# Patient Record
Sex: FEMALE | Race: BLACK OR AFRICAN AMERICAN | Employment: FULL TIME | URBAN - METROPOLITAN AREA
[De-identification: names, ages, dates, MRNs, and addresses within clinical notes are randomized per-mention and may not be internally consistent; named-entity substitution may affect disease eponyms.]

---

## 2017-01-28 DIAGNOSIS — I10 ESSENTIAL HYPERTENSION, BENIGN: Chronic | ICD-10-CM

## 2017-01-30 ENCOUNTER — OFFICE VISIT (OUTPATIENT)
Dept: CARDIOLOGY CLINIC | Age: 57
End: 2017-01-30

## 2017-01-30 ENCOUNTER — CLINICAL SUPPORT (OUTPATIENT)
Dept: CARDIOLOGY CLINIC | Age: 57
End: 2017-01-30

## 2017-01-30 VITALS
BODY MASS INDEX: 23.3 KG/M2 | OXYGEN SATURATION: 98 % | SYSTOLIC BLOOD PRESSURE: 150 MMHG | WEIGHT: 140 LBS | HEART RATE: 79 BPM | RESPIRATION RATE: 18 BRPM | DIASTOLIC BLOOD PRESSURE: 100 MMHG

## 2017-01-30 DIAGNOSIS — I47.1 PAROXYSMAL ATRIAL TACHYCARDIA (HCC): ICD-10-CM

## 2017-01-30 DIAGNOSIS — I50.20 SYSTOLIC HEART FAILURE, UNSPECIFIED HEART FAILURE CHRONICITY: ICD-10-CM

## 2017-01-30 DIAGNOSIS — I42.8 CARDIOMYOPATHY, NONISCHEMIC (HCC): ICD-10-CM

## 2017-01-30 DIAGNOSIS — I10 ESSENTIAL HYPERTENSION, BENIGN: Chronic | ICD-10-CM

## 2017-01-30 DIAGNOSIS — Z99.2 ESRD ON PERITONEAL DIALYSIS (HCC): ICD-10-CM

## 2017-01-30 DIAGNOSIS — N18.6 ESRD ON PERITONEAL DIALYSIS (HCC): ICD-10-CM

## 2017-01-30 DIAGNOSIS — I10 ESSENTIAL HYPERTENSION, BENIGN: Primary | Chronic | ICD-10-CM

## 2017-01-30 RX ORDER — VALSARTAN 80 MG/1
TABLET ORAL
Qty: 60 TAB | Refills: 5 | Status: CANCELLED | OUTPATIENT
Start: 2017-01-30

## 2017-01-30 RX ORDER — VALSARTAN 160 MG/1
160 TABLET ORAL 2 TIMES DAILY
Qty: 180 TAB | Refills: 3 | Status: SHIPPED | OUTPATIENT
Start: 2017-01-30 | End: 2018-03-03 | Stop reason: SDUPTHER

## 2017-01-30 RX ORDER — CARVEDILOL 25 MG/1
25 TABLET ORAL 2 TIMES DAILY WITH MEALS
Qty: 60 TAB | Refills: 3
Start: 2017-01-30 | End: 2017-02-08 | Stop reason: SDUPTHER

## 2017-01-30 NOTE — PROGRESS NOTES
[id number] Freddie Hdez [facility], [address] [facility]    Office [phone]  Fax [phone]    History of Present Illness    Oneil Ivy is a 61 y.o. female. Last seen 4 months ago. Problem List  Date Reviewed: 11/30/2016          Codes Class Noted    Paroxysmal atrial tachycardia ([facility] ICD-10-CM: I47.1  ICD-9-CM: 427.0  4/17/2016        ESRD on peritoneal dialysis [facility]) ICD-10-CM: N18.6, Z99.2  ICD-9-CM: 585.6, V45.11  98/84/3598        Systolic HF (heart failure) ([facility] ICD-10-CM: I50.20  ICD-9-CM: 428.20  10/31/2013        Cardiomyopathy, nonischemic ([facility] ICD-10-CM: I42.9  ICD-9-CM: 425.4  10/19/2013    Overview Signed 10/19/2013  1:36 PM by Lottie Jackson MD     LVEF 25-30% by echo 10/19/2013 (previously 45%)             Polycystic kidney disease (Chronic) ICD-10-CM: Q61.3  ICD-9-CM: 753.12  10/18/2012        Essential hypertension, benign (Chronic) ICD-10-CM: I10  ICD-9-CM: 401.1  10/18/2012                Cardiac Testing  ECHO: 10/14/12: EF 45% w/ posterior HK Grade 1 DD, LAE, mild MR, mild-mod TR RVSP 60 mmHG   Cath: 10/16/12: Normal cors. No AVG. Mild pulm HTN (43/16/26). Low-normal filling pressures. Echo 10/19/13 - EF 25- 30%. Severe diffuse hypokinesis. Mildly increased wall thickness. Mild concentric hypertrophy. Doppler parameters were consistent with a reversible restrictive pattern, indicative of decreased left ventricular  diastolic compliance and/or increased left atrial pressure (grade 3 diastolic dysfunction). LA mildly dilated, mild MR, mild TR, mod PA HTN, small pericardial effusion circumferential to the heart, no evidence of hemodynamic compromise. Echo 11/2/15 - EF 25-30%, global HK, mild MR  Lexiscan cardiolite 11/24/15 - focal anteroapical ischemia    Cath 12/15/2015 - EDP 10, LV dilated, EF 20% global HK, normal cors with right dominance. Unable to cannulate femoral vein.      12/29/15-implantation of a single-chamber [address] per  Angelo MAC    Ms. Mathew Seek notes of 3-4 pillow orthopnea for the past several weeks. Patient has unchanged occassional HENDERSON. Her BPs at home normally is in the 120/90s range, but notes it has been elevated recently. She remains active with work. She is still producing urine. Patient denies any exertional chest pain, palpitations, syncope,  edema or paroxysmal nocturnal dyspnea. No ICD discharges. Current Outpatient Prescriptions on File Prior to Visit   Medication Sig Dispense Refill    calcitRIOL (ROCALTROL) 0.25 mcg capsule Take 0.25 mcg by mouth every other day.  valsartan (DIOVAN) 80 mg tablet TAKE ONE TABLET BY MOUTH TWICE DAILY 60 Tab 5    potassium chloride SR (KLOR-CON 10) 10 mEq tablet Take 40 mEq by mouth daily.  spironolactone (ALDACTONE) 50 mg tablet Take 50 mg by mouth daily.  bumetanide (BUMEX) 2 mg tablet Take 4 mg by mouth daily.  lactulose (CHRONULAC) 10 gram/15 mL solution Take 20 g by mouth daily.  aspirin delayed-release 81 mg tablet Take 81 mg by mouth daily.  gentamicin (GARAMYCIN) 0.1 % topical cream Apply  to affected area two (2) times a day.  sevelamer carbonate (RENVELA) 800 mg tab tab Take 800 mg by mouth three (3) times daily (with meals).  MULTIVITAMIN PO Take  by mouth.  fluticasone (FLONASE) 50 mcg/actuation nasal spray 1 [place] by Both Nostrils route daily. 1 Bottle 0     No current facility-administered medications on file prior to visit. Social History     Social History    Marital status: SINGLE     Spouse name: N/A    Number of children: N/A    Years of education: N/A     Occupational History    Not on file.      Social History Main Topics    Smoking status: Never Smoker    Smokeless tobacco: Never Used    Alcohol use No    Drug use: No    Sexual activity: Not Currently     Partners: Male     Other Topics Concern    Not on file     Social History Narrative     Not currently employed as of last week    Review of Systems  Constitutional:  Negative for fever, chills and diaphoresis. Respiratory:  Positive for dyspnea. Negative for cough, hemoptysis, sputum production. Cardiovascular:  Negative for palpitations, claudication, leg swelling and PND. Positive for orthopnea. Gastrointestinal:  Negative for nausea, vomiting, blood in stool and melena. Genitourinary:  Negative for dysuria, urgency and flank pain. Musculoskeletal:  Negative for back pain and joint pain. Skin:  Negative for rash. Neurological:  Negative for dizziness, weakness, seizures, loss of consciousness and headaches. Endo/Heme/Allergies:  Does not bruise/bleed easily. Psychiatric/Behavioral:  Negative for memory loss. The patient does not have insomnia. Visit Vitals    BP (!) 150/100 (BP 1 Location: Left arm, BP Patient Position: At rest)    Pulse 79    Resp 18    Wt 140 lb (63.5 kg)    SpO2 98%    BMI 23.3 kg/m2     Wt Readings from Last 3 Encounters:   01/30/17 140 lb (63.5 kg)   11/09/16 140 lb (63.5 kg)   04/15/16 144 lb 12.8 oz (65.7 kg)     Physical Exam  Vitals reviewed. Constitutional:  She is oriented to person, place and time. She appears well-nourished. HENT:  Head:  Normocephalic. Neck:  Neck supple. No JVD present. Cardiovascular:  Normal rate, regular rhythm, normal heart sounds and intact distal pulses. Exam reveals no gallop. No murmur heard. Pulmonary/Chest:  Effort normal and breath sounds normal.  Abdominal:  Soft, nontender. Bowel sounds normal.  Musculoskeletal:  No edema. Neurological:  Alert and oriented to person, place and time. Skin:  Skin is warm and dry. Psychiatric:  She has a normal mood and affect. Cardiographics  Echo 11/2/15 - EF 25-30%, global HK, mild MR  Lexiscan cardiolite 11/24/15 - focal anteroapical ischemia  EKG 4/15/2016 - atrial tachycardia 120   Echo 1/30/16 - dilated LV, EF 20%    ASSESSMENT and PLAN  Encounter Diagnoses   Name Primary?     Essential hypertension, benign Yes    Paroxysmal atrial tachycardia (HCC)     Systolic heart failure, unspecified heart failure chronicity (HCC)     Cardiomyopathy, nonischemic ([facility]        Ms. Brennan Richardson has a NICM with severe LV dysfunction s/p ICD in the setting of HTN, ESRD on PD. She has had class 2 HF until the past 2 weeks when she describes orthopnea. her BPs have been borderline elevated. She continues to make urine with diuretic therapy. Echo today is largely unchanged with EF in the 20% range. Will increase Valsartan to 160mg BID. Continue Coreg 25mg BID. She continues to increase dylysate to manage her fluid status. We had a long discussion of the role of heart/kidney transplantation. She would like to hold off the discussion for now. Follow-up Disposition:  Return in about 3 months (around 4/30/2017).      Written by Chantel Maradiaga, as dictated by Jose A Hutton MD.   Jose A Hutton MD

## 2017-01-30 NOTE — MR AVS SNAPSHOT
Visit Information Date & Time Provider Department Dept. Phone Encounter #  
 1/30/2017  3:20 PM Adalgisa Bruce MD [facility] [place] [phone] [id number] Follow-up Instructions Return in about 3 months (around 4/30/2017). Your Appointments 12/8/2017  8:30 AM  
PACEMAKER with PACEMAKER, STFRANCES  
[facility] ([place] SCHEDULING) Appt Note: vernon sci icd/stf/new lat schedule [address] [place] [address] [address] [facility]  
[phone]  
  
   
 [address] [facility] [address]  
  
    
 12/8/2017  9:00 AM  
ESTABLISHED PATIENT with Brii Cintron MD  
[facility] ([facility]) Appt Note: annual w/ pacer ck [address] [address]  
[phone]  
  
   
 [address] [place] [address] [address]  
  
    
  
 2/16/2017 10:45 AM  
REMOTE OFFICE VISIT with Bharath Navarrete [facility] ([place] SCHEDULING) Appt Note: lat icd/stf/b 11-9-16  
 [address] [address]  
[phone]  
  
   
 [address] [place] [phone]  
  
    
 5/23/2017 11:15 AM  
REMOTE OFFICE VISIT with Bharath Navarrete [facility] ([facility] SCHEDULING) Appt Note: lat icd/stf  
 [address] [address] [facility]  
[phone]  
  
    
 8/29/2017 10:30 AM  
REMOTE OFFICE VISIT with Bharath Navarrete [facility] ([place] SCHEDULING) Appt Note: lat icd/stf  
 [address] [place] [address] [address] [facility]  
[phone] Upcoming Health Maintenance Date Due Hepatitis C Screening 1960 DTaP/Tdap/Td series (1 - Tdap) 12/18/1980 FOBT Q 1 YEAR AGE 50-75 12/18/2009 Pneumococcal 19-64 Highest Risk (2 of 3 - PCV13) 10/19/2014 BREAST CANCER SCRN MAMMOGRAM 7/6/2016 INFLUENZA AGE 9 TO ADULT 8/1/2016 PAP AKA CERVICAL CYTOLOGY 7/6/2020 Allergies as of 1/30/2017  Review Complete On: 11/30/2016 By: Rashid Vincent MD  
 No Known Allergies Current Immunizations  Never Reviewed Name Date Influenza Vaccine PF 10/19/2013  6:32 AM  
 Pneumococcal Polysaccharide (PPSV-23) 10/19/2013  6:07 AM  
  
 Not reviewed this visit You Were Diagnosed With   
  
 Codes Comments Essential hypertension, benign    -  Primary ICD-10-CM: I10 
ICD-9-CM: 401.1 Paroxysmal atrial tachycardia (HCC)     ICD-10-CM: I47.1 ICD-9-CM: 427.0 Systolic heart failure, unspecified heart failure chronicity (HCC)     ICD-10-CM: I50.20 ICD-9-CM: 428.20 Cardiomyopathy, nonischemic ([facility]     ICD-10-CM: I42.9 ICD-9-CM: 425.4 Vitals BP Pulse Resp Weight(growth percentile) SpO2 BMI  
 (!) 150/100 (BP 1 Location: Left arm, BP Patient Position: At rest) 79 18 140 lb (63.5 kg) 98% 23.3 kg/m2 OB Status Smoking Status Postmenopausal Never Smoker Vitals History BMI and BSA Data Body Mass Index Body Surface Area  
 23.3 kg/m 2 1.71 m 2 Preferred Pharmacy Pharmacy Name Phone [facility] [address] [place] [address] [phone] Your Updated Medication List  
  
   
This list is accurate as of: 1/30/17  3:55 PM.  Always use your most recent med list.  
  
  
  
  
 aspirin delayed-release 81 mg tablet Take 81 mg by mouth daily. bumetanide 2 mg tablet Commonly known as:  Imani Clark Take 4 mg by mouth daily. calcitRIOL 0.25 mcg capsule Commonly known as:  ROCALTROL Take 0.25 mcg by mouth every other day. fluticasone 50 mcg/actuation nasal spray Commonly known as:  FLONASE  
1 [place] by Both Nostrils route daily. gentamicin 0.1 % topical cream  
Commonly known as:  GARAMYCIN Apply  to affected area two (2) times a day. lactulose 10 gram/15 mL solution Commonly known as:  Maciel Sensing Take 20 g by mouth daily. MULTIVITAMIN PO Take  by mouth.  
  
 potassium chloride SR 10 mEq tablet Commonly known as:  KLOR-CON 10 Take 40 mEq by mouth daily. RENVELA 800 mg Tab tab Generic drug:  sevelamer carbonate Take 800 mg by mouth three (3) times daily (with meals). spironolactone 50 mg tablet Commonly known as:  ALDACTONE Take 50 mg by mouth daily. Follow-up Instructions Return in about 3 months (around 4/30/2017). Patient Instructions 1. Increase valsartan 160mg twice daily. Introducing [facility] SERVICES! Dear Chelsey Ask: 
Thank you for requesting a [employer] account. Our records indicate that you already have an active [employer] account. You can access your account anytime at [email or web address] Did you know that you can access your hospital and ER discharge instructions at any time in [employer]? You can also review all of your test results from your hospital stay or ER visit. Additional Information If you have questions, please visit the Frequently Asked Questions section of the [employer] website at [email or web address] Remember, [employer] is NOT to be used for urgent needs. For medical emergencies, dial 911. Now available from your iPhone and Android! Please provide this summary of care documentation to your next provider. Your primary care clinician is listed as Cruzito Driscoll. If you have any questions after today's visit, please call [phone].

## 2017-02-08 RX ORDER — CARVEDILOL 25 MG/1
TABLET ORAL
Qty: 60 TAB | Refills: 11 | Status: SHIPPED | OUTPATIENT
Start: 2017-02-08 | End: 2018-03-03 | Stop reason: SDUPTHER

## 2017-02-16 ENCOUNTER — OFFICE VISIT (OUTPATIENT)
Dept: CARDIOLOGY CLINIC | Age: 57
End: 2017-02-16

## 2017-02-16 DIAGNOSIS — Z95.810 CARDIAC DEFIBRILLATOR IN SITU: Primary | ICD-10-CM

## 2017-05-23 ENCOUNTER — OFFICE VISIT (OUTPATIENT)
Dept: CARDIOLOGY CLINIC | Age: 57
End: 2017-05-23

## 2017-05-23 DIAGNOSIS — Z95.810 PRESENCE OF AUTOMATIC CARDIOVERTER/DEFIBRILLATOR (AICD): Primary | ICD-10-CM

## 2017-07-02 ENCOUNTER — DOCUMENTATION ONLY (OUTPATIENT)
Dept: CARDIOLOGY CLINIC | Age: 57
End: 2017-07-02

## 2017-07-21 RX ORDER — DILTIAZEM HYDROCHLORIDE 120 MG/1
120 CAPSULE, COATED, EXTENDED RELEASE ORAL DAILY
Qty: 30 CAP | Refills: 6 | Status: SHIPPED | OUTPATIENT
Start: 2017-07-21 | End: 2018-04-03 | Stop reason: SDUPTHER

## 2017-09-14 ENCOUNTER — OFFICE VISIT (OUTPATIENT)
Dept: CARDIOLOGY CLINIC | Age: 57
End: 2017-09-14

## 2017-09-14 VITALS
RESPIRATION RATE: 18 BRPM | DIASTOLIC BLOOD PRESSURE: 76 MMHG | SYSTOLIC BLOOD PRESSURE: 112 MMHG | WEIGHT: 140.8 LBS | BODY MASS INDEX: 23.46 KG/M2 | HEIGHT: 65 IN | OXYGEN SATURATION: 97 % | HEART RATE: 64 BPM

## 2017-09-14 DIAGNOSIS — I47.1 PAROXYSMAL ATRIAL TACHYCARDIA (HCC): ICD-10-CM

## 2017-09-14 DIAGNOSIS — Q61.3 POLYCYSTIC KIDNEY DISEASE: Chronic | ICD-10-CM

## 2017-09-14 DIAGNOSIS — I42.8 CARDIOMYOPATHY, NONISCHEMIC (HCC): Primary | ICD-10-CM

## 2017-09-14 DIAGNOSIS — N18.6 ESRD ON PERITONEAL DIALYSIS (HCC): ICD-10-CM

## 2017-09-14 DIAGNOSIS — Z99.2 ESRD ON PERITONEAL DIALYSIS (HCC): ICD-10-CM

## 2017-09-14 DIAGNOSIS — I10 ESSENTIAL HYPERTENSION, BENIGN: Chronic | ICD-10-CM

## 2017-09-14 DIAGNOSIS — I50.22 CHRONIC SYSTOLIC HEART FAILURE (HCC): ICD-10-CM

## 2017-09-14 RX ORDER — OMEPRAZOLE 20 MG/1
20 CAPSULE, DELAYED RELEASE ORAL
COMMUNITY
End: 2020-01-01

## 2017-09-14 RX ORDER — CINACALCET 30 MG/1
30 TABLET, FILM COATED ORAL DAILY
COMMUNITY

## 2017-09-14 NOTE — PROGRESS NOTES
[id number] Freddie Hdez [facility], [address] [facility]    Office [phone]  Fax [phone]    History of Present Illness  Esther Pollock is a 61 y.o. female. Last seen 8 months ago by Dr. Jennifer Moody. Problem List  Date Reviewed: 9/14/2017          Codes Class Noted    Paroxysmal atrial tachycardia ([facility] ICD-10-CM: I47.1  ICD-9-CM: 427.0  4/17/2016        ESRD on peritoneal dialysis [facility]) ICD-10-CM: N18.6, Z99.2  ICD-9-CM: 585.6, V45.11  58/04/7136        Systolic HF (heart failure) ([facility] ICD-10-CM: I50.20  ICD-9-CM: 428.20  10/31/2013        Cardiomyopathy, nonischemic ([facility] ICD-10-CM: I42.8  ICD-9-CM: 425.4  10/19/2013    Overview Signed 10/19/2013  1:36 PM by Abida Pérez MD     LVEF 25-30% by echo 10/19/2013 (previously 45%)             Polycystic kidney disease (Chronic) ICD-10-CM: Q61.3  ICD-9-CM: 753.12  10/18/2012        Essential hypertension, benign (Chronic) ICD-10-CM: I10  ICD-9-CM: 401.1  10/18/2012            Cardiac Testing  ECHO: 10/14/12: EF 45% w/ posterior HK Grade 1 DD, LAE, mild MR, mild-mod TR RVSP 60 mmHG   Cath: 10/16/12: Normal cors. No AVG. Mild pulm HTN (43/16/26). Low-normal filling pressures. Echo 10/19/13 - EF 25- 30%. Severe diffuse hypokinesis. Mildly increased wall thickness. Mild concentric hypertrophy. Doppler parameters were consistent with a reversible restrictive pattern, indicative of decreased left ventricular  diastolic compliance and/or increased left atrial pressure (grade 3 diastolic dysfunction). LA mildly dilated, mild MR, mild TR, mod PA HTN, small pericardial effusion circumferential to the heart, no evidence of hemodynamic compromise. Echo 11/2/15 - EF 25-30%, global HK, mild MR  Lexiscan cardiolite 11/24/15 - focal anteroapical ischemia    Cath 12/15/2015 - EDP 10, LV dilated, EF 20% global HK, normal cors with right dominance. Unable to cannulate femoral vein.      12/29/15-implantation of a single-chamber [address] per Dr. Terese Mesa    Echo 1/30/17 - EF 20 %. Severe diffuse hypokinesis. Mild LAE. Mild MR. Mild Pulm HTN. Small pericardial effusion. No significant change when compared to study 02-Nov-2015. HPI  Ms. Feng Nuñez continues to lead a very active lifestyle. She continues to work full time and performs all of her routine ADL's. She has ESRD and continues to perform nightly PD. She is still producing urine - continues to take Bumex 4 mg BID, Aldactone and potassium as directed by Nephrologist Dr. Karla Garcia. She reports that dialysis is going well. Weight is stable. She has stable 3 pillow orthopnea. Unchanged occassional HENDERSON. She denies any exertional chest pain. No edema or paroxysmal nocturnal dyspnea. She presents today with concerns about 2 episodes (over the past several weeks) of palpitations/elevated HR which are associated with fatigue. She notes that she has been under a lot of stress with work as attributed these episodes to that. Her Nephrologist encouraged her to follow up with Cardiology for further evaluation. She denies any lightheadedness, dizziness, syncope or near syncope with these episodes. No ICD discharges. BP's per home monitoring have remained within normal limits following recent medication changes - Valsartan increased by Dr. Author Osullivan 1/2017 to address elevated SBP. Diltiazem added by Dr. Terese Mesa 7/2017 to address high vent rate episodes per device interrogation. Routine lab work is performed by her Nephrology group every 2 weeks - monthly. She reports labs ~ 2 weeks ago were within normal limits. Current Outpatient Prescriptions on File Prior to Visit   Medication Sig Dispense Refill    dilTIAZem CD (CARDIZEM CD) 120 mg ER capsule Take 1 Cap by mouth daily. 30 Cap 6    carvedilol (COREG) 25 mg tablet TAKE ONE TABLET BY MOUTH TWICE DAILY WITH MEALS 60 Tab 11    valsartan (DIOVAN) 160 mg tablet Take 1 Tab by mouth two (2) times a day.  Indications: Hypertension 180 Tab 3    potassium chloride SR (KLOR-CON 10) 10 mEq tablet Take 40 mEq by mouth daily.  spironolactone (ALDACTONE) 50 mg tablet Take 50 mg by mouth daily.  bumetanide (BUMEX) 2 mg tablet Take 4 mg by mouth two (2) times a day.  aspirin delayed-release 81 mg tablet Take 81 mg by mouth daily.  gentamicin (GARAMYCIN) 0.1 % topical cream Apply  to affected area two (2) times a day. No current facility-administered medications on file prior to visit. Social History     Social History    Marital status: SINGLE     Spouse name: N/A    Number of children: N/A    Years of education: N/A     Occupational History    Not on file. Social History Main Topics    Smoking status: Never Smoker    Smokeless tobacco: Never Used    Alcohol use No    Drug use: No    Sexual activity: Not Currently     Partners: Male     Other Topics Concern    Not on file     Social History Narrative     Review of Systems  Constitutional:  Negative for fever, chills and diaphoresis. Respiratory: Negative for cough, hemoptysis, sputum production. Positive for dyspnea. Cardiovascular:  Negative for claudication, leg swelling and PND. Positive for palpitations/tachycardia and orthopnea. Gastrointestinal:  Negative for nausea, vomiting, blood in stool and melena. Genitourinary:  Negative for dysuria, urgency and flank pain. Musculoskeletal:  Negative for back pain and joint pain. Skin:  Negative for rash. Neurological:  Negative for dizziness, weakness, seizures, loss of consciousness and headaches. Endo/Heme/Allergies:  Does not bruise/bleed easily. Psychiatric/Behavioral:  Negative for memory loss. The patient does not have insomnia.     Visit Vitals    /76 (BP 1 Location: Left arm, BP Patient Position: Sitting)    Pulse 64    Resp 18    Ht 5' 5\" (1.651 m)    Wt 140 lb 12.8 oz (63.9 kg)    SpO2 97%    BMI 23.43 kg/m2     Wt Readings from Last 3 Encounters:   01/30/17 140 lb (63.5 kg)   11/09/16 140 lb (63.5 kg)   04/15/16 144 lb 12.8 oz (65.7 kg)     Physical Exam  Vitals reviewed. Constitutional:  She is oriented to person, place and time. She appears well-nourished. HENT:  Head:  Normocephalic. Neck:  Neck supple. No JVD present. Cardiovascular:  Normal rate, regular rhythm, normal heart sounds and intact distal pulses. Exam reveals no gallop. No murmur heard. Pulmonary/Chest:  Effort normal and breath sounds normal.  Abdominal:  Soft, nontender. Bowel sounds normal.  Musculoskeletal:  No edema. Neurological:  Alert and oriented to person, place and time. Skin:  Skin is warm and dry. Psychiatric:  She has a normal mood and affect. Cardiographics  EKG 4/15/2016 - atrial tachycardia 120   Device interrogation 9/15/17 - No device therapies. No VT (since 2/2017). EKG 9/15/17 - SB, first degree AV block     ASSESSMENT and PLAN  Encounter Diagnoses   Name Primary?  Cardiomyopathy, nonischemic (HCC) Yes    Essential hypertension, benign     Chronic systolic heart failure (HCC)     ESRD on peritoneal dialysis ([facility]     Polycystic kidney disease     Paroxysmal atrial tachycardia ([facility]      Ms. Chucho Argueta has a NICM with severe LV dysfunction s/p ICD in the setting of HTN, ESRD on PD. She has stable class 2 HF symptoms on her current diuretic and PD regimen. Continue ARB, BB and Aldactone. Normotensive in the office today and per home monitoring. She has a history of atrial and ventricular tachycardia. Device interrogation 2 months ago revealed high ventricular rate episodes which prompted the addition of CCB. She denied that these episodes caused her any significant symptoms. Device interrogation today does not reveal any significant ventricular arrhythmias or atrial tachycardia, to my eye that would correlate with her reports of palpitations and tachycardia. EKG today is a NSR with frequent unifocal PVC's.  She reports stable electrolytes per recent check, however results are not available for my personal review at this time. The etiology of her events are unclear to me at this time. Stress could have possibly played a role. I plan to discuss her case further with Dr. Susannah Fairchild and follow up with her should any additional testing is needed at this time. She is scheduled for EP follow up 12/8/17.       Judy Arias NP

## 2017-09-14 NOTE — MR AVS SNAPSHOT
Visit Information Date & Time Provider Department Dept. Phone Encounter #  
 9/14/2017  9:30 AM Dimitrios Cox NP [facility] Javon Jessica [phone] [id number] Your Appointments 12/8/2017  8:30 AM  
PACEMAKER with PACEMAKER, ROLOANCES  
[facility] (VARGAS SCHEDULING) Appt Note: vernon sci icd/stf/new lat schedule [address] [facility] [place] [address] [facility]  
[phone]  
  
   
 [address] [facility] [address] [place] [address] [place] [address]  
  
    
 12/8/2017  9:00 AM  
ESTABLISHED PATIENT with Campos MD Po  
[facility] ([address]) Appt Note: annual w/ pacer ck [address] [facility] [address] [place] [address]  
[address] [place] [address] [address] Upcoming Health Maintenance Date Due Hepatitis C Screening 1960 DTaP/Tdap/Td series (1 - Tdap) 12/18/1980 FOBT Q 1 YEAR AGE 50-75 12/18/2009 Pneumococcal 19-64 Highest Risk (2 of 3 - PCV13) 10/19/2014 BREAST CANCER SCRN MAMMOGRAM 7/6/2016 INFLUENZA AGE 9 TO ADULT 8/1/2017 PAP AKA CERVICAL CYTOLOGY 7/6/2020 Allergies as of 9/14/2017  Review Complete On: 9/14/2017 By: Dimitrios Cox NP No Known Allergies Current Immunizations  Never Reviewed Name Date Influenza Vaccine PF 10/19/2013  6:32 AM  
 Pneumococcal Polysaccharide (PPSV-23) 10/19/2013  6:07 AM  
  
 Not reviewed this visit You Were Diagnosed With   
  
 Codes Comments Cardiomyopathy, nonischemic ([facility]    -  Primary ICD-10-CM: I42.8 ICD-9-CM: 425.4 Essential hypertension, benign     ICD-10-CM: I10 
ICD-9-CM: 218. 1 Chronic systolic heart failure (HCC)     ICD-10-CM: I50.22 ICD-9-CM: 428.22   
 ESRD on peritoneal dialysis ([facility]     ICD-10-CM: N18.6, Z99.2 ICD-9-CM: 585.6, V45.11 Polycystic kidney disease     ICD-10-CM: Q61.3 ICD-9-CM: 753.12   
 Paroxysmal atrial tachycardia (HCC)     ICD-10-CM: I47.1 ICD-9-CM: 427.0 Vitals BP Pulse Resp Height(growth percentile) Weight(growth percentile) SpO2  
 112/76 (BP 1 Location: Left arm, BP Patient Position: Sitting) 64 18 5' 5\" (1.651 m) 140 lb 12.8 oz (63.9 kg) 97% BMI OB Status Smoking Status 23.43 kg/m2 Postmenopausal Never Smoker BMI and BSA Data Body Mass Index Body Surface Area  
 23.43 kg/m 2 1.71 m 2 Preferred Pharmacy Pharmacy Name Phone [facility] [address] [phone] Your Updated Medication List  
  
   
This list is accurate as of: 9/14/17 10:37 AM.  Always use your most recent med list.  
  
  
  
  
 aspirin delayed-release 81 mg tablet Take 81 mg by mouth daily. bumetanide 2 mg tablet Commonly known as:  Rumalda Fritter Take 4 mg by mouth two (2) times a day. carvedilol 25 mg tablet Commonly known as:  COREG  
TAKE ONE TABLET BY MOUTH TWICE DAILY WITH MEALS  
  
 dilTIAZem  mg ER capsule Commonly known as:  CARDIZEM CD Take 1 Cap by mouth daily. ferric citrate 210 mg iron tablet Commonly known as:  Verlon Duet Take  by mouth three (3) times daily (with meals). gentamicin 0.1 % topical cream  
Commonly known as:  GARAMYCIN Apply  to affected area two (2) times a day. omeprazole 20 mg capsule Commonly known as:  PRILOSEC Take 20 mg by mouth daily. potassium chloride SR 10 mEq tablet Commonly known as:  KLOR-CON 10 Take 40 mEq by mouth daily. SENNA-DOCUSATE SODIUM PO Take  by mouth as needed. SENSIPAR 30 mg tablet Generic drug:  cinacalcet Take 30 mg by mouth daily. spironolactone 50 mg tablet Commonly known as:  ALDACTONE Take 50 mg by mouth daily. valsartan 160 mg tablet Commonly known as:  DIOVAN Take 1 Tab by mouth two (2) times a day. Indications: Hypertension We Performed the Following AMB POC EKG ROUTINE W/ 12 LEADS, INTER & REP [45388 CPT(R)] To-Do List   
 Around 01/14/2018 ECHO:  2D ECHO COMPLETE ADULT (TTE) W OR WO CONTR Introducing [facility]! Dear Riya Og: 
Thank you for requesting a [employer] account. Our records indicate that you already have an active [employer] account. You can access your account anytime at [email or web address] Did you know that you can access your hospital and ER discharge instructions at any time in [employer]? You can also review all of your test results from your hospital stay or ER visit. Additional Information If you have questions, please visit the Frequently Asked Questions section of the [employer] website at [email or web address] Remember, [employer] is NOT to be used for urgent needs. For medical emergencies, dial 911. Now available from your iPhone and Android! Please provide this summary of care documentation to your next provider. Your primary care clinician is listed as Tod Blow. If you have any questions after today's visit, please call [phone].

## 2017-10-05 ENCOUNTER — TELEPHONE (OUTPATIENT)
Dept: CARDIOLOGY CLINIC | Age: 57
End: 2017-10-05

## 2017-10-05 NOTE — TELEPHONE ENCOUNTER
Ant Fenton, MICHAEL Iverson, ERYN                   Would you please notify Ms. Pimentel that I had Dr. May Grande review her recent device interrogation and he did not see any concerning high HR events.       Suspect that symptoms she and I discussed a few weeks ago may have been related to stress, as she suspected.       Can you check in to see how she is doing now? If fine, continue follow up with Device checks and MD follow up as previously scheduled.         Patient notified. She voices understanding. She states she feels good and has not had any recent events.

## 2017-12-07 NOTE — PROGRESS NOTES
HISTORY OF PRESENTING ILLNESS      Rebecca Kimble is a 61 y.o. female with HF, HTN, non ischemic CM, LVEF 25-30%, ESRD on peritoneal dialysis, NYHA class II-III, SVT who presents for follow up of her ICD and SVT. Previous ICD interrogation in 7/17 demonstrated tachycardia suspicious for SVT. Patient is doing well without recurrence of tachycardia on her device interrogation since 5/17. Device interrogation otherwise reveals normal device functioning. Patient denies chest pain, shortness of breath, palpitations, syncope. Overall she is quite pleased with her current quality of life. ACTIVE PROBLEM LIST     Patient Active Problem List    Diagnosis Date Noted    Paroxysmal atrial tachycardia ([address] 04/17/2016    ESRD on peritoneal dialysis ([address] 94/58/1320    Systolic HF (heart failure) ([address] 10/31/2013    Cardiomyopathy, nonischemic ([address] 10/19/2013    Polycystic kidney disease 10/18/2012    Essential hypertension, benign 10/18/2012           PAST MEDICAL HISTORY     Past Medical History:   Diagnosis Date    Chronic diastolic heart failure ([address] 10/31/2012    Chronic systolic heart failure ([address] 10/31/2012    Heart failure ([address] 00/23/6953    systolic and diastolic    Hx of non-ST elevation myocardial infarction (NSTEMI)     Hypertension     Peritoneal dialysis catheter in place [facility])     Polycystic kidney disease            PAST SURGICAL HISTORY     No past surgical history on file.        ALLERGIES     No Known Allergies       FAMILY HISTORY     Family History   Problem Relation Age of Onset    Diabetes Brother     Hypertension Brother     Hypertension Mother     Kidney Disease Mother     Hypertension Sister     negative for cardiac disease       SOCIAL HISTORY     Social History     Social History    Marital status: SINGLE     Spouse name: N/A    Number of children: N/A    Years of education: N/A     Social History Main Topics    Smoking status: Never Smoker    Smokeless tobacco: Never Used    Alcohol use No    Drug use: No    Sexual activity: Not Currently     Partners: Male     Other Topics Concern    Not on file     Social History Narrative         MEDICATIONS     Current Outpatient Prescriptions   Medication Sig    omeprazole (PRILOSEC) 20 mg capsule Take 20 mg by mouth daily.  SENNA-DOCUSATE SODIUM PO Take  by mouth as needed.  cinacalcet (SENSIPAR) 30 mg tablet Take 30 mg by mouth daily.  ferric citrate (AURYXIA) 210 mg iron tablet Take  by mouth three (3) times daily (with meals).  dilTIAZem CD (CARDIZEM CD) 120 mg ER capsule Take 1 Cap by mouth daily.  carvedilol (COREG) 25 mg tablet TAKE ONE TABLET BY MOUTH TWICE DAILY WITH MEALS    valsartan (DIOVAN) 160 mg tablet Take 1 Tab by mouth two (2) times a day. Indications: Hypertension    potassium chloride SR (KLOR-CON 10) 10 mEq tablet Take 40 mEq by mouth daily.  spironolactone (ALDACTONE) 50 mg tablet Take 50 mg by mouth daily.  bumetanide (BUMEX) 2 mg tablet Take 4 mg by mouth two (2) times a day.  aspirin delayed-release 81 mg tablet Take 81 mg by mouth daily.  gentamicin (GARAMYCIN) 0.1 % topical cream Apply  to affected area two (2) times a day. No current facility-administered medications for this visit. I have reviewed the nurses notes, vitals, problem list, allergy list, medical history, family, social history and medications. REVIEW OF SYMPTOMS      General: Pt denies excessive weight gain or loss. Pt is able to conduct ADL's  HEENT: Denies blurred vision, headaches, hearing loss, epistaxis and difficulty swallowing. Respiratory: Denies cough, congestion, shortness of breath, HENDERSON, wheezing or stridor.   Cardiovascular: Denies precordial pain, palpitations, edema or PND  Gastrointestinal: Denies poor appetite, indigestion, abdominal pain or blood in stool  Genitourinary: Denies hematuria, dysuria, increased urinary frequency  Musculoskeletal: Denies joint pain or swelling from muscles or joints  Neurologic: Denies tremor, paresthesias, headache, or sensory motor disturbance  Psychiatric: Denies confusion, insomnia, depression  Integumentray: Denies rash, itching or ulcers. Hematologic: Denies easy bruising, bleeding     PHYSICAL EXAMINATION      There were no vitals filed for this visit. General: Well developed, in no acute distress. HEENT: No jaundice, oral mucosa moist, no oral ulcers  Neck: Supple, no stiffness, no lymphadenopathy, supple  Heart:  Normal S1/S2 negative S3 or S4. Regular, no murmur, gallop or rub, no jugular venous distention  Respiratory: Clear bilaterally x 4, no wheezing or rales  Abdomen:   Soft, non-tender, bowel sounds are active.   Extremities:  No edema, normal cap refill, no cyanosis. Musculoskeletal: No clubbing, no deformities  Neuro: A&Ox3, speech clear, gait stable, cooperative, no focal neurologic deficits  Skin: Skin color is normal. No rashes or lesions. Non diaphoretic, moist.  Vascular: 2+ pulses symmetric in all extremities       DIAGNOSTIC DATA      EKG:        LABORATORY DATA      Lab Results   Component Value Date/Time    WBC 7.5 12/10/2015 02:09 PM    Hemoglobin (POC) 9.5 10/18/2013 03:35 PM    HGB 10.4 12/10/2015 02:09 PM    Hematocrit (POC) 28 10/18/2013 03:35 PM    HCT 31.1 12/10/2015 02:09 PM    PLATELET 862 51/34/5162 02:09 PM    MCV 92 12/10/2015 02:09 PM      Lab Results   Component Value Date/Time    Sodium 145 12/10/2015 02:09 PM    Potassium 4.2 12/10/2015 02:09 PM    Chloride 102 12/10/2015 02:09 PM    CO2 22 12/10/2015 02:09 PM    Anion gap 9 01/21/2015 06:05 AM    Glucose 88 12/10/2015 02:09 PM    BUN 36 12/10/2015 02:09 PM    Creatinine 8.91 12/10/2015 02:09 PM    BUN/Creatinine ratio 4 12/10/2015 02:09 PM    GFR est AA 5 12/10/2015 02:09 PM    GFR est non-AA 5 12/10/2015 02:09 PM    Calcium 9.8 12/10/2015 02:09 PM    Bilirubin, total 0.5 01/19/2015 06:35 PM    AST (SGOT) 20 01/19/2015 06:35 PM    Alk.  phosphatase 65 01/19/2015 06:35 PM    Protein, total 7.3 01/19/2015 06:35 PM    Albumin 2.8 01/19/2015 06:35 PM    Globulin 4.5 01/19/2015 06:35 PM    A-G Ratio 0.6 01/19/2015 06:35 PM    ALT (SGPT) 20 01/19/2015 06:35 PM           ASSESSMENT      1. Cardiomyopathy              A. Non ischemic                B. LV systolic              C. NYHA class III              D. Narrow QRS  2. Hypertension  3. End stage renal disease  4. Supraventricular tachycardia  5. ICD           PLAN     Continue current drug regimen and monitoring in device clinic     FOLLOW-UP     1 year      Thank you,  Keisha Hook MD and Dr. Francie Stanton for involving me in the care of this extraordinarily pleasant female. Please do not hesitate to contact me for further questions/concerns.          Fortino Bolaños MD  Cardiac Electrophysiology / Cardiology    [address]  [address], Suite [facility], [address] [place] [address], [address] [place] [address], [facility]  [phone] / [phone] Fax   [phone] / [phone] Fax

## 2017-12-08 ENCOUNTER — OFFICE VISIT (OUTPATIENT)
Dept: CARDIOLOGY CLINIC | Age: 57
End: 2017-12-08

## 2017-12-08 ENCOUNTER — CLINICAL SUPPORT (OUTPATIENT)
Dept: CARDIOLOGY CLINIC | Age: 57
End: 2017-12-08

## 2017-12-08 VITALS
BODY MASS INDEX: 23.53 KG/M2 | HEART RATE: 68 BPM | RESPIRATION RATE: 16 BRPM | OXYGEN SATURATION: 98 % | SYSTOLIC BLOOD PRESSURE: 128 MMHG | DIASTOLIC BLOOD PRESSURE: 82 MMHG | WEIGHT: 141.2 LBS | HEIGHT: 65 IN

## 2017-12-08 DIAGNOSIS — I47.1 SVT (SUPRAVENTRICULAR TACHYCARDIA) (HCC): Primary | ICD-10-CM

## 2017-12-08 DIAGNOSIS — Z95.810 PRESENCE OF AUTOMATIC CARDIOVERTER/DEFIBRILLATOR (AICD): Primary | ICD-10-CM

## 2017-12-08 NOTE — PROGRESS NOTES
Visit Vitals    /82 (BP 1 Location: Right arm, BP Patient Position: Sitting)    Pulse 68    Resp 16    Ht 5' 5\" (1.651 m)    Wt 141 lb 3.2 oz (64 kg)    SpO2 98%    BMI 23.5 kg/m2

## 2018-01-26 ENCOUNTER — CLINICAL SUPPORT (OUTPATIENT)
Dept: CARDIOLOGY CLINIC | Age: 58
End: 2018-01-26

## 2018-01-26 ENCOUNTER — OFFICE VISIT (OUTPATIENT)
Dept: CARDIOLOGY CLINIC | Age: 58
End: 2018-01-26

## 2018-01-26 VITALS
HEART RATE: 68 BPM | BODY MASS INDEX: 23.49 KG/M2 | OXYGEN SATURATION: 98 % | WEIGHT: 141 LBS | DIASTOLIC BLOOD PRESSURE: 84 MMHG | RESPIRATION RATE: 16 BRPM | HEIGHT: 65 IN | SYSTOLIC BLOOD PRESSURE: 132 MMHG

## 2018-01-26 DIAGNOSIS — I10 ESSENTIAL HYPERTENSION, BENIGN: Chronic | ICD-10-CM

## 2018-01-26 DIAGNOSIS — I47.1 PAROXYSMAL ATRIAL TACHYCARDIA (HCC): ICD-10-CM

## 2018-01-26 DIAGNOSIS — N18.6 ESRD ON PERITONEAL DIALYSIS (HCC): ICD-10-CM

## 2018-01-26 DIAGNOSIS — Z99.2 ESRD ON PERITONEAL DIALYSIS (HCC): ICD-10-CM

## 2018-01-26 DIAGNOSIS — I42.8 CARDIOMYOPATHY, NONISCHEMIC (HCC): ICD-10-CM

## 2018-01-26 DIAGNOSIS — Z95.810 ICD (IMPLANTABLE CARDIOVERTER-DEFIBRILLATOR) IN PLACE: ICD-10-CM

## 2018-01-26 DIAGNOSIS — I50.22 CHRONIC SYSTOLIC HEART FAILURE (HCC): Primary | ICD-10-CM

## 2018-01-26 DIAGNOSIS — I50.22 CHRONIC SYSTOLIC HEART FAILURE (HCC): ICD-10-CM

## 2018-01-26 DIAGNOSIS — Q61.3 POLYCYSTIC KIDNEY DISEASE: Chronic | ICD-10-CM

## 2018-01-26 NOTE — PROGRESS NOTES
[id number] [facility], [address] [facility]    Office [phone]  Fax [phone]    History of Present Illness  Licha Chin is a 61 y.o. female. Last seen 4 months ago by Boogie Camarena NP. Last seen by me 1 year ago. Problem List  Date Reviewed: 9/14/2017          Codes Class Noted    ICD (implantable cardioverter-defibrillator) in place ICD-10-CM: Z95.810  ICD-9-CM: V45.02  1/26/2018        Paroxysmal atrial tachycardia ([facility] ICD-10-CM: I47.1  ICD-9-CM: 427.0  4/17/2016        ESRD on peritoneal dialysis [facility]) ICD-10-CM: N18.6, Z99.2  ICD-9-CM: 585.6, V45.11  36/00/1144        Systolic HF (heart failure) ([facility] ICD-10-CM: I50.20  ICD-9-CM: 428.20  10/31/2013        Cardiomyopathy, nonischemic ([facility] ICD-10-CM: I42.8  ICD-9-CM: 425.4  10/19/2013    Overview Signed 10/19/2013  1:36 PM by Doroteo Pollock MD     LVEF 25-30% by echo 10/19/2013 (previously 45%)             Polycystic kidney disease (Chronic) ICD-10-CM: Q61.3  ICD-9-CM: 753.12  10/18/2012        Essential hypertension, benign (Chronic) ICD-10-CM: I10  ICD-9-CM: 401.1  10/18/2012            Cardiac Testing  ECHO: 10/14/12: EF 45% w/ posterior HK Grade 1 DD, LAE, mild MR, mild-mod TR RVSP 60 mmHG   Cath: 10/16/12: Normal cors. No AVG. Mild pulm HTN (43/16/26). Low-normal filling pressures. Echo 10/19/13 - EF 25- 30%. Severe diffuse hypokinesis. Mildly increased wall thickness. Mild concentric hypertrophy. Doppler parameters were consistent with a reversible restrictive pattern, indicative of decreased left ventricular  diastolic compliance and/or increased left atrial pressure (grade 3 diastolic dysfunction). LA mildly dilated, mild MR, mild TR, mod PA HTN, small pericardial effusion circumferential to the heart, no evidence of hemodynamic compromise.     Echo 11/2/15 - EF 25-30%, global HK, mild MR  Lexiscan cardiolite 11/24/15 - focal anteroapical ischemia    Cath 12/15/2015 - EDP 10, LV dilated, EF 20% global HK, normal cors with right dominance. Unable to cannulate femoral vein. 12/29/15-implantation of a single-chamber [address] per Dr. Nav Solis    Echo 1/30/17 - EF 20 %. Severe diffuse hypokinesis. Mild LAE. Mild MR. Mild Pulm HTN. Small pericardial effusion. No significant change when compared to study 02-Nov-2015. Echo 1/26/18- LVEF 28%, severe LAE, PA systolic 50 mmHg      HPI  Ms. Herminia Crowder feels well overall. She goes for walks about twice a week for 1-2 miles. She reports HENDERSON walking fast or up stairs. She also notes SOB sometimes when getting out of bed in the morning. She denies any exertional sxs with her ADLs. Patient denies any exertional chest pain, palpitations, syncope, orthopnea, edema or paroxysmal nocturnal dyspnea. She gets regular ICD checks. She states PD has been going well. Current Outpatient Prescriptions on File Prior to Visit   Medication Sig Dispense Refill    omeprazole (PRILOSEC) 20 mg capsule Take 20 mg by mouth daily.  SENNA-DOCUSATE SODIUM PO Take  by mouth as needed.  cinacalcet (SENSIPAR) 30 mg tablet Take 30 mg by mouth daily.  ferric citrate (AURYXIA) 210 mg iron tablet Take  by mouth three (3) times daily (with meals).  dilTIAZem CD (CARDIZEM CD) 120 mg ER capsule Take 1 Cap by mouth daily. 30 Cap 6    carvedilol (COREG) 25 mg tablet TAKE ONE TABLET BY MOUTH TWICE DAILY WITH MEALS 60 Tab 11    valsartan (DIOVAN) 160 mg tablet Take 1 Tab by mouth two (2) times a day. Indications: Hypertension 180 Tab 3    potassium chloride SR (KLOR-CON 10) 10 mEq tablet Take 40 mEq by mouth daily.  spironolactone (ALDACTONE) 50 mg tablet Take 50 mg by mouth daily.  bumetanide (BUMEX) 2 mg tablet Take 4 mg by mouth two (2) times a day.  aspirin delayed-release 81 mg tablet Take 81 mg by mouth daily.  gentamicin (GARAMYCIN) 0.1 % topical cream Apply  to affected area two (2) times a day.        No current facility-administered medications on file prior to visit. Social History     Social History    Marital status: SINGLE     Spouse name: N/A    Number of children: N/A    Years of education: N/A     Occupational History    Not on file. Social History Main Topics    Smoking status: Never Smoker    Smokeless tobacco: Never Used    Alcohol use No    Drug use: No    Sexual activity: Not Currently     Partners: Male     Other Topics Concern    Not on file     Social History Narrative     Review of Systems  Constitutional:  Negative for fever, chills and diaphoresis. Respiratory: Negative for cough, hemoptysis, sputum production. Positive for dyspnea. Cardiovascular:  Negative for claudication, leg swelling and PND. Gastrointestinal:  Negative for nausea, vomiting, blood in stool and melena. Genitourinary:  Negative for dysuria, urgency and flank pain. Musculoskeletal:  Negative for back pain and joint pain. Skin:  Negative for rash. Neurological:  Negative for dizziness, weakness, seizures, loss of consciousness and headaches. Endo/Heme/Allergies:  Does not bruise/bleed easily. Psychiatric/Behavioral:  Negative for memory loss. The patient does not have insomnia. Visit Vitals    /84 (BP 1 Location: Left arm, BP Patient Position: Sitting)    Pulse 68    Resp 16    Ht 5' 5\" (1.651 m)    Wt 141 lb (64 kg)    SpO2 98%    BMI 23.46 kg/m2     Wt Readings from Last 3 Encounters:   01/26/18 141 lb (64 kg)   12/08/17 141 lb 3.2 oz (64 kg)   09/14/17 140 lb 12.8 oz (63.9 kg)     Physical Exam  Vitals reviewed. Constitutional:  She is oriented to person, place and time. She appears well-nourished. HENT:  Head:  Normocephalic. Neck:  Neck supple. No JVD present. Cardiovascular:  Normal rate, regular rhythm, normal heart sounds and intact distal pulses. Exam reveals no gallop. No murmur heard. Pulmonary/Chest:  Effort normal and breath sounds normal.  Abdominal:  Soft, nontender.   Bowel sounds normal.  Musculoskeletal:  No edema. Neurological:  Alert and oriented to person, place and time. Skin:  Skin is warm and dry. Psychiatric:  She has a normal mood and affect. Cardiographics  EKG 4/15/2016 - atrial tachycardia 120   Device interrogation 9/15/17 - No device therapies. No VT (since 2/2017). EKG 9/15/17 - SB, first degree AV block   Echo 1/26/18- LVEF 28%, severe LAE, PA systolic 50 mmHg    ASSESSMENT and PLAN  Encounter Diagnoses   Name Primary?  Essential hypertension, benign Yes    Chronic systolic heart failure (HCC)     Cardiomyopathy, nonischemic (HCC)     Paroxysmal atrial tachycardia (HCC)     ESRD on [facility] dialysis ([facility]     Polycystic kidney disease     ICD (implantable cardioverter-defibrillator) in place      Ms. Dora Bangura has a NICM with severe LV dysfunction s/p ICD in the setting of HTN, ESRD on PD. Echo today demonstrates stable but severe LF dysfunction (28%). She has stable class 2 HF symptoms on good medical therapy coupled with her PD regimen. She has significant LAE, but no documented AF. Continue regular ICD checks. We discussed renal transplantation. She may require a heart-kidney transplant. I have asked her to investigate this further. Follow-up Disposition:  Return in about 6 months (around 7/26/2018).      Written by Kathy Wisdom, dictated by Octavio Doss MD.  Octavio Doss MD

## 2018-01-26 NOTE — MR AVS SNAPSHOT
[address] 
[phone] Patient: Jayson Ivey MRN: [id number] Visit Information Date & Time Provider Department Dept. Phone Encounter #  
 1/26/2018  4:00 PM Buster Boss MD [facility] [place] [phone] [id number] Follow-up Instructions Return in about 6 months (around 7/26/2018). Your Appointments 12/19/2018  9:00 AM  
ESTABLISHED PATIENT with Yuridia Ellington MD  
[facility] ([facility]) Appt Note: annual  
 [address] [facility] [address]  
[phone]  
  
   
 [address] [facility] [address]  
  
    
  
 3/15/2018 11:30 AM  
REMOTE OFFICE VISIT with Jack Valencia [facility] ([facility]) Appt Note: lat icd/stf  
 [address] [facility] [address]  
[phone]  
  
   
 [address] [facility] [address]  
  
    
 6/21/2018  9:30 AM  
REMOTE OFFICE VISIT with Jack Valencia [facility] ([facility]) Appt Note: lat icd/stf  
 [address]  
[phone]  
  
    
 9/25/2018 10:00 AM  
REMOTE OFFICE VISIT with Jack Valencia [facility] ([facility]) Appt Note: lat icd/stf  
 [address]  
[phone] Upcoming Health Maintenance Date Due Hepatitis C Screening 1960 DTaP/Tdap/Td series (1 - Tdap) 12/18/1980 FOBT Q 1 YEAR AGE 50-75 12/18/2009 Pneumococcal 19-64 Highest Risk (2 of 3 - PCV13) 10/19/2014 BREAST CANCER SCRN MAMMOGRAM 7/6/2016 Influenza Age 5 to Adult 8/1/2017 PAP AKA CERVICAL CYTOLOGY 7/6/2020 Allergies as of 1/26/2018  Review Complete On: 1/26/2018 By: Jovani Wilcox No Known Allergies Current Immunizations  Never Reviewed Name Date Influenza Vaccine PF 10/19/2013  6:32 AM  
 Pneumococcal Polysaccharide (PPSV-23) 10/19/2013  6:07 AM  
  
 Not reviewed this visit You Were Diagnosed With   
  
 Codes Comments Essential hypertension, benign    -  Primary ICD-10-CM: I10 
ICD-9-CM: 244. 1 Chronic systolic heart failure (HCC)     ICD-10-CM: I50.22 ICD-9-CM: 428.22 Cardiomyopathy, nonischemic ([facility]     ICD-10-CM: I42.8 ICD-9-CM: 425.4 Paroxysmal atrial tachycardia (HCC)     ICD-10-CM: I47.1 ICD-9-CM: 427.0 ESRD on peritoneal dialysis ([facility]     ICD-10-CM: N18.6, Z99.2 ICD-9-CM: 585.6, V45.11 Polycystic kidney disease     ICD-10-CM: Q61.3 ICD-9-CM: 753.12 Vitals BP Pulse Resp Height(growth percentile) Weight(growth percentile) SpO2  
 132/84 (BP 1 Location: Left arm, BP Patient Position: Sitting) 68 16 5' 5\" (1.651 m) 141 lb (64 kg) 98% BMI OB Status Smoking Status 23.46 kg/m2 Postmenopausal Never Smoker Vitals History BMI and BSA Data Body Mass Index Body Surface Area  
 23.46 kg/m 2 1.71 m 2 Preferred Pharmacy Pharmacy Name Phone [facility] 90, [address] [phone] Your Updated Medication List  
  
   
This list is accurate as of: 1/26/18  4:29 PM.  Always use your most recent med list.  
  
  
  
  
 aspirin delayed-release 81 mg tablet Take 81 mg by mouth daily. bumetanide 2 mg tablet Commonly known as:  Alleen Herson Take 4 mg by mouth two (2) times a day. carvedilol 25 mg tablet Commonly known as:  COREG  
TAKE ONE TABLET BY MOUTH TWICE DAILY WITH MEALS  
  
 dilTIAZem  mg ER capsule Commonly known as:  CARDIZEM CD Take 1 Cap by mouth daily. ferric citrate 210 mg iron tablet Commonly known as:  Natividad Lout Take  by mouth three (3) times daily (with meals). gentamicin 0.1 % topical cream  
Commonly known as:  GARAMYCIN Apply  to affected area two (2) times a day. omeprazole 20 mg capsule Commonly known as:  PRILOSEC Take 20 mg by mouth daily. potassium chloride SR 10 mEq tablet Commonly known as:  KLOR-CON 10 Take 40 mEq by mouth daily. SENNA-DOCUSATE SODIUM PO Take  by mouth as needed. SENSIPAR 30 mg tablet Generic drug:  cinacalcet Take 30 mg by mouth daily. spironolactone 50 mg tablet Commonly known as:  ALDACTONE Take 50 mg by mouth daily. valsartan 160 mg tablet Commonly known as:  DIOVAN Take 1 Tab by mouth two (2) times a day. Indications: Hypertension Follow-up Instructions Return in about 6 months (around 7/26/2018). Introducing [facility]! Dear Erin Cueva: 
Thank you for requesting a [employer] account. Our records indicate that you already have an active [employer] account. You can access your account anytime at [email or web address] Did you know that you can access your hospital and ER discharge instructions at any time in [employer]? You can also review all of your test results from your hospital stay or ER visit. Additional Information If you have questions, please visit the Frequently Asked Questions section of the [employer] website at [email or web address] Remember, [employer] is NOT to be used for urgent needs. For medical emergencies, dial 911. Now available from your iPhone and Android! Please provide this summary of care documentation to your next provider. Your primary care clinician is listed as Abigail Conway. If you have any questions after today's visit, please call [phone].

## 2018-03-03 DIAGNOSIS — I10 ESSENTIAL HYPERTENSION, BENIGN: Chronic | ICD-10-CM

## 2018-03-05 DIAGNOSIS — I10 ESSENTIAL HYPERTENSION, BENIGN: Chronic | ICD-10-CM

## 2018-03-05 RX ORDER — VALSARTAN 160 MG/1
TABLET ORAL
Qty: 180 TAB | Refills: 3 | Status: SHIPPED | OUTPATIENT
Start: 2018-03-05 | End: 2018-03-07 | Stop reason: SDUPTHER

## 2018-03-05 RX ORDER — CARVEDILOL 25 MG/1
TABLET ORAL
Qty: 60 TAB | Refills: 11 | Status: SHIPPED | OUTPATIENT
Start: 2018-03-05 | End: 2018-03-05 | Stop reason: SDUPTHER

## 2018-03-05 RX ORDER — VALSARTAN 160 MG/1
TABLET ORAL
Qty: 60 TAB | Refills: 11 | Status: SHIPPED | OUTPATIENT
Start: 2018-03-05 | End: 2018-03-05 | Stop reason: SDUPTHER

## 2018-03-05 RX ORDER — CARVEDILOL 25 MG/1
TABLET ORAL
Qty: 180 TAB | Refills: 3 | Status: SHIPPED | OUTPATIENT
Start: 2018-03-05 | End: 2018-03-07 | Stop reason: SDUPTHER

## 2018-03-07 DIAGNOSIS — I10 ESSENTIAL HYPERTENSION, BENIGN: Chronic | ICD-10-CM

## 2018-03-07 RX ORDER — VALSARTAN 160 MG/1
TABLET ORAL
Qty: 180 TAB | Refills: 3 | Status: SHIPPED | OUTPATIENT
Start: 2018-03-07 | End: 2018-08-08

## 2018-03-07 RX ORDER — CARVEDILOL 25 MG/1
TABLET ORAL
Qty: 180 TAB | Refills: 3 | Status: SHIPPED | OUTPATIENT
Start: 2018-03-07 | End: 2018-08-27

## 2018-03-07 NOTE — TELEPHONE ENCOUNTER
Pharmacy verified. Requested Prescriptions     Pending Prescriptions Disp Refills    carvedilol (COREG) 25 mg tablet 180 Tab 3     Sig: TAKE ONE TABLET BY MOUTH TWICE DAILY WITH MEALS    valsartan (DIOVAN) 160 mg tablet 180 Tab 3     Sig: TAKE ONE TABLET BY MOUTH TWICE DAILY FOR HYPERTENSION     Thanks!   Braulio Dave

## 2018-03-12 ENCOUNTER — TELEPHONE (OUTPATIENT)
Dept: CARDIOLOGY CLINIC | Age: 58
End: 2018-03-12

## 2018-03-15 ENCOUNTER — OFFICE VISIT (OUTPATIENT)
Dept: CARDIOLOGY CLINIC | Age: 58
End: 2018-03-15

## 2018-03-15 DIAGNOSIS — Z95.810 CARDIAC DEFIBRILLATOR IN SITU: Primary | ICD-10-CM

## 2018-03-21 ENCOUNTER — HOSPITAL ENCOUNTER (OUTPATIENT)
Dept: GENERAL RADIOLOGY | Age: 58
Discharge: HOME | End: 2018-03-21
Payer: MEDICARE

## 2018-03-21 DIAGNOSIS — R05.9 COUGH: ICD-10-CM

## 2018-03-21 DIAGNOSIS — R06.02 SOB (SHORTNESS OF BREATH): ICD-10-CM

## 2018-03-21 PROCEDURE — 71046 X-RAY EXAM CHEST 2 VIEWS: CPT

## 2018-04-03 RX ORDER — DILTIAZEM HYDROCHLORIDE 120 MG/1
120 CAPSULE, COATED, EXTENDED RELEASE ORAL DAILY
Qty: 30 CAP | Refills: 6 | Status: SHIPPED | OUTPATIENT
Start: 2018-04-03 | End: 2018-04-20 | Stop reason: SDUPTHER

## 2018-04-03 NOTE — TELEPHONE ENCOUNTER
Requested Prescriptions     Signed Prescriptions Disp Refills    dilTIAZem CD (CARDIZEM CD) 120 mg ER capsule 30 Cap 6     Sig: Take 1 Cap by mouth daily. Authorizing Provider: Ether Loyal     Ordering User: Leydi Cleary     Per verbal order Dr. Dashawn Villatoro.

## 2018-04-20 RX ORDER — DILTIAZEM HYDROCHLORIDE 120 MG/1
120 CAPSULE, COATED, EXTENDED RELEASE ORAL DAILY
Qty: 30 CAP | Refills: 6 | Status: SHIPPED | OUTPATIENT
Start: 2018-04-20 | End: 2018-08-24

## 2018-06-21 ENCOUNTER — OFFICE VISIT (OUTPATIENT)
Dept: CARDIOLOGY CLINIC | Age: 58
End: 2018-06-21

## 2018-06-21 DIAGNOSIS — Z95.810 CARDIAC DEFIBRILLATOR IN SITU: Primary | ICD-10-CM

## 2018-07-27 ENCOUNTER — OFFICE VISIT (OUTPATIENT)
Dept: CARDIOLOGY CLINIC | Age: 58
End: 2018-07-27

## 2018-07-27 VITALS
DIASTOLIC BLOOD PRESSURE: 76 MMHG | WEIGHT: 123 LBS | SYSTOLIC BLOOD PRESSURE: 132 MMHG | RESPIRATION RATE: 14 BRPM | OXYGEN SATURATION: 99 % | BODY MASS INDEX: 20.49 KG/M2 | HEART RATE: 103 BPM | HEIGHT: 65 IN

## 2018-07-27 DIAGNOSIS — I42.8 CARDIOMYOPATHY, NONISCHEMIC (HCC): ICD-10-CM

## 2018-07-27 DIAGNOSIS — Z95.810 ICD (IMPLANTABLE CARDIOVERTER-DEFIBRILLATOR) IN PLACE: ICD-10-CM

## 2018-07-27 DIAGNOSIS — I47.1 PAROXYSMAL ATRIAL TACHYCARDIA (HCC): ICD-10-CM

## 2018-07-27 DIAGNOSIS — Z99.2 ESRD ON PERITONEAL DIALYSIS (HCC): ICD-10-CM

## 2018-07-27 DIAGNOSIS — I48.0 PAROXYSMAL ATRIAL FIBRILLATION (HCC): ICD-10-CM

## 2018-07-27 DIAGNOSIS — I50.22 CHRONIC SYSTOLIC HEART FAILURE (HCC): Primary | ICD-10-CM

## 2018-07-27 DIAGNOSIS — Q61.3 POLYCYSTIC KIDNEY DISEASE: Chronic | ICD-10-CM

## 2018-07-27 DIAGNOSIS — I10 ESSENTIAL HYPERTENSION, BENIGN: Chronic | ICD-10-CM

## 2018-07-27 DIAGNOSIS — N18.6 ESRD ON PERITONEAL DIALYSIS (HCC): ICD-10-CM

## 2018-07-27 NOTE — MR AVS SNAPSHOT
[address] 
[phone] Patient: Christiana Vidales MRN: [id number] Visit Information Date & Time Provider Department Dept. Phone Encounter #  
 7/27/2018 11:00 AM Alberto Hurd, NP [facility] Shruthi Muro [phone] [id number] Your Appointments 8/2/2018  1:40 PM  
MAMMOGRAPHY with MAMMMAYA, ADOLFO Jones [facility] Appt Note: mammo + Dr. Carin Cole [address] [facility] [address]  
[facility] 31 [address]  
  
    
 8/2/2018  2:00 PM  
ESTABLISHED PATIENT with MD Surinder Luna ([facility]) Appt Note: mammo + Dr. Carin Cole [address]  
[phone]  
  
   
 [address]  
  
    
 12/19/2018  9:00 AM  
ESTABLISHED PATIENT with Cesia Capellan MD  
[facility] ([facility]) Appt Note: annual  
 [address]  
[phone]  
  
   
 [address] [address] [address]  
  
    
  
 9/25/2018 10:00 AM  
REMOTE OFFICE VISIT with Felicia Laurent [facility] ([facility]) Appt Note: lat icd/stf  
 [address]  
[address] [address] [address] Upcoming Health Maintenance Date Due Hepatitis C Screening 1960 DTaP/Tdap/Td series (1 - Tdap) 12/18/1980 FOBT Q 1 YEAR AGE 50-75 12/18/2009 Pneumococcal 19-64 Highest Risk (2 of 3 - PCV13) 10/19/2014 BREAST CANCER SCRN MAMMOGRAM 7/6/2016 MEDICARE YEARLY EXAM 3/14/2018 Influenza Age 5 to Adult 8/1/2018 PAP AKA CERVICAL CYTOLOGY 7/6/2020 Allergies as of 7/27/2018  Review Complete On: 7/27/2018 By: Danny Gauthier NP No Known Allergies Current Immunizations  Reviewed on 7/27/2018 Name Date Influenza Vaccine PF 10/19/2013  6:32 AM  
 Pneumococcal Polysaccharide (PPSV-23) 10/19/2013  6:07 AM  
  
 Reviewed by Richard Berman LPN on 0/70/4659 at 47:63 AM  
You Were Diagnosed With   
  
 Codes Comments Chronic systolic heart failure (HCC)    -  Primary ICD-10-CM: P42.70 ICD-9-CM: 428.22 Essential hypertension, benign     ICD-10-CM: I10 
ICD-9-CM: 401.1 Cardiomyopathy, nonischemic ([facility]     ICD-10-CM: I42.8 ICD-9-CM: 425.4 Polycystic kidney disease     ICD-10-CM: Q61.3 ICD-9-CM: 753.12   
 ESRD on peritoneal dialysis ([facility]     ICD-10-CM: N18.6, Z99.2 ICD-9-CM: 585.6, V45.11 Paroxysmal atrial tachycardia (HCC)     ICD-10-CM: I47.1 ICD-9-CM: 427.0 ICD (implantable cardioverter-defibrillator) in place     ICD-10-CM: Z95.810 ICD-9-CM: V45.02 Vitals BP Pulse Resp Height(growth percentile) Weight(growth percentile) SpO2  
 132/76 (BP 1 Location: Right arm, BP Patient Position: Sitting) (!) 103 14 5' 5\" (1.651 m) 123 lb (55.8 kg) 99% BMI OB Status Smoking Status 20.47 kg/m2 Postmenopausal Never Smoker Vitals History BMI and BSA Data Body Mass Index Body Surface Area  
 20.47 kg/m 2 1.6 m 2 Preferred Pharmacy Pharmacy Name Phone [facility] [address] [place] [address] [address] [facility] [phone] Your Updated Medication List  
  
   
This list is accurate as of 7/27/18 12:29 PM.  Always use your most recent med list.  
  
  
  
  
 aspirin delayed-release 81 mg tablet Take 81 mg by mouth daily. bumetanide 2 mg tablet Commonly known as:  Nubia Damon Take 4 mg by mouth two (2) times a day. carvedilol 25 mg tablet Commonly known as:  COREG  
TAKE ONE TABLET BY MOUTH TWICE DAILY WITH MEALS  
  
 dilTIAZem  mg ER capsule Commonly known as:  CARDIZEM CD Take 1 Cap by mouth daily. ferric citrate 210 mg iron tablet Commonly known as:  Earma Johny Take  by mouth three (3) times daily (with meals). gentamicin 0.1 % topical cream  
Commonly known as:  GARAMYCIN Apply  to affected area two (2) times a day. omeprazole 20 mg capsule Commonly known as:  PRILOSEC Take 20 mg by mouth daily. potassium chloride SR 10 mEq tablet Commonly known as:  KLOR-CON 10 Take 40 mEq by mouth daily. SENNA-DOCUSATE SODIUM PO Take  by mouth as needed. SENSIPAR 30 mg tablet Generic drug:  cinacalcet Take 30 mg by mouth daily. spironolactone 50 mg tablet Commonly known as:  ALDACTONE Take 50 mg by mouth daily. valsartan 160 mg tablet Commonly known as:  DIOVAN  
TAKE ONE TABLET BY MOUTH TWICE DAILY FOR HYPERTENSION We Performed the Following AMB POC EKG ROUTINE W/ 12 LEADS, INTER & REP [09098 CPT(R)] Introducing [facility]! Dear Ata Door: 
Thank you for requesting a [employer] account. Our records indicate that you already have an active [employer] account. You can access your account anytime at [email or web address] Did you know that you can access your hospital and ER discharge instructions at any time in [employer]? You can also review all of your test results from your hospital stay or ER visit. Additional Information If you have questions, please visit the Frequently Asked Questions section of the [employer] website at [email or web address] Remember, [employer] is NOT to be used for urgent needs. For medical emergencies, dial 911. Now available from your iPhone and Android! Please provide this summary of care documentation to your next provider. Your primary care clinician is listed as Casi Krause. If you have any questions after today's visit, please call [phone].

## 2018-07-27 NOTE — PATIENT INSTRUCTIONS
We uncovered a different heart rhythm on you today. Its called atrial fibrillation. I am going to obtain your records from [facility] and from your Nephrologist and then will call so we can continue our discussion about Coumadin (blood thinner).

## 2018-07-27 NOTE — PROGRESS NOTES
Chief Complaint   Patient presents with    Follow-up     6 mths,Systolic HF,PAF,ICD    Hypertension    Cardiomyopathy     1. Have you been to the ER, urgent care clinic since your last visit? Hospitalized since your last visit? Yes,MCV,allergic reaction,6/4/18 approx. Per pt    2. Have you seen or consulted any other health care providers outside of the [address] [place] [address] since your last visit? Include any pap smears or colon screening.  No    Visit Vitals    /76 (BP 1 Location: Right arm, BP Patient Position: Sitting)    Pulse (!) 103    Resp 14    Ht 5' 5\" (1.651 m)    Wt 123 lb (55.8 kg)    SpO2 99%    BMI 20.47 kg/m2

## 2018-07-31 PROBLEM — I48.0 PAROXYSMAL ATRIAL FIBRILLATION (HCC): Status: ACTIVE | Noted: 2018-07-31

## 2018-08-01 ENCOUNTER — DOCUMENTATION ONLY (OUTPATIENT)
Dept: CARDIOLOGY CLINIC | Age: 58
End: 2018-08-01

## 2018-08-01 NOTE — PROGRESS NOTES
Records Release form faxed to [facility] for hospital stay 6/2018 and to Mary Kate Haney 1841.

## 2018-08-02 ENCOUNTER — OFFICE VISIT (OUTPATIENT)
Dept: OBGYN CLINIC | Age: 58
End: 2018-08-02

## 2018-08-02 VITALS
HEIGHT: 65 IN | DIASTOLIC BLOOD PRESSURE: 74 MMHG | BODY MASS INDEX: 21.02 KG/M2 | SYSTOLIC BLOOD PRESSURE: 116 MMHG | WEIGHT: 126.2 LBS

## 2018-08-02 DIAGNOSIS — Z11.51 SPECIAL SCREENING EXAMINATION FOR HUMAN PAPILLOMAVIRUS (HPV): ICD-10-CM

## 2018-08-02 DIAGNOSIS — Z01.419 ENCOUNTER FOR GYNECOLOGICAL EXAMINATION (GENERAL) (ROUTINE) WITHOUT ABNORMAL FINDINGS: Primary | ICD-10-CM

## 2018-08-02 NOTE — PROGRESS NOTES
Fernando Stephens is a ,  61 y.o. female [address] whose LMP was on  who presents for her annual checkup. She is having no significant problems. She now needs a heart and kidney transplant - is working with docs at [facility]. Menstrual status:    Her periods are absent in flow. She denies dysmenorrhea. She reports no premenstrual symptoms. The patient is not using HRT. Contraception:    The current method of family planning is post menopausal status. Sexual history:    She  reports that she does not currently engage in sexual activity but has had male partners.;    Medical conditions:    Since her last annual GYN exam about 2015 ago, she has had the following changes in her health history: none. Pap and Mammogram History:    Her most recent Pap smear was normal/-HPV obtained 2015 year(s) ago. The patient had her mammogram today in our office. Breast Cancer History/Substance Abuse:    She has no family history of breast cancer. Osteoporosis History:    Family history does not include a first or second degree relative with osteopenia or osteoporosis. A bone density scan was not obtained. She is currently not taking calcium and vit D. Past Medical History:   Diagnosis Date    Chronic diastolic heart failure ([address] 10/31/2012    Chronic systolic heart failure ([facility] 10/31/2012    Heart failure ([facility]     systolic and diastolic    Hx of non-ST elevation myocardial infarction (NSTEMI)     Hypertension     Peritoneal dialysis catheter in place [facility])     Polycystic kidney disease      History reviewed. No pertinent surgical history. Current Outpatient Prescriptions   Medication Sig Dispense Refill    dilTIAZem CD (CARDIZEM CD) 120 mg ER capsule Take 1 Cap by mouth daily.  30 Cap 6    carvedilol (COREG) 25 mg tablet TAKE ONE TABLET BY MOUTH TWICE DAILY WITH MEALS 180 Tab 3    valsartan (DIOVAN) 160 mg tablet TAKE ONE TABLET BY MOUTH TWICE DAILY FOR HYPERTENSION 180 Tab 3    omeprazole (PRILOSEC) 20 mg capsule Take 20 mg by mouth daily.  SENNA-DOCUSATE SODIUM PO Take  by mouth as needed.  cinacalcet (SENSIPAR) 30 mg tablet Take 30 mg by mouth daily.  ferric citrate (AURYXIA) 210 mg iron tablet Take  by mouth three (3) times daily (with meals).  potassium chloride SR (KLOR-CON 10) 10 mEq tablet Take 40 mEq by mouth daily.  spironolactone (ALDACTONE) 50 mg tablet Take 50 mg by mouth daily.  bumetanide (BUMEX) 2 mg tablet Take 4 mg by mouth two (2) times a day.  aspirin delayed-release 81 mg tablet Take 81 mg by mouth daily.  gentamicin (GARAMYCIN) 0.1 % topical cream Apply  to affected area two (2) times a day. Allergies: Review of patient's allergies indicates no known allergies. Social History     Social History    Marital status: SINGLE     Spouse name: N/A    Number of children: N/A    Years of education: N/A     Occupational History    Not on file. Social History Main Topics    Smoking status: Never Smoker    Smokeless tobacco: Never Used    Alcohol use No    Drug use: No    Sexual activity: Not Currently     Partners: Male     Other Topics Concern    Not on file     Social History Narrative     Tobacco History:  reports that she has never smoked. She has never used smokeless tobacco.  Alcohol Abuse:  reports that she does not drink alcohol. Drug Abuse:  reports that she does not use illicit drugs.   Patient Active Problem List   Diagnosis Code    Polycystic kidney disease Q61.3    Essential hypertension, benign I10    Cardiomyopathy, nonischemic ([address] S39.9    Systolic HF (heart failure) ([address] I50.20    ESRD on peritoneal dialysis (HCC) N18.6, Z99.2    Paroxysmal atrial tachycardia (HCC) I47.1    ICD (implantable cardioverter-defibrillator) in place Z95.810    Paroxysmal atrial fibrillation ([address] I48.0         Review of Systems - History obtained from the patient  Constitutional: negative for weight loss, fever, night sweats  HEENT: negative for hearing loss, earache, congestion, snoring, sorethroat  CV: negative for chest pain, palpitations, edema  Resp: negative for cough, shortness of breath, wheezing  GI: negative for change in bowel habits, abdominal pain, black or bloody stools  : negative for frequency, dysuria, hematuria, vaginal discharge  MSK: negative for back pain, joint pain, muscle pain  Breast: negative for breast lumps, nipple discharge, galactorrhea  Skin :negative for itching, rash, hives  Neuro: negative for dizziness, headache, confusion, weakness  Psych: negative for anxiety, depression, change in mood  Heme/lymph: negative for bleeding, bruising, pallor    Physical Exam    Visit Vitals    /74    Ht 5' 5\" (1.651 m)    Wt 126 lb 3.2 oz (57.2 kg)    BMI 21 kg/m2     Constitutional  · Appearance: well-nourished, well developed, alert, in no acute distress    HENT  · Head and Face: appears normal    Neck  · Inspection/Palpation: normal appearance, no masses or tenderness  · Lymph Nodes: no lymphadenopathy present  · Thyroid: gland size normal, nontender, no nodules or masses present on palpation    Chest  · Respiratory Effort: breathing normal  · Auscultation: normal breath sounds    Cardiovascular  · Heart:  · Auscultation: regular rate and rhythm without murmur    Breasts  · Inspection of Breasts: breasts symmetrical, no skin changes, no discharge present, nipple appearance normal, no skin retraction present  · Palpation of Breasts and Axillae: no masses present on palpation, no breast tenderness  · Axillary Lymph Nodes: no lymphadenopathy present    Gastrointestinal  · Abdominal Examination: abdomen non-tender to palpation, normal bowel sounds, no masses present  · Liver and spleen: no hepatomegaly present, spleen not palpable  · Hernias: no hernias identified    Skin  · General Inspection: no rash, no lesions identified    Neurologic/Psychiatric  · Mental Status:  · Orientation: grossly oriented to person, place and time  · Mood and Affect: mood normal, affect appropriate    Genitourinary  · External Genitalia: normal appearance for age, no discharge present, no tenderness present, no inflammatory lesions present, no masses present, no atrophy present  · Vagina: normal vaginal vault without central or paravaginal defects, no discharge present, no inflammatory lesions present, no masses present  · Bladder: non-tender to palpation  · Urethra: appears normal  · Cervix: normal   · Uterus: normal size, shape and consistency  · Adnexa: no adnexal tenderness present, no adnexal masses present  · Perineum: perineum within normal limits, no evidence of trauma, no rashes or skin lesions present  · Anus: anus within normal limits, no hemorrhoids present  · Inguinal Lymph Nodes: no lymphadenopathy present    Assessment:  Routine gynecologic examination  Her current medical status is satisfactory with no evidence of significant gynecologic issues.     Plan:  Counseled re: diet, exercise, healthy lifestyle  Return for yearly wellness visits  Rec annual mammogram  Pap/HPV

## 2018-08-02 NOTE — PATIENT INSTRUCTIONS
Well Visit, Women 48 to 72: Care Instructions  Your Care Instructions    Physical exams can help you stay healthy. Your doctor has checked your overall health and may have suggested ways to take good care of yourself. He or she also may have recommended tests. At home, you can help prevent illness with healthy eating, regular exercise, and other steps. Follow-up care is a key part of your treatment and safety. Be sure to make and go to all appointments, and call your doctor if you are having problems. It's also a good idea to know your test results and keep a list of the medicines you take. How can you care for yourself at home? · Reach and stay at a healthy weight. This will lower your risk for many problems, such as obesity, diabetes, heart disease, and high blood pressure. · Get at least 30 minutes of exercise on most days of the week. Walking is a good choice. You also may want to do other activities, such as running, swimming, cycling, or playing tennis or team sports. · Do not smoke. Smoking can make health problems worse. If you need help quitting, talk to your doctor about stop-smoking programs and medicines. These can increase your chances of quitting for good. · Protect your skin from too much sun. When you're outdoors from 10 a.m. to 4 p.m., stay in the shade or cover up with clothing and a hat with a wide brim. Wear sunglasses that block UV rays. Even when it's cloudy, put broad-spectrum sunscreen (SPF 30 or higher) on any exposed skin. · See a dentist one or two times a year for checkups and to have your teeth cleaned. · Wear a seat belt in the car. · Limit alcohol to 1 drink a day. Too much alcohol can cause health problems. Follow your doctor's advice about when to have certain tests. These tests can spot problems early. · Cholesterol.  Your doctor will tell you how often to have this done based on your age, family history, or other things that can increase your risk for heart attack and stroke. · Blood pressure. Have your blood pressure checked during a routine doctor visit. Your doctor will tell you how often to check your blood pressure based on your age, your blood pressure results, and other factors. · Mammogram. Ask your doctor how often you should have a mammogram, which is an X-ray of your breasts. A mammogram can spot breast cancer before it can be felt and when it is easiest to treat. · Pap test and pelvic exam. Ask your doctor how often you should have a Pap test. You may not need to have a Pap test as often as you used to. · Vision. Have your eyes checked every year or two or as often as your doctor suggests. Some experts recommend that you have yearly exams for glaucoma and other age-related eye problems starting at age 48. · Hearing. Tell your doctor if you notice any change in your hearing. You can have tests to find out how well you hear. · Diabetes. Ask your doctor whether you should have tests for diabetes. · Colon cancer. You should begin tests for colon cancer at age 48. You may have one of several tests. Your doctor will tell you how often to have tests based on your age and risk. Risks include whether you already had a precancerous polyp removed from your colon or whether your parents, sisters and brothers, or children have had colon cancer. · Thyroid disease. Talk to your doctor about whether to have your thyroid checked as part of a regular physical exam. Women have an increased chance of a thyroid problem. · Osteoporosis. You should begin tests for bone density at age 72. If you are younger than 72, ask your doctor whether you have factors that may increase your risk for this disease. You may want to have this test before age 72. · Heart attack and stroke risk. At least every 4 to 6 years, you should have your risk for heart attack and stroke assessed.  Your doctor uses factors such as your age, blood pressure, cholesterol, and whether you smoke or have diabetes to show what your risk for a heart attack or stroke is over the next 10 years. When should you call for help? Watch closely for changes in your health, and be sure to contact your doctor if you have any problems or symptoms that concern you. Where can you learn more? Go to http://khoi-terry.info/. Enter B881 in the search box to learn more about \"Well Visit, Women 50 to 72: Care Instructions. \"  Current as of: May 16, 2017  Content Version: 11.7  © 8835-0056 [employer]. Care instructions adapted under license by [employer] (which disclaims liability or warranty for this information). If you have questions about a medical condition or this instruction, always ask your healthcare professional. [address] any warranty or liability for your use of this information.

## 2018-08-05 LAB
CYTOLOGIST CVX/VAG CYTO: NORMAL
CYTOLOGY CVX/VAG DOC THIN PREP: NORMAL
CYTOLOGY HISTORY:: NORMAL
DX ICD CODE: NORMAL
HPV I/H RISK 1 DNA CVX QL PROBE+SIG AMP: NEGATIVE
Lab: NORMAL
OTHER STN SPEC: NORMAL
PATH REPORT.FINAL DX SPEC: NORMAL
STAT OF ADQ CVX/VAG CYTO-IMP: NORMAL

## 2018-08-07 ENCOUNTER — DOCUMENTATION ONLY (OUTPATIENT)
Dept: CARDIOLOGY CLINIC | Age: 58
End: 2018-08-07

## 2018-08-07 DIAGNOSIS — I48.0 PAROXYSMAL ATRIAL FIBRILLATION (HCC): ICD-10-CM

## 2018-08-07 DIAGNOSIS — I47.1 PAROXYSMAL ATRIAL TACHYCARDIA (HCC): Primary | ICD-10-CM

## 2018-08-07 RX ORDER — WARFARIN SODIUM 5 MG/1
5 TABLET ORAL DAILY
Qty: 15 TAB | Refills: 0 | Status: SHIPPED | OUTPATIENT
Start: 2018-08-07 | End: 2018-08-27

## 2018-08-07 NOTE — PROGRESS NOTES
Records from [facility] received and reviewed. 1. Cardiology Admission H&P and procedure note for right and left heart cath 6/4/18:   Access via right IJ vein and right radial artery   No significant CAD  Normal right sided filling pressure  Elevated left sided filling pressure  Group 2 Pulmonary HTN  Decreased cardiac output    Lipid panel 6/4/18:    TG 89  LDL 90  HDL 32    2. ER visit 6/6/18 - c/o right facial edema  - CT neck w/o contrast and Otolaryngology consult - IV Clindamyacin started and admitted to the hospital.     Lab work - 6/6/18:  Na 140  K 3.5  BUN/Creat 40/13.09  WBC 14.3  H/H 12.2/37  Plat 156  Negative Blood cultures    H/H trend 6/8/18 - 6/12/18:  9.0/26.9  9.5/28.3  9.2/28  9.3/27.7  9.8/30    +C-diff on 6/10/18    Discharge summary reviewed - facial cellulitis resolved - discharged on PO Augmentin. C-diff - discharged on PO Vanc. Advised to hold Valsartan and Diltiazem at discharge due to hypotension.       3. Lab work from Dialysis center   6/13/18:  H/H 11.8/36.4  K 3.7  WBC 8  Plat 220    7/25/18:  K 3.0

## 2018-08-08 RX ORDER — LOSARTAN POTASSIUM 50 MG/1
50 TABLET ORAL 2 TIMES DAILY
Qty: 180 TAB | Refills: 3 | Status: SHIPPED | OUTPATIENT
Start: 2018-08-08 | End: 2018-08-27

## 2018-08-08 NOTE — TELEPHONE ENCOUNTER
Verified pt using two identifiers (name and )    Pt notified of med change and to monitor Bp to ensure dose is working well

## 2018-08-24 ENCOUNTER — HOSPITAL ENCOUNTER (INPATIENT)
Age: 58
LOS: 1 days | Discharge: HOME | DRG: 698 | End: 2018-08-27
Attending: STUDENT IN AN ORGANIZED HEALTH CARE EDUCATION/TRAINING PROGRAM | Admitting: INTERNAL MEDICINE
Payer: MEDICARE

## 2018-08-24 ENCOUNTER — APPOINTMENT (OUTPATIENT)
Dept: CT IMAGING | Age: 58
DRG: 698 | End: 2018-08-24
Attending: EMERGENCY MEDICINE
Payer: MEDICARE

## 2018-08-24 ENCOUNTER — TELEPHONE (OUTPATIENT)
Dept: CARDIOLOGY CLINIC | Age: 58
End: 2018-08-24

## 2018-08-24 ENCOUNTER — CLINICAL SUPPORT (OUTPATIENT)
Dept: CARDIOLOGY CLINIC | Age: 58
End: 2018-08-24

## 2018-08-24 DIAGNOSIS — R10.9 ACUTE ABDOMINAL PAIN: Primary | ICD-10-CM

## 2018-08-24 DIAGNOSIS — Z79.01 LONG TERM CURRENT USE OF ANTICOAGULANT THERAPY: Primary | ICD-10-CM

## 2018-08-24 DIAGNOSIS — R11.2 NON-INTRACTABLE VOMITING WITH NAUSEA, UNSPECIFIED VOMITING TYPE: ICD-10-CM

## 2018-08-24 DIAGNOSIS — I48.0 PAROXYSMAL ATRIAL FIBRILLATION (HCC): ICD-10-CM

## 2018-08-24 DIAGNOSIS — R79.1 SUPRATHERAPEUTIC INR: ICD-10-CM

## 2018-08-24 PROBLEM — N32.89 BLADDER MASS: Status: ACTIVE | Noted: 2018-08-24

## 2018-08-24 PROBLEM — D68.9 COAGULOPATHY (HCC): Status: ACTIVE | Noted: 2018-08-24

## 2018-08-24 PROBLEM — R31.0 GROSS HEMATURIA: Status: ACTIVE | Noted: 2018-08-24

## 2018-08-24 LAB
ALBUMIN SERPL-MCNC: 3 G/DL (ref 3.5–5)
ALBUMIN/GLOB SERPL: 0.7 {RATIO} (ref 1.1–2.2)
ALP SERPL-CCNC: 57 U/L (ref 45–117)
ALT SERPL-CCNC: 33 U/L (ref 12–78)
ANION GAP SERPL CALC-SCNC: 13 MMOL/L (ref 5–15)
APPEARANCE FLD: CLEAR
AST SERPL-CCNC: 37 U/L (ref 15–37)
BASOPHILS # BLD: 0.1 K/UL (ref 0–0.1)
BASOPHILS NFR BLD: 1 % (ref 0–1)
BILIRUB SERPL-MCNC: 1.1 MG/DL (ref 0.2–1)
BUN SERPL-MCNC: 41 MG/DL (ref 6–20)
BUN/CREAT SERPL: 4 (ref 12–20)
CALCIUM SERPL-MCNC: 8.3 MG/DL (ref 8.5–10.1)
CHLORIDE SERPL-SCNC: 101 MMOL/L (ref 97–108)
CO2 SERPL-SCNC: 23 MMOL/L (ref 21–32)
COLOR FLD: COLORLESS
CREAT SERPL-MCNC: 11.3 MG/DL (ref 0.55–1.02)
DIFFERENTIAL METHOD BLD: ABNORMAL
EOSINOPHIL # BLD: 0.2 K/UL (ref 0–0.4)
EOSINOPHIL NFR BLD: 3 % (ref 0–7)
ERYTHROCYTE [DISTWIDTH] IN BLOOD BY AUTOMATED COUNT: 13.3 % (ref 11.5–14.5)
GLOBULIN SER CALC-MCNC: 4.1 G/DL (ref 2–4)
GLUCOSE SERPL-MCNC: 104 MG/DL (ref 65–100)
HCT VFR BLD AUTO: 33.1 % (ref 35–47)
HGB BLD-MCNC: 11 G/DL (ref 11.5–16)
IMM GRANULOCYTES # BLD: 0.1 K/UL (ref 0–0.04)
IMM GRANULOCYTES NFR BLD AUTO: 1 % (ref 0–0.5)
INR BLD: NORMAL
INR PPP: 9.8 (ref 0.9–1.1)
LACTATE SERPL-SCNC: 1.8 MMOL/L (ref 0.4–2)
LIPASE SERPL-CCNC: 310 U/L (ref 73–393)
LYMPHOCYTES # BLD: 0.7 K/UL (ref 0.8–3.5)
LYMPHOCYTES NFR BLD: 9 % (ref 12–49)
LYMPHOCYTES NFR FLD: 96 %
MCH RBC QN AUTO: 32.4 PG (ref 26–34)
MCHC RBC AUTO-ENTMCNC: 33.2 G/DL (ref 30–36.5)
MCV RBC AUTO: 97.6 FL (ref 80–99)
MONOCYTES # BLD: 0.2 K/UL (ref 0–1)
MONOCYTES NFR BLD: 3 % (ref 5–13)
MONOS+MACROS NFR FLD: 4 %
NEUTS SEG # BLD: 6.9 K/UL (ref 1.8–8)
NEUTS SEG NFR BLD: 83 % (ref 32–75)
NRBC # BLD: 0 K/UL (ref 0–0.01)
NRBC BLD-RTO: 0 PER 100 WBC
NUC CELL # FLD: 8 /CU MM
PLATELET # BLD AUTO: 181 K/UL (ref 150–400)
PMV BLD AUTO: 10.1 FL (ref 8.9–12.9)
POTASSIUM SERPL-SCNC: 2.9 MMOL/L (ref 3.5–5.1)
PROT SERPL-MCNC: 7.1 G/DL (ref 6.4–8.2)
PROTHROMBIN TIME: 88.8 SEC (ref 9–11.1)
PT POC: NORMAL SECONDS
RBC # BLD AUTO: 3.39 M/UL (ref 3.8–5.2)
RBC # FLD: >100 /CU MM
RBC MORPH BLD: ABNORMAL
SODIUM SERPL-SCNC: 137 MMOL/L (ref 136–145)
SPECIMEN SOURCE FLD: ABNORMAL
TOTAL CELLS COUNTED SPEC: 25
VALID INTERNAL CONTROL?: YES
WBC # BLD AUTO: 8.2 K/UL (ref 3.6–11)

## 2018-08-24 PROCEDURE — 85610 PROTHROMBIN TIME: CPT | Performed by: STUDENT IN AN ORGANIZED HEALTH CARE EDUCATION/TRAINING PROGRAM

## 2018-08-24 PROCEDURE — 89050 BODY FLUID CELL COUNT: CPT | Performed by: STUDENT IN AN ORGANIZED HEALTH CARE EDUCATION/TRAINING PROGRAM

## 2018-08-24 PROCEDURE — 74011250636 HC RX REV CODE- 250/636: Performed by: INTERNAL MEDICINE

## 2018-08-24 PROCEDURE — 99285 EMERGENCY DEPT VISIT HI MDM: CPT

## 2018-08-24 PROCEDURE — 74011250636 HC RX REV CODE- 250/636: Performed by: EMERGENCY MEDICINE

## 2018-08-24 PROCEDURE — 96361 HYDRATE IV INFUSION ADD-ON: CPT

## 2018-08-24 PROCEDURE — 74011250636 HC RX REV CODE- 250/636

## 2018-08-24 PROCEDURE — 96375 TX/PRO/DX INJ NEW DRUG ADDON: CPT

## 2018-08-24 PROCEDURE — 74011000258 HC RX REV CODE- 258: Performed by: INTERNAL MEDICINE

## 2018-08-24 PROCEDURE — 83690 ASSAY OF LIPASE: CPT | Performed by: STUDENT IN AN ORGANIZED HEALTH CARE EDUCATION/TRAINING PROGRAM

## 2018-08-24 PROCEDURE — 87205 SMEAR GRAM STAIN: CPT | Performed by: EMERGENCY MEDICINE

## 2018-08-24 PROCEDURE — 83605 ASSAY OF LACTIC ACID: CPT | Performed by: EMERGENCY MEDICINE

## 2018-08-24 PROCEDURE — 74011250637 HC RX REV CODE- 250/637: Performed by: INTERNAL MEDICINE

## 2018-08-24 PROCEDURE — 85025 COMPLETE CBC W/AUTO DIFF WBC: CPT | Performed by: STUDENT IN AN ORGANIZED HEALTH CARE EDUCATION/TRAINING PROGRAM

## 2018-08-24 PROCEDURE — 99218 HC RM OBSERVATION: CPT

## 2018-08-24 PROCEDURE — 96365 THER/PROPH/DIAG IV INF INIT: CPT

## 2018-08-24 PROCEDURE — 36415 COLL VENOUS BLD VENIPUNCTURE: CPT | Performed by: EMERGENCY MEDICINE

## 2018-08-24 PROCEDURE — 96376 TX/PRO/DX INJ SAME DRUG ADON: CPT

## 2018-08-24 PROCEDURE — 74011250636 HC RX REV CODE- 250/636: Performed by: STUDENT IN AN ORGANIZED HEALTH CARE EDUCATION/TRAINING PROGRAM

## 2018-08-24 PROCEDURE — 74176 CT ABD & PELVIS W/O CONTRAST: CPT

## 2018-08-24 PROCEDURE — 87040 BLOOD CULTURE FOR BACTERIA: CPT | Performed by: EMERGENCY MEDICINE

## 2018-08-24 PROCEDURE — 80053 COMPREHEN METABOLIC PANEL: CPT | Performed by: STUDENT IN AN ORGANIZED HEALTH CARE EDUCATION/TRAINING PROGRAM

## 2018-08-24 RX ORDER — HYDROCODONE BITARTRATE AND ACETAMINOPHEN 5; 325 MG/1; MG/1
1 TABLET ORAL
Status: DISCONTINUED | OUTPATIENT
Start: 2018-08-24 | End: 2018-08-27 | Stop reason: HOSPADM

## 2018-08-24 RX ORDER — NALOXONE HYDROCHLORIDE 0.4 MG/ML
0.4 INJECTION, SOLUTION INTRAMUSCULAR; INTRAVENOUS; SUBCUTANEOUS AS NEEDED
Status: DISCONTINUED | OUTPATIENT
Start: 2018-08-24 | End: 2018-08-27 | Stop reason: HOSPADM

## 2018-08-24 RX ORDER — SEVELAMER CARBONATE 800 MG/1
800 TABLET, FILM COATED ORAL
Status: DISCONTINUED | OUTPATIENT
Start: 2018-08-25 | End: 2018-08-27 | Stop reason: HOSPADM

## 2018-08-24 RX ORDER — CINACALCET 30 MG/1
30 TABLET, FILM COATED ORAL DAILY
Status: DISCONTINUED | OUTPATIENT
Start: 2018-08-25 | End: 2018-08-27 | Stop reason: HOSPADM

## 2018-08-24 RX ORDER — LOSARTAN POTASSIUM 50 MG/1
50 TABLET ORAL 2 TIMES DAILY
Status: DISCONTINUED | OUTPATIENT
Start: 2018-08-24 | End: 2018-08-27

## 2018-08-24 RX ORDER — SPIRONOLACTONE 25 MG/1
50 TABLET ORAL DAILY
Status: DISCONTINUED | OUTPATIENT
Start: 2018-08-25 | End: 2018-08-27 | Stop reason: HOSPADM

## 2018-08-24 RX ORDER — CARVEDILOL 12.5 MG/1
25 TABLET ORAL 2 TIMES DAILY WITH MEALS
Status: DISCONTINUED | OUTPATIENT
Start: 2018-08-25 | End: 2018-08-27

## 2018-08-24 RX ORDER — SODIUM CHLORIDE 0.9 % (FLUSH) 0.9 %
5-10 SYRINGE (ML) INJECTION EVERY 8 HOURS
Status: DISCONTINUED | OUTPATIENT
Start: 2018-08-24 | End: 2018-08-27 | Stop reason: HOSPADM

## 2018-08-24 RX ORDER — BUMETANIDE 1 MG/1
4 TABLET ORAL 2 TIMES DAILY
Status: DISCONTINUED | OUTPATIENT
Start: 2018-08-24 | End: 2018-08-27

## 2018-08-24 RX ORDER — FENTANYL CITRATE 50 UG/ML
50 INJECTION, SOLUTION INTRAMUSCULAR; INTRAVENOUS
Status: DISCONTINUED | OUTPATIENT
Start: 2018-08-24 | End: 2018-08-24

## 2018-08-24 RX ORDER — MORPHINE SULFATE 4 MG/ML
INJECTION, SOLUTION INTRAMUSCULAR; INTRAVENOUS
Status: COMPLETED
Start: 2018-08-24 | End: 2018-08-24

## 2018-08-24 RX ORDER — ACETAMINOPHEN 325 MG/1
650 TABLET ORAL
Status: DISCONTINUED | OUTPATIENT
Start: 2018-08-24 | End: 2018-08-27 | Stop reason: HOSPADM

## 2018-08-24 RX ORDER — ONDANSETRON 2 MG/ML
4 INJECTION INTRAMUSCULAR; INTRAVENOUS
Status: DISCONTINUED | OUTPATIENT
Start: 2018-08-24 | End: 2018-08-27 | Stop reason: HOSPADM

## 2018-08-24 RX ORDER — GENTAMICIN SULFATE 1 MG/G
CREAM TOPICAL
Status: DISCONTINUED | OUTPATIENT
Start: 2018-08-24 | End: 2018-08-27 | Stop reason: HOSPADM

## 2018-08-24 RX ORDER — SODIUM CHLORIDE 0.9 % (FLUSH) 0.9 %
5-10 SYRINGE (ML) INJECTION AS NEEDED
Status: DISCONTINUED | OUTPATIENT
Start: 2018-08-24 | End: 2018-08-27 | Stop reason: HOSPADM

## 2018-08-24 RX ORDER — OMEPRAZOLE 20 MG/1
20 CAPSULE, DELAYED RELEASE ORAL DAILY
Status: DISCONTINUED | OUTPATIENT
Start: 2018-08-25 | End: 2018-08-27 | Stop reason: HOSPADM

## 2018-08-24 RX ORDER — POTASSIUM CHLORIDE 20 MEQ/1
20 TABLET, EXTENDED RELEASE ORAL DAILY
COMMUNITY
End: 2018-08-27

## 2018-08-24 RX ORDER — HYDROMORPHONE HYDROCHLORIDE 2 MG/ML
1 INJECTION, SOLUTION INTRAMUSCULAR; INTRAVENOUS; SUBCUTANEOUS
Status: DISCONTINUED | OUTPATIENT
Start: 2018-08-24 | End: 2018-08-27 | Stop reason: HOSPADM

## 2018-08-24 RX ORDER — MORPHINE SULFATE 4 MG/ML
8 INJECTION, SOLUTION INTRAMUSCULAR; INTRAVENOUS ONCE
Status: COMPLETED | OUTPATIENT
Start: 2018-08-24 | End: 2018-08-24

## 2018-08-24 RX ORDER — ZOLPIDEM TARTRATE 5 MG/1
5 TABLET ORAL
Status: DISCONTINUED | OUTPATIENT
Start: 2018-08-24 | End: 2018-08-27 | Stop reason: HOSPADM

## 2018-08-24 RX ORDER — POTASSIUM CHLORIDE 750 MG/1
40 TABLET, FILM COATED, EXTENDED RELEASE ORAL
Status: COMPLETED | OUTPATIENT
Start: 2018-08-24 | End: 2018-08-24

## 2018-08-24 RX ORDER — MORPHINE SULFATE 4 MG/ML
4 INJECTION, SOLUTION INTRAMUSCULAR; INTRAVENOUS ONCE
Status: COMPLETED | OUTPATIENT
Start: 2018-08-24 | End: 2018-08-24

## 2018-08-24 RX ORDER — ONDANSETRON 2 MG/ML
4 INJECTION INTRAMUSCULAR; INTRAVENOUS
Status: COMPLETED | OUTPATIENT
Start: 2018-08-24 | End: 2018-08-24

## 2018-08-24 RX ORDER — MORPHINE SULFATE 4 MG/ML
4 INJECTION, SOLUTION INTRAMUSCULAR; INTRAVENOUS
Status: COMPLETED | OUTPATIENT
Start: 2018-08-24 | End: 2018-08-24

## 2018-08-24 RX ADMIN — MORPHINE SULFATE 4 MG: 4 INJECTION, SOLUTION INTRAMUSCULAR; INTRAVENOUS at 18:11

## 2018-08-24 RX ADMIN — ONDANSETRON 4 MG: 2 INJECTION, SOLUTION INTRAMUSCULAR; INTRAVENOUS at 21:00

## 2018-08-24 RX ADMIN — Medication 10 ML: at 21:00

## 2018-08-24 RX ADMIN — SODIUM CHLORIDE 250 ML: 900 INJECTION, SOLUTION INTRAVENOUS at 18:00

## 2018-08-24 RX ADMIN — PHYTONADIONE 5 MG: 10 INJECTION, EMULSION INTRAMUSCULAR; INTRAVENOUS; SUBCUTANEOUS at 18:59

## 2018-08-24 RX ADMIN — BUMETANIDE 4 MG: 1 TABLET ORAL at 20:50

## 2018-08-24 RX ADMIN — POTASSIUM CHLORIDE 40 MEQ: 750 TABLET, EXTENDED RELEASE ORAL at 19:00

## 2018-08-24 RX ADMIN — ONDANSETRON 4 MG: 2 INJECTION, SOLUTION INTRAMUSCULAR; INTRAVENOUS at 17:09

## 2018-08-24 RX ADMIN — LOSARTAN POTASSIUM 50 MG: 50 TABLET ORAL at 20:50

## 2018-08-24 RX ADMIN — MORPHINE SULFATE 4 MG: 4 INJECTION, SOLUTION INTRAMUSCULAR; INTRAVENOUS at 17:10

## 2018-08-24 RX ADMIN — MORPHINE SULFATE 4 MG: 4 INJECTION, SOLUTION INTRAMUSCULAR; INTRAVENOUS at 18:01

## 2018-08-24 RX ADMIN — GENTAMICIN SULFATE: 1 CREAM TOPICAL at 22:00

## 2018-08-24 RX ADMIN — HYDROMORPHONE HYDROCHLORIDE 1 MG: 2 INJECTION, SOLUTION INTRAMUSCULAR; INTRAVENOUS; SUBCUTANEOUS at 21:07

## 2018-08-24 NOTE — PROGRESS NOTES
See documentation from tele message today. Pt started warfarin 5 mg daily 8/7/18. Did not have any follow up. Per verbal order Dr Analilia Salmon, pt given 5 mg vitamin K. She then had moderate vomiting in bathroom (here in office) and had been c/o moderate abdominal pain on left side. HX of PD. States she has felt this way in the past when she had an infection from the PD. Verbal order Dr Analilia Salmon: have pt transported to [facility] ER for further workup and evaluation. Pt taken down to ER via wheelchair by this nurse.

## 2018-08-24 NOTE — H&P
[facility]    Hospitalist Admission Note      NAME:  Crystal Gutiérrez   :   1960   MRN:  [id number]     PCP:  Nayan Cuevas MD     Date/Time:  2018 6:41 PM          Subjective:     CHIEF COMPLAINT: Lower abd pain     HISTORY OF PRESENT ILLNESS:     Ms. Ximena Arredondo is a 61 y.o.  female with a hx of ESRD on PD, HFrEF, HTN who presented to the Emergency Department complaining of LLQ abdominal pain x 1 day. Pain is sharp, stabbing pain in LLQ. Started this AM upon waking. Similar to prior episode of peritonitis. Dumped PD fluid without looking at it this AM so unsure if murky/cloudy. Pain is constant, 7/10, worse with movement. Seen in cardiology office today for INR check as she was started on coumadin 3 weeks ago and this was her first INR check/visit. INR was high and given vitamin K PO with immediate vomiting. Abd pain appeared to get worse and sent to ED. In ED, INR elevated and CT scan showed possible bladder hematoma. We will admit for observation. Past Medical History:   Diagnosis Date    Chronic diastolic heart failure ([facility] [address] 10/31/2012    Chronic systolic heart failure ([facility] [address] 10/31/2012    Heart failure ([facility] [address]     systolic and diastolic    Hx of non-ST elevation myocardial infarction (NSTEMI)     Hypertension     Peritoneal dialysis catheter in place [facility])     Polycystic kidney disease         History reviewed. No pertinent surgical history. Social History   Substance Use Topics    Smoking status: Never Smoker    Smokeless tobacco: Never Used    Alcohol use No        Family History   Problem Relation Age of Onset    Diabetes Brother     Hypertension Brother     Hypertension Mother     Kidney Disease Mother     Hypertension Sister       Family hx cannot be fully assessed, due to the admitting conditions    No Known Allergies     Prior to Admission medications    Medication Sig Start Date End Date Taking?  Authorizing Provider   losartan (COZAAR) 50 mg tablet Take 1 Tab by mouth two (2) times a day. Indications: hypertension 8/8/18   Thea Whiting NP   warfarin (COUMADIN) 5 mg tablet Take 1 Tab by mouth daily. 8/7/18   Nat Mcmahon MD   dilTIAZem CD (CARDIZEM CD) 120 mg ER capsule Take 1 Cap by mouth daily. 4/20/18   Martha Gonsales MD   carvedilol (COREG) 25 mg tablet TAKE ONE TABLET BY MOUTH TWICE DAILY WITH MEALS 3/7/18   Nat Mcmahon MD   omeprazole (PRILOSEC) 20 mg capsule Take 20 mg by mouth daily. Historical Provider   SENNA-DOCUSATE SODIUM PO Take  by mouth as needed. Historical Provider   cinacalcet (SENSIPAR) 30 mg tablet Take 30 mg by mouth daily. Historical Provider   ferric citrate (AURYXIA) 210 mg iron tablet Take  by mouth three (3) times daily (with meals). Historical Provider   potassium chloride SR (KLOR-CON 10) 10 mEq tablet Take 40 mEq by mouth daily. Historical Provider   spironolactone (ALDACTONE) 50 mg tablet Take 50 mg by mouth daily. Historical Provider   bumetanide (BUMEX) 2 mg tablet Take 4 mg by mouth two (2) times a day. Historical Provider   aspirin delayed-release 81 mg tablet Take 81 mg by mouth daily. Historical Provider   gentamicin (GARAMYCIN) 0.1 % topical cream Apply  to affected area two (2) times a day.  7/12/14   Historical Provider       Review of Systems:  (bold if positive, if negative)    Gen:  Eyes:  ENT:  CVS:  Pulm:  GI:  Abdominal pain, nausea, emesis  :    MS:  Skin:  Psych:  Endo:    Hem:  Renal:    Neuro:        Objective:      VITALS:    Vital signs reviewed; most recent are:    Visit Vitals    /81    Pulse 85    Temp 97.8 °F (36.6 °C)    Resp 18    Ht 5' 5\" (1.651 m)    Wt 57.2 kg (126 lb)    SpO2 96%    BMI 20.97 kg/m2     SpO2 Readings from Last 6 Encounters:   08/24/18 96%   07/27/18 99%   01/26/18 98%   12/08/17 98%   09/14/17 97%   01/30/17 98%    O2 Flow Rate (L/min): 1 l/min   No intake or output data in the 24 hours ending 08/24/18 0051 Exam:     Physical Exam:    Gen:  Well-developed, well-nourished, in no acute distress  HEENT:  Pink conjunctivae, PERRL, hearing intact to voice, moist mucous membranes  Neck:  Supple, without masses, thyroid non-tender  Resp:  No accessory muscle use, clear breath sounds without wheezes rales or rhonchi  Card:  No murmurs, normal S1, S2 without thrills, bruits or peripheral edema  Abd:  Soft, ttp in LLQ, non-distended, normoactive bowel sounds are present, no palpable organomegaly and no detectable hernias, PD cath in place  Lymph:  No cervical or inguinal adenopathy  Musc:  No cyanosis or clubbing  Skin:  No rashes or ulcers, skin turgor is good  Neuro:  Cranial nerves are grossly intact, no focal motor weakness, follows commands appropriately  Psych:  Good insight, oriented to person, place and time, alert     Labs:    Recent Labs      08/24/18   1539   WBC  8.2   HGB  11.0*   HCT  33.1*   PLT  181     Recent Labs      08/24/18   1539   NA  137   K  2.9*   CL  101   CO2  23   GLU  104*   BUN  41*   CREA  11.30*   CA  8.3*   ALB  3.0*   TBILI  1.1*   SGOT  37   ALT  33     Lab Results   Component Value Date/Time    Glucose (POC) 94 10/18/2013 03:35 PM    Glucose (POC) 102 10/14/2012 05:58 AM     No results for input(s): PH, PCO2, PO2, HCO3, FIO2 in the last 72 hours. Recent Labs      08/24/18   1645   INR  9.8*     All Micro Results     Procedure Component Value Units Date/Time    CULTURE, BODY FLUID Joyce Deng STAIN [[id number]] Collected:  08/24/18 1719    Order Status:  Completed Specimen:  Peritoneal [address] [place] Updated:  08/24/18 2828    CULTURE, BLOOD, PAIRED [[id number]]     Order Status:  Sent Specimen:  Blood         CT A/P:    IMPRESSION:     1. Filling defect in the bladder which is concerning for mass. Findings may also  represent hemorrhage/hematoma. Recommend clinical correlation. 2. Multiple cysts throughout the liver and in both kidneys suggesting autosomal  dominant polycystic kidney disease.   3. Incidental findings as above. I have reviewed previous records       Assessment and Plan: Active Problems:    Abdominal pain / Hx of peritonitis. I doubt this is peritonitis at this time but abd pain significant. Admit to medicine. Serial abd exams. BCx, fluid cx obtained. Hold on Abx unless abd pain worsens, WBC elevation, fever, or AMS. Bladder mass / Gross hematuria. This is the likely culprit for above issue. Presumably this is a hemorrhage or hematoma in setting of elevated INR. Oliguric to begin with so likely a decent amount of blood to trigger voiding. Reverse INR, monitor abd exam. Will ask urology to comment (cysto v. Repeating imaging v. Reassurance). Coagulopathy. 2/2 warfarin. Patient is 9.8 with significant pain. Attempted 5 mg PO in cardiology clinic but immediately threw up. Will give 5 mg IV vitamin K now. Hold coumadin. Monitor INR    ESRD on peritoneal dialysis. Did exchange this AM. Nephrology consult to assist with PD orders. Essential hypertension, benign (10/18/2012). BP okay. Continue home anti-hypertensives once verified    Chronic Systolic HF (heart failure), class 2  / ICD (implantable cardioverter-defibrillator) in place. Stable/compensated. Seen by Dr. Leesa Mosley today. Continue coreg, bumex, aldactone, cozaar. Paroxysmal atrial fibrillation. Rate controlled. Hold coumadin 2/2 above. Continue CCB and BB. Hypokalemia (1/20/2015). Replete PO and monitor    Anemia of chronic disease. Doubt acute blood loss playing a role. At baseline.  Monitor      Telemetry reviewed:   normal sinus rhythm    Risk of deterioration: high      Total time spent with patient: [address] discussed with: Patient, Family and Nursing Staff    Discussed:  Care Plan and D/C Planning       ___________________________________________________    Attending Physician: Sruthi James DO

## 2018-08-24 NOTE — PROGRESS NOTES
[facility]: MED RECONCILIATION    Comments/Recommendations:   Patient is awake and alert. She does not have a medication list or pill bottles with her today. She reports the list in the EMR from Dr. Alix Thomas office is up to date. Pharmacist reviewed prescription refill history with [facility]. The patient reports she uses [facility] as listed but obtains some medications from [facility] mail order. She obtains some samples from [facility]. The pharmacist was unable to verify a recent prescription refill history for carvedilol, cinacalcet, ferrice citrate, gentamicin topical cream, losartan, and potassium chloride. The patient reports she is taking these medications as listed below. The patient was asked about the lack of available recent refill history. She reports some medications come from [facility] (mail order or samples) and having a backlog of other medications at home. Aspirin was stopped when the patient was started on warfarin this month. Warfarin is a new medication for atrial fibrillation. The patient reports she has been taking warfarin for three weeks and today was her first INR check. The patient reports she took one pill of ibuprofen this morning by mistake for pain but she does not usually take this medication and she is aware she should only take acetaminophen or Tylenol as an OTC pain medication. The patient reports the dose of ibuprofen today was the only dose she has taken in the last three weeks. Pharmacy does not have ferric citrate in stock in the past the patient has used sevelamer 800 mg with meals. The patient reports diltiazem was stopped awhile ago    Medications added:     · None    Medications removed:    · Aspirin  · Diltiazem    Medications adjusted:    · Gentamicin cream changed to HS  · Potassium changed to 20 meq daily  · Senna docusate changed to one pill daily prn    Allergies: Review of patient's allergies indicates no known allergies.     Prior to Admission Medications:   Prior to Admission Medications   Prescriptions Last Dose Informant Patient Reported? Taking? SENNA-DOCUSATE SODIUM PO  Self Yes Yes   Sig: Take 1 Tab by mouth daily as needed (constipation). bumetanide (BUMEX) 2 mg tablet 8/23/2018 at afternoon Self Yes Yes   Sig: Take 4 mg by mouth two (2) times a day. carvedilol (COREG) 25 mg tablet 8/23/2018 at afternoon Self No Yes   Sig: TAKE ONE TABLET BY MOUTH TWICE DAILY WITH MEALS   cinacalcet (SENSIPAR) 30 mg tablet 8/23/2018 at am Self Yes Yes   Sig: Take 30 mg by mouth daily. ferric citrate (AURYXIA) 210 mg iron tablet 8/23/2018 at Unknown time Self Yes Yes   Sig: Take 210 mg by mouth three (3) times daily (with meals). gentamicin (GARAMYCIN) 0.1 % topical cream 8/23/2018 at Unknown time Self Yes Yes   Sig: Apply  to affected area nightly. losartan (COZAAR) 50 mg tablet 8/23/2018 at afternoon Self No Yes   Sig: Take 1 Tab by mouth two (2) times a day. Indications: hypertension   omeprazole (PRILOSEC) 20 mg capsule 8/23/2018 at am Self Yes Yes   Sig: Take 20 mg by mouth daily. potassium chloride (KLOR-CON M20) 20 mEq tablet 8/23/2018 at Unknown time Self Yes Yes   Sig: Take 20 mEq by mouth daily. spironolactone (ALDACTONE) 50 mg tablet 8/23/2018 at am Self Yes Yes   Sig: Take 50 mg by mouth daily. warfarin (COUMADIN) 5 mg tablet 8/23/2018 at am Self No Yes   Sig: Take 1 Tab by mouth daily.       Facility-Administered Medications: None      Thank you,    Naheed Marvin, PharmD, BCPS

## 2018-08-24 NOTE — ED NOTES

## 2018-08-24 NOTE — IP AVS SNAPSHOT
[address] [place] [address] 
[phone] Patient: Rachael Halsted MRN: [id number] [facility]:[birth year] About your hospitalization You were admitted on:  August 24, 2018 You last received care in the:  [facility] 5M1 MED SURG 1 You were discharged on:  August 27, 2018 Why you were hospitalized Your primary diagnosis was:  Bladder Mass Your diagnoses also included:  Esrd On Peritoneal Dialysis (Hcc), Essential Hypertension, Benign, Paroxysmal Atrial Fibrillation (Hcc), Hypokalemia, Anemia, Abdominal Pain, Coagulopathy (Hcc), Abdominal Pain, Gross Hematuria, Leukocytosis, Systolic Chf, Chronic (Hcc) Follow-up Information Follow up With Details Comments Contact Info Amador Crenshaw MD   [address] [place] [address] 
[phone] Your Scheduled Appointments Monday September 17, 2018  8:40 AM EDT  
ESTABLISHED PATIENT with Samantha Castillo MD  
[facility] ([address]) [address] [place] [address]  
[phone] Tuesday September 25, 2018 10:00 AM EDT  
REMOTE OFFICE VISIT with Hilario Simental [facility] ([facility]AS SCHEDULING) [address] [place] [address]  
[phone] Discharge Orders None A check matthew indicates which time of day the medication should be taken. My Medications CONTINUE taking these medications Instructions Each Dose to Equal  
 Morning Noon Evening Bedtime  
 ferric citrate 210 mg iron tablet Commonly known as:  [place] Take 210 mg by mouth three (3) times daily (with meals). 210 mg  
    
  
   
  
   
  
   
  
 gentamicin 0.1 % topical cream  
Commonly known as:  GARAMYCIN Apply  to affected area nightly. omeprazole 20 mg capsule Commonly known as:  PRILOSEC  
 Take 20 mg by mouth daily. 20 mg SENNA-DOCUSATE SODIUM PO Take 1 Tab by mouth daily as needed (constipation). 1 Tab SENSIPAR 30 mg tablet Generic drug:  cinacalcet Take 30 mg by mouth daily. 30 mg  
    
  
   
   
   
  
  
STOP taking these medications   
 bumetanide 2 mg tablet Commonly known as:  BUMEX  
   
  
 carvedilol 25 mg tablet Commonly known as:  COREG  
   
  
 KLOR-CON M20 20 mEq tablet Generic drug:  potassium chloride  
   
  
 losartan 50 mg tablet Commonly known as:  COZAAR  
   
  
 spironolactone 50 mg tablet Commonly known as:  ALDACTONE  
   
  
 warfarin 5 mg tablet Commonly known as:  COUMADIN Discharge Instructions ACUTE DIAGNOSES: 
Abdominal pain Abdominal pain CHRONIC MEDICAL DIAGNOSES: 
Problem List as of 8/27/2018  Date Reviewed: 8/2/2018 Codes Class Noted - Resolved Systolic CHF, chronic (HCC) (Chronic) ICD-10-CM: E51.96 ICD-9-CM: 428.22, 428.0  8/25/2018 - Present * (Principal)Bladder mass ICD-10-CM: N32.89 ICD-9-CM: 596.89  8/24/2018 - Present Gross hematuria ICD-10-CM: R31.0 ICD-9-CM: 599.71  8/24/2018 - Present Paroxysmal atrial fibrillation (HCC) (Chronic) ICD-10-CM: I48.0 ICD-9-CM: 427.31  7/31/2018 - Present  
   
 ICD (implantable cardioverter-defibrillator) in place (Chronic) ICD-10-CM: F17.022 ICD-9-CM: V45.02  1/26/2018 - Present Paroxysmal atrial tachycardia (HCC) ICD-10-CM: I47.1 ICD-9-CM: 427.0  4/17/2016 - Present ESRD on peritoneal dialysis (HCC) (Chronic) ICD-10-CM: N18.6, Z99.2 ICD-9-CM: 585.6, V45.11  12/11/2015 - Present Hypokalemia ICD-10-CM: E87.6 ICD-9-CM: 276.8  1/20/2015 - Present Anemia (Chronic) ICD-10-CM: D64.9 ICD-9-CM: 285.9  1/20/2015 - Present Cardiomyopathy, nonischemic ([facility] ICD-10-CM: I42.8 ICD-9-CM: 425.4  10/19/2013 - Present Overview Signed 10/19/2013  1:36 PM by Jose A Hutton MD  
  LVEF 25-30% by echo 10/19/2013 (previously 45%) Polycystic kidney disease (Chronic) ICD-10-CM: Q61.3 ICD-9-CM: 753.12  10/18/2012 - Present Essential hypertension, benign (Chronic) ICD-10-CM: I10 
ICD-9-CM: 401.1  10/18/2012 - Present RESOLVED: Leukocytosis ICD-10-CM: T89.824 ICD-9-CM: 288.60  8/25/2018 - 8/27/2018 RESOLVED: Coagulopathy ([facility] ICD-10-CM: B52.7 ICD-9-CM: 286.9  8/24/2018 - 8/27/2018 RESOLVED: Abdominal pain ICD-10-CM: R10.9 ICD-9-CM: 789.00  8/24/2018 - 8/27/2018 RESOLVED: Abnormal nuclear stress test ICD-10-CM: R94.39 
ICD-9-CM: 794.39  12/11/2015 - 1/5/2016 RESOLVED: CAD (coronary artery disease) ICD-10-CM: I25.10 ICD-9-CM: 414.00  1/20/2015 - 12/11/2015 RESOLVED: Sepsis ([facility] ICD-10-CM: A41.9 ICD-9-CM: 038.9, 995.91  1/20/2015 - 10/13/2015 RESOLVED: Fever and chills ICD-10-CM: R50.9 ICD-9-CM: 780.60  1/20/2015 - 10/13/2015 RESOLVED: Abdominal pain ICD-10-CM: R10.9 ICD-9-CM: 789.00  1/20/2015 - 10/13/2015 RESOLVED: Leukocytosis ICD-10-CM: P42.408 ICD-9-CM: 288.60  1/20/2015 - 10/13/2015 RESOLVED: Peritonitis ([facility] ICD-10-CM: K65.9 ICD-9-CM: 567.9  1/19/2015 - 10/13/2015 RESOLVED: Acute systolic HF (heart failure) (HCC) ICD-10-CM: I50.21 ICD-9-CM: 428.21  10/19/2013 - 10/31/2013 RESOLVED: Excessive sleepiness ICD-10-CM: G47.10 ICD-9-CM: 780.54  10/19/2013 - 10/20/2013 Overview Signed 10/19/2013  1:37 PM by Jose A Hutton MD  
  Suspect severe KAI RESOLVED: CHF, acute ([facility] ICD-10-CM: I50.9 ICD-9-CM: 428.0  10/18/2013 - 10/31/2013 RESOLVED: Fluid overload ICD-10-CM: E87.70 ICD-9-CM: 276.69  10/18/2013 - 10/31/2013 RESOLVED: Chronic diastolic heart failure ([facility] ICD-10-CM: I50.32 
ICD-9-CM: 428.32  10/31/2012 - 12/13/2013 RESOLVED: CKD (chronic kidney disease) stage 5, GFR less than 15 ml/min (HCC) (Chronic) ICD-10-CM: N18.5 ICD-9-CM: 585.5  10/18/2012 - 12/11/2015 RESOLVED: Heart failure (HCC) ICD-10-CM: I50.9 ICD-9-CM: 428.9  10/15/2012 - 10/18/2012 RESOLVED: Elevated troponin ICD-10-CM: R74.8 ICD-9-CM: 790.6  10/14/2012 - 10/18/2012 RESOLVED: Other dyspnea and respiratory abnormality ICD-10-CM: R06.09, R09.89 ICD-9-CM: 786.09  10/14/2012 - 10/18/2012 RESOLVED: Acute renal failure (HCC) ICD-10-CM: N17.9 ICD-9-CM: 584.9  10/14/2012 - 10/18/2012 DISCHARGE MEDICATIONS:  
  
 
 
· It is important that you take the medication exactly as they are prescribed. · Keep your medication in the bottles provided by the pharmacist and keep a list of the medication names, dosages, and times to be taken in your wallet. · Do not take other medications without consulting your doctor. DIET:  Cardiac Diet ACTIVITY: Activity as tolerated ADDITIONAL INFORMATION: If you experience any of the following symptoms then please call your primary care physician or return to the emergency room if you cannot get hold of your doctor: Fever, chills, nausea, vomiting, diarrhea, change in mentation, falling, bleeding, shortness of breath. FOLLOW UP CARE: 
Dr. Laith Dimas MD  you are to call and set up an appointment to see them in 2 weeks. Follow-up with urology in 1 week Follow up with cardiology in 1-2 week Information obtained by : 
I understand that if any problems occur once I am at home I am to contact my physician. I understand and acknowledge receipt of the instructions indicated above. Physician's or R.N.'s Signature                                                                  Date/Time Patient or Representative Signature                                                          Date/Time 
 
 
 
ACO Transitions of Care Introducing [employer] [address] offers a voluntary care coordination program to provide high quality service and care to [facility] fee-for-service beneficiaries. Simin Miller was designed to help you enhance your health and well-being through the following services: ? Transitions of Care  support for individuals who are transitioning from one care setting to another (example: Hospital to home). ? Chronic and Complex Care Coordination  support for individuals and caregivers of those with serious or chronic illnesses or with more than one chronic (ongoing) condition and those who take a number of different medications. If you meet specific medical criteria, a [address] [place] [address] Rd may call you directly to coordinate your care with your primary care physician and your other care providers. For questions about the [facility] programs, please, contact your physicians office. For general questions or additional information about Accountable Care Organizations: 
Please visit www.medicare.gov/acos. html or call 1-800-MEDICARE (7-520.630.4982) TTY users should call 4-257.785.4869. MamaBear App Announcement We are excited to announce that we are making your provider's discharge notes available to you in [employer]. You will see these notes when they are completed and signed by the physician that discharged you from your recent hospital stay.   If you have any questions or concerns about any information you see in [employer], please call the Health Information Department where you were seen or reach out to your Primary Care Provider for more information about your plan of care. Introducing [facility]! Dear Kassie Teixeira: 
Thank you for requesting a [employer] account. Our records indicate that you already have an active [employer] account. You can access your account anytime at [email or web address] Did you know that you can access your hospital and ER discharge instructions at any time in [employer]? You can also review all of your test results from your hospital stay or ER visit. Additional Information If you have questions, please visit the Frequently Asked Questions section of the [employer] website at [email or web address] Remember, [employer] is NOT to be used for urgent needs. For medical emergencies, dial 911. Now available from your iPhone and Android! Introducing Ephraim Dyson As a Joey Santiago patient, I wanted to make you aware of our electronic visit tool called Ephraim Kiel. Joey Single 24/Securly allows you to connect within minutes with a medical provider 24 hours a day, seven days a week via a mobile device or tablet or logging into a secure website from your computer. You can access Ephraim Dyson from anywhere in the [place]. A virtual visit might be right for you when you have a simple condition and feel like you just dont want to get out of bed, or cant get away from work for an appointment, when your regular Joey Santiago provider is not available (evenings, weekends or holidays), or when youre out of town and need minor care. Electronic visits cost only $49 and if the Joey Tricycle/Securly provider determines a prescription is needed to treat your condition, one can be electronically transmitted to a nearby pharmacy*. Please take a moment to enroll today if you have not already done so. The enrollment process is free and takes just a few minutes.   To enroll, please download the Peter Single 24/Securly amos to your tablet or phone, or visit [email or web address] to enroll on your computer. And, as an [address] [place] [address] patient with a [employer] account, the results of your visits will be scanned into your electronic medical record and your primary care provider will be able to view the scanned results. We urge you to continue to see your regular Piter Griffin provider for your ongoing medical care. And while your primary care provider may not be the one available when you seek a TargetingMantradesirefin virtual visit, the peace of mind you get from getting a real diagnosis real time can be priceless. For more information on Offerpop, view our Frequently Asked Questions (FAQs) at [email or web address]. Sincerely, 
 
Kel Briseno MD 
Chief Medical Officer [address] *:  certain medications cannot be prescribed via [employer]pop Unresulted Labs-Please follow up with your PCP about these lab tests Order Current Status CULTURE, BLOOD, PAIRED Preliminary result CULTURE, BODY FLUID W GRAM STAIN Preliminary result CULTURE, BODY FLUID W GRAM STAIN Preliminary result Providers Seen During Your Hospitalization Provider Specialty Primary office phone Yanick Marks MD Emergency Medicine [phone] Jhon Draper, DO Emergency Medicine [phone] [facility],  Internal Medicine [phone] Argelia Fregoso MD Hospitalist [phone] Your Primary Care Physician (PCP) Primary Care Physician Office Phone Office Fax Payal Mani [phone] You are allergic to the following No active allergies Recent Documentation Height Weight BMI OB Status Smoking Status 1.651 m 58.7 kg 21.53 kg/m2 Postmenopausal Never Smoker Emergency Contacts Name Discharge Info Relation Home Work Mobile Kelly Gonzalez DISCHARGE CAREGIVER [3] Other Relative [6] [phone] Jihan Espinosa DISCHARGE CAREGIVER [3] Sister [23] [phone] Patient Belongings The following personal items are in your possession at time of discharge: 
  Dental Appliances: None  Visual Aid: Glasses, With patient      Home Medications: None   Jewelry: Ring  Clothing: At bedside    Other Valuables: Romana Gilbert Please provide this summary of care documentation to your next provider. Signatures-by signing, you are acknowledging that this After Visit Summary has been reviewed with you and you have received a copy. Patient Signature:  ____________________________________________________________ Date:  ____________________________________________________________  
  
[facility] Provider Signature:  ____________________________________________________________ Date:  ____________________________________________________________

## 2018-08-24 NOTE — TELEPHONE ENCOUNTER
Pt tells me that she is new on coumadin and forgot she has to be careful about what meds she takes and she took ibuprofen this AM and has now noticed blood in her urine. She can see it in the toilet. Looks like she started 5 mg warfarin 8/7/18. Said that she has not gotten a call about coming in for a pro-time. I asked her to come this afternoon. I gave this info to DIDIER, our NP, and she also wants Ms Analisa Henriquez to have an [facility] 80 drawn.

## 2018-08-24 NOTE — TELEPHONE ENCOUNTER
MICHAEL Tracy RN        Caller: Unspecified (Today,  1:37 PM)                     Thank you.  Please fwd me lab results when they return.  Educate pt on s/s of abnormal bleeding which she needs to watch for while on Warfarin.  Can you get her set up to be followed in the Coumadin clinic?

## 2018-08-24 NOTE — ED PROVIDER NOTES
HPI Comments: 61 y.o. female with past medical history significant for polycystic kidney disease, hypertension, heart failure, STEMI, and peritoneal dialysis catheter who presents ambulatory from cardiology with chief complaint of severe abdominal pain. Patient reports onset of LLQ pain this morning and notes history of similar pain with peritonitis 3-4 years ago. She was hospitalized for her abdominal pain then. She notes that she did not check her PD bag this morning. Patient notes her last bowel movement this morning and was normal color. She also complains of chills and multiple episodes of vomiting. Of note, patient takes Coumadin. There are no other acute medical concerns at this time. Old Chart Review: Patient was admitted in 2015 for an ICD implantation. Social hx: Denies tobacco use; denies alcohol use; denies illicit drug use  PCP: Feliz Verma MD    Note written by Amor Billings, as dictated by Cristie Litten, DO 3:40 PM      The history is provided by the patient. No  was used. Past Medical History:   Diagnosis Date    Chronic diastolic heart failure ([address] 10/31/2012    Chronic systolic heart failure ([address] 10/31/2012    Heart failure ([facility] 69/31/2667    systolic and diastolic    Hx of non-ST elevation myocardial infarction (NSTEMI)     Hypertension     Peritoneal dialysis [facility] in [facility])     Polycystic kidney disease        No past surgical history on file. Family History:   Problem Relation Age of Onset    Diabetes Brother     Hypertension Brother     Hypertension Mother     Kidney Disease Mother     Hypertension Sister        Social History     Social History    Marital status: SINGLE     Spouse name: N/A    Number of children: N/A    Years of education: N/A     Occupational History    Not on file.      Social History Main Topics    Smoking status: Never Smoker    Smokeless tobacco: Never Used    Alcohol use No    Drug use: No    Sexual activity: Not Currently     Partners: Male     Other Topics Concern    Not on file     Social History Narrative         ALLERGIES: Review of patient's allergies indicates no known allergies. Review of Systems   Constitutional: Positive for chills. Gastrointestinal: Positive for abdominal pain, nausea and vomiting. All other systems reviewed and are negative. Vitals:    08/24/18 1532   BP: (!) 159/106   Pulse: 85   Resp: 18   Temp: 97.8 °F (36.6 °C)   SpO2: 96%   Weight: 57.2 kg (126 lb)   Height: 5' 5\" (1.651 m)            Physical Exam      Constitutional: Pt is awake and alert. Pt appears well-developed and well-nourished. In a lot of pain. HENT:   Head: Normocephalic and atraumatic. Nose: Nose normal.   Mouth/Throat: Oropharynx is clear and moist. No oropharyngeal exudate. Eyes: Conjunctivae and extraocular motions are normal. Pupils are equal, round, and reactive to light. Right eye exhibits no discharge. Left eye exhibits no discharge. No scleral icterus. Neck: No tracheal deviation present. Supple neck. Cardiovascular: Normal rate, regular rhythm, normal heart sounds and intact distal pulses. Exam reveals no gallop and no friction rub. No murmur heard. Pulmonary/Chest: Effort normal and breath sounds normal.  Pt  has no wheezes. Pt  has no rales. Abdominal: Soft. Pt  exhibits no distension and no mass. Left abdominal tenderness. Pt  has no rebound and no guarding. PD catheter right side. Musculoskeletal:  Pt  exhibits no edema and no tenderness. Ext: Normal ROM in all four extremities; not tender to palpation; distal pulses are normal, no edema. Neurological:  Pt is alert. nonfocal neuro exam.  Skin: Skin is warm and dry. Pt  is not diaphoretic. Psychiatric:  Pt  has a normal mood and affect.  Behavior is normal.   Note written by Amor Boyle, as dictated by Jared Carbajal DO 3:40 PM            [facility]      ED Course Procedures           Reviewed CT images          5:10 PM - after numerous failed peripheral IV attempts by nursing, I placed a 20g peripheral iv in her R hand. Nestor Cerrato, DO    PROGRESS NOTE:  6:20 PM  Patient states that she had gross hematuria yesterday and this morning.        INR high  Discussed with Dr Reyes Clause - will admit  Pain control with morphine  CT reviewed  Has bladder and/or mass in bladder  No obstruction  8 WBC in her fluid

## 2018-08-24 NOTE — PROGRESS NOTES
8/24/2018  4:12 PM  Case management note    Met with patient and sister to discuss discharge planning. Confirmed demographics. Patient lives alone in a 2 story apartment with about 20 steps to enter. Patient drives and performs ADL's independently. She uses [facility] @ [address] [place] [address]. Patient follows Dr. Carlee Mcnair for medical management. No NN    OBS educated, letter signed and placed in chart. Reason for Admission:   Stomach pain                  RRAT Score:     13             Do you (patient/family) have any concerns for transition/discharge? Lives alone               Plan for utilizing home health:     Unable to determine at this time    Likelihood of readmission?    Moderate/yellow            Transition of Care Plan:      Unable to determine at this time    Care Management Interventions  Mode of Transport at Discharge: Self  Transition of Care Consult (CM Consult): Discharge Planning  Current Support Network: Lives Alone  Confirm Follow Up Transport: Family  Plan discussed with Pt/Family/Caregiver: Yes  Discharge Location  Discharge Placement: Unable to determine at this time  Greri Busch

## 2018-08-24 NOTE — TELEPHONE ENCOUNTER
INR 8.0 given 5 mg vitamin K per Dr Dago Hooks verbal order. (see anticoagulation documentation) states only had blood in her urine. No other bleeding issues, no recents cuts nor falls. Did take one time dose of ibuprofen this AM for \"kidney pain\". Kimmie Gins it is not unusual for her to do that. Pt also c/o moderate left sided abdominal pain and bloating and nausea. Mentioned she is a PD patient and was in a hurry this AM when doing her exchange and wonders if she has developed an infection because she has felt similar with an infection in the past. She went into the restroom in the office and vomited. Per Dr Corey Adam, this nurse took pt via wheelchair to the ER for further evaluation and tx.

## 2018-08-24 NOTE — IP AVS SNAPSHOT
[address] 
[phone] Patient: Milton Siegel MRN: [id number] SAZ:[birth year] A check matthew indicates which time of day the medication should be taken. My Medications CONTINUE taking these medications Instructions Each Dose to Equal  
 Morning Noon Evening Bedtime  
 ferric citrate 210 mg iron tablet Commonly known as:  Ivan Cake Take 210 mg by mouth three (3) times daily (with meals). 210 mg  
    
  
   
  
   
  
   
  
 gentamicin 0.1 % topical cream  
Commonly known as:  GARAMYCIN Apply  to affected area nightly. omeprazole 20 mg capsule Commonly known as:  PRILOSEC Take 20 mg by mouth daily. 20 mg SENNA-DOCUSATE SODIUM PO Take 1 Tab by mouth daily as needed (constipation). 1 Tab SENSIPAR 30 mg tablet Generic drug:  cinacalcet Take 30 mg by mouth daily. 30 mg  
    
  
   
   
   
  
  
STOP taking these medications   
 bumetanide 2 mg tablet Commonly known as:  BUMEX  
   
  
 carvedilol 25 mg tablet Commonly known as:  COREG  
   
  
 KLOR-CON M20 20 mEq tablet Generic drug:  potassium chloride  
   
  
 losartan 50 mg tablet Commonly known as:  COZAAR  
   
  
 spironolactone 50 mg tablet Commonly known as:  ALDACTONE  
   
  
 warfarin 5 mg tablet Commonly known as:  COUMADIN

## 2018-08-25 PROBLEM — I50.22 SYSTOLIC CHF, CHRONIC (HCC): Chronic | Status: ACTIVE | Noted: 2018-08-25

## 2018-08-25 PROBLEM — I48.0 PAROXYSMAL ATRIAL FIBRILLATION (HCC): Chronic | Status: ACTIVE | Noted: 2018-07-31

## 2018-08-25 PROBLEM — D72.829 LEUKOCYTOSIS: Status: ACTIVE | Noted: 2018-08-25

## 2018-08-25 PROBLEM — Z95.810 ICD (IMPLANTABLE CARDIOVERTER-DEFIBRILLATOR) IN PLACE: Chronic | Status: ACTIVE | Noted: 2018-01-26

## 2018-08-25 LAB
ALBUMIN SERPL-MCNC: 2.9 G/DL (ref 3.5–5)
ALBUMIN/GLOB SERPL: 0.9 {RATIO} (ref 1.1–2.2)
ALP SERPL-CCNC: 56 U/L (ref 45–117)
ALT SERPL-CCNC: 28 U/L (ref 12–78)
ANION GAP SERPL CALC-SCNC: 17 MMOL/L (ref 5–15)
AST SERPL-CCNC: 33 U/L (ref 15–37)
BASOPHILS # BLD: 0.1 K/UL (ref 0–0.1)
BASOPHILS NFR BLD: 0 % (ref 0–1)
BILIRUB DIRECT SERPL-MCNC: 0.3 MG/DL (ref 0–0.2)
BILIRUB SERPL-MCNC: 1.6 MG/DL (ref 0.2–1)
BUN SERPL-MCNC: 44 MG/DL (ref 6–20)
BUN/CREAT SERPL: 4 (ref 12–20)
CALCIUM SERPL-MCNC: 7.8 MG/DL (ref 8.5–10.1)
CHLORIDE SERPL-SCNC: 101 MMOL/L (ref 97–108)
CO2 SERPL-SCNC: 24 MMOL/L (ref 21–32)
CREAT SERPL-MCNC: 12.43 MG/DL (ref 0.55–1.02)
DIFFERENTIAL METHOD BLD: ABNORMAL
EOSINOPHIL # BLD: 0 K/UL (ref 0–0.4)
EOSINOPHIL NFR BLD: 0 % (ref 0–7)
ERYTHROCYTE [DISTWIDTH] IN BLOOD BY AUTOMATED COUNT: 13.4 % (ref 11.5–14.5)
GLOBULIN SER CALC-MCNC: 3.2 G/DL (ref 2–4)
GLUCOSE SERPL-MCNC: 100 MG/DL (ref 65–100)
HCT VFR BLD AUTO: 34.5 % (ref 35–47)
HGB BLD-MCNC: 11.1 G/DL (ref 11.5–16)
IMM GRANULOCYTES # BLD: 0.1 K/UL (ref 0–0.04)
IMM GRANULOCYTES NFR BLD AUTO: 1 % (ref 0–0.5)
INR PPP: 2 (ref 0.9–1.1)
LYMPHOCYTES # BLD: 0.7 K/UL (ref 0.8–3.5)
LYMPHOCYTES NFR BLD: 5 % (ref 12–49)
MAGNESIUM SERPL-MCNC: 2 MG/DL (ref 1.6–2.4)
MCH RBC QN AUTO: 32.1 PG (ref 26–34)
MCHC RBC AUTO-ENTMCNC: 32.2 G/DL (ref 30–36.5)
MCV RBC AUTO: 99.7 FL (ref 80–99)
MONOCYTES # BLD: 0.6 K/UL (ref 0–1)
MONOCYTES NFR BLD: 5 % (ref 5–13)
NEUTS SEG # BLD: 11.2 K/UL (ref 1.8–8)
NEUTS SEG NFR BLD: 88 % (ref 32–75)
NRBC # BLD: 0 K/UL (ref 0–0.01)
NRBC BLD-RTO: 0 PER 100 WBC
PHOSPHATE SERPL-MCNC: 6 MG/DL (ref 2.6–4.7)
PLATELET # BLD AUTO: 170 K/UL (ref 150–400)
PMV BLD AUTO: 9.7 FL (ref 8.9–12.9)
POTASSIUM SERPL-SCNC: 3.4 MMOL/L (ref 3.5–5.1)
PROT SERPL-MCNC: 6.1 G/DL (ref 6.4–8.2)
PROTHROMBIN TIME: 19.8 SEC (ref 9–11.1)
RBC # BLD AUTO: 3.46 M/UL (ref 3.8–5.2)
SODIUM SERPL-SCNC: 142 MMOL/L (ref 136–145)
WBC # BLD AUTO: 12.7 K/UL (ref 3.6–11)

## 2018-08-25 PROCEDURE — 74011250637 HC RX REV CODE- 250/637: Performed by: INTERNAL MEDICINE

## 2018-08-25 PROCEDURE — 77030005518 HC CATH URETH FOL 2W BARD -B

## 2018-08-25 PROCEDURE — 77030018836 HC SOL IRR NACL ICUM -A

## 2018-08-25 PROCEDURE — 83735 ASSAY OF MAGNESIUM: CPT | Performed by: INTERNAL MEDICINE

## 2018-08-25 PROCEDURE — 74011250636 HC RX REV CODE- 250/636: Performed by: INTERNAL MEDICINE

## 2018-08-25 PROCEDURE — 84100 ASSAY OF PHOSPHORUS: CPT | Performed by: INTERNAL MEDICINE

## 2018-08-25 PROCEDURE — 80048 BASIC METABOLIC PNL TOTAL CA: CPT | Performed by: INTERNAL MEDICINE

## 2018-08-25 PROCEDURE — 77010033678 HC OXYGEN DAILY

## 2018-08-25 PROCEDURE — 99218 HC RM OBSERVATION: CPT

## 2018-08-25 PROCEDURE — 85025 COMPLETE CBC W/AUTO DIFF WBC: CPT | Performed by: INTERNAL MEDICINE

## 2018-08-25 PROCEDURE — 80076 HEPATIC FUNCTION PANEL: CPT | Performed by: INTERNAL MEDICINE

## 2018-08-25 PROCEDURE — 36415 COLL VENOUS BLD VENIPUNCTURE: CPT | Performed by: INTERNAL MEDICINE

## 2018-08-25 PROCEDURE — 85610 PROTHROMBIN TIME: CPT | Performed by: INTERNAL MEDICINE

## 2018-08-25 PROCEDURE — 94760 N-INVAS EAR/PLS OXIMETRY 1: CPT

## 2018-08-25 PROCEDURE — A4722 DIALYS SOL FLD VOL > 1999CC: HCPCS | Performed by: INTERNAL MEDICINE

## 2018-08-25 RX ORDER — POTASSIUM CHLORIDE 750 MG/1
40 TABLET, FILM COATED, EXTENDED RELEASE ORAL
Status: COMPLETED | OUTPATIENT
Start: 2018-08-25 | End: 2018-08-25

## 2018-08-25 RX ADMIN — BUMETANIDE 4 MG: 1 TABLET ORAL at 08:09

## 2018-08-25 RX ADMIN — HYDROCODONE BITARTRATE AND ACETAMINOPHEN 1 TABLET: 5; 325 TABLET ORAL at 16:41

## 2018-08-25 RX ADMIN — ONDANSETRON 4 MG: 2 INJECTION, SOLUTION INTRAMUSCULAR; INTRAVENOUS at 11:55

## 2018-08-25 RX ADMIN — SEVELAMER CARBONATE 800 MG: 800 TABLET, FILM COATED ORAL at 08:10

## 2018-08-25 RX ADMIN — HYDROMORPHONE HYDROCHLORIDE 1 MG: 2 INJECTION, SOLUTION INTRAMUSCULAR; INTRAVENOUS; SUBCUTANEOUS at 03:54

## 2018-08-25 RX ADMIN — CARVEDILOL 25 MG: 12.5 TABLET, FILM COATED ORAL at 08:08

## 2018-08-25 RX ADMIN — LOSARTAN POTASSIUM 50 MG: 50 TABLET ORAL at 08:08

## 2018-08-25 RX ADMIN — GENTAMICIN SULFATE: 1 CREAM TOPICAL at 22:00

## 2018-08-25 RX ADMIN — SODIUM CHLORIDE, SODIUM LACTATE, CALCIUM CHLORIDE, MAGNESIUM CHLORIDE AND DEXTROSE 1500 ML: 1.5; 538; 448; 18.3; 5.08 INJECTION, SOLUTION INTRAPERITONEAL at 22:00

## 2018-08-25 RX ADMIN — Medication 10 ML: at 22:00

## 2018-08-25 RX ADMIN — SODIUM CHLORIDE, SODIUM LACTATE, CALCIUM CHLORIDE, MAGNESIUM CHLORIDE AND DEXTROSE 1500 ML: 1.5; 538; 448; 18.3; 5.08 INJECTION, SOLUTION INTRAPERITONEAL at 15:11

## 2018-08-25 RX ADMIN — SEVELAMER CARBONATE 800 MG: 800 TABLET, FILM COATED ORAL at 17:40

## 2018-08-25 RX ADMIN — SEVELAMER CARBONATE 800 MG: 800 TABLET, FILM COATED ORAL at 12:56

## 2018-08-25 RX ADMIN — SPIRONOLACTONE 50 MG: 25 TABLET, FILM COATED ORAL at 08:07

## 2018-08-25 RX ADMIN — OMEPRAZOLE 20 MG: 20 CAPSULE, DELAYED RELEASE ORAL at 08:10

## 2018-08-25 RX ADMIN — SODIUM CHLORIDE, SODIUM LACTATE, CALCIUM CHLORIDE, MAGNESIUM CHLORIDE AND DEXTROSE 1500 ML: 1.5; 538; 448; 18.3; 5.08 INJECTION, SOLUTION INTRAPERITONEAL at 18:14

## 2018-08-25 RX ADMIN — CINACALCET HYDROCHLORIDE 30 MG: 30 TABLET, COATED ORAL at 08:09

## 2018-08-25 RX ADMIN — Medication 10 ML: at 03:54

## 2018-08-25 RX ADMIN — POTASSIUM CHLORIDE 40 MEQ: 750 TABLET, EXTENDED RELEASE ORAL at 10:20

## 2018-08-25 NOTE — ROUTINE PROCESS
TRANSFER - OUT REPORT:    Verbal report given to Timi on Marline Angry  being transferred to 506 for routine progression of care       Report consisted of patients Situation, Background, Assessment and   Recommendations(SBAR). Information from the following report(s) SBAR, ED Summary, [facility] and Recent Results was reviewed with the receiving nurse. Opportunity for questions and clarification was provided.

## 2018-08-25 NOTE — PROGRESS NOTES
TRANSFER - IN REPORT:    Verbal report received from [address] (name) on Jonathan Mehta  being received from ED (unit) for routine progression of care      Report consisted of patients Situation, Background, Assessment and   Recommendations(SBAR). Information from the following report(s) SBAR, Kardex, ED Summary, Accordion and Recent Results was reviewed with the receiving nurse. Opportunity for questions and clarification was provided. Assessment completed upon patients arrival to unit and care assumed.

## 2018-08-25 NOTE — PROGRESS NOTES
Marcial Alatorre zaire [place] 78  [address] [facility], [place], [address] [place] [address]  [phone]      Medical Progress Note      NAME: Rachael Halsted   :  1960  MRM:  [id number]    Date/Time: 2018  10:26 AM         Subjective:     Chief Complaint:  Pain: abdominal/bladder, mild this AM, improved from yesterday and controlled with pain meds, still with hematuria    ROS:  (bold if positive, if negative)                        Tolerating Diet          Objective:       Vitals:          Last 24hrs VS reviewed since prior progress note.  Most recent are:    Visit Vitals    /77 (BP 1 Location: Right arm)    Pulse (!) 115    Temp 98 °F (36.7 °C)    Resp 20    Ht 5' 5\" (1.651 m)    Wt 57.2 kg (126 lb)    SpO2 95%    BMI 20.97 kg/m2     SpO2 Readings from Last 6 Encounters:   18 95%   18 99%   18 98%   17 98%   17 97%   17 98%    O2 Flow Rate (L/min): 2 l/min     Intake/Output Summary (Last 24 hours) at 18 1026  Last data filed at 18 0412   Gross per 24 hour   Intake                0 ml   Output              150 ml   Net             -150 ml          Exam:     Physical Exam:    Gen:  Well-developed, well-nourished, in no acute distress  HEENT:  Pink conjunctivae, PERRL, hearing intact to voice, moist mucous membranes  Neck:  Supple, without masses, thyroid non-tender  Resp:  No accessory muscle use, clear breath sounds without wheezes rales or rhonchi  Card:  No murmurs, normal S1, S2 without thrills, bruits or peripheral edema  Abd:  Soft, non-tender, non-distended, normoactive bowel sounds are present, no palpable organomegaly and no detectable hernias  Lymph:  No cervical or inguinal adenopathy  Musc:  No cyanosis or clubbing  Skin:  No rashes or ulcers, skin turgor is good  Neuro:  Cranial nerves are grossly intact, no focal motor weakness, follows commands appropriately  Psych:  Good insight, oriented to person, place and time, alert    Medications Reviewed: (see below)    Lab Data Reviewed: (see below)    ______________________________________________________________________    Medications:     Current Facility-Administered Medications   Medication Dose Route Frequency    sodium chloride (NS) flush 5-10 mL  5-10 mL IntraVENous Q8H    sodium chloride (NS) flush 5-10 mL  5-10 mL IntraVENous PRN    naloxone (NARCAN) injection 0.4 mg  0.4 mg IntraVENous PRN    zolpidem (AMBIEN) tablet 5 mg  5 mg Oral QHS PRN    ondansetron (ZOFRAN) injection 4 mg  4 mg IntraVENous Q4H PRN    acetaminophen (TYLENOL) tablet 650 mg  650 mg Oral Q4H PRN    HYDROcodone-acetaminophen (NORCO) 5-325 mg per tablet 1 Tab  1 Tab Oral Q4H PRN    HYDROmorphone (PF) (DILAUDID) injection 1 mg  1 mg IntraVENous Q4H PRN    losartan (COZAAR) tablet 50 mg  50 mg Oral BID    carvedilol (COREG) tablet 25 mg  25 mg Oral BID WITH MEALS    cinacalcet (SENSIPAR) tablet 30 mg  30 mg Oral DAILY    omeprazole (PRILOSEC) capsule 20 mg  20 mg Oral DAILY    bumetanide (BUMEX) tablet 4 mg  4 mg Oral BID    spironolactone (ALDACTONE) tablet 50 mg  50 mg Oral DAILY    gentamicin (GARAMYCIN) 0.1 % cream   Topical QHS    sevelamer carbonate (RENVELA) tab 800 mg  800 mg Oral TID WITH MEALS            Lab Review:     Recent Labs      08/25/18   0406  08/24/18   1539   WBC  12.7*  8.2   HGB  11.1*  11.0*   HCT  34.5*  33.1*   PLT  170  181     Recent Labs      08/25/18   0450  08/25/18   0406  08/24/18   1645  08/24/18   1539   NA   --   142   --   137   K   --   3.4*   --   2.9*   CL   --   101   --   101   CO2   --   24   --   23   GLU   --   100   --   104*   BUN   --   44*   --   41*   CREA   --   12.43*   --   11.30*   CA   --   7.8*   --   8.3*   MG   --   2.0   --    --    PHOS   --   6.0*   --    --    ALB   --   2.9*   --   3.0*   TBILI   --   1.6*   --   1.1*   SGOT   --   33   --   37   ALT   --   28   --   33   INR  2.0*   --   9.8*   --      Lab Results   Component Value Date/Time    Glucose (POC) 94 10/18/2013 03:35 PM    Glucose (POC) 102 10/14/2012 05:58 AM     No results for input(s): PH, PCO2, PO2, HCO3, FIO2 in the last 72 hours.   Recent Labs      08/25/18   0450  08/24/18   1645   INR  2.0*  9.8*     Lab Results   Component Value Date/Time    Specimen Description: URINE 10/18/2013 02:41 PM    Specimen Description: NARES 10/15/2012 02:45 PM     Lab Results   Component Value Date/Time    Culture result: PENDING 08/24/2018 05:19 PM    Culture result: NO GROWTH AFTER 11 HOURS 08/24/2018 04:21 PM    Culture result: HEAVY 01/19/2015 07:50 PM    Culture result: STAPHYLOCOCCUS AUREUS 01/19/2015 07:50 PM            Assessment:     Principal Problem:    Bladder mass (8/24/2018)    Active Problems:    Essential hypertension, benign (10/18/2012)      Hypokalemia (1/20/2015)      Anemia (1/20/2015)      ESRD on peritoneal dialysis ([address] (12/11/2015)      Paroxysmal atrial fibrillation ([address] (7/31/2018)      Coagulopathy ([address] (8/24/2018)      Abdominal pain (8/24/2018)      Gross hematuria (8/24/2018)      Leukocytosis (2/39/7510)      Systolic CHF, chronic (HCC) (8/25/2018)           Plan:     Principal Problem:    Bladder mass (8/24/2018)/Gross hematuria (8/24/2018)/Abdominal pain (8/24/2018)   - abdominal CT with bladder mass vs clot/hematoma, visualized by me    - given coagulopathy suspect all due to acute bleeding   - seems to have improved with correction of coagulopathy    -  to see, doubt acute cystoscopy would be of any benefit but will need outpatient cystoscopy prior to resuming warfarin   - UA was never sent, have ordered and would send for culture and start ceftriaxone pending result given WBC up this AM   - continue pain meds    Active Problems:    Essential hypertension, benign (10/18/2012)   - BP okay on meds as above      Hypokalemia (1/20/2015)   - replete and follow      Anemia (1/20/2015)   - Hgb stable      ESRD on peritoneal dialysis ([address] (12/11/2015)   - Renal consulted for PD      Paroxysmal atrial fibrillation (HCC) (7/31/2018)/Coagulopathy ([facility] (8/24/2018)   - hold warfarin until bladder issues addressed      Leukocytosis (8/25/2018)   - WBC up acutely this AM, may have UTI, will treat as above   - no evidence of PD peritonitis      Systolic CHF, chronic ([facility] (8/25/2018)   - stable, watch volume status      Total time spent in patient care: 35 minutes                  Care Plan discussed with: Patient and Nursing Staff    Discussed:  Code Status, Care Plan and D/C Planning    Prophylaxis:  SCD's    Disposition:  Home w/Family           ___________________________________________________    Attending Physician: Mariella Adame MD

## 2018-08-25 NOTE — PROGRESS NOTES
0715- Bedside and Verbal shift change report given to SLAVA Camarena and SLAVA Serrano  (oncoming nurse) by Tommy Ramos RN  (offgoing nurse). Report included the following information SBAR, Kardex, Intake/Output, MAR and Cardiac Rhythm AFIB. Patient observed in bed, resting on 2L O2 NC, patient states no discomfort at this time. Will assess. 1700- Dr. Laura Crawford called and updated regarding patient's upcoming blood pressure medications and recent BP reading of 97/69 at 1553 and 107/77 at 1651. Patient states that she does not feel light headed or dizzy. Dr. Laura Crawford stated to hold upcoming blood pressure medications at 1700 and 1800. Will continue to monitor. [facility]- Bedside and Verbal shift change report given to Tommy Ramos RN  (oncoming nurse) by Miroslava Ureña and SLAVA Serrano  (offgoing nurse). Report included the following information SBAR, Kardex, Intake/Output, MAR, Recent Results and Cardiac Rhythm AFIB.  Amanda London

## 2018-08-25 NOTE — PROGRESS NOTES
Primary Nurse Jude Rivers RN and [facility] CELY VANCE RN performed a dual skin assessment on this patient No impairment noted  Maurisio score is 22

## 2018-08-25 NOTE — CONSULTS
[address] [facility]  MR#: [id number]  : 1960  ACCOUNT #: [de-identified]   DATE OF SERVICE: 2018    REFERRING PHYSICIAN:  Dr. Derrick Mills:  Provision of peritoneal dialysis during this hospitalization. HISTORY OF PRESENT ILLNESS:  The patient is a very well-known to me 41-year-old black woman who was followed up by me for the last 10 years for polycystic kidney disease with chronic kidney disease, which progressed to end-stage renal disease. The patient is currently on peritoneal dialysis. He does a nighttime cycler. Patient was doing relatively well until recently when she experienced sudden pain in her left flank. The pain was sharp and stabbing. Patient developed subsequently hematuria. Of importance is that the patient was started on Coumadin for AFib. The patient was diagnosed with a mass in her bladder, which is believed to be a big clot. Patient had already PD fluid checked for peritonitis and the fluid is clear with no fibrin or cells. PAST MEDICAL HISTORY:  Polycystic kidney disease, end-stage renal disease on PD, hypertension, non-ST elevation MI, severe cardiomyopathy with atrial fibrillation (patient is being evaluated for combined kidney heart transplant) chronic systolic heart failure. PAST SURGICAL HISTORY:  Placement of a PD catheter. SOCIAL HISTORY:  The patient still works full time as a . The patient is single, childless. No history of alcohol, tobacco or illicit drug abuse. FAMILY HISTORY:  Mother with hypertension and kidney disease. Brother with hypertension and diabetes. Sister with hypertension. ALLERGIES:  NO KNOWN MEDICAL ALLERGIES.     MEDICATIONS:  Prior to admission include Cozaar 50 mg once a day, warfarin 5 mg once a day, diltiazem 120 extended release, carvedilol 25 twice a day, Prilosec 20, senna, Sensipar 30, Auryxia 210 one pill 3 times daily, potassium chloride, Aldactone, Bumex. REVIEW OF SYSTEMS:  Currently, patient reports constipation, abdominal soreness, anorexia. She denies any other systems from the exam and 12 body system review. PHYSICAL EXAMINATION:  GENERAL:  Middle-aged woman. She is pleasant, awake, alert, oriented, not in acute distress. She is able to provide history. VITAL SIGNS:  Blood pressure is 93/69, heart rate is 110, temperature is 98.5. HEENT:  Normocephalic. Eyes with anicteric sclerae. Mouth with moist oral mucosa. NECK:  Supple, with no increased JVP. No carotid bruit or thyromegaly. CHEST:  Lungs are clear to auscultation with no wheezes, rales or rhonchi. Breathing is nonlabored. CARDIOVASCULAR:  With S1 and S2 with systolic murmur. ABDOMEN:  Soft and not tender. Bowel sounds are present. EXTREMITIES:  With no edema, cyanosis or clubbing. NEUROLOGIC:  Nonfocal.    LABORATORY DATA:  Hemoglobin is 11.1. INR is 2, down from 9.8 yesterday. Sodium is 142, potassium 3.4, CO2 is 24, BUN is 44, creatinine is 12.4, phosphorus is 6, calcium is 7.8, albumin is 2.9. IMPRESSION:  End-stage renal disease, on peritoneal dialysis. Patient is known uremic. She has normal fluid status, electrolytes are with mild hypokalemia. Urinary bladder clot most probably from bleeding cysts which is not uncommon in patients with longstanding polycystic kidney disease. Patient had coagulopathy induced by Coumadin with very high INR. Currently, the patient is hypotensive. She is on multiple blood pressure medications. RECOMMENDATIONS:  Resume PD with low volume exchanges of 1.5 kilos. Resume potassium supplementation and phosphorus binders. Hold blood pressure medications, especially diltiazem if blood pressure is below 110/70. Urology will follow. We will monitor their recommendations. Thank you very much for the opportunity to be part of this patient's care.       Prince Wynne MD       LTD / [place]  D: 08/25/2018 13:11     T: 08/25/2018 16:47  JOB #: [id number]

## 2018-08-25 NOTE — CONSULTS
Urology Consult    Patient: Viri Buck MRN: [id number]  SSN: [id number]    YOB: 1960  Age: 61 y.o. Sex: female          Date of Encounter:  August 25, 2018  Pre-existing [facility] Patient:          History of Present Illness:  Patient is a 61 y.o. female. She presents with LLQ x 1 day. Has hx of ADPCKD on PD. Limited uop. Reports starting coumadin earlier this month. INR 10 on presentation. Now with gross hematuria. Never smoker. Has never had hematuria previously. CT with blood clot vs bladder tumor. Denies difficulty with urination. Got Vit K.  INR now 2. Past Medical History:  No Known Allergies   Prior to Admission medications    Medication Sig Start Date End Date Taking? Authorizing Provider   potassium chloride (KLOR-CON M20) 20 mEq tablet Take 20 mEq by mouth daily. Yes Historical Provider   losartan (COZAAR) 50 mg tablet Take 1 Tab by mouth two (2) times a day. Indications: hypertension 8/8/18  Yes Kyle Interiano NP   warfarin (COUMADIN) 5 mg tablet Take 1 Tab by mouth daily. 8/7/18  Yes Barak Yanes MD   carvedilol (COREG) 25 mg tablet TAKE ONE TABLET BY MOUTH TWICE DAILY WITH MEALS 3/7/18  Yes Barak Yanes MD   omeprazole (PRILOSEC) 20 mg capsule Take 20 mg by mouth daily. Yes Historical Provider   SENNA-DOCUSATE SODIUM PO Take 1 Tab by mouth daily as needed (constipation). Yes Historical Provider   cinacalcet (SENSIPAR) 30 mg tablet Take 30 mg by mouth daily. Yes Historical Provider   ferric citrate (AURYXIA) 210 mg iron tablet Take 210 mg by mouth three (3) times daily (with meals). Yes Historical Provider   spironolactone (ALDACTONE) 50 mg tablet Take 50 mg by mouth daily. Yes Historical Provider   bumetanide (BUMEX) 2 mg tablet Take 4 mg by mouth two (2) times a day. Yes Historical Provider   gentamicin (GARAMYCIN) 0.1 % topical cream Apply  to affected area nightly.  7/12/14  Yes Historical Provider     PMHx:  has a past medical history of Chronic diastolic heart failure ([facility] (10/31/2012); Chronic systolic heart failure ([facility] (10/31/2012); [facility] failure ([facility] (10/15/2012); non-ST elevation myocardial infarction (NSTEMI); Hypertension; Peritoneal dialysis catheter in place [facility]); and Polycystic kidney disease. PSurgHx:  has no past surgical history on file. PSocHx:  reports that she has never smoked. She has never used smokeless tobacco. She reports that she does not drink alcohol or use illicit drugs. ROS:  negative other than above. Physical Exam:            General:    appears nontoxic                     Skin:  no clubbing, cyanosis, edema                HEENT:  NCAT        Throat/Neck:  supple, no LAD                 Chest[de-identified]  nonlabored      Heart[de-identified]  normal cap refill             Abdomen/Flank[de-identified]  No CVAT, non-tender soft abdomen, no masses             Lab Results   Component Value Date/Time    WBC 12.7 (H) 08/25/2018 04:06 AM    HCT 34.5 (L) 08/25/2018 04:06 AM    PLATELET 365 59/04/7771 04:06 AM    Sodium 142 08/25/2018 04:06 AM    Potassium 3.4 (L) 08/25/2018 04:06 AM    Chloride 101 08/25/2018 04:06 AM    CO2 24 08/25/2018 04:06 AM    BUN 44 (H) 08/25/2018 04:06 AM    Creatinine 12.43 (H) 08/25/2018 04:06 AM    Glucose 100 08/25/2018 04:06 AM    Calcium 7.8 (L) 08/25/2018 04:06 AM    Magnesium 2.0 08/25/2018 04:06 AM    Phosphorus 6.0 (H) 08/25/2018 04:06 AM    INR 2.0 (H) 08/25/2018 04:50 AM       UA: pendnig  Cultures: pending  Xrays: CT reviewed    Assessment/Plan:     1. Bladder mass: hematoma vs bladder tumor  -PVR will be unreliable given free fluid for PD  -urokinase in urine may reabsorb clot. Will keep some for me to see. May need manual clot irrigation.  -fu as outpt for cystoscopy.     Signed By: Gi Ramos MD  - August 25, 2018

## 2018-08-26 ENCOUNTER — APPOINTMENT (OUTPATIENT)
Dept: GENERAL RADIOLOGY | Age: 58
DRG: 698 | End: 2018-08-26
Attending: INTERNAL MEDICINE
Payer: MEDICARE

## 2018-08-26 LAB
ANION GAP SERPL CALC-SCNC: 10 MMOL/L (ref 5–15)
APPEARANCE FLD: CLEAR
APPEARANCE FLD: CLEAR
APPEARANCE UR: ABNORMAL
BACTERIA URNS QL MICRO: NEGATIVE /HPF
BILIRUB UR QL CFM: NEGATIVE
BUN SERPL-MCNC: 48 MG/DL (ref 6–20)
BUN/CREAT SERPL: 4 (ref 12–20)
CALCIUM SERPL-MCNC: 7.6 MG/DL (ref 8.5–10.1)
CHLORIDE SERPL-SCNC: 101 MMOL/L (ref 97–108)
CO2 SERPL-SCNC: 25 MMOL/L (ref 21–32)
COLOR FLD: YELLOW
COLOR FLD: YELLOW
COLOR UR: ABNORMAL
CREAT SERPL-MCNC: 12.77 MG/DL (ref 0.55–1.02)
EPITH CASTS URNS QL MICRO: ABNORMAL /LPF
ERYTHROCYTE [DISTWIDTH] IN BLOOD BY AUTOMATED COUNT: 13.5 % (ref 11.5–14.5)
GLUCOSE SERPL-MCNC: 80 MG/DL (ref 65–100)
GLUCOSE UR STRIP.AUTO-MCNC: NEGATIVE MG/DL
HCT VFR BLD AUTO: 30.6 % (ref 35–47)
HGB BLD-MCNC: 9.9 G/DL (ref 11.5–16)
HGB UR QL STRIP: ABNORMAL
KETONES UR QL STRIP.AUTO: ABNORMAL MG/DL
LEUKOCYTE ESTERASE UR QL STRIP.AUTO: ABNORMAL
LYMPHOCYTES NFR FLD: 76 %
LYMPHOCYTES NFR FLD: 79 %
MAGNESIUM SERPL-MCNC: 1.8 MG/DL (ref 1.6–2.4)
MCH RBC QN AUTO: 31.9 PG (ref 26–34)
MCHC RBC AUTO-ENTMCNC: 32.4 G/DL (ref 30–36.5)
MCV RBC AUTO: 98.7 FL (ref 80–99)
MESOTHL CELL NFR FLD: 5 %
MONOS+MACROS NFR FLD: 12 %
MONOS+MACROS NFR FLD: 18 %
NEUTROPHILS NFR FLD: 4 %
NEUTROPHILS NFR FLD: 6 %
NITRITE UR QL STRIP.AUTO: NEGATIVE
NRBC # BLD: 0 K/UL (ref 0–0.01)
NRBC BLD-RTO: 0 PER 100 WBC
NUC CELL # FLD: 22 /CU MM
NUC CELL # FLD: 6 /CU MM
PH UR STRIP: 7 [PH] (ref 5–8)
PHOSPHATE SERPL-MCNC: 4.9 MG/DL (ref 2.6–4.7)
PLATELET # BLD AUTO: 109 K/UL (ref 150–400)
PMV BLD AUTO: 9.8 FL (ref 8.9–12.9)
POTASSIUM SERPL-SCNC: 3.8 MMOL/L (ref 3.5–5.1)
PROT UR STRIP-MCNC: 300 MG/DL
RBC # BLD AUTO: 3.1 M/UL (ref 3.8–5.2)
RBC # FLD: >100 /CU MM
RBC # FLD: >100 /CU MM
RBC #/AREA URNS HPF: >100 /HPF (ref 0–5)
SODIUM SERPL-SCNC: 136 MMOL/L (ref 136–145)
SP GR UR REFRACTOMETRY: 1.01 (ref 1–1.03)
SPECIMEN SOURCE FLD: ABNORMAL
SPECIMEN SOURCE FLD: ABNORMAL
UA: UC IF INDICATED,UAUC: ABNORMAL
UROBILINOGEN UR QL STRIP.AUTO: 0.2 EU/DL (ref 0.2–1)
WBC # BLD AUTO: 11.6 K/UL (ref 3.6–11)
WBC URNS QL MICRO: ABNORMAL /HPF (ref 0–4)

## 2018-08-26 PROCEDURE — 99218 HC RM OBSERVATION: CPT

## 2018-08-26 PROCEDURE — 89050 BODY FLUID CELL COUNT: CPT | Performed by: INTERNAL MEDICINE

## 2018-08-26 PROCEDURE — 94760 N-INVAS EAR/PLS OXIMETRY 1: CPT

## 2018-08-26 PROCEDURE — A4722 DIALYS SOL FLD VOL > 1999CC: HCPCS | Performed by: INTERNAL MEDICINE

## 2018-08-26 PROCEDURE — 85027 COMPLETE CBC AUTOMATED: CPT | Performed by: INTERNAL MEDICINE

## 2018-08-26 PROCEDURE — 74011250636 HC RX REV CODE- 250/636: Performed by: INTERNAL MEDICINE

## 2018-08-26 PROCEDURE — 84100 ASSAY OF PHOSPHORUS: CPT | Performed by: INTERNAL MEDICINE

## 2018-08-26 PROCEDURE — 74011250637 HC RX REV CODE- 250/637: Performed by: INTERNAL MEDICINE

## 2018-08-26 PROCEDURE — 87070 CULTURE OTHR SPECIMN AEROBIC: CPT | Performed by: INTERNAL MEDICINE

## 2018-08-26 PROCEDURE — 36415 COLL VENOUS BLD VENIPUNCTURE: CPT | Performed by: INTERNAL MEDICINE

## 2018-08-26 PROCEDURE — 71046 X-RAY EXAM CHEST 2 VIEWS: CPT

## 2018-08-26 PROCEDURE — 3E1K78Z IRRIGATION OF GENITOURINARY TRACT USING IRRIGATING SUBSTANCE, VIA NATURAL OR ARTIFICIAL OPENING: ICD-10-PCS | Performed by: UROLOGY

## 2018-08-26 PROCEDURE — 87086 URINE CULTURE/COLONY COUNT: CPT | Performed by: INTERNAL MEDICINE

## 2018-08-26 PROCEDURE — 83735 ASSAY OF MAGNESIUM: CPT | Performed by: INTERNAL MEDICINE

## 2018-08-26 PROCEDURE — 80048 BASIC METABOLIC PNL TOTAL CA: CPT | Performed by: INTERNAL MEDICINE

## 2018-08-26 PROCEDURE — 81001 URINALYSIS AUTO W/SCOPE: CPT | Performed by: INTERNAL MEDICINE

## 2018-08-26 RX ADMIN — BUMETANIDE 4 MG: 1 TABLET ORAL at 09:16

## 2018-08-26 RX ADMIN — CARVEDILOL 25 MG: 12.5 TABLET, FILM COATED ORAL at 17:56

## 2018-08-26 RX ADMIN — GENTAMICIN SULFATE: 1 CREAM TOPICAL at 21:46

## 2018-08-26 RX ADMIN — SEVELAMER CARBONATE 800 MG: 800 TABLET, FILM COATED ORAL at 12:08

## 2018-08-26 RX ADMIN — Medication 10 ML: at 13:26

## 2018-08-26 RX ADMIN — Medication 10 ML: at 21:46

## 2018-08-26 RX ADMIN — HYDROMORPHONE HYDROCHLORIDE 1 MG: 2 INJECTION, SOLUTION INTRAMUSCULAR; INTRAVENOUS; SUBCUTANEOUS at 22:19

## 2018-08-26 RX ADMIN — SODIUM CHLORIDE, SODIUM LACTATE, CALCIUM CHLORIDE, MAGNESIUM CHLORIDE AND DEXTROSE 1500 ML: 1.5; 538; 448; 18.3; 5.08 INJECTION, SOLUTION INTRAPERITONEAL at 21:47

## 2018-08-26 RX ADMIN — SPIRONOLACTONE 50 MG: 25 TABLET, FILM COATED ORAL at 09:15

## 2018-08-26 RX ADMIN — HYDROMORPHONE HYDROCHLORIDE 1 MG: 2 INJECTION, SOLUTION INTRAMUSCULAR; INTRAVENOUS; SUBCUTANEOUS at 07:33

## 2018-08-26 RX ADMIN — OMEPRAZOLE 20 MG: 20 CAPSULE, DELAYED RELEASE ORAL at 09:16

## 2018-08-26 RX ADMIN — BUMETANIDE 4 MG: 1 TABLET ORAL at 17:55

## 2018-08-26 RX ADMIN — SEVELAMER CARBONATE 800 MG: 800 TABLET, FILM COATED ORAL at 07:33

## 2018-08-26 RX ADMIN — LOSARTAN POTASSIUM 50 MG: 50 TABLET ORAL at 09:16

## 2018-08-26 RX ADMIN — SODIUM CHLORIDE, SODIUM LACTATE, CALCIUM CHLORIDE, MAGNESIUM CHLORIDE AND DEXTROSE 1500 ML: 1.5; 538; 448; 18.3; 5.08 INJECTION, SOLUTION INTRAPERITONEAL at 18:00

## 2018-08-26 RX ADMIN — CARVEDILOL 25 MG: 12.5 TABLET, FILM COATED ORAL at 07:33

## 2018-08-26 RX ADMIN — LOSARTAN POTASSIUM 50 MG: 50 TABLET ORAL at 17:56

## 2018-08-26 RX ADMIN — ERYTHROPOIETIN 20000 UNITS: 20000 INJECTION, SOLUTION INTRAVENOUS; SUBCUTANEOUS at 14:56

## 2018-08-26 RX ADMIN — CINACALCET HYDROCHLORIDE 30 MG: 30 TABLET, COATED ORAL at 09:16

## 2018-08-26 RX ADMIN — SEVELAMER CARBONATE 800 MG: 800 TABLET, FILM COATED ORAL at 17:56

## 2018-08-26 RX ADMIN — SODIUM CHLORIDE, SODIUM LACTATE, CALCIUM CHLORIDE, MAGNESIUM CHLORIDE AND DEXTROSE 1500 ML: 1.5; 538; 448; 18.3; 5.08 INJECTION, SOLUTION INTRAPERITONEAL at 09:22

## 2018-08-26 RX ADMIN — SODIUM CHLORIDE, SODIUM LACTATE, CALCIUM CHLORIDE, MAGNESIUM CHLORIDE AND DEXTROSE 1500 ML: 1.5; 538; 448; 18.3; 5.08 INJECTION, SOLUTION INTRAPERITONEAL at 13:27

## 2018-08-26 NOTE — PROGRESS NOTES
Passed catheter and irrigated manually. No clot returned. Urine initially drained deep burgundy, but thin. Rapidly cleared after 120cc. Catheter removed after irrigating to crystal clear with 500cc of saline. She will keep her next urine in a hat for review.

## 2018-08-26 NOTE — PROGRESS NOTES
Name: Carlos Benson MRN: [id number]   : 1960 Hospital: [facility]   Date: 2018        IMPRESSION:   · ESRD on PD. Patient reports pain during in and dwell time. Her exchange fluid is \"whitney\" as per patient. · May be a presentation of PD related peritonitis  · Gross hematuria- awaiting urology evaluation on Monday  · Anemia of ESRD, Hb is down bellow the target range. PLAN:   · Check PD fluid for cells and culture  · Resume Procrit  · Will follow recommendations from Urology  · If SOB persists will increase the Dextrose concentration to 2.5%     Subjective/Interval History:   I have reviewed the flowsheet and previous days notes. ROS:Pertinent items are noted in HPI. Reports SOB, cloudy PD fluid and abdominal pain. urine is still bloody    Objective:   Vital Signs:    Visit Vitals    BP 94/63 (BP 1 Location: Right arm, BP Patient Position: At rest)    Pulse 79    Temp 98.8 °F (37.1 °C)    Resp 18    Ht 5' 5\" (1.651 m)    Wt 59.1 kg (130 lb 4.7 oz)    SpO2 93%    BMI 21.68 kg/m2       O2 Device: Room air   O2 Flow Rate (L/min): 2 l/min   Temp (24hrs), Av °F (36.7 °C), Min:97.3 °F (36.3 °C), Max:98.8 °F (37.1 °C)       Intake/Output:   Last shift:      701 - 1900  In: 1440 [P.O.:240]  Out: 200 [Urine:100]  Last 3 shifts: 1901 -  0700  In: 2800   Out: 6450 [Urine:150]    Intake/Output Summary (Last 24 hours) at 18 1327  Last data filed at 18 1050   Gross per 24 hour   Intake             4240 ml   Output             6500 ml   Net            -2260 ml        Physical Exam:  General:    Alert, cooperative, no distress, appears stated age. Head:   Normocephalic, without obvious abnormality, atraumatic. Eyes:   Conjunctivae/corneas clear. Nose:  Nares normal. No drainage or sinus tenderness.   Throat:    Lips, mucosa, and tongue normal.  No Thrush  Neck:  Supple, symmetrical,  no adenopathy, thyroid: non tender    no carotid bruit and no JVD. Lungs:   Clear to auscultation bilaterally. No Wheezing or Rhonchi. No rales. Chest wall:  No tenderness or deformity. No Accessory muscle use. AICD on left upper chest  Heart:   Regular rate and rhythm,  no murmur, rub or gallop. Abdomen:   Soft, non-tender. Distended. Bowel sounds normal. No masses  Extremities: Extremities normal, atraumatic, No cyanosis. No edema. No clubbing  Skin:     Texture, turgor normal. No rashes or lesions. Not Jaundiced  Psych:  Good insight. Not depressed. Not anxious or agitated. Neurologic: Normal strength, Alert and oriented X 3. DATA:  Labs:  Recent Labs      08/26/18 0253  08/25/18   0406  08/24/18   1539   NA  136  142  137   K  3.8  3.4*  2.9*   CL  101  101  101   CO2  25  24  23   BUN  48*  44*  41*   CREA  12.77*  12.43*  11.30*   CA  7.6*  7.8*  8.3*   ALB   --   2.9*  3.0*   PHOS  4.9*  6.0*   --    MG  1.8  2.0   --      Recent Labs      08/26/18 0253 08/25/18   0406  08/24/18   1539   WBC  11.6*  12.7*  8.2   HGB  9.9*  11.1*  11.0*   HCT  30.6*  34.5*  33.1*   PLT  109*  170  181     No results for input(s): ROMAN, KU, CLU, CREAU in the last 72 hours.     No lab exists for component: PROU    Total time spent with patient:  35 minutes    [] Critical Care Provided    Care Plan discussed with:   Staff, Medical Team    Roz Rubinstein, MD

## 2018-08-26 NOTE — PROGRESS NOTES
Bedside and Verbal shift change report given to Maggie (oncoming nurse) by Gudelia Aguilar (offgoing nurse). Report included the following information SBAR and Kardex.

## 2018-08-26 NOTE — PROGRESS NOTES
Progress Note    Patient: Jonathan Mehta MRN: [id number]  SSN: [id number]    YOB: 1960 Age: 61 y.o. Sex: female   Height: Height: 5' 5\" (165.1 cm) Weight: Weight: 59.1 kg (130 lb 4.7 oz) BMI: Body mass index is 21.68 kg/(m^2). Emergency  Contact:  Primary Emergency Contact: Rafaela, Home Phone: [phone]   PCP:   Avi Jenkins  [phone]     Hospital Day: 3 - Admitted 8/24/2018  3:36 PM by [address] Problems    Diagnosis Date Noted    Leukocytosis 83/70/4402    Systolic CHF, chronic ([facility] 08/25/2018    Coagulopathy ([facility] [address] 08/24/2018    Abdominal pain 08/24/2018    Bladder mass 08/24/2018    Gross hematuria 08/24/2018    Paroxysmal atrial fibrillation ([facility] [facility] 07/31/2018    ESRD on peritoneal dialysis ([facility] [address] [facility] 12/11/2015    Hypokalemia 01/20/2015    Anemia 01/20/2015    Essential hypertension, benign 10/18/2012    * No surgery found *            Assessment/Plan:     1. Bladder mass: clot vs bladder tumor  -PVR will be unreliable given free fluid for PD  -Will pass red rubber for manual clot irrigation. Will try to avoid indwelling catheter as she does not make much urine and do not want to introduce bacterial colonizatino  -fu as outpt for cystoscopy.      Subjective: No acute events, still with hematuria   Imaging: N/A   Exam: nad  nonlabored breathing   ROS:  Denies Chest Pain, SOB         Labs: Recent Labs      08/26/18   0253  08/25/18   0406  08/24/18   1539   WBC  11.6*  12.7*  8.2   HGB  9.9*  11.1*  11.0*   HCT  30.6*  34.5*  33.1*   PLT  109*  170  181     Recent Labs      08/26/18   0253  08/25/18   0450  08/25/18   0406  08/24/18   1645  08/24/18   1539   NA  136   --   142   --   137   K  3.8   --   3.4*   --   2.9*   CL  101   --   101   --   101   CO2  25   --   24   --   23   GLU  80   --   100   --   104*   BUN  48*   --   44*   --   41*   CREA  12.77*   --   12.43*   --   11.30*   CA 7.6*   --   7.8*   --   8.3*   MG  1.8   --   2.0   --    --    PHOS  4.9*   --   6.0*   --    --    INR   --   2.0*   --   9.8*   --       ID: Temp (24hrs), Av °F (36.7 °C), Min:97.3 °F (36.3 °C), Max:98.5 °F (36.9 °C)    2018: WBC 8.2 K/uL (Ref range: 3.6 - 11.0 K/uL)  2018: WBC 12.7 K/uL* (Ref range: 3.6 - 11.0 K/uL)  2018: WBC 11.6 K/uL* (Ref range: 3.6 - 11.0 K/uL)  Current Antimicrobial Therapy     None        Cultures: All Micro Results     Procedure Component Value Units Date/Time    CULTURE, BODY FLUID Trinidad Bracket STAIN [[id number]] Collected:  18 1046    Order Status:  Completed Specimen:  Peritoneal [place]ial[place]ld Updated:  18 1053    CULTURE, URINE [[id number]] Collected:  18 0752    Order Status:  Completed Updated:  18 1023    CULTURE, BODY FLUID PATRICIA Yee [[id number]] Collected:  18 1719    Order Status:  Completed Specimen:  Peritoneal [place] Updated:  18 1009     Special Requests: NO SPECIAL REQUESTS        GRAM STAIN RARE WBCS SEEN         NO ORGANISMS SEEN        Culture result: NO GROWTH 2 DAYS       CULTURE, BLOOD, PAIRED [[id number]] Collected:  18 1621    Order Status:  Completed Specimen:  Blood Updated:  18 0719     Special Requests: NO SPECIAL REQUESTS        Culture result: NO GROWTH 2 DAYS            GI: Intake: DIET CARDIAC Regular   Appetite: Good % Diet Eaten: 80 %   P.O.: 240 mL    Abdominal Assessment: Tender, Soft  Bowel Sounds:  Active    Patient Vitals for the past 168 hrs:   Stool Occurrence(s)   18 2341 0         Pain: 7/10 Dull - Abdomen - Anterior, Left      Current Analgesic Therapy (168h ago through future)    Ordered     Start Stop    18 1840  HYDROmorphone (PF) (DILAUDID) injection 1 mg  1 mg,   IntraVENous,   EVERY 4 HOURS AS NEEDED      18 --    18  HYDROcodone-acetaminophen (NORCO) 5-325 mg per tablet 1 Tab  1 Tab,   Oral,   EVERY 4 HOURS AS NEEDED      18 -- 08/24/18 1840  acetaminophen (TYLENOL) tablet 650 mg  650 mg,   Oral,   EVERY 4 HOURS AS NEEDED      08/24/18 1840 --         :    Anuria -      8/24/2018: Creatinine 11.30 MG/DL* (Ref range: 0.55 - 1.02 MG/DL)  8/25/2018: Creatinine 12.43 MG/DL* (Ref range: 0.55 - 1.02 MG/DL)  8/26/2018: Creatinine 12.77 MG/DL* (Ref range: 0.55 - 1.02 MG/DL)       Vitals: O2 Device: Room air @ O2 Flow Rate (L/min): 2 l/min  Patient Vitals for the past 24 hrs:   BP Temp Pulse Resp SpO2 Weight   08/26/18 0828 107/77 97.8 °F (36.6 °C) 85 18 96 % -   08/26/18 0727 - - (!) 124 - - -   08/26/18 0354 110/75 97.3 °F (36.3 °C) (!) 121 16 94 % -   08/25/18 2340 - - - - 90 % -   08/25/18 2338 115/85 98.3 °F (36.8 °C) (!) 117 16 (!) 85 % -   08/25/18 2113 - - (!) 116 - - -   08/25/18 2010 95/67 97.8 °F (36.6 °C) 81 15 93 % -   08/25/18 1805 - - - - - 59.1 kg (130 lb 4.7 oz)   08/25/18 1651 107/77 - (!) 114 18 94 % -   08/25/18 1553 97/69 98.1 °F (36.7 °C) (!) 112 18 92 % -   08/25/18 1402 - - (!) 112 - - -   08/25/18 1232 93/69 98.5 °F (36.9 °C) (!) 110 20 96 % -      I&O's:    Date 08/25/18 0700 - 08/26/18 0659 08/26/18 0700 - 08/27/18 0659   Shift [id number] 1900-0659 24 Hour Total 7360-7201 3354-4834 24 Hour Total   I  N  T  A  K  E   P.O.    240  240      P. O.    240  240    Dialysis 1300 1500 2800 1200  1200      Total Fluid Volume In (mL) 1300 1500 2800 1200  1200    Shift Total  (mL/kg) 1300  (22) 1500  (25.4) 2800  (47.4) 1440  (24.4)  1440  (24.4)   O  U  T  P  U  T   Urine  (mL/kg/hr)    100  100      Urine Voided    100  100      Urine Occurrence(s)  0 x 0 x       Stool            Stool Occurrence(s)  0 x 0 x       Dialysis 200 6100 6300 100  100      Total Fluid Volume Out (mL) 200 6100 6300 100  100    Shift Total  (mL/kg) 200  (3.4) 6100  (103.2) 6300  (106.6) 200  (3.4)  200  (3.4)   NET 1100 -4600 -3500 1240  1240   Weight (kg) 59.1 59.1 59.1 59.1 59.1 59.1       Meds:    Current Facility-Administered Medications:    peritoneal dialysis DEXTROSE 1.5% (2.5 mEq/L low calcium) solution 1,500 mL, 1,500 mL, IntraPERitoneal, QID, Massimo Davidson MD, 1,500 mL at 08/26/18 2727    sodium chloride (NS) flush 5-10 mL, 5-10 mL, IntraVENous, Q8H, Lovena Weesatche Jr V, DO, 10 mL at 08/25/18 2200    sodium chloride (NS) flush 5-10 mL, 5-10 mL, IntraVENous, PRN, Lovena Weesatche Jr V, DO, 10 mL at 08/25/18 0354    naloxone [facility]) injection 0.4 mg, 0.4 mg, IntraVENous, PRN, Lovena Yolanda Jr V, DO    zolpidem [facility] [place], [facility]) tablet 5 mg, 5 mg, Oral, QHS PRN, Lovena Yolanda Jr V, DO    ondansetron [facility]) injection 4 mg, 4 mg, IntraVENous, Q4H PRN, Lovena Yolanda Jr V, DO, 4 mg at 08/25/18 1155    acetaminophen (TYLENOL) tablet 650 mg, 650 mg, Oral, Q4H PRN, Felicia Whitehead, DO    HYDROcodone-acetaminophen [facility]) 5-325 mg per tablet 1 Tab, 1 Tab, Oral, Q4H PRN, Lovena Yolanda Jr V, DO, 1 Tab at 08/25/18 1641    HYDROmorphone (PF) (DILAUDID) injection 1 mg, 1 mg, IntraVENous, Q4H PRN, Lovena Weesatche Jr V, DO, 1 mg at 08/26/18 8261    losartan (COZAAR) tablet 50 mg, 50 mg, Oral, BID, Lovena Yolanda Jr V, DO, 50 mg at 08/26/18 6531    carvedilol (COREG) tablet 25 mg, 25 mg, Oral, BID WITH MEALS, Lovena Weesatche Jr V, DO, 25 mg at 08/26/18 0996    cinacalcet (SENSIPAR) tablet 30 mg, 30 mg, Oral, DAILY, Lovena Weesatche Jr V, DO, 30 mg at 08/26/18 9932    omeprazole (PRILOSEC) capsule 20 mg, 20 mg, Oral, DAILY, Lovena Weesatche Jr V, DO, 20 mg at 08/26/18 9681    bumetanide (BUMEX) tablet 4 mg, 4 mg, Oral, BID, Lovena Yolanda Jr V, DO, 4 mg at 08/26/18 7094    spironolactone (ALDACTONE) tablet 50 mg, 50 mg, Oral, DAILY, Lovena Yolanda Jr V, DO, 50 mg at 08/26/18 0915    gentamicin (GARAMYCIN) 0.1 % cream, , Topical, QHS, Lovena Yolanda Jr V, DO    sevelamer carbonate (RENVELA) tab 800 mg, 800 mg, Oral, TID WITH MEALS, Jing Fitch MD, 800 mg at 08/26/18 3347          Signed By: Hanna Antoine MD - August 26, 2018

## 2018-08-26 NOTE — PROGRESS NOTES
Bedside and Verbal shift change report given to Hitesh Fritz RN (oncoming nurse) by Raymond Betancourt RN (offgoing nurse). Report included the following information SBAR, Kardex, Procedure Summary, Intake/Output, MAR, Accordion, Recent Results and Med Rec Status.

## 2018-08-26 NOTE — PROGRESS NOTES
Problem: Falls - Risk of  Goal: *Absence of Falls  Document Jimmy Fall Risk and appropriate interventions in the flowsheet.    Outcome: Progressing Towards Goal  Fall Risk Interventions:            Medication Interventions: Patient to call before getting OOB, Teach patient to arise slowly

## 2018-08-26 NOTE — PROGRESS NOTES
[facility] [address]  [address]  [phone]      Medical Progress Note      NAME: Jolly Macdonald   :  1960  MRM:  [id number]    Date/Time: 2018  10:32 AM          Subjective:     Chief Complaint:  Pain: abdominal/bladder, mild this AM, improved from yesterday and controlled with pain meds, still with hematuria. States she doesn't feel as well today and her dialysate is cloudy this AM    ROS:  (bold if positive, if negative)                        Tolerating Diet          Objective:       Vitals:          Last 24hrs VS reviewed since prior progress note.  Most recent are:    Visit Vitals    /77 (BP 1 Location: Right arm, BP Patient Position: At rest)    Pulse 85    Temp 97.8 °F (36.6 °C)    Resp 18    Ht 5' 5\" (1.651 m)    Wt 59.1 kg (130 lb 4.7 oz)    SpO2 96%    BMI 21.68 kg/m2     SpO2 Readings from Last 6 Encounters:   18 96%   18 99%   18 98%   17 98%   17 97%   17 98%    O2 Flow Rate (L/min): 2 l/min       Intake/Output Summary (Last 24 hours) at 18 1032  Last data filed at 18 0951   Gross per 24 hour   Intake             3040 ml   Output             6500 ml   Net            -3460 ml          Exam:     Physical Exam:    Gen:  Well-developed, well-nourished, in no acute distress  HEENT:  Pink conjunctivae, PERRL, hearing intact to voice, moist mucous membranes  Neck:  Supple, without masses, thyroid non-tender  Resp:  No accessory muscle use, clear breath sounds without wheezes rales or rhonchi  Card:  No murmurs, normal S1, S2 without thrills, bruits or peripheral edema  Abd:  Soft, non-tender, non-distended, normoactive bowel sounds are present, no palpable organomegaly and no detectable hernias  Lymph:  No cervical or inguinal adenopathy  Musc:  No cyanosis or clubbing  Skin:  No rashes or ulcers, skin turgor is good  Neuro:  Cranial nerves are grossly intact, no focal motor weakness, follows commands appropriately  Psych:  Good insight, oriented to person, place and time, alert    Medications Reviewed: (see below)    Lab Data Reviewed: (see below)    ______________________________________________________________________    Medications:     Current Facility-Administered Medications   Medication Dose Route Frequency    peritoneal dialysis DEXTROSE 1.5% (2.5 mEq/L low calcium) solution 1,500 mL  1,500 mL IntraPERitoneal QID    sodium chloride (NS) flush 5-10 mL  5-10 mL IntraVENous Q8H    sodium chloride (NS) flush 5-10 mL  5-10 mL IntraVENous PRN    naloxone (NARCAN) injection 0.4 mg  0.4 mg IntraVENous PRN    zolpidem (AMBIEN) tablet 5 mg  5 mg Oral QHS PRN    ondansetron (ZOFRAN) injection 4 mg  4 mg IntraVENous Q4H PRN    acetaminophen (TYLENOL) tablet 650 mg  650 mg Oral Q4H PRN    HYDROcodone-acetaminophen (NORCO) 5-325 mg per tablet 1 Tab  1 Tab Oral Q4H PRN    HYDROmorphone (PF) (DILAUDID) injection 1 mg  1 mg IntraVENous Q4H PRN    losartan (COZAAR) tablet 50 mg  50 mg Oral BID    carvedilol (COREG) tablet 25 mg  25 mg Oral BID WITH MEALS    cinacalcet (SENSIPAR) tablet 30 mg  30 mg Oral DAILY    omeprazole (PRILOSEC) capsule 20 mg  20 mg Oral DAILY    bumetanide (BUMEX) tablet 4 mg  4 mg Oral BID    spironolactone (ALDACTONE) tablet 50 mg  50 mg Oral DAILY    gentamicin (GARAMYCIN) 0.1 % cream   Topical QHS    sevelamer carbonate (RENVELA) tab 800 mg  800 mg Oral TID WITH MEALS            Lab Review:     Recent Labs      08/26/18   0253  08/25/18   0406  08/24/18   1539   WBC  11.6*  12.7*  8.2   HGB  9.9*  11.1*  11.0*   HCT  30.6*  34.5*  33.1*   PLT  109*  170  181     Recent Labs      08/26/18   0253  08/25/18   0450  08/25/18   0406  08/24/18   1645  08/24/18   1539   NA  136   --   142   --   137   K  3.8   --   3.4*   --   2.9*   CL  101   --   101   --   101   CO2  25   --   24   --   23   GLU  80   --   100   --   104*   BUN  48*   --   44*   -- 41*   CREA  12.77*   --   12.43*   --   11.30*   CA  7.6*   --   7.8*   --   8.3*   MG  1.8   --   2.0   --    --    PHOS  4.9*   --   6.0*   --    --    ALB   --    --   2.9*   --   3.0*   TBILI   --    --   1.6*   --   1.1*   SGOT   --    --   33   --   37   ALT   --    --   28   --   33   INR   --   2.0*   --   9.8*   --      Lab Results   Component Value Date/Time    Glucose (POC) 94 10/18/2013 03:35 PM    Glucose (POC) 102 10/14/2012 05:58 AM     No results for input(s): PH, PCO2, PO2, HCO3, FIO2 in the last 72 hours.   Recent Labs      08/25/18   0450  08/24/18   1645   INR  2.0*  9.8*     Lab Results   Component Value Date/Time    Specimen Description: URINE 10/18/2013 02:41 PM    Specimen Description: NARES 10/15/2012 02:45 PM     Lab Results   Component Value Date/Time    Culture result: NO GROWTH 2 DAYS 08/24/2018 05:19 PM    Culture result: NO GROWTH 2 DAYS 08/24/2018 04:21 PM    Culture result: HEAVY 01/19/2015 07:50 PM    Culture result: STAPHYLOCOCCUS AUREUS 01/19/2015 07:50 PM            Assessment:     Principal Problem:    Bladder mass (8/24/2018)    Active Problems:    Essential hypertension, benign (10/18/2012)      Hypokalemia (1/20/2015)      Anemia (1/20/2015)      ESRD on peritoneal dialysis ([facility] (12/11/2015)      Paroxysmal atrial fibrillation (HCC) (7/31/2018)      Coagulopathy ([address] (8/24/2018)      Abdominal pain (8/24/2018)      Gross hematuria (8/24/2018)      Leukocytosis (3/21/8073)      Systolic CHF, chronic (HCC) (8/25/2018)           Plan:     Principal Problem:    Bladder mass (8/24/2018)/Gross hematuria (8/24/2018)/Abdominal pain (8/24/2018)   - abdominal CT with bladder mass vs clot/hematoma, visualized by me    - given coagulopathy suspect all due to acute bleeding   - seems to have improved with correction of coagulopathy    -  input noted, patient states no one came and talked with her about it   - unclear from the note if  felt she could go as they mentioned manual clot removal   - UA does not appear infected    Active Problems:    Essential hypertension, benign (10/18/2012)   - BP okay on meds as above      Hypokalemia (2015)   - replete and follow      Anemia (2015)   - Hgb stable      ESRD on peritoneal dialysis ([facility] [address] (2015)   - Renal consulted for PD   - ordered cell count given constitutional symptoms and report of cloudy fluid      Paroxysmal atrial fibrillation (HCC) (2018)/Coagulopathy ([facility] [address] (2018)   - hold warfarin until bladder issues addressed, daily INR   - will not be able to resume until has outpatient cystoscopy      Leukocytosis (2018)   - WBC about the same today   - Temp (24hrs), Av °F (36.7 °C), Min:97.3 °F (36.3 °C), Max:98.5 °F (36.9 °C)    - follow off antibiotics for now, checking dialysate cell count as above      Systolic CHF, chronic ([facility] [address] (2018)   - stable, watch volume status      Total time spent in patient care: 25 minutes                  Care Plan discussed with: Patient and Nursing Staff    Discussed:  Code Status, Care Plan and D/C Planning    Prophylaxis:  SCD's    Disposition:  Home w/Family           ___________________________________________________    Attending Physician: Hitesh Kessler MD

## 2018-08-27 VITALS
OXYGEN SATURATION: 94 % | TEMPERATURE: 97.6 F | DIASTOLIC BLOOD PRESSURE: 72 MMHG | BODY MASS INDEX: 21.56 KG/M2 | WEIGHT: 129.41 LBS | HEART RATE: 70 BPM | RESPIRATION RATE: 18 BRPM | HEIGHT: 65 IN | SYSTOLIC BLOOD PRESSURE: 118 MMHG

## 2018-08-27 PROBLEM — R10.9 ABDOMINAL PAIN: Status: RESOLVED | Noted: 2018-08-24 | Resolved: 2018-08-27

## 2018-08-27 PROBLEM — D72.829 LEUKOCYTOSIS: Status: RESOLVED | Noted: 2018-08-25 | Resolved: 2018-08-27

## 2018-08-27 PROBLEM — D68.9 COAGULOPATHY (HCC): Status: RESOLVED | Noted: 2018-08-24 | Resolved: 2018-08-27

## 2018-08-27 LAB
ANION GAP SERPL CALC-SCNC: 9 MMOL/L (ref 5–15)
BACTERIA SPEC CULT: NORMAL
BUN SERPL-MCNC: 50 MG/DL (ref 6–20)
BUN/CREAT SERPL: 4 (ref 12–20)
CALCIUM SERPL-MCNC: 7.8 MG/DL (ref 8.5–10.1)
CC UR VC: NORMAL
CHLORIDE SERPL-SCNC: 99 MMOL/L (ref 97–108)
CO2 SERPL-SCNC: 28 MMOL/L (ref 21–32)
CREAT SERPL-MCNC: 13.18 MG/DL (ref 0.55–1.02)
ERYTHROCYTE [DISTWIDTH] IN BLOOD BY AUTOMATED COUNT: 13.5 % (ref 11.5–14.5)
GLUCOSE SERPL-MCNC: 95 MG/DL (ref 65–100)
HCT VFR BLD AUTO: 28.7 % (ref 35–47)
HGB BLD-MCNC: 9.4 G/DL (ref 11.5–16)
INR PPP: 1.6 (ref 0.9–1.1)
IRON SATN MFR SERPL: 14 % (ref 20–50)
IRON SERPL-MCNC: 30 UG/DL (ref 35–150)
MCH RBC QN AUTO: 32.3 PG (ref 26–34)
MCHC RBC AUTO-ENTMCNC: 32.8 G/DL (ref 30–36.5)
MCV RBC AUTO: 98.6 FL (ref 80–99)
NRBC # BLD: 0 K/UL (ref 0–0.01)
NRBC BLD-RTO: 0 PER 100 WBC
PLATELET # BLD AUTO: 118 K/UL (ref 150–400)
PMV BLD AUTO: 10.3 FL (ref 8.9–12.9)
POTASSIUM SERPL-SCNC: 3.3 MMOL/L (ref 3.5–5.1)
PROTHROMBIN TIME: 15.6 SEC (ref 9–11.1)
RBC # BLD AUTO: 2.91 M/UL (ref 3.8–5.2)
SERVICE CMNT-IMP: NORMAL
SODIUM SERPL-SCNC: 136 MMOL/L (ref 136–145)
TIBC SERPL-MCNC: 208 UG/DL (ref 250–450)
WBC # BLD AUTO: 10.5 K/UL (ref 3.6–11)

## 2018-08-27 PROCEDURE — 36415 COLL VENOUS BLD VENIPUNCTURE: CPT | Performed by: INTERNAL MEDICINE

## 2018-08-27 PROCEDURE — A4722 DIALYS SOL FLD VOL > 1999CC: HCPCS | Performed by: INTERNAL MEDICINE

## 2018-08-27 PROCEDURE — 85027 COMPLETE CBC AUTOMATED: CPT | Performed by: INTERNAL MEDICINE

## 2018-08-27 PROCEDURE — 74011250637 HC RX REV CODE- 250/637: Performed by: INTERNAL MEDICINE

## 2018-08-27 PROCEDURE — 74011250636 HC RX REV CODE- 250/636: Performed by: INTERNAL MEDICINE

## 2018-08-27 PROCEDURE — 85610 PROTHROMBIN TIME: CPT | Performed by: INTERNAL MEDICINE

## 2018-08-27 PROCEDURE — 99218 HC RM OBSERVATION: CPT

## 2018-08-27 PROCEDURE — 83540 ASSAY OF IRON: CPT | Performed by: INTERNAL MEDICINE

## 2018-08-27 PROCEDURE — 65270000029 HC RM PRIVATE

## 2018-08-27 PROCEDURE — 94760 N-INVAS EAR/PLS OXIMETRY 1: CPT

## 2018-08-27 PROCEDURE — 80048 BASIC METABOLIC PNL TOTAL CA: CPT | Performed by: INTERNAL MEDICINE

## 2018-08-27 RX ORDER — POTASSIUM CHLORIDE 750 MG/1
40 TABLET, FILM COATED, EXTENDED RELEASE ORAL
Status: COMPLETED | OUTPATIENT
Start: 2018-08-27 | End: 2018-08-27

## 2018-08-27 RX ADMIN — Medication 10 ML: at 14:47

## 2018-08-27 RX ADMIN — SODIUM CHLORIDE, SODIUM LACTATE, CALCIUM CHLORIDE, MAGNESIUM CHLORIDE AND DEXTROSE 1500 ML: 1.5; 538; 448; 18.3; 5.08 INJECTION, SOLUTION INTRAPERITONEAL at 09:27

## 2018-08-27 RX ADMIN — POTASSIUM CHLORIDE 40 MEQ: 750 TABLET, EXTENDED RELEASE ORAL at 09:10

## 2018-08-27 RX ADMIN — SEVELAMER CARBONATE 800 MG: 800 TABLET, FILM COATED ORAL at 09:10

## 2018-08-27 RX ADMIN — SODIUM CHLORIDE, SODIUM LACTATE, CALCIUM CHLORIDE, MAGNESIUM CHLORIDE AND DEXTROSE 1500 ML: 1.5; 538; 448; 18.3; 5.08 INJECTION, SOLUTION INTRAPERITONEAL at 13:43

## 2018-08-27 RX ADMIN — CINACALCET HYDROCHLORIDE 30 MG: 30 TABLET, COATED ORAL at 09:10

## 2018-08-27 RX ADMIN — SEVELAMER CARBONATE 800 MG: 800 TABLET, FILM COATED ORAL at 12:21

## 2018-08-27 RX ADMIN — OMEPRAZOLE 20 MG: 20 CAPSULE, DELAYED RELEASE ORAL at 09:09

## 2018-08-27 NOTE — PROGRESS NOTES
[facility] Alatorre [address] [place] [address]  [address] [facility], [address] [place] [address]  [phone]      Medical Progress Note      NAME: Oneil Ivy   :  1960  MRM:  [id number]    Date/Time: 2018  8:31 AM       Assessment and Plan:   1. Bladder mass (2018) vs clot. abdominal CT with bladder mass vs clot/hematoma. Evaluated by urology and irrigation was done. Pt still has some hematuria. Plan for outpatient cystoscopy                            2.  Essential hypertension, benign (10/18/2012). Now hypotensive. Hold all BP meds                           3.  Hypokalemia (2015). replete                            4.  Anemia (2015) - Hgb stable                         5.  ESRD on peritoneal dialysis ([address] (2015). Renal evaluation appreciated. Continue PD    6. Abdominal pain. Report of cloudy peritoneal fluid. Cell count is unremarkable for possible infection. 7.  Paroxysmal atrial fibrillation ([address] (2018)/Coagulopathy ([address] (2018). hold warfarin until bladder issues addressed. Will not be able to resume until has outpatient cystoscopy    8. Systolic CHF, chronic ([address] (2018). Recent EF 25%. Holding bumex, ARB and coreg due to hypotension                             Subjective:     Chief Complaint:  Follow up of pt who was admitted with bladder mass. Still pt has hematuria     ROS:  (bold if positive, if negative)      Tolerating PT  Tolerating Diet        Objective:     Last 24hrs VS reviewed since prior progress note.  Most recent are:    Visit Vitals    BP (!) 85/62 (BP 1 Location: Right arm, BP Patient Position: At rest)    Pulse 99    Temp 97.8 °F (36.6 °C)    Resp 18    Ht 5' 5\" (1.651 m)    Wt 58.7 kg (129 lb 6.6 oz)    SpO2 96%    BMI 21.53 kg/m2     SpO2 Readings from Last 6 Encounters:   18 96%   18 99%   18 98%   17 98%   17 97%   17 98%    O2 Flow Rate (L/min): 2 l/min Intake/Output Summary (Last 24 hours) at 08/27/18 0831  Last data filed at 08/27/18 0037   Gross per 24 hour   Intake             3800 ml   Output             7300 ml   Net            -3500 ml        Physical Exam:    Gen:  Well-developed, well-nourished, in no acute distress  HEENT:  Pink conjunctivae, PERRL, hearing intact to voice, moist mucous membranes  Neck:  Supple, without masses, thyroid non-tender  Resp:  No accessory muscle use, clear breath sounds without wheezes rales or rhonchi  Card:  No murmurs, normal S1, S2 without thrills, bruits or peripheral edema  Abd:  Soft, non-tender, non-distended, normoactive bowel sounds are present, no palpable organomegaly and no detectable hernias  Lymph:  No cervical or inguinal adenopathy  Musc:  No cyanosis or clubbing  Skin:  No rashes or ulcers, skin turgor is good  Neuro:  Cranial nerves are grossly intact, no focal motor weakness, follows commands appropriately  Psych:  Good insight, oriented to person, place and time, alert  __________________________________________________________________  Medications Reviewed: (see below)  Medications:     Current Facility-Administered Medications   Medication Dose Route Frequency    peritoneal dialysis DEXTROSE 1.5% (2.5 mEq/L low calcium) solution 1,500 mL  1,500 mL IntraPERitoneal QID    sodium chloride (NS) flush 5-10 mL  5-10 mL IntraVENous Q8H    sodium chloride (NS) flush 5-10 mL  5-10 mL IntraVENous PRN    naloxone (NARCAN) injection 0.4 mg  0.4 mg IntraVENous PRN    zolpidem (AMBIEN) tablet 5 mg  5 mg Oral QHS PRN    ondansetron (ZOFRAN) injection 4 mg  4 mg IntraVENous Q4H PRN    acetaminophen (TYLENOL) tablet 650 mg  650 mg Oral Q4H PRN    HYDROcodone-acetaminophen (NORCO) 5-325 mg per tablet 1 Tab  1 Tab Oral Q4H PRN    HYDROmorphone (PF) (DILAUDID) injection 1 mg  1 mg IntraVENous Q4H PRN    cinacalcet (SENSIPAR) tablet 30 mg  30 mg Oral DAILY    omeprazole (PRILOSEC) capsule 20 mg  20 mg Oral DAILY    spironolactone (ALDACTONE) tablet 50 mg  50 mg Oral DAILY    gentamicin (GARAMYCIN) 0.1 % cream   Topical QHS    sevelamer carbonate (RENVELA) tab 800 mg  800 mg Oral TID WITH MEALS        Lab Data Reviewed: (see below)  Lab Review:     Recent Labs      08/27/18   0124 08/26/18   0253  08/25/18   0406   WBC  10.5  11.6*  12.7*   HGB  9.4*  9.9*  11.1*   HCT  28.7*  30.6*  34.5*   PLT  118*  109*  170     Recent Labs      08/27/18   0124 08/26/18   0253  08/25/18   0450  08/25/18   0406  08/24/18   1645  08/24/18   1539   NA  136  136   --   142   --   137   K  3.3*  3.8   --   3.4*   --   2.9*   CL  99  101   --   101   --   101   CO2  28  25   --   24   --   23   GLU  95  80   --   100   --   104*   BUN  50*  48*   --   44*   --   41*   CREA  13.18*  12.77*   --   12.43*   --   11.30*   CA  7.8*  7.6*   --   7.8*   --   8.3*   MG   --   1.8   --   2.0   --    --    PHOS   --   4.9*   --   6.0*   --    --    ALB   --    --    --   2.9*   --   3.0*   TBILI   --    --    --   1.6*   --   1.1*   SGOT   --    --    --   33   --   37   ALT   --    --    --   28   --   33   INR  1.6*   --   2.0*   --   9.8*   --      Lab Results   Component Value Date/Time    Glucose (POC) 94 10/18/2013 03:35 PM    Glucose (POC) 102 10/14/2012 05:58 AM     No results for input(s): PH, PCO2, PO2, HCO3, FIO2 in the last 72 hours.   Recent Labs      08/27/18   0124 08/25/18   0450  08/24/18   1645   INR  1.6*  2.0*  9.8*     All Micro Results     Procedure Component Value Units Date/Time    CULTURE, BLOOD, PAIRED [[id number]] Collected:  08/24/18 1621    Order Status:  Completed Specimen:  Blood Updated:  08/27/18 0729     Special Requests: NO SPECIAL REQUESTS        Culture result: NO GROWTH 3 DAYS       CULTURE, BODY FLUID Francie Garcia [[id number]] Collected:  08/26/18 1046    Order Status:  Completed Specimen:  Peritoneal Dial[place]ld Updated:  08/26/18 1505     Special Requests: NO SPECIAL REQUESTS        GRAM STAIN OCCASIONAL WBCS SEEN         NO ORGANISMS SEEN        Culture result: PENDING    CULTURE, URINE [[id number]] Collected:  08/26/18 0752    Order Status:  Completed Updated:  08/26/18 1023    CULTURE, BODY FLUID W Analilia Wrightett [[id number]] Collected:  08/24/18 1719    Order Status:  Completed Specimen:  Peritoneal [address] [place] Updated:  08/26/18 1009     Special Requests: NO SPECIAL REQUESTS        GRAM STAIN RARE WBCS SEEN         NO ORGANISMS SEEN        Culture result: NO GROWTH 2 DAYS             I have reviewed notes of prior 24hr. Other pertinent lab:       Total time spent with patient: [address] discussed with: Patient, Nursing Staff and >50% of time spent in counseling and coordination of care    Discussed:  Care Plan    Prophylaxis:  SCD's    Disposition:  Home w/Family           ___________________________________________________    Attending Physician: Virginia Bains MD

## 2018-08-27 NOTE — PHYSICIAN ADVISORY
Letter of Status Determination:   Recommend hospitalization status upgraded from   OBSERVATION  to INPATIENT  Status     Pt Name:  Karrie Chin   MR#   [address] [facility] # [id number] /  [id number]  Payor: Reed Lopez / Plan: [address] / Product Type: Medicare /    RAFY#  [id number]   [address] [place] [address]  [phone]  @ [facility]   Hospitalization date  8/24/2018  3:36 PM   Current Attending Physician  Viji Jduge MD   Principal diagnosis  Bladder mass      Clinicals  61 y.o. y.o  female hospitalized with above diagnosis   This pt suffers from complex multiple medical illnesses including but not limited to ESRD / CAPD, Anemia, Chronic systolic dysfunction. She presented with abdominal pain and now we learn that she has had hematuria with possible clot/mass in her urinary bladder. Urologist had been following this patient guiding treatment. Due to appropriate and necessary medical care, this pt's hospitalization has now exceeded two midnights . [place]   Does  NOT apply    STATUS DETERMINATION  This patient is at above high risk of deterioration based on documented presenting clinical data, comorbid conditions, high risk of adverse events and current acute care course. Ms. Karrie Chin now meets Inpatient Admission status criteria in accordance with CMS regulation Section 43 .3. Specifically, due to medical necessity the patient's stay now exceeds Two Midnights. It is our recommendation that this patient's hospitalization status should be upgraded from  OBSERVATION to INPATIENT status.      The final decision of the patient's hospitalization status depends on the attending physician's judgment            Additional comments     Payor: Reed Lopez / Plan: [address] / Product Type: Medicare /         Traamine Galeano MD MPH [facility] Documentation Management Program  [facility] [address]   President Medical Staff, [address] [place] [address]    Cell  [phone]        [phone]    .

## 2018-08-27 NOTE — DISCHARGE INSTRUCTIONS
ACUTE DIAGNOSES:  Abdominal pain  Abdominal pain    CHRONIC MEDICAL DIAGNOSES:  Problem List as of 8/27/2018  Date Reviewed: 8/2/2018          Codes Class Noted - Resolved    Systolic CHF, chronic (HCC) (Chronic) ICD-10-CM: I50.22  ICD-9-CM: 428.22, 428.0  8/25/2018 - Present        * (Principal)Bladder mass ICD-10-CM: N32.89  ICD-9-CM: 596.89  8/24/2018 - Present        Gross hematuria ICD-10-CM: R31.0  ICD-9-CM: 599.71  8/24/2018 - Present        Paroxysmal atrial fibrillation (HCC) (Chronic) ICD-10-CM: I48.0  ICD-9-CM: 427.31  7/31/2018 - Present        ICD (implantable cardioverter-defibrillator) in place (Chronic) ICD-10-CM: Z95.810  ICD-9-CM: V45.02  1/26/2018 - Present        Paroxysmal atrial tachycardia ([facility] ICD-10-CM: I47.1  ICD-9-CM: 427.0  4/17/2016 - Present        ESRD on peritoneal dialysis ([facility] (Chronic) ICD-10-CM: N18.6, Z99.2  ICD-9-CM: 585.6, V45.11  12/11/2015 - Present        Hypokalemia ICD-10-CM: E87.6  ICD-9-CM: 276.8  1/20/2015 - Present        Anemia (Chronic) ICD-10-CM: D64.9  ICD-9-CM: 285.9  1/20/2015 - Present        Cardiomyopathy, nonischemic ([facility] ICD-10-CM: I42.8  ICD-9-CM: 425.4  10/19/2013 - Present    Overview Signed 10/19/2013  1:36 PM by Karen Mccabe MD     LVEF 25-30% by echo 10/19/2013 (previously 45%)             Polycystic kidney disease (Chronic) ICD-10-CM: Q61.3  ICD-9-CM: 753.12  10/18/2012 - Present        Essential hypertension, benign (Chronic) ICD-10-CM: I10  ICD-9-CM: 401.1  10/18/2012 - Present        RESOLVED: Leukocytosis ICD-10-CM: X31.720  ICD-9-CM: 288.60  8/25/2018 - 8/27/2018        RESOLVED: Coagulopathy ([address] ICD-10-CM: D68.9  ICD-9-CM: 286.9  8/24/2018 - 8/27/2018        RESOLVED: Abdominal pain ICD-10-CM: R10.9  ICD-9-CM: 789.00  8/24/2018 - 8/27/2018        RESOLVED: Abnormal nuclear stress test ICD-10-CM: R94.39  ICD-9-CM: 794.39  12/11/2015 - 1/5/2016        RESOLVED: CAD (coronary artery disease) ICD-10-CM: I25.10  ICD-9-CM: 414.00  1/20/2015 - 12/11/2015        RESOLVED: Sepsis ([facility] ICD-10-CM: A41.9  ICD-9-CM: 038.9, 995.91  1/20/2015 - 10/13/2015        RESOLVED: Fever and chills ICD-10-CM: R50.9  ICD-9-CM: 780.60  1/20/2015 - 10/13/2015        RESOLVED: Abdominal pain ICD-10-CM: R10.9  ICD-9-CM: 789.00  1/20/2015 - 10/13/2015        RESOLVED: Leukocytosis ICD-10-CM: M13.161  ICD-9-CM: 288.60  1/20/2015 - 10/13/2015        RESOLVED: Peritonitis ([facility] ICD-10-CM: K65.9  ICD-9-CM: 567.9  1/19/2015 - 10/13/2015        RESOLVED: Acute systolic HF (heart failure) ([facility] ICD-10-CM: I50.21  ICD-9-CM: 428.21  10/19/2013 - 10/31/2013        RESOLVED: Excessive sleepiness ICD-10-CM: G47.10  ICD-9-CM: 780.54  10/19/2013 - 10/20/2013    Overview Signed 10/19/2013  1:37 PM by Pernell Thompson MD     Suspect severe KAI             RESOLVED: CHF, acute ([facility] ICD-10-CM: I50.9  ICD-9-CM: 428.0  10/18/2013 - 10/31/2013        RESOLVED: Fluid overload ICD-10-CM: E87.70  ICD-9-CM: 276.69  10/18/2013 - 10/31/2013        RESOLVED: Chronic diastolic heart failure ([facility] ICD-10-CM: I50.32  ICD-9-CM: 428.32  10/31/2012 - 12/13/2013        RESOLVED: CKD (chronic kidney disease) stage 5, GFR less than 15 ml/min (HCC) (Chronic) ICD-10-CM: N18.5  ICD-9-CM: 585.5  10/18/2012 - 12/11/2015        RESOLVED: Heart failure ([facility] ICD-10-CM: I50.9  ICD-9-CM: 428.9  10/15/2012 - 10/18/2012        RESOLVED: Elevated troponin ICD-10-CM: R74.8  ICD-9-CM: 790.6  10/14/2012 - 10/18/2012        RESOLVED: Other dyspnea and respiratory abnormality ICD-10-CM: R06.09, R09.89  ICD-9-CM: 786.09  10/14/2012 - 10/18/2012        RESOLVED: Acute renal failure ([facility] ICD-10-CM: N17.9  ICD-9-CM: 584.9  10/14/2012 - 10/18/2012              DISCHARGE MEDICATIONS:          · It is important that you take the medication exactly as they are prescribed. · Keep your medication in the bottles provided by the pharmacist and keep a list of the medication names, dosages, and times to be taken in your wallet.    · Do not take other medications without consulting your doctor. DIET:  Cardiac Diet    ACTIVITY: Activity as tolerated    ADDITIONAL INFORMATION: If you experience any of the following symptoms then please call your primary care physician or return to the emergency room if you cannot get hold of your doctor: Fever, chills, nausea, vomiting, diarrhea, change in mentation, falling, bleeding, shortness of breath. FOLLOW UP CARE:  Dr. Kamille Sarah MD  you are to call and set up an appointment to see them in 2 weeks. Follow-up with urology in 1 week    Follow up with cardiology in 1-2 week      Information obtained by :  I understand that if any problems occur once I am at home I am to contact my physician. I understand and acknowledge receipt of the instructions indicated above.                                                                                                                                            Physician's or R.N.'s Signature                                                                  Date/Time                                                                                                                                              Patient or Representative Signature                                                          Date/Time

## 2018-08-27 NOTE — DISCHARGE SUMMARY
Hospitalist Discharge Summary     Patient ID:    Steffany Quick  [id number]  61 y.o.  1960    Admit date: 8/24/2018    Discharge date and time: 8/27/2018    Admission Diagnoses: Abdominal pain  Abdominal pain    Chronic Diagnoses:    Problem List as of 8/27/2018  Date Reviewed: 8/2/2018          Codes Class Noted - Resolved    Systolic CHF, chronic (HCC) (Chronic) ICD-10-CM: I50.22  ICD-9-CM: 428.22, 428.0  8/25/2018 - Present        * (Principal)Bladder mass ICD-10-CM: N32.89  ICD-9-CM: 596.89  8/24/2018 - Present        Gross hematuria ICD-10-CM: R31.0  ICD-9-CM: 599.71  8/24/2018 - Present        Paroxysmal atrial fibrillation (HCC) (Chronic) ICD-10-CM: I48.0  ICD-9-CM: 427.31  7/31/2018 - Present        ICD (implantable cardioverter-defibrillator) in place (Chronic) ICD-10-CM: Z95.810  ICD-9-CM: V45.02  1/26/2018 - Present        Paroxysmal atrial tachycardia ([facility] ICD-10-CM: I47.1  ICD-9-CM: 427.0  4/17/2016 - Present        ESRD on peritoneal dialysis ([facility] (Chronic) ICD-10-CM: N18.6, Z99.2  ICD-9-CM: 585.6, V45.11  12/11/2015 - Present        Hypokalemia ICD-10-CM: E87.6  ICD-9-CM: 276.8  1/20/2015 - Present        Anemia (Chronic) ICD-10-CM: D64.9  ICD-9-CM: 285.9  1/20/2015 - Present        Cardiomyopathy, nonischemic ([facility] ICD-10-CM: I42.8  ICD-9-CM: 425.4  10/19/2013 - Present    Overview Signed 10/19/2013  1:36 PM by Jason Rosario MD     LVEF 25-30% by echo 10/19/2013 (previously 45%)             Polycystic kidney disease (Chronic) ICD-10-CM: Q61.3  ICD-9-CM: 753.12  10/18/2012 - Present        Essential hypertension, benign (Chronic) ICD-10-CM: I10  ICD-9-CM: 401.1  10/18/2012 - Present        RESOLVED: Leukocytosis ICD-10-CM: L27.140  ICD-9-CM: 288.60  8/25/2018 - 8/27/2018        RESOLVED: Coagulopathy ([facility] ICD-10-CM: D68.9  ICD-9-CM: 286.9  8/24/2018 - 8/27/2018        RESOLVED: Abdominal pain ICD-10-CM: R10.9  ICD-9-CM: 789.00  8/24/2018 - 8/27/2018        RESOLVED: Abnormal nuclear stress test ICD-10-CM: R94.39  ICD-9-CM: 794.39  12/11/2015 - 1/5/2016        RESOLVED: CAD (coronary artery disease) ICD-10-CM: I25.10  ICD-9-CM: 414.00  1/20/2015 - 12/11/2015        RESOLVED: Sepsis ([email or web address] ICD-10-CM: A41.9  ICD-9-CM: 038.9, 995.91  1/20/2015 - 10/13/2015        RESOLVED: Fever and chills ICD-10-CM: R50.9  ICD-9-CM: 780.60  1/20/2015 - 10/13/2015        RESOLVED: Abdominal pain ICD-10-CM: R10.9  ICD-9-CM: 789.00  1/20/2015 - 10/13/2015        RESOLVED: Leukocytosis ICD-10-CM: K54.225  ICD-9-CM: 288.60  1/20/2015 - 10/13/2015        RESOLVED: Peritonitis ([place] [address].) ICD-10-CM: K65.9  ICD-9-CM: 567.9  1/19/2015 - 10/13/2015        RESOLVED: Acute systolic HF (heart failure) ([email or web address] ICD-10-CM: I50.21  ICD-9-CM: 428.21  10/19/2013 - 10/31/2013        RESOLVED: Excessive sleepiness ICD-10-CM: G47.10  ICD-9-CM: 780.54  10/19/2013 - 10/20/2013    Overview Signed 10/19/2013  1:37 PM by Leandro Kapoor MD     Suspect severe KAI             RESOLVED: CHF, acute ([email or web address] ICD-10-CM: I50.9  ICD-9-CM: 428.0  10/18/2013 - 10/31/2013        RESOLVED: Fluid overload ICD-10-CM: E87.70  ICD-9-CM: 276.69  10/18/2013 - 10/31/2013        RESOLVED: Chronic diastolic heart failure ([email or web address] ICD-10-CM: I50.32  ICD-9-CM: 428.32  10/31/2012 - 12/13/2013        RESOLVED: CKD (chronic kidney disease) stage 5, GFR less than 15 ml/min (HCC) (Chronic) ICD-10-CM: N18.5  ICD-9-CM: 585.5  10/18/2012 - 12/11/2015        RESOLVED: Heart failure ([email or web address] ICD-10-CM: I50.9  ICD-9-CM: 428.9  10/15/2012 - 10/18/2012        RESOLVED: Elevated troponin ICD-10-CM: R74.8  ICD-9-CM: 790.6  10/14/2012 - 10/18/2012        RESOLVED: Other dyspnea and respiratory abnormality ICD-10-CM: R06.09, R09.89  ICD-9-CM: 786.09  10/14/2012 - 10/18/2012        RESOLVED: Acute renal failure ([email or web address] ICD-10-CM: N17.9  ICD-9-CM: 584.9  10/14/2012 - 10/18/2012              Discharge Medications:   Current Discharge Medication List      CONTINUE these medications which have NOT CHANGED    Details   omeprazole (PRILOSEC) 20 mg capsule Take 20 mg by mouth daily. Associated Diagnoses: Cardiomyopathy, nonischemic ([facility] Essential hypertension, benign; Chronic systolic heart failure ([facility] ESRD on peritoneal dialysis ([facility] Polycystic kidney disease; Paroxysmal atrial tachycardia (HCC)      SENNA-DOCUSATE SODIUM PO Take 1 Tab by mouth daily as needed (constipation). Associated Diagnoses: Cardiomyopathy, nonischemic ([facility] Essential hypertension, benign; Chronic systolic heart failure ([facility] ESRD on peritoneal dialysis ([facility] Polycystic kidney disease; Paroxysmal atrial tachycardia (HCC)      cinacalcet (SENSIPAR) 30 mg tablet Take 30 mg by mouth daily. Associated Diagnoses: Cardiomyopathy, nonischemic ([facility] Essential hypertension, benign; Chronic systolic heart failure ([facility] ESRD on peritoneal dialysis ([facility] Polycystic kidney disease; Paroxysmal atrial tachycardia (HCC)      ferric citrate (AURYXIA) 210 mg iron tablet Take 210 mg by mouth three (3) times daily (with meals). Associated Diagnoses: Cardiomyopathy, nonischemic ([facility] Essential hypertension, benign; Chronic systolic heart failure ([facility] ESRD on peritoneal dialysis ([facility] Polycystic kidney disease; Paroxysmal atrial tachycardia (HCC)      gentamicin (GARAMYCIN) 0.1 % topical cream Apply  to affected area nightly. STOP taking these medications       potassium chloride (KLOR-CON M20) 20 mEq tablet Comments:   Reason for Stopping:         losartan (COZAAR) 50 mg tablet Comments:   Reason for Stopping:         warfarin (COUMADIN) 5 mg tablet Comments:   Reason for Stopping:         carvedilol (COREG) 25 mg tablet Comments:   Reason for Stopping:         spironolactone (ALDACTONE) 50 mg tablet Comments:   Reason for Stopping:         bumetanide (BUMEX) 2 mg tablet Comments:   Reason for Stopping: Follow up Care:    1. Art Horvath MD in 1-2 weeks  2.  Urology     Diet:  Cardiac Diet    Disposition:  Home. Advanced Directive:    Discharge Exam:  See today's note. CONSULTATIONS: Urology    Significant Diagnostic Studies:   Recent Labs      08/27/18   0124  08/26/18   0253   WBC  10.5  11.6*   HGB  9.4*  9.9*   HCT  28.7*  30.6*   PLT  118*  109*     Recent Labs      08/27/18   0124  08/26/18   0253  08/25/18   0406   NA  136  136  142   K  3.3*  3.8  3.4*   CL  99  101  101   CO2  28  25  24   BUN  50*  48*  44*   CREA  13.18*  12.77*  12.43*   GLU  95  80  100   CA  7.8*  7.6*  7.8*   MG   --   1.8  2.0   PHOS   --   4.9*  6.0*     Recent Labs      08/25/18   0406  08/24/18   1539   SGOT  33  37   ALT  28  33   AP  56  57   TBILI  1.6*  1.1*   TP  6.1*  7.1   ALB  2.9*  3.0*   GLOB  3.2  4.1*   LPSE   --   310     Recent Labs      08/27/18   0124  08/25/18   0450  08/24/18   1645   INR  1.6*  2.0*  9.8*   PTP  15.6*  19.8*  88.8*      Recent Labs      08/27/18   0124   TIBC  208*   PSAT  14*      No results for input(s): PH, PCO2, PO2 in the last 72 hours. No results for input(s): CPK, CKMB in the last 72 hours. No lab exists for component: TROPONINI  Lab Results   Component Value Date/Time    Glucose (POC) 94 10/18/2013 03:35 PM    Glucose (POC) 102 10/14/2012 05:58 AM             HOSPITAL COURSE & TREATMENT RENDERED:   1.   Bladder mass (8/24/2018) vs clot. abdominal CT with bladder mass vs clot/hematoma. Evaluated by urology and irrigation was done. Pt still has some hematuria. Plan for outpatient cystoscopy       2. Essential hypertension, benign (10/18/2012). Now hypotensive. Hold all BP meds and monitor BP. Pt has BP machine at home and will check daily her BP. Asymptomatic        3.  Hypokalemia (1/20/2015). replete       4. Anemia (1/20/2015) - Hgb stable     5.  ESRD on peritoneal dialysis ([facility] (12/11/2015). Renal evaluation appreciated. Continue PD     6. Abdominal pain. Report of cloudy peritoneal fluid. Cell count is unremarkable for possible infection.       7. Paroxysmal atrial fibrillation (HCC) (7/31/2018)/Coagulopathy ([facility] (8/24/2018). hold warfarin until bladder issues addressed. Will not be able to resume until has outpatient cystoscopy     8. Systolic CHF, chronic ([facility] (8/25/2018). Recent EF 25%.  Holding bumex, ARB and coreg due to hypotension     Discharged in stable condition       Signed:  Randy Pederson MD  8/27/2018  2:30 PM

## 2018-08-27 NOTE — PROGRESS NOTES
0715- Bedside and Verbal shift change report given to Migue RN and Jimy Pierce RN  (oncoming nurse) by Azeem Olsen RN  (offgoing nurse). Report included the following information SBAR, Kardex, Intake/Output, MAR, Recent Results and Cardiac Rhythm AFIB. Patient observed in bed, watching tv, on 2L O2 NC. Patient states no pain or discomfort at this time. Will assess. Vale Monteiro I have reviewed discharge instructions with the patient. The patient verbalized understanding. Patient IV removed, and in own clothes. Currently dwelling 1400g of diasylate. States she is in no pain or discomfort. Will drive self home. All questions answered to patients satisfaction.  Vale Monteiro

## 2018-08-27 NOTE — PROGRESS NOTES
Progress Note    Patient: Ambrose Escalante MRN: [id number]  SSN: [id number]    YOB: 1960  Age: 61 y.o. Sex: female        ADMITTED:  2018 to Penny Whitehead MD  for Abdominal pain  Abdominal pain         Ambrose Escalante was admitted for Abdominal pain  Abdominal pain. Passed scant blood today, feels well    Vitals:  Temp (24hrs), Av.6 °F (37 °C), Min:97.8 °F (36.6 °C), Max:99.1 °F (37.3 °C)     Blood pressure (!) 85/62, pulse 99, temperature 97.8 °F (36.6 °C), resp. rate 18, height 5' 5\" (1.651 m), weight 58.7 kg (129 lb 6.6 oz), SpO2 96 %. I&O's:   1901 -  0700  In: 5540 [P.O.:240]  Out: 06006 [Urine:250]         Exam:   NAD. Labs:   Recent Labs      18   0124  18   0253  18   0406   WBC  10.5  11.6*  12.7*   HGB  9.4*  9.9*  11.1*   HCT  28.7*  30.6*  34.5*   PLT  118*  109*  170     Recent Labs      18   0124  18   0253  18   0406   NA  136  136  142   K  3.3*  3.8  3.4*   CL  99  101  101   CO2  28  25  24   GLU  95  80  100   BUN  50*  48*  44*   CREA  13.18*  12.77*  12.43*   CA  7.8*  7.6*  7.8*        Cultures:      Imaging:       Assessment:     - Principal Problem:    Bladder mass (2018)    Active Problems:    Essential hypertension, benign (10/18/2012)      Hypokalemia (2015)      Anemia (2015)      ESRD on peritoneal dialysis ([facility] (2015)      Paroxysmal atrial fibrillation (HCC) (2018)      Coagulopathy ([facility] (2018)      Abdominal pain (2018)      Gross hematuria (2018)      Leukocytosis ()      Systolic CHF, chronic ([facility] (2018)        Plan:     - follow up urine culture.   - cysto rpg, poss turbt as op  - stay off coumadin for now    Signed By: Lisandro Shell MD - 2018

## 2018-08-27 NOTE — PROGRESS NOTES
[facility] follow-up appointment on Friday August 31,2018 @ 11:20 a.m. with Dr. Sabrina Escalona. Specialist appointment on Monday September 17,2018 @ 8:40 a.m. With Dr. Hannah García. [facility] will be consulted.    Added to AVS.  Machelle Li CM Specialist

## 2018-08-27 NOTE — PROGRESS NOTES
Name: Miley Haas MRN: [id number]   : 1960 Hospital: [address] [place] [address]   Date: 2018        IMPRESSION:   · ESRD on PD. Patient reports less pain during in and dwell time. Her exchange fluid is still neena in apperance per patient. · Unlikely peritonitis given normal PD cell count  · Gross hematuria- urologyto perform cysto as outpatient  · Anemia of ESRD, Hb is down bellow the target range. PLAN:   · Continue PD exchanges, d/w RN  · Resume Procrit  · Will follow recommendations from Urology       Subjective/Interval History:   I have reviewed the flowsheet and previous days notes. Patient seen at 0700 today  ROS:  Not SOB, no further nausea, still with mild abd discomfort, reddish PD fluid and urine    Objective:   Vital Signs:    Visit Vitals    BP 95/68 (BP 1 Location: Right arm, BP Patient Position: At rest)    Pulse 70    Temp 97.6 °F (36.4 °C)    Resp 18    Ht 5' 5\" (1.651 m)    Wt 58.7 kg (129 lb 6.6 oz)    SpO2 94%    BMI 21.53 kg/m2       O2 Device: Room air   O2 Flow Rate (L/min): 2 l/min   Temp (24hrs), Av.4 °F (36.9 °C), Min:97.6 °F (36.4 °C), Max:99.1 °F (37.3 °C)       Intake/Output:   Last shift:       07 -  190  In: -   Out: 1300   Last 3 shifts: 1901 -  07  In: 0141 [P.O.:240]  Out: 16132 [Urine:250]    Intake/Output Summary (Last 24 hours) at 18 1428  Last data filed at 18 1233   Gross per 24 hour   Intake             2600 ml   Output             7250 ml   Net            -4650 ml        Physical Exam:  General:    Alert, cooperative, no distress, appears stated age. Head:   Normocephalic, without obvious abnormality, atraumatic. Eyes:   Conjunctivae/corneas clear. Neck:  Supple, symmetrical,  no adenopathy, thyroid: non tender    no carotid bruit and no JVD. Lungs:   Clear to auscultation bilaterally. No Wheezing or Rhonchi. No rales. Chest wall:  No tenderness or deformity. No Accessory muscle use. AICD on left upper chest  Heart:   Regular rate and rhythm,  no murmur, rub or gallop. Abdomen:   Soft, non-tender. Distended. Bowel sounds normal. No masses  Extremities: Extremities normal, atraumatic, No cyanosis. No edema. No clubbing  Skin:     Texture, turgor normal. No rashes or lesions. Not Jaundiced  Psych:  Good insight. Not depressed. Not anxious or agitated. Neurologic: Normal strength, Alert and oriented X 3. DATA:  Labs:  Recent Labs      08/27/18   0124  08/26/18   0253  08/25/18   0406  08/24/18   1539   NA  136  136  142  137   K  3.3*  3.8  3.4*  2.9*   CL  99  101  101  101   CO2  28  25  24  23   BUN  50*  48*  44*  41*   CREA  13.18*  12.77*  12.43*  11.30*   CA  7.8*  7.6*  7.8*  8.3*   ALB   --    --   2.9*  3.0*   PHOS   --   4.9*  6.0*   --    MG   --   1.8  2.0   --      Recent Labs      08/27/18   0124  08/26/18   0253  08/25/18   0406   WBC  10.5  11.6*  12.7*   HGB  9.4*  9.9*  11.1*   HCT  28.7*  30.6*  34.5*   PLT  118*  109*  170     Results for Kelley Driscoll (MRN [id number]) as of 8/27/2018 14:32    8/26/2018 15:02  BODY FLUID TYPE: ABDOMINAL FLUID  FLUID APPEARANCE: CLEAR  FLUID COLOR: YELLOW  FLUID RBC CT.: >100 (H)  FLUID NUCLEATED CELLS: 6  FLD NEUTROPHILS: 6 (A)  FLD LYMPHS: 76 (A)  FLD MONO/MACROPHAGES: 18 (A)    No results for input(s): ROMAN, KU, CLU, CREAU in the last 72 hours.     No lab exists for component: PROU    Total time spent with patient:     [] Critical Care Provided    Care Plan discussed with:   Staff, Medical Team    Vivi Ortiz MD

## 2018-08-27 NOTE — PROGRESS NOTES
Bedside and Verbal shift change report given to Emmy (oncoming nurse) by Antnoieta Talavera (offgoing nurse). Report included the following information SBAR and Kardex.

## 2018-08-28 LAB
BACTERIA SPEC CULT: NORMAL
GRAM STN SPEC: NORMAL
GRAM STN SPEC: NORMAL
SERVICE CMNT-IMP: NORMAL

## 2018-08-29 LAB
BACTERIA SPEC CULT: NORMAL
SERVICE CMNT-IMP: NORMAL

## 2018-08-30 LAB
BACTERIA SPEC CULT: NORMAL
GRAM STN SPEC: NORMAL
GRAM STN SPEC: NORMAL
SERVICE CMNT-IMP: NORMAL

## 2018-09-17 ENCOUNTER — OFFICE VISIT (OUTPATIENT)
Dept: CARDIOLOGY CLINIC | Age: 58
End: 2018-09-17

## 2018-09-17 VITALS
DIASTOLIC BLOOD PRESSURE: 84 MMHG | OXYGEN SATURATION: 98 % | BODY MASS INDEX: 19.99 KG/M2 | HEART RATE: 124 BPM | RESPIRATION RATE: 24 BRPM | HEIGHT: 65 IN | SYSTOLIC BLOOD PRESSURE: 124 MMHG | WEIGHT: 120 LBS

## 2018-09-17 DIAGNOSIS — I50.22 CHRONIC SYSTOLIC HEART FAILURE (HCC): ICD-10-CM

## 2018-09-17 DIAGNOSIS — N18.6 ESRD ON PERITONEAL DIALYSIS (HCC): Chronic | ICD-10-CM

## 2018-09-17 DIAGNOSIS — I10 ESSENTIAL HYPERTENSION, BENIGN: Chronic | ICD-10-CM

## 2018-09-17 DIAGNOSIS — Z95.810 ICD (IMPLANTABLE CARDIOVERTER-DEFIBRILLATOR) IN PLACE: ICD-10-CM

## 2018-09-17 DIAGNOSIS — I47.1 PAROXYSMAL ATRIAL TACHYCARDIA (HCC): ICD-10-CM

## 2018-09-17 DIAGNOSIS — I42.8 CARDIOMYOPATHY, NONISCHEMIC (HCC): ICD-10-CM

## 2018-09-17 DIAGNOSIS — I48.0 PAROXYSMAL ATRIAL FIBRILLATION (HCC): Primary | ICD-10-CM

## 2018-09-17 DIAGNOSIS — I47.1 SVT (SUPRAVENTRICULAR TACHYCARDIA) (HCC): ICD-10-CM

## 2018-09-17 DIAGNOSIS — Z99.2 ESRD ON PERITONEAL DIALYSIS (HCC): Chronic | ICD-10-CM

## 2018-09-17 DIAGNOSIS — I50.22 SYSTOLIC CHF, CHRONIC (HCC): Chronic | ICD-10-CM

## 2018-09-17 NOTE — MR AVS SNAPSHOT
[address]  [place] [address] [facility] 
[phone] Patient: Miley Haas MRN: [id number] Visit Information Date & Time Provider Department Dept. Phone Encounter #  
 9/17/2018  8:40 AM Keke Musa MD [facility] [phone] [id number] Your Appointments 12/19/2018  9:00 AM  
ESTABLISHED PATIENT with Hugo Macias MD  
[facility] ([address]) Appt Note: annual  
 [address] [place] [address] [facility]  
[phone]  
  
   
 [address] [facility] [place] [address] [address]  
  
    
  
 9/25/2018 10:00 AM  
REMOTE OFFICE VISIT with Alisson Kendrick [facility] ([facility] SCHEDULING) Appt Note: lat icd/stf  
 [address] [facility] [place] [address] [facility]  
[address] [place] [address] [address] Upcoming Health Maintenance Date Due Hepatitis C Screening 1960 DTaP/Tdap/Td series (1 - Tdap) 12/18/1980 FOBT Q 1 YEAR AGE 50-75 12/18/2009 Pneumococcal 19-64 Highest Risk (2 of 3 - PCV13) 10/19/2014 MEDICARE YEARLY EXAM 3/14/2018 Influenza Age 5 to Adult 8/1/2018 BREAST CANCER SCRN MAMMOGRAM 8/2/2019 PAP AKA CERVICAL CYTOLOGY 8/2/2023 Allergies as of 9/17/2018  Review Complete On: 9/17/2018 By: Melanie Núñez NP No Known Allergies Current Immunizations  Reviewed on 7/27/2018 Name Date Influenza Vaccine PF 10/19/2013  6:32 AM  
 Pneumococcal Polysaccharide (PPSV-23) 10/19/2013  6:07 AM  
  
 Not reviewed this visit You Were Diagnosed With   
  
 Codes Comments Paroxysmal atrial fibrillation (HCC)    -  Primary ICD-10-CM: I48.0 ICD-9-CM: 427.31 Chronic systolic heart failure (HCC)     ICD-10-CM: I50.22 ICD-9-CM: 428.22   
 Cardiomyopathy, nonischemic ([facility]     ICD-10-CM: I42.8 ICD-9-CM: 425.4 Paroxysmal atrial tachycardia (HCC)     ICD-10-CM: I47.1 ICD-9-CM: 427.0 ICD (implantable cardioverter-defibrillator) in place     ICD-10-CM: Z95.810 ICD-9-CM: V45.02   
 SVT (supraventricular tachycardia) (HCC)     ICD-10-CM: I47.1 ICD-9-CM: 427.89 Systolic CHF, chronic (HCC)     ICD-10-CM: I50.22 ICD-9-CM: 428.22, 428.0 Essential hypertension, benign     ICD-10-CM: I10 
ICD-9-CM: 401.1 ESRD on peritoneal dialysis ([facility]     ICD-10-CM: N18.6, Z99.2 ICD-9-CM: 585.6, V45.11 Vitals BP Pulse Resp Height(growth percentile) Weight(growth percentile) SpO2  
 124/84 (!) 124 24 5' 5\" (1.651 m) 120 lb (54.4 kg) 98% BMI OB Status Smoking Status 19.97 kg/m2 Postmenopausal Never Smoker BMI and BSA Data Body Mass Index Body Surface Area  
 19.97 kg/m 2 1.58 m 2 Preferred Pharmacy Pharmacy Name Phone [facility] [address] [place] [address] [address] [facility] [phone] Your Updated Medication List  
  
   
This list is accurate as of 9/17/18  9:58 AM.  Always use your most recent med list.  
  
  
  
  
 ferric citrate 210 mg iron tablet Commonly known as:  Francine Mingo Take 210 mg by mouth three (3) times daily (with meals). gentamicin 0.1 % topical cream  
Commonly known as:  GARAMYCIN Apply  to affected area nightly. omeprazole 20 mg capsule Commonly known as:  PRILOSEC Take 20 mg by mouth daily. SENNA-DOCUSATE SODIUM PO Take 1 Tab by mouth daily as needed (constipation). SENSIPAR 30 mg tablet Generic drug:  cinacalcet Take 30 mg by mouth daily. We Performed the Following AMB POC EKG ROUTINE W/ 12 LEADS, INTER & REP [46084 CPT(R)] Patient Instructions Continue Coreg/Carvedilol 25 mg twice daily. We are going to add a new medication called Corlanor to be taken 2.5 mg twice daily. Please use your green bags tonight and tomorrow night prior to seeing Dr. Dede Smith to address our concerns that you have excess fluid on board. If anything were to intensify past how you are feeling now, please seek additional medical attention at the ER or call 911. Introducing [facility] SERVICES! Dear Flaquito Cullen: 
Thank you for requesting a [employer] account. Our records indicate that you already have an active Medef[employer] account. You can access your account anytime at [email or web address] Did you know that you can access your hospital and ER discharge instructions at any time in Med[employer]? You can also review all of your test results from your hospital stay or ER visit. Additional Information If you have questions, please visit the Frequently Asked Questions section of the Medefy website at [email or web address] Remember, Medefy is NOT to be used for urgent needs. For medical emergencies, dial 911. Now available from your iPhone and Android! Please provide this summary of care documentation to your next provider. Your primary care clinician is listed as Hunter Ritter. If you have any questions after today's visit, please call [phone].

## 2018-09-17 NOTE — PROGRESS NOTES
[address] [facility], [address] [facility]    Office [phone]  Fax [phone]    History of Present Illness  Marline Mckinney is a 61 y.o. female admitted to [facility] 8/24/18 - 8/27/18 with hematuria. Problem List  Date Reviewed: 9/17/2018          Codes Class Noted    Systolic CHF, chronic (HCC) (Chronic) ICD-10-CM: I50.22  ICD-9-CM: 428.22, 428.0  8/25/2018        Bladder mass ICD-10-CM: N32.89  ICD-9-CM: 596.89  8/24/2018        Gross hematuria ICD-10-CM: R31.0  ICD-9-CM: 599.71  8/24/2018        Paroxysmal atrial fibrillation (HCC) (Chronic) ICD-10-CM: I48.0  ICD-9-CM: 427.31  7/31/2018        ICD (implantable cardioverter-defibrillator) in place (Chronic) ICD-10-CM: Z95.810  ICD-9-CM: V45.02  1/26/2018        Paroxysmal atrial tachycardia (HCC) ICD-10-CM: I47.1  ICD-9-CM: 427.0  4/17/2016        ESRD on peritoneal dialysis [facility]) (Chronic) ICD-10-CM: N18.6, Z99.2  ICD-9-CM: 585.6, V45.11  12/11/2015        Hypokalemia ICD-10-CM: E87.6  ICD-9-CM: 276.8  1/20/2015        Anemia (Chronic) ICD-10-CM: D64.9  ICD-9-CM: 285.9  1/20/2015        Cardiomyopathy, nonischemic ([facility] ICD-10-CM: I42.8  ICD-9-CM: 425.4  10/19/2013    Overview Signed 10/19/2013  1:36 PM by Henrik Alvarenga MD     LVEF 25-30% by echo 10/19/2013 (previously 45%)             Polycystic kidney disease (Chronic) ICD-10-CM: Q61.3  ICD-9-CM: 753.12  10/18/2012        Essential hypertension, benign (Chronic) ICD-10-CM: I10  ICD-9-CM: 401.1  10/18/2012            Cardiac Testing  ECHO: 10/14/12: EF 45% w/ posterior HK Grade 1 DD, LAE, mild MR, mild-mod TR RVSP 60 mmHG   Cath: 10/16/12: Normal cors. No AVG. Mild pulm HTN (43/16/26). Low-normal filling pressures. Echo 10/19/13 - EF 25- 30%. Severe diffuse hypokinesis. Mildly increased wall thickness. Mild concentric hypertrophy.  Doppler parameters were consistent with a reversible restrictive pattern, indicative of decreased left ventricular  diastolic compliance and/or increased left atrial pressure (grade 3 diastolic dysfunction). LA mildly dilated, mild MR, mild TR, mod PA HTN, small pericardial effusion circumferential to the heart, no evidence of hemodynamic compromise. Echo 11/2/15 - EF 25-30%, global HK, mild MR  Lexiscan cardiolite 11/24/15 - focal anteroapical ischemia    Cath 12/15/2015 - EDP 10, LV dilated, EF 20% global HK, normal cors with right dominance. Unable to cannulate femoral vein. 12/29/15-implantation of a single-chamber [address] per Dr. Juan Jose Hughes    Echo 1/30/17 - EF 20 %. Severe diffuse hypokinesis. Mild LAE. Mild MR. Mild Pulm HTN. Small pericardial effusion. No significant change when compared to study 02-Nov-2015. Echo 1/26/18- LVEF 28%, severe LAE, PA systolic 50 mmHg      HPI  Discharge summary reviewed. Found to have bladder mass vs.clot in the setting of supra therapeutic INR. Pending further Urologic evaluation and testing - denies any recurrent bleeding concerns, now off Coumadin. Note that all HF and HTN medication were discontinued at discharge due to hypotension during admission. She reports seeing Dr. Paras Ortiz ~ 2 weeks ago. She reports HR was elevated at that time and Coreg 25 mg BID was restarted. She continues with nightly PD. Reports that exchanges have been going well. She reports that she has been rotating between her \"routine\" bags and \"high volume\" bags. Used a \"high volume bag\" last night to address worsening shortness of breath, at rest and with exertion and abdominal distention. She reports that symptoms improved, but are still more than her usual baseline. She is scheduled for Nephrology with Dr. Luis Be again this Wednesday. She continues to have a poor appetite with intermittent nausea and vomiting. This was present prior to admission, when volume status was stable. Sleep has been poor due to worsening orthopnea and PND. Intermittent wheezing with laying down.       She reports that her activity level has been significantly reduced due to worsening HENDERSON. She denies any exertional chest pain. No ICD therapies. No syncope or near syncope. Current Outpatient Prescriptions on File Prior to Visit   Medication Sig Dispense Refill    omeprazole (PRILOSEC) 20 mg capsule Take 20 mg by mouth daily.  SENNA-DOCUSATE SODIUM PO Take 1 Tab by mouth daily as needed (constipation).  cinacalcet (SENSIPAR) 30 mg tablet Take 30 mg by mouth daily.  ferric citrate (AURYXIA) 210 mg iron tablet Take 210 mg by mouth three (3) times daily (with meals).  gentamicin (GARAMYCIN) 0.1 % topical cream Apply  to affected area nightly. No current facility-administered medications on file prior to visit. Social History     Social History    Marital status: SINGLE     Spouse name: N/A    Number of children: N/A    Years of education: N/A     Occupational History    Not on file. Social History Main Topics    Smoking status: Never Smoker    Smokeless tobacco: Never Used    Alcohol use No    Drug use: No    Sexual activity: Not Currently     Partners: Male     Other Topics Concern    Not on file     Social History Narrative     Review of Systems  Constitutional:  Negative for fever, chills and diaphoresis. Respiratory: Negative for hemoptysis. Positive for cough with small sputum production. Positive for conversational dyspnea, HENDERSON, orthopnea and PND. Cardiovascular:  Negative for chest pain and claudication. Positive for leg swelling, orthopnea and PND. Gastrointestinal:  Negative for blood in stool and melena. Positive for abdominal distention; improved today. Positive for nausea and intermittent vomiting. Genitourinary:  Negative for dysuria, urgency and flank pain. Musculoskeletal:  Negative for back pain and joint pain. Skin:  Negative for rash. Neurological:  Negative for dizziness, weakness, seizures, loss of consciousness and headaches.   Endo/Heme/Allergies:  Does not bruise/bleed easily. Psychiatric/Behavioral:  Negative for memory loss. The patient does not have insomnia. Visit Vitals    /84    Pulse (!) 124    Resp 24    Ht 5' 5\" (1.651 m)    Wt 120 lb (54.4 kg)    SpO2 98%    BMI 19.97 kg/m2     Wt Readings from Last 3 Encounters:   09/17/18 120 lb (54.4 kg)   08/26/18 129 lb 6.6 oz (58.7 kg)   08/02/18 126 lb 3.2 oz (57.2 kg)     Physical Exam  Vitals reviewed. Constitutional:  She is oriented to person, place and time. She appears well-nourished. HENT:  Head:  Normocephalic. Neck:  Neck supple. +JVD  Cardiovascular:  Normal rate, regular rhythm, normal heart sounds and intact distal pulses. Exam reveals no gallop. No murmur heard. Pulmonary/Chest:  Effort normal and breath sounds normal.  Abdominal:  Distended, mild tenderness RUQ. Bowel sounds normal.  Musculoskeletal:  Trace ankle edema bilaterally. Neurological:  Alert and oriented to person, place and time. Skin:  Skin is warm and dry. Psychiatric:  She has a normal mood and affect. Cardiographics  EKG 4/15/2016 - atrial tachycardia 120   Device interrogation 9/15/17 - No device therapies. No VT (since 2/2017). EKG 9/15/17 - SB, first degree AV block    EKG 9/18/18 - AF vs. Atrial tach/, occ. PVC's    ASSESSMENT and PLAN  Encounter Diagnoses   Name Primary?  Paroxysmal atrial fibrillation (HCC) Yes    Chronic systolic heart failure (HCC)     Cardiomyopathy, nonischemic (HCC)     Paroxysmal atrial tachycardia (HCC)     ICD (implantable cardioverter-defibrillator) in place     SVT (supraventricular tachycardia) (HCC)     Systolic CHF, chronic (HCC)     Essential hypertension, benign     ESRD on peritoneal dialysis [facility])      Ms. Hillary Smyth has a NICM with severe LV dysfunction s/p ICD in the setting of HTN, ESRD on PD. EF 25% with G3DD by Echo 1/26/18. She now has progressive Class 3/4 HF symptoms and is volume overloaded on exam today.  Volume status is controlled via PD and seems to have improved following intensified PD bag last night. Discussed plan moving forward including admission vs.continuing with more aggressive PD until she is seen by Nephrology again on Wednesday. She prefers to avoid hospital admission and will continue with nightly PD. Advised that if symptoms progressed that she will need to seek immediate medical attention or call 911. She voices understanding. Continue Coreg 25 mg BID. Plan to add Corlanor 2.5 mg BID today to address elevated HR. PAF, now off anticoagulation with recent hematuria. Will need to await further Urological evaluation until anticoagulation can be further discussed. Follow-up Disposition:  Return in about 4 weeks (around 10/15/2018).      MICHAEL Quick MD

## 2018-09-17 NOTE — PATIENT INSTRUCTIONS
Continue Coreg/Carvedilol 25 mg twice daily. We are going to add a new medication called Corlanor to be taken 2.5 mg twice daily. Please use your green bags tonight and tomorrow night prior to seeing Dr. Estee Restrepo to address our concerns that you have excess fluid on board. If anything were to intensify past how you are feeling now, please seek additional medical attention at the ER or call 911.

## 2018-09-17 NOTE — PROGRESS NOTES
Visit Vitals    /84    Pulse (!) 124    Resp 24    Ht 5' 5\" (1.651 m)    Wt 120 lb (54.4 kg)    SpO2 98%    BMI 19.97 kg/m2     Hospital visit 8/24 at that time taken off coumadin and has not restarted,also stopped BP meds. Complains of Cough in that month,SOB since yesterday with lung pain.   EKG HOWARD Perez

## 2018-09-18 RX ORDER — CARVEDILOL 25 MG/1
25 TABLET ORAL 2 TIMES DAILY WITH MEALS
Qty: 60 TAB | Refills: 6
Start: 2018-09-18 | End: 2019-03-19

## 2018-09-25 ENCOUNTER — OFFICE VISIT (OUTPATIENT)
Dept: CARDIOLOGY CLINIC | Age: 58
End: 2018-09-25

## 2018-09-25 DIAGNOSIS — Z95.810 CARDIAC DEFIBRILLATOR IN SITU: Primary | ICD-10-CM

## 2018-10-09 ENCOUNTER — TELEPHONE (OUTPATIENT)
Dept: CARDIOLOGY CLINIC | Age: 58
End: 2018-10-09

## 2018-10-09 NOTE — TELEPHONE ENCOUNTER
[facility] requesting Cardiac stratification for Cystoscopy bilateral retrograde possible Bladder biopsy vs Transurethral resection of Bladder tumor under general anesthesia.   Surgeon Tara Lagos or Bibi Burrows advise

## 2018-10-10 ENCOUNTER — TELEPHONE (OUTPATIENT)
Dept: CARDIOLOGY CLINIC | Age: 58
End: 2018-10-10

## 2018-10-10 NOTE — TELEPHONE ENCOUNTER
Pt is returning your call from October 9. Please call pt back on her cell phone [phone].     Thanks

## 2018-10-11 ENCOUNTER — OFFICE VISIT (OUTPATIENT)
Dept: CARDIOLOGY CLINIC | Age: 58
End: 2018-10-11

## 2018-10-11 VITALS
OXYGEN SATURATION: 93 % | SYSTOLIC BLOOD PRESSURE: 120 MMHG | HEART RATE: 63 BPM | WEIGHT: 115.6 LBS | HEIGHT: 65 IN | DIASTOLIC BLOOD PRESSURE: 78 MMHG | BODY MASS INDEX: 19.26 KG/M2

## 2018-10-11 DIAGNOSIS — N18.6 ESRD ON PERITONEAL DIALYSIS (HCC): Chronic | ICD-10-CM

## 2018-10-11 DIAGNOSIS — Z99.2 ESRD ON PERITONEAL DIALYSIS (HCC): Chronic | ICD-10-CM

## 2018-10-11 DIAGNOSIS — I42.8 CARDIOMYOPATHY, NONISCHEMIC (HCC): ICD-10-CM

## 2018-10-11 DIAGNOSIS — Q61.3 POLYCYSTIC KIDNEY DISEASE: Chronic | ICD-10-CM

## 2018-10-11 DIAGNOSIS — N32.89 BLADDER MASS: ICD-10-CM

## 2018-10-11 DIAGNOSIS — R31.0 GROSS HEMATURIA: ICD-10-CM

## 2018-10-11 DIAGNOSIS — I48.0 PAROXYSMAL ATRIAL FIBRILLATION (HCC): Chronic | ICD-10-CM

## 2018-10-11 DIAGNOSIS — I50.22 SYSTOLIC CHF, CHRONIC (HCC): Primary | Chronic | ICD-10-CM

## 2018-10-11 DIAGNOSIS — I10 ESSENTIAL HYPERTENSION, BENIGN: Chronic | ICD-10-CM

## 2018-10-11 NOTE — PROGRESS NOTES
[address] [facility], [address] [facility]    Office [phone]  Fax [phone]    History of Present Illness  Marlon Turner is a 61 y.o. female admitted to [facility] 8/24/18 - 8/27/18 with hematuria. Problem List  Date Reviewed: 9/17/2018          Codes Class Noted    Systolic CHF, chronic (HCC) (Chronic) ICD-10-CM: I50.22  ICD-9-CM: 428.22, 428.0  8/25/2018        Bladder mass ICD-10-CM: N32.89  ICD-9-CM: 596.89  8/24/2018        Gross hematuria ICD-10-CM: R31.0  ICD-9-CM: 599.71  8/24/2018        Paroxysmal atrial fibrillation (HCC) (Chronic) ICD-10-CM: I48.0  ICD-9-CM: 427.31  7/31/2018        ICD (implantable cardioverter-defibrillator) in place (Chronic) ICD-10-CM: Z95.810  ICD-9-CM: V45.02  1/26/2018        Paroxysmal atrial tachycardia (HCC) ICD-10-CM: I47.1  ICD-9-CM: 427.0  4/17/2016        ESRD on peritoneal dialysis [facility]) (Chronic) ICD-10-CM: N18.6, Z99.2  ICD-9-CM: 585.6, V45.11  12/11/2015        Hypokalemia ICD-10-CM: E87.6  ICD-9-CM: 276.8  1/20/2015        Anemia (Chronic) ICD-10-CM: D64.9  ICD-9-CM: 285.9  1/20/2015        Cardiomyopathy, nonischemic ([facility] ICD-10-CM: I42.8  ICD-9-CM: 425.4  10/19/2013    Overview Signed 10/19/2013  1:36 PM by Pasha Preston MD     LVEF 25-30% by echo 10/19/2013 (previously 45%)             Polycystic kidney disease (Chronic) ICD-10-CM: Q61.3  ICD-9-CM: 753.12  10/18/2012        Essential hypertension, benign (Chronic) ICD-10-CM: I10  ICD-9-CM: 401.1  10/18/2012            Cardiac Testing  ECHO: 10/14/12: EF 45% w/ posterior HK Grade 1 DD, LAE, mild MR, mild-mod TR RVSP 60 mmHG   Cath: 10/16/12: Normal cors. No AVG. Mild pulm HTN (43/16/26). Low-normal filling pressures. Echo 10/19/13 - EF 25- 30%. Severe diffuse hypokinesis. Mildly increased wall thickness. Mild concentric hypertrophy.  Doppler parameters were consistent with a reversible restrictive pattern, indicative of decreased left ventricular  diastolic compliance and/or increased left atrial pressure (grade 3 diastolic dysfunction). LA mildly dilated, mild MR, mild TR, mod PA HTN, small pericardial effusion circumferential to the heart, no evidence of hemodynamic compromise. Echo 11/2/15 - EF 25-30%, global HK, mild MR  Lexiscan cardiolite 11/24/15 - focal anteroapical ischemia    Cath 12/15/2015 - EDP 10, LV dilated, EF 20% global HK, normal cors with right dominance. Unable to cannulate femoral vein. 12/29/15-implantation of a single-chamber [address] per Dr. Orly Leiva    Echo 1/30/17 - EF 20 %. Severe diffuse hypokinesis. Mild LAE. Mild MR. Mild Pulm HTN. Small pericardial effusion. No significant change when compared to study 02-Nov-2015. Echo 1/26/18- LVEF 28%, severe LAE, PA systolic 50 mmHg      HPI  Marked improvement in dyspnea and edema with 2.5 exchanges for PD. Feels great now. Patient denies any exertional chest pain, dyspnea, palpitations, syncope, orthopnea, edema or paroxysmal nocturnal dyspnea. Scheduled for cysto under [place] 10/22 with Dr Keyona Esposito to evaluate bladder tumor, recent hematuria    Has been off warfarin since her initial presentation with hematuria. Current Outpatient Prescriptions on File Prior to Visit   Medication Sig Dispense Refill    carvedilol (COREG) 25 mg tablet Take 1 Tab by mouth two (2) times daily (with meals). 60 Tab 6    omeprazole (PRILOSEC) 20 mg capsule Take 20 mg by mouth daily.  SENNA-DOCUSATE SODIUM PO Take 1 Tab by mouth daily as needed (constipation).  cinacalcet (SENSIPAR) 30 mg tablet Take 30 mg by mouth daily.  ferric citrate (AURYXIA) 210 mg iron tablet Take 210 mg by mouth three (3) times daily (with meals).  gentamicin (GARAMYCIN) 0.1 % topical cream Apply  to affected area nightly.  ivabradine (CORLANOR) 5 mg tablet Take 0.5 Tabs by mouth two (2) times daily (with meals). 60 Tab 6     No current facility-administered medications on file prior to visit.         Social History Social History    Marital status: SINGLE     Spouse name: N/A    Number of children: N/A    Years of education: N/A     Occupational History    Not on file. Social History Main Topics    Smoking status: Never Smoker    Smokeless tobacco: Never Used    Alcohol use No    Drug use: No    Sexual activity: Not Currently     Partners: Male     Other Topics Concern    Not on file     Social History Narrative     Review of Systems  Constitutional:  Negative for fever, chills and diaphoresis. Respiratory: Negative for hemoptysis. Positive for cough with small sputum production. Positive for conversational dyspnea, HENDERSON, orthopnea and PND. Cardiovascular:  Negative for chest pain and claudication. Positive for leg swelling, orthopnea and PND. Gastrointestinal:  Negative for blood in stool and melena. Positive for abdominal distention; improved today. Positive for nausea and intermittent vomiting. Genitourinary:  Negative for dysuria, urgency and flank pain. Musculoskeletal:  Negative for back pain and joint pain. Skin:  Negative for rash. Neurological:  Negative for dizziness, weakness, seizures, loss of consciousness and headaches. Endo/Heme/Allergies:  Does not bruise/bleed easily. Psychiatric/Behavioral:  Negative for memory loss. The patient does not have insomnia. Visit Vitals    /78 (BP 1 Location: Left arm, BP Patient Position: Sitting)    Pulse 63    Ht 5' 5\" (1.651 m)    Wt 115 lb 9.6 oz (52.4 kg)    SpO2 93%    BMI 19.24 kg/m2     Wt Readings from Last 3 Encounters:   10/11/18 115 lb 9.6 oz (52.4 kg)   09/17/18 120 lb (54.4 kg)   08/26/18 129 lb 6.6 oz (58.7 kg)     Physical Exam  Vitals reviewed. Constitutional:  She is oriented to person, place and time. She appears well-nourished. HENT:  Head:  Normocephalic. Neck:  Neck supple. Normal JVP  Cardiovascular:  Normal rate, regular rhythm, normal heart sounds and intact distal pulses. Exam reveals no gallop.   No murmur heard. Pulmonary/Chest:  Effort normal and breath sounds normal.  Abdominal:  abd soft  Bowel sounds normal.  Musculoskeletal:  no ankle edema bilaterally. Neurological:  Alert and oriented to person, place and time. Skin:  Skin is warm and dry. Psychiatric:  She has a normal mood and affect. Cardiographics  EKG 4/15/2016 - atrial tachycardia 120   Device interrogation 9/15/17 - No device therapies. No VT (since 2/2017). EKG 9/15/17 - SB, first degree AV block    EKG 9/18/18 - AF vs. Atrial tach/, occ. PVC's    ASSESSMENT and PLAN  Encounter Diagnoses   Name Primary?  Essential hypertension, benign     Paroxysmal atrial fibrillation (HCC)     Systolic CHF, chronic (HCC) Yes    Cardiomyopathy, nonischemic (HCC)     Polycystic kidney disease     ESRD on [facility] dialysis ([facility]     Bladder mass     Gross hematuria      Ms. Florina Norwood has a NICM with severe LV dysfunction s/p ICD in the setting of HTN, ESRD on PD. EF 25% by Echo 1/26/18. She has class 1-2 now after intensification of PD regimen. Continue coreg, resume ACE/ARB at some point, continue spironlactone. Reassess EF with echo in 3 months    PAF, now off anticoagulation with recent hematuria. Resume anticoagulation either with warfarin or possibly Eliquis (she will check pricing with [facility]) following completion of urological evaluation. Continue regular ICD checks    Low cardiac risk for cystoscopy. Proceed as planned. Follow-up Disposition:  Return in about 3 months (around 1/11/2019).      Zehra Wood MD

## 2018-10-11 NOTE — MR AVS SNAPSHOT
[facility] [place] [facility] 
[phone] Patient: Marques Machuca MRN: [id number]:[id number] Visit Information Date & Time Provider Department Dept. Phone Encounter #  
 10/11/2018  9:20 AM Zeinab Hercules MD [facility] [phone] [id number] Follow-up Instructions Return in about 3 months (around 1/11/2019). Your Appointments 12/19/2018  9:00 AM  
ESTABLISHED PATIENT with Fina Buckley MD  
[facility] ([address] [place] [address]) Appt Note: annual  
 320 [facility] 600 [facility] [address]  
[phone]  
  
   
 1717 [facility]  
  
    
 12/19/2018  9:15 AM  
PACEMAKER with TARAS HODGE  
[facility] ([facility]) Appt Note: vernon/icd/stf/r  
 320 [facility] 600 [facility] [address]  
[phone]  
  
   
 [address] [address]  
  
    
 1/28/2019  8:00 AM  
ECHO CARDIOGRAMS 2D with ROMAIN CHAIDEZ  
[facility] ([address] [place] [address]) Appt Note: 1 mo fup needed early morning appt; 3 mo fup echo at 8 at 9 dr Diallo Art 320 [facility] [place] 600 [facility] 99 [address]  
[phone]  
  
   
 [address] [address]  
  
    
 1/28/2019  9:00 AM  
ESTABLISHED PATIENT with Zeinab Hercules MD  
[facility] ([address] [place] [address]) Appt Note: 3 mo fup echo at 8 at 9 dr Imani Shin 320 [facility] [place] [address] 1007 [facility]  
[address] [facility] [address] [address] Upcoming Health Maintenance Date Due Hepatitis C Screening 1960 DTaP/Tdap/Td series (1 - Tdap) 12/18/1980 Shingrix Vaccine Age 50> (1 of 2) 12/18/2009 FOBT Q 1 YEAR AGE 50-75 12/18/2009 Pneumococcal 19-64 Highest Risk (2 of 3 - PCV13) 10/19/2014 MEDICARE YEARLY EXAM 3/14/2018 Influenza Age 5 to Adult 8/1/2018 BREAST CANCER SCRN MAMMOGRAM 8/2/2019 PAP AKA CERVICAL CYTOLOGY 8/2/2023 Allergies as of 10/11/2018  Review Complete On: 10/11/2018 By: Marine Stein  
 No Known Allergies Current Immunizations  Reviewed on 7/27/2018 Name Date Influenza Vaccine PF 10/19/2013  6:32 AM  
 Pneumococcal Polysaccharide (PPSV-23) 10/19/2013  6:07 AM  
  
 Not reviewed this visit You Were Diagnosed With   
  
 Codes Comments Essential hypertension, benign    -  Primary ICD-10-CM: I10 
ICD-9-CM: 401.1 Paroxysmal atrial fibrillation (HCC)     ICD-10-CM: I48.0 ICD-9-CM: 427.31 Systolic CHF, chronic (HCC)     ICD-10-CM: I50.22 ICD-9-CM: 428.22, 428.0 Cardiomyopathy, nonischemic ([email or web address] 75.)     ICD-10-CM: I42.8 ICD-9-CM: 425.4 Polycystic kidney disease     ICD-10-CM: Q61.3 ICD-9-CM: 753.12   
 ESRD on peritoneal dialysis ([facility] [[email or web address] 75.)     ICD-10-CM: N18.6, Z99.2 ICD-9-CM: 585.6, V45.11 Vitals BP Pulse Height(growth percentile) Weight(growth percentile) SpO2 BMI  
 120/78 (BP 1 Location: Left arm, BP Patient Position: Sitting) 63 5' 5\" (1.651 m) 115 lb 9.6 oz (52.4 kg) 93% 19.24 kg/m2 OB Status Smoking Status Postmenopausal Never Smoker Vitals History BMI and BSA Data Body Mass Index Body Surface Area  
 19.24 kg/m 2 1.55 m 2 Preferred Pharmacy Pharmacy Name Phone [facility] [address] [place] [address] 1500 [facility] [phone] Your Updated Medication List  
  
   
This list is accurate as of 10/11/18  9:39 AM.  Always use your most recent med list.  
  
  
  
  
 carvedilol 25 mg tablet Commonly known as:  González Zaragoza Take 1 Tab by mouth two (2) times daily (with meals). ferric citrate 210 mg iron tablet Commonly known as:  Cabot Pickle Take 210 mg by mouth three (3) times daily (with meals). gentamicin 0.1 % topical cream  
Commonly known as:  GARAMYCIN Apply  to affected area nightly. ivabradine 5 mg tablet Commonly known as:  [employer] Take 0.5 Tabs by mouth two (2) times daily (with meals). omeprazole 20 mg capsule Commonly known as:  PRILOSEC Take 20 mg by mouth daily. POTASSIUM CHLORIDE Take  by mouth three (3) times daily. SENNA-DOCUSATE SODIUM PO Take 1 Tab by mouth daily as needed (constipation). SENSIPAR 30 mg tablet Generic drug:  cinacalcet Take 30 mg by mouth daily. SPIRONOLACTONE PO Take  by mouth daily. Follow-up Instructions Return in about 3 months (around 1/11/2019). To-Do List   
 10/16/2018 8:00 AM  
  Appointment with BILLY HAMLIN at [address] [place] [address] ([phone]) Patient Instructions Check with healthkeepers to see if Eliquis is covered as an alternative to coumadin Introducing [facility] HEALTH [facility]! Dear Estefany Caruso: 
Thank you for requesting a [employer] account. Our records indicate that you already have an active [employer] account. You can access your account anytime at [email or web address] Did you know that you can access your hospital and ER discharge instructions at any time in [employer]? You can also review all of your test results from your hospital stay or ER visit. Additional Information If you have questions, please visit the Frequently Asked Questions section of the [employer] website at [email or web address] Remember, [employer] is NOT to be used for urgent needs. For medical emergencies, dial 911. Now available from your iPhone and Android! Please provide this summary of care documentation to your next provider. Your primary care clinician is listed as Jerome Choudhary.  If you have any questions after today's visit, please call [phone].

## 2018-10-11 NOTE — PROGRESS NOTES
Patient has no cardiac complaints today.     Visit Vitals    /78 (BP 1 Location: Left arm, BP Patient Position: Sitting)    Pulse 63    Ht 5' 5\" (1.651 m)    Wt 115 lb 9.6 oz (52.4 kg)    SpO2 93%    BMI 19.24 kg/m2

## 2018-10-12 ENCOUNTER — TELEPHONE (OUTPATIENT)
Dept: CARDIOLOGY CLINIC | Age: 58
End: 2018-10-12

## 2018-10-12 NOTE — TELEPHONE ENCOUNTER
[facility] from [facility] called following up on Cardiac clearance faxed on Oct 9th at 2:49pm for bladder procedure.   Phone # [phone] ext #[phone]  Thanks

## 2018-10-16 ENCOUNTER — HOSPITAL ENCOUNTER (OUTPATIENT)
Dept: PREADMISSION TESTING | Age: 58
Discharge: HOME | End: 2018-10-16
Payer: MEDICARE

## 2018-10-16 VITALS
RESPIRATION RATE: 14 BRPM | OXYGEN SATURATION: 94 % | HEART RATE: 99 BPM | BODY MASS INDEX: 19.68 KG/M2 | TEMPERATURE: 97.9 F | WEIGHT: 115.3 LBS | DIASTOLIC BLOOD PRESSURE: 58 MMHG | HEIGHT: 64 IN | SYSTOLIC BLOOD PRESSURE: 81 MMHG

## 2018-10-16 LAB
ANION GAP SERPL CALC-SCNC: 18 MMOL/L (ref 5–15)
BASOPHILS # BLD: 0.1 K/UL (ref 0–0.1)
BASOPHILS NFR BLD: 1 % (ref 0–1)
BUN SERPL-MCNC: 46 MG/DL (ref 6–20)
BUN/CREAT SERPL: 3 (ref 12–20)
CALCIUM SERPL-MCNC: 10 MG/DL (ref 8.5–10.1)
CHLORIDE SERPL-SCNC: 99 MMOL/L (ref 97–108)
CO2 SERPL-SCNC: 26 MMOL/L (ref 21–32)
CREAT SERPL-MCNC: 13.69 MG/DL (ref 0.55–1.02)
DIFFERENTIAL METHOD BLD: NORMAL
EOSINOPHIL # BLD: 0.3 K/UL (ref 0–0.4)
EOSINOPHIL NFR BLD: 3 % (ref 0–7)
ERYTHROCYTE [DISTWIDTH] IN BLOOD BY AUTOMATED COUNT: 14.4 % (ref 11.5–14.5)
GLUCOSE SERPL-MCNC: 99 MG/DL (ref 65–100)
HCT VFR BLD AUTO: 43.3 % (ref 35–47)
HGB BLD-MCNC: 13.6 G/DL (ref 11.5–16)
IMM GRANULOCYTES # BLD: 0 K/UL (ref 0–0.04)
IMM GRANULOCYTES NFR BLD AUTO: 0 % (ref 0–0.5)
LYMPHOCYTES # BLD: 1.3 K/UL (ref 0.8–3.5)
LYMPHOCYTES NFR BLD: 15 % (ref 12–49)
MCH RBC QN AUTO: 31 PG (ref 26–34)
MCHC RBC AUTO-ENTMCNC: 31.4 G/DL (ref 30–36.5)
MCV RBC AUTO: 98.6 FL (ref 80–99)
MONOCYTES # BLD: 0.5 K/UL (ref 0–1)
MONOCYTES NFR BLD: 6 % (ref 5–13)
NEUTS SEG # BLD: 6.5 K/UL (ref 1.8–8)
NEUTS SEG NFR BLD: 75 % (ref 32–75)
NRBC # BLD: 0 K/UL (ref 0–0.01)
NRBC BLD-RTO: 0 PER 100 WBC
PLATELET # BLD AUTO: 181 K/UL (ref 150–400)
PMV BLD AUTO: 10 FL (ref 8.9–12.9)
POTASSIUM SERPL-SCNC: 3.1 MMOL/L (ref 3.5–5.1)
RBC # BLD AUTO: 4.39 M/UL (ref 3.8–5.2)
SODIUM SERPL-SCNC: 143 MMOL/L (ref 136–145)
WBC # BLD AUTO: 8.6 K/UL (ref 3.6–11)

## 2018-10-16 PROCEDURE — 80048 BASIC METABOLIC PNL TOTAL CA: CPT | Performed by: ANESTHESIOLOGY

## 2018-10-16 PROCEDURE — 85025 COMPLETE CBC W/AUTO DIFF WBC: CPT | Performed by: ANESTHESIOLOGY

## 2018-10-16 PROCEDURE — 36415 COLL VENOUS BLD VENIPUNCTURE: CPT | Performed by: ANESTHESIOLOGY

## 2018-10-16 RX ORDER — SPIRONOLACTONE 50 MG/1
50 TABLET, FILM COATED ORAL
COMMUNITY
End: 2019-03-19

## 2018-10-16 RX ORDER — POTASSIUM CHLORIDE 20 MEQ/1
40 TABLET, EXTENDED RELEASE ORAL
COMMUNITY
End: 2020-01-01

## 2018-10-16 NOTE — PERIOP NOTES
INSTRUCTIONS [address] [address] [address] MAIN OR [phone] MAIN PRE OP [phone] AMBULATORY PRE OP [phone] PRE-ADMISSION TESTING 21  Surgery Date:   10/22/2018 Is surgery arrival time given by surgeon? NO If [facility] staff will call you between 3 and 7pm the day before your surgery with your arrival time. (If your surgery is on a Monday, we will call you the Friday before.) Call [phone] after 7pm Monday-Friday if you did not receive your arrival time. Answers to Common Questions When You 
Arrive Arrive at the 2nd [address] [facility] on the day of your surgery Have your insurance card, photo ID, and any copayment (if needed) Food 
 and  
Drink NO food or drink after midnight the night before surgery This means NO water, gum, mints, coffee, juice, etc. 
No alcohol (beer, wine, liquor) 24 hours before and after surgery Medicine to TAKE Morning of Surgery MEDICATIONS TO TAKE THE MORNING OF SURGERY WITH A SIP OF WATER:  
? Carvedilol, Prilosec if needed. Check with your nephrologist to see if you should take your potassium and Spironolactone the morning of surgery. Medicine To 
STOP  
FOR PAIN 
? You can take Tylenol  follow instructions on the bottle 
? NO Aspirin for pain ? NO Non-Steroidal Anti-Inflammatory Drugs (NSAIDs:  
for example, Ibuprofen (Advil, Motrin), Naproxen (Aleve) ? STOP herbal supplements and vitamins 1 week before surgery Blood Thinners ? If you take Aspirin, Plavix, Coumadin, blood-thinning or anti-clot medicine, talk to your surgeon and/or follow the instructions from the doctor who told you to take that medicine Clothing Jewelry Valuables Bathing CLOTHING 
? Wear loose, comfortable clothes ? Wear glasses instead of contacts ? Leave money, jewelry and valuables at home ? No make-up, particularly mascara, the day of surgery ? REMOVE ALL piercings, rings, and jewelry - leave at home ? Wear hair loose or down; no pony-tails, buns, or metal hair clips BATHING 
? Follow all special bath instructions (for total joint replacement, spine and bowel surgeries.) ? If you shower the morning of surgery, please do not apply any lotions, powders, or deodorants afterwards. Do not shave or trim anywhere 24 hours before surgery. Going Home 
or Spending the Night  
? SAME-DAY SURGERY: You must have a responsible adult drive you home and stay with you 24 hours after surgery ? ADMITS: If your doctor is keeping you into the hospital after surgery, leave personal belongings/luggage in your car until you have a hospital room number. Hospital discharge time is 12 noon Drivers must be here before 12 noon unless you are told differently Follow all instructions so your surgery wont be cancelled. Please, be on time. If a situation occurs and you are delayed the day of surgery, call [phone] or [phone]. If your physical condition changes (like a fever, cold, flu, etc.) call your surgeon as soon as possible. The Preadmission Testing staff can be reached at [phone]. OTHER SPECIAL INSTRUCTIONS:  Free  parking 7am-5pm 
 
The patient was contacted  in person. She  verbalize  understanding of all instructions and does not  need reinforcement.

## 2018-10-16 NOTE — H&P
PAT Pre-Op History & Physical 
 
Patient: Carolina Larios                  MRN: [id number]          SSN: [id number] YOB: 1960          Age: 61 y.o. Sex: female Subjective:  
Patient is a 61 y.o.  female who presents with history of gross hematuria for which she was hospitalized 8/24-8/27/2018. A CT scan showed a bladder mass. Patient denies any hematuria at this time and states that no evidence of bleeding in PD fluid. The patient was evaluated in the surgeon's office and it was determined that the most appropriate plan of care is to proceed with surgical intervention. Patient's PCP Khanh Frederick MD 
 
 
 
 
 
Past Medical History:  
Diagnosis Date  Anemia associated with chronic renal failure  Arrhythmia Paroxysmal a fib, paroxsymal atrial tach, SVT  Chronic kidney disease ESRD- on PD  Chronic systolic heart failure ([facility] [address] 10/31/2012  GERD (gastroesophageal reflux disease)  Hx of non-ST elevation myocardial infarction (NSTEMI)  Hypertension  Peritoneal dialysis catheter in place [facility])  Polycystic kidney disease Past Surgical History:  
Procedure Laterality Date  HX HEART CATHETERIZATION  12/2015  
 no interventions  HX PACEMAKER  12/29/2015 ICD Prior to Admission medications Medication Sig Start Date End Date Taking? Authorizing Provider  
spironolactone (ALDACTONE) 50 mg tablet Take 50 mg by mouth every morning. Yes Historical Provider  
potassium chloride (KLOR-CON M20) 20 mEq tablet Take 40 mEq by mouth every morning. Yes Historical Provider  
carvedilol (COREG) 25 mg tablet Take 1 Tab by mouth two (2) times daily (with meals). 9/18/18  Yes Liliane Erickson NP  
omeprazole (PRILOSEC) 20 mg capsule Take 20 mg by mouth daily as needed. Yes Historical Provider SENNA-DOCUSATE SODIUM PO Take 1 Tab by mouth daily as needed (constipation). Yes Historical Provider cinacalcet (SENSIPAR) 30 mg tablet Take 30 mg by mouth daily (with dinner). Yes Historical Provider  
ferric citrate (AURYXIA) 210 mg iron tablet Take 210 mg by mouth three (3) times daily (with meals). Yes Historical Provider  
gentamicin (GARAMYCIN) 0.1 % topical cream Apply  to affected area nightly. 7/12/14  Yes Historical Provider Current Outpatient Prescriptions Medication Sig  
 spironolactone (ALDACTONE) 50 mg tablet Take 50 mg by mouth every morning.  potassium chloride (KLOR-CON M20) 20 mEq tablet Take 40 mEq by mouth every morning.  carvedilol (COREG) 25 mg tablet Take 1 Tab by mouth two (2) times daily (with meals).  omeprazole (PRILOSEC) 20 mg capsule Take 20 mg by mouth daily as needed.  SENNA-DOCUSATE SODIUM PO Take 1 Tab by mouth daily as needed (constipation).  cinacalcet (SENSIPAR) 30 mg tablet Take 30 mg by mouth daily (with dinner).  ferric citrate (AURYXIA) 210 mg iron tablet Take 210 mg by mouth three (3) times daily (with meals).  gentamicin (GARAMYCIN) 0.1 % topical cream Apply  to affected area nightly. No current facility-administered medications for this encounter. Allergies Allergen Reactions  Iodinated Contrast- Oral And Iv Dye Other (comments) Cellulitis on side of face after test  
  
Social History Substance Use Topics  Smoking status: Never Smoker  Smokeless tobacco: Never Used  Alcohol use No  
  
History Drug Use No  
 
Family History Problem Relation Age of Onset  Diabetes Brother  Hypertension Brother  Hypertension Mother Jerman.Deem Arthritis-osteo Mother  Hypertension Sister  Diabetes Sister  Kidney Disease Father   
  polycystic  Parkinson's Disease Father  Heart Disease Father  Hypertension Father  Hypertension Brother Review of Systems Patient denies difficulty swallowing, mouth sores, or loose teeth.  Patient denies any recent dental procedures or any planned prior to surgery. Patient denies chest pain, tightness, pain radiating down left arm, palpitations. Denies dizziness, visual disturbances, or lightheadedness. Patient denies shortness of breath, wheezing, cough, fever, or chills. Patient denies diarrhea, constipation, or abdominal pain. Patient denies urinary problems including dysuria, hesitancy, urgency, or incontinence- states she voids usually once/day. Denies skin breakdown, rashes, insect bites or open area. Objective:  
Patient Vitals for the past 24 hrs: 
 Temp Pulse Resp BP SpO2  
10/16/18 0817 97.9 °F (36.6 °C) 99 14 (!) 81/58 94 % Temp (24hrs), Av.9 °F (36.6 °C), Min:97.9 °F (36.6 °C), Max:97.9 °F (36.6 °C) Body mass index is 19.79 kg/(m^2). Wt Readings from Last 1 Encounters:  
10/16/18 52.3 kg (115 lb 4.8 oz) Physical Exam: 
 
 General: Pleasant,  cooperative, no apparent distress, appears stated age. Eyes: Conjunctivae/corneas clear. EOMs intact. Nose: Nares normal. 
 Mouth/Throat: Lips, mucosa, and tongue normal. Teeth and gums normal. 
 Neck: Supple, symmetrical, trachea midline. Back: Symmetric Lungs: Clear to auscultation bilaterally. Heart: Regular rate and rhythm, S1, S2 normal. No murmur, click, rub or gallop. Abdomen: Soft, non-tender. Bowel sounds normal. No distention. Musculoskeletal:  Unremarkable. Extremities:  Extremities normal, atraumatic, no cyanosis or edema. Calves 
                               supple, non tender to palpation. Pulses: 2+ and symmetric bilateral upper extremities. Cap. refill <2 seconds Skin: Skin color, texture, turgor normal.  No visible rashes or lesions. Neurologic: CN II-XII grossly intact. Alert and oriented x3. Labs:  
Recent Results (from the past 72 hour(s)) CBC WITH AUTOMATED DIFF Collection Time: 10/16/18  8:57 AM  
Result Value Ref Range WBC 8.6 3.6 - 11.0 K/uL RBC 4.39 3.80 - 5.20 M/uL  
 HGB 13.6 11.5 - 16.0 g/dL HCT 43.3 35.0 - 47.0 % MCV 98.6 80.0 - 99.0 FL  
 MCH 31.0 26.0 - 34.0 PG  
 MCHC 31.4 30.0 - 36.5 g/dL  
 RDW 14.4 11.5 - 14.5 % PLATELET 534 163 - 938 K/uL MPV 10.0 8.9 - 12.9 FL  
 NRBC 0.0 0  WBC ABSOLUTE NRBC 0.00 0.00 - 0.01 K/uL NEUTROPHILS 75 32 - 75 % LYMPHOCYTES 15 12 - 49 % MONOCYTES 6 5 - 13 % EOSINOPHILS 3 0 - 7 % BASOPHILS 1 0 - 1 % IMMATURE GRANULOCYTES 0 0.0 - 0.5 % ABS. NEUTROPHILS 6.5 1.8 - 8.0 K/UL  
 ABS. LYMPHOCYTES 1.3 0.8 - 3.5 K/UL  
 ABS. MONOCYTES 0.5 0.0 - 1.0 K/UL  
 ABS. EOSINOPHILS 0.3 0.0 - 0.4 K/UL  
 ABS. BASOPHILS 0.1 0.0 - 0.1 K/UL  
 ABS. IMM. GRANS. 0.0 0.00 - 0.04 K/UL  
 DF AUTOMATED METABOLIC PANEL, BASIC Collection Time: 10/16/18  8:57 AM  
Result Value Ref Range Sodium 143 136 - 145 mmol/L Potassium 3.1 (L) 3.5 - 5.1 mmol/L Chloride 99 97 - 108 mmol/L  
 CO2 26 21 - 32 mmol/L Anion gap 18 (H) 5 - 15 mmol/L Glucose 99 65 - 100 mg/dL BUN 46 (H) 6 - 20 MG/DL Creatinine 13.69 (H) 0.55 - 1.02 MG/DL  
 BUN/Creatinine ratio 3 (L) 12 - 20 GFR est AA 3 (L) >60 ml/min/1.73m2 GFR est non-AA 3 (L) >60 ml/min/1.73m2 Calcium 10.0 8.5 - 10.1 MG/DL Assessment:  
 
Gross hematuria Plan:  
 
Scheduled for Cystoscopy bilateral retrogrades possible bladder biopsy versus transurethral resection of bladder tumor. Labs to be done DOS per surgeon's orders. BMP and CBC done today per anesthesia protocol- results consistent with history of ESRD. EKG done 9/17/2018- reviewed in [facility]. Patient denies any change in cardiac status in interim. Cardiology note dated 10/11/2018 from Dr. Colleen Smith reviewed in [facility]. States that patient is low cardiac risk for cystoscopy. ICD plan sent to Dr Sergio Chen for completion.  
 
 
 
Carmen Menchaca NP

## 2018-10-19 ENCOUNTER — ANESTHESIA EVENT (OUTPATIENT)
Dept: SURGERY | Age: 58
End: 2018-10-19
Payer: MEDICARE

## 2018-10-19 RX ORDER — SODIUM CHLORIDE, SODIUM LACTATE, POTASSIUM CHLORIDE, CALCIUM CHLORIDE 600; 310; 30; 20 MG/100ML; MG/100ML; MG/100ML; MG/100ML
125 INJECTION, SOLUTION INTRAVENOUS CONTINUOUS
Status: CANCELLED | OUTPATIENT
Start: 2018-10-19

## 2018-10-19 RX ORDER — DIPHENHYDRAMINE HYDROCHLORIDE 50 MG/ML
12.5 INJECTION, SOLUTION INTRAMUSCULAR; INTRAVENOUS AS NEEDED
Status: CANCELLED | OUTPATIENT
Start: 2018-10-19 | End: 2018-10-19

## 2018-10-19 RX ORDER — HYDROMORPHONE HYDROCHLORIDE 1 MG/ML
.25-1 INJECTION, SOLUTION INTRAMUSCULAR; INTRAVENOUS; SUBCUTANEOUS
Status: CANCELLED | OUTPATIENT
Start: 2018-10-19

## 2018-10-19 RX ORDER — MIDAZOLAM HYDROCHLORIDE 1 MG/ML
2 INJECTION, SOLUTION INTRAMUSCULAR; INTRAVENOUS
Status: CANCELLED | OUTPATIENT
Start: 2018-10-19

## 2018-10-22 ENCOUNTER — HOSPITAL ENCOUNTER (OUTPATIENT)
Age: 58
Setting detail: OUTPATIENT SURGERY
Discharge: HOME | End: 2018-10-22
Attending: UROLOGY | Admitting: UROLOGY
Payer: MEDICARE

## 2018-10-22 ENCOUNTER — HOSPITAL ENCOUNTER (EMERGENCY)
Age: 58
Discharge: HOME | End: 2018-10-22
Attending: EMERGENCY MEDICINE
Payer: MEDICARE

## 2018-10-22 ENCOUNTER — ANESTHESIA (OUTPATIENT)
Dept: SURGERY | Age: 58
End: 2018-10-22
Payer: MEDICARE

## 2018-10-22 VITALS
OXYGEN SATURATION: 98 % | HEIGHT: 65 IN | RESPIRATION RATE: 12 BRPM | DIASTOLIC BLOOD PRESSURE: 69 MMHG | WEIGHT: 115.3 LBS | SYSTOLIC BLOOD PRESSURE: 95 MMHG | BODY MASS INDEX: 19.21 KG/M2 | TEMPERATURE: 97.7 F | HEART RATE: 96 BPM

## 2018-10-22 VITALS
HEART RATE: 85 BPM | DIASTOLIC BLOOD PRESSURE: 83 MMHG | RESPIRATION RATE: 11 BRPM | TEMPERATURE: 97.6 F | BODY MASS INDEX: 19.14 KG/M2 | WEIGHT: 115 LBS | SYSTOLIC BLOOD PRESSURE: 108 MMHG | OXYGEN SATURATION: 99 %

## 2018-10-22 DIAGNOSIS — Z99.2 PERITONEAL DIALYSIS STATUS (HCC): ICD-10-CM

## 2018-10-22 DIAGNOSIS — E87.6 HYPOKALEMIA: Primary | ICD-10-CM

## 2018-10-22 LAB
ANION GAP SERPL CALC-SCNC: 11 MMOL/L (ref 5–15)
ANION GAP SERPL CALC-SCNC: 14 MMOL/L (ref 5–15)
ATRIAL RATE: 90 BPM
BASOPHILS # BLD: 0.1 K/UL (ref 0–0.1)
BASOPHILS NFR BLD: 1 % (ref 0–1)
BUN SERPL-MCNC: 44 MG/DL (ref 6–20)
BUN SERPL-MCNC: 45 MG/DL (ref 6–20)
BUN/CREAT SERPL: 3 (ref 12–20)
BUN/CREAT SERPL: 3 (ref 12–20)
CALCIUM SERPL-MCNC: 10 MG/DL (ref 8.5–10.1)
CALCIUM SERPL-MCNC: 9.5 MG/DL (ref 8.5–10.1)
CALCULATED R AXIS, ECG10: -32 DEGREES
CALCULATED T AXIS, ECG11: 108 DEGREES
CHLORIDE SERPL-SCNC: 100 MMOL/L (ref 97–108)
CHLORIDE SERPL-SCNC: 101 MMOL/L (ref 97–108)
CO2 SERPL-SCNC: 26 MMOL/L (ref 21–32)
CO2 SERPL-SCNC: 30 MMOL/L (ref 21–32)
CREAT SERPL-MCNC: 13.68 MG/DL (ref 0.55–1.02)
CREAT SERPL-MCNC: 14.19 MG/DL (ref 0.55–1.02)
DIAGNOSIS, 93000: NORMAL
DIFFERENTIAL METHOD BLD: ABNORMAL
EOSINOPHIL # BLD: 0.2 K/UL (ref 0–0.4)
EOSINOPHIL NFR BLD: 3 % (ref 0–7)
ERYTHROCYTE [DISTWIDTH] IN BLOOD BY AUTOMATED COUNT: 14 % (ref 11.5–14.5)
ERYTHROCYTE [DISTWIDTH] IN BLOOD BY AUTOMATED COUNT: 14.4 % (ref 11.5–14.5)
GLUCOSE SERPL-MCNC: 114 MG/DL (ref 65–100)
GLUCOSE SERPL-MCNC: 121 MG/DL (ref 65–100)
HCT VFR BLD AUTO: 41.6 % (ref 35–47)
HCT VFR BLD AUTO: 44.1 % (ref 35–47)
HGB BLD-MCNC: 13.6 G/DL (ref 11.5–16)
HGB BLD-MCNC: 14 G/DL (ref 11.5–16)
IMM GRANULOCYTES # BLD: 0 K/UL (ref 0–0.04)
IMM GRANULOCYTES NFR BLD AUTO: 1 % (ref 0–0.5)
LYMPHOCYTES # BLD: 1 K/UL (ref 0.8–3.5)
LYMPHOCYTES NFR BLD: 13 % (ref 12–49)
MAGNESIUM SERPL-MCNC: 2.5 MG/DL (ref 1.6–2.4)
MCH RBC QN AUTO: 30.8 PG (ref 26–34)
MCH RBC QN AUTO: 31.9 PG (ref 26–34)
MCHC RBC AUTO-ENTMCNC: 31.7 G/DL (ref 30–36.5)
MCHC RBC AUTO-ENTMCNC: 32.7 G/DL (ref 30–36.5)
MCV RBC AUTO: 96.9 FL (ref 80–99)
MCV RBC AUTO: 97.4 FL (ref 80–99)
MONOCYTES # BLD: 0.4 K/UL (ref 0–1)
MONOCYTES NFR BLD: 6 % (ref 5–13)
NEUTS SEG # BLD: 5.7 K/UL (ref 1.8–8)
NEUTS SEG NFR BLD: 77 % (ref 32–75)
NRBC # BLD: 0 K/UL (ref 0–0.01)
NRBC # BLD: 0 K/UL (ref 0–0.01)
NRBC BLD-RTO: 0 PER 100 WBC
NRBC BLD-RTO: 0 PER 100 WBC
PLATELET # BLD AUTO: 126 K/UL (ref 150–400)
PLATELET # BLD AUTO: 127 K/UL (ref 150–400)
PMV BLD AUTO: 10.5 FL (ref 8.9–12.9)
PMV BLD AUTO: 9.7 FL (ref 8.9–12.9)
POTASSIUM SERPL-SCNC: 2.8 MMOL/L (ref 3.5–5.1)
POTASSIUM SERPL-SCNC: 3 MMOL/L (ref 3.5–5.1)
Q-T INTERVAL, ECG07: 378 MS
QRS DURATION, ECG06: 108 MS
QTC CALCULATION (BEZET), ECG08: 459 MS
RBC # BLD AUTO: 4.27 M/UL (ref 3.8–5.2)
RBC # BLD AUTO: 4.55 M/UL (ref 3.8–5.2)
SODIUM SERPL-SCNC: 140 MMOL/L (ref 136–145)
SODIUM SERPL-SCNC: 142 MMOL/L (ref 136–145)
VENTRICULAR RATE, ECG03: 89 BPM
WBC # BLD AUTO: 7.1 K/UL (ref 3.6–11)
WBC # BLD AUTO: 7.4 K/UL (ref 3.6–11)

## 2018-10-22 PROCEDURE — 74011250636 HC RX REV CODE- 250/636: Performed by: EMERGENCY MEDICINE

## 2018-10-22 PROCEDURE — 74011250637 HC RX REV CODE- 250/637: Performed by: EMERGENCY MEDICINE

## 2018-10-22 PROCEDURE — 36415 COLL VENOUS BLD VENIPUNCTURE: CPT | Performed by: UROLOGY

## 2018-10-22 PROCEDURE — 85025 COMPLETE CBC W/AUTO DIFF WBC: CPT | Performed by: EMERGENCY MEDICINE

## 2018-10-22 PROCEDURE — 93005 ELECTROCARDIOGRAM TRACING: CPT

## 2018-10-22 PROCEDURE — 83735 ASSAY OF MAGNESIUM: CPT | Performed by: PHYSICIAN ASSISTANT

## 2018-10-22 PROCEDURE — 80048 BASIC METABOLIC PNL TOTAL CA: CPT | Performed by: EMERGENCY MEDICINE

## 2018-10-22 PROCEDURE — 74011250636 HC RX REV CODE- 250/636: Performed by: UROLOGY

## 2018-10-22 PROCEDURE — 96365 THER/PROPH/DIAG IV INF INIT: CPT

## 2018-10-22 PROCEDURE — 85027 COMPLETE CBC AUTOMATED: CPT | Performed by: UROLOGY

## 2018-10-22 PROCEDURE — 74011250636 HC RX REV CODE- 250/636

## 2018-10-22 PROCEDURE — 80048 BASIC METABOLIC PNL TOTAL CA: CPT | Performed by: UROLOGY

## 2018-10-22 PROCEDURE — 77030020782 HC GWN BAIR PAWS FLX 3M -B

## 2018-10-22 PROCEDURE — 99285 EMERGENCY DEPT VISIT HI MDM: CPT

## 2018-10-22 PROCEDURE — 96366 THER/PROPH/DIAG IV INF ADDON: CPT

## 2018-10-22 RX ORDER — POTASSIUM CHLORIDE 29.8 MG/ML
20 INJECTION INTRAVENOUS ONCE
Status: DISCONTINUED | OUTPATIENT
Start: 2018-10-22 | End: 2018-10-22

## 2018-10-22 RX ORDER — POTASSIUM CHLORIDE 7.45 MG/ML
10 INJECTION INTRAVENOUS
Status: COMPLETED | OUTPATIENT
Start: 2018-10-22 | End: 2018-10-22

## 2018-10-22 RX ORDER — SODIUM CHLORIDE 9 MG/ML
25 INJECTION, SOLUTION INTRAVENOUS CONTINUOUS
Status: DISCONTINUED | OUTPATIENT
Start: 2018-10-22 | End: 2018-10-22 | Stop reason: HOSPADM

## 2018-10-22 RX ORDER — POTASSIUM CHLORIDE 7.45 MG/ML
10 INJECTION INTRAVENOUS
Status: DISCONTINUED | OUTPATIENT
Start: 2018-10-22 | End: 2018-10-22 | Stop reason: HOSPADM

## 2018-10-22 RX ORDER — SODIUM CHLORIDE, SODIUM LACTATE, POTASSIUM CHLORIDE, CALCIUM CHLORIDE 600; 310; 30; 20 MG/100ML; MG/100ML; MG/100ML; MG/100ML
125 INJECTION, SOLUTION INTRAVENOUS CONTINUOUS
Status: DISCONTINUED | OUTPATIENT
Start: 2018-10-22 | End: 2018-10-22 | Stop reason: HOSPADM

## 2018-10-22 RX ORDER — LIDOCAINE HYDROCHLORIDE 10 MG/ML
0.1 INJECTION, SOLUTION EPIDURAL; INFILTRATION; INTRACAUDAL; PERINEURAL AS NEEDED
Status: DISCONTINUED | OUTPATIENT
Start: 2018-10-22 | End: 2018-10-22 | Stop reason: HOSPADM

## 2018-10-22 RX ORDER — POTASSIUM CHLORIDE 750 MG/1
40 TABLET, FILM COATED, EXTENDED RELEASE ORAL
Status: COMPLETED | OUTPATIENT
Start: 2018-10-22 | End: 2018-10-22

## 2018-10-22 RX ORDER — CEFAZOLIN SODIUM/WATER 2 G/20 ML
2 SYRINGE (ML) INTRAVENOUS ONCE
Status: DISCONTINUED | OUTPATIENT
Start: 2018-10-22 | End: 2018-10-22 | Stop reason: HOSPADM

## 2018-10-22 RX ORDER — NALOXONE HYDROCHLORIDE 0.4 MG/ML
0.2 INJECTION, SOLUTION INTRAMUSCULAR; INTRAVENOUS; SUBCUTANEOUS
Status: DISCONTINUED | OUTPATIENT
Start: 2018-10-22 | End: 2018-10-22 | Stop reason: HOSPADM

## 2018-10-22 RX ORDER — FLUMAZENIL 0.1 MG/ML
0.2 INJECTION INTRAVENOUS
Status: DISCONTINUED | OUTPATIENT
Start: 2018-10-22 | End: 2018-10-22 | Stop reason: HOSPADM

## 2018-10-22 RX ADMIN — SODIUM CHLORIDE 25 ML/HR: 900 INJECTION, SOLUTION INTRAVENOUS at 10:13

## 2018-10-22 RX ADMIN — POTASSIUM CHLORIDE 10 MEQ: 10 INJECTION, SOLUTION INTRAVENOUS at 14:49

## 2018-10-22 RX ADMIN — POTASSIUM CHLORIDE 10 MEQ: 10 INJECTION, SOLUTION INTRAVENOUS at 13:00

## 2018-10-22 RX ADMIN — POTASSIUM CHLORIDE 40 MEQ: 750 TABLET, FILM COATED, EXTENDED RELEASE ORAL at 13:00

## 2018-10-22 NOTE — ANESTHESIA PREPROCEDURE EVALUATION
Anesthetic History No history of anesthetic complications Review of Systems / Medical History Patient summary reviewed, nursing notes reviewed and pertinent labs reviewed Pulmonary Pertinent negatives: No recent URI Neuro/Psych Within defined limits Cardiovascular Hypertension Dysrhythmias : atrial fibrillation Pacemaker, past MI (2012) and hyperlipidemia Exercise tolerance: >4 METS Comments: Nonischemic cardiomyopathy TTE 1/18 - Left ventricle systolic function was severely reduced. EF estimated to be 25 %. No regional wall motion abnormalities. Severe diffuse hypokinesis. Doppler parameters 
were consistent with a reversible restrictive pattern, indicative of decreased left ventricular diastolic compliance and/or increased left 
atrial pressure (grade 3 diastolic dysfunction). Left atrium was severely dilated. Mild MR Tricuspid valve: Pulmonary artery systolic pressure: 51 mmHg. Pericardium: A small pericardial effusion was identified. GI/Hepatic/Renal 
  
GERD: well controlled Renal disease (polycystic kidney disease, on PD): ESRD and dialysis Endo/Other Anemia ( 
bladder seen) Other Findings Comments: Stopped coumadin about 1 month ago since bladder mass seen Physical Exam 
 
Airway Mallampati: I 
TM Distance: 4 - 6 cm Neck ROM: normal range of motion Mouth opening: Normal 
 
 Cardiovascular Rhythm: regular Rate: normal 
 
 
 
 Dental 
No notable dental hx Pulmonary Breath sounds clear to auscultation Abdominal 
GI exam deferred Other Findings Anesthetic Plan ASA: 4 Anesthesia type: general 
 
 
 
 
Induction: Intravenous Anesthetic plan and risks discussed with: Patient

## 2018-10-22 NOTE — ED TRIAGE NOTES
Patient arrives to ED via wheelchair with Pre-op staff. Patient was scheduled for a cystoscopy/bladder biopsy/resection of bladder tumor today, potassium noted to be 2.8. Patient denies symptoms at this time. Patient is on peritoneal dialysis, last dialysis last night.

## 2018-10-22 NOTE — PERIOP NOTES
Dr. Edgar Nelson assessed patient and viewed recent lab work drawn in pre op. Due to Potassium being 2.8 she has checked with Dr. Martin Purdy to see if we will be proceeding with surgery. Waiting for Dr. Martin Purdy to let us know if we will be continuing. 1100: Per Dr. Egdar Nelson and Dr. Neda Cardona surgery is canceled. Dr. Edgar Nelson  Would like her to be treated in the ED for her low potassium. Patient is aware of all of this and Rn will be taking patient to ED to be registered.

## 2018-10-22 NOTE — ED PROVIDER NOTES
11:41 AM 
I have evaluated the patient as the Provider in Triage. I have reviewed Her vital signs and the triage nurse assessment. I have talked with the patient and any available family and advised that I am the provider in triage and have ordered the appropriate study to initiate their work up based on the clinical presentation during my assessment. I have advised that the patient will be accommodated in the Main ED as soon as possible. I have also requested to contact the triage nurse or myself immediately if the patient experiences any changes in their condition during this brief waiting period. 62 yo female with ESRD, polycystic kidney p/w hypokalemia (2.8) from pre-op where she was going to get a cystoscopy. Colleen Hazel. Cally Reynoso MD 
 
61 y.o. female with past medical history significant for polycystic kidney disease, hypertension, NSTEMI, paroxysmal atrial fibrillation, GERD, chronic kidney disease, and anemia who presents from surgery with chief complaint of abnormal lab results. Patient was scheduled to have cystoscopy today for bladder mass; however, the pre-op lab work showed potassium 2.8, so patient did not undergo surgery and was instead referred to [facility] ED for further evaluation. Patient presents to [facility] ED with no current pain or discomfort. Patient notes having nausea this morning during pre-op lab work, but attributes this to usually feeling nauseous when she is around blood. Patient states she had pre-op blood work done on 10/16/2018 for the cystoscopy scheduled today which showed potassium 3.1. Patient's nephrologist is Dr. Ximena Zaragoza MD. Patient is on peritoneal dialysis and notes her last dialysis was last night. She specifically denies any fevers, chills, nausea, vomiting, chest pain, abd pain, urinary sx, shortness of breath, headache, rash, diarrhea, sweating or weight loss. Patient denies the use of tobacco, alcohol, or elicit drugs. Old Chart Review: Patient was seen by her cardiologist Dr. Merlinda Sellar, MD on 10/11/2018 which was normal, cleared for cytoscopy, and sent home with follow up in three months. Patient was seem at [facility] on 08/24/2018 for hematuria, completed CT abd pelv which showed bladder mass and multiple cysts throughout liver and both kidneys, and discharged on 08/27/2018 with plan to schedule outpatient cystoscopy. There are no other acute medical concerns at this time. PCP: Coreen Nguyen MD 
 
Note written by Amor Arnold, as dictated by Jenae Chavez PA-C 12:07 PM 
 
 
 
The history is provided by the patient. Past Medical History:  
Diagnosis Date  Anemia associated with chronic renal failure  Arrhythmia Paroxysmal a fib, paroxsymal atrial tach, SVT  Chronic kidney disease ESRD- on PD  Chronic systolic heart failure ([facility] [address] 10/31/2012  GERD (gastroesophageal reflux disease)  Hx of non-ST elevation myocardial infarction (NSTEMI)  Hypertension  Peritoneal dialysis catheter in place [facility])  Polycystic kidney disease Past Surgical History:  
Procedure Laterality Date  HX HEART CATHETERIZATION  12/2015  
 no interventions  HX PACEMAKER  12/29/2015 ICD Family History:  
Problem Relation Age of Onset  Diabetes Brother  Hypertension Brother  Hypertension Mother David.Reji Arthritis-osteo Mother  Hypertension Sister  Diabetes Sister  Kidney Disease Father   
     polycystic  Parkinson's Disease Father  Heart Disease Father  Hypertension Father  Hypertension Brother Social History Socioeconomic History  Marital status: SINGLE Spouse name: Not on file  Number of children: Not on file  Years of education: Not on file  Highest education level: Not on file Social Needs  Financial resource strain: Not on file  Food insecurity - worry: Not on file  Food insecurity - inability: Not on file  Transportation needs - medical: Not on file  Transportation needs - non-medical: Not on file Occupational History  Not on file Tobacco Use  Smoking status: Never Smoker  Smokeless tobacco: Never Used Substance and Sexual Activity  Alcohol use: No  
 Drug use: No  
 Sexual activity: Not Currently Partners: Male Other Topics Concern  Not on file Social History Narrative  Not on file ALLERGIES: Iodinated contrast- oral and iv dye Review of Systems Constitutional: Negative for appetite change, chills, fatigue and fever. HENT: Negative for congestion, ear pain, postnasal drip, rhinorrhea and sore throat. Eyes: Negative for visual disturbance. Respiratory: Negative for cough, shortness of breath and wheezing. Cardiovascular: Negative for chest pain, palpitations and leg swelling. Gastrointestinal: Positive for nausea. Negative for abdominal pain, anal bleeding, constipation, diarrhea and vomiting. Genitourinary: Negative for difficulty urinating, dysuria and hematuria. Musculoskeletal: Negative for arthralgias and myalgias. Skin: Negative for rash and wound. Allergic/Immunologic: Negative for immunocompromised state. Neurological: Negative for weakness, light-headedness and headaches. Patient Vitals for the past 12 hrs: 
 Temp Pulse Resp BP SpO2  
10/22/18 1600  85 11 108/83 99 % 10/22/18 1430  96 18 98/71 98 % 10/22/18 1400  93 18 95/70   
10/22/18 1330  95 10 96/74   
10/22/18 1139 97.6 °F (36.4 °C) 93 15 111/71 97 % Physical Exam  
Constitutional: She is oriented to person, place, and time. She appears well-developed and well-nourished. No distress. HENT:  
Head: Normocephalic and atraumatic. Right Ear: External ear normal.  
Left Ear: External ear normal.  
Eyes: EOM are normal. Pupils are equal, round, and reactive to light. Neck: Neck supple. Cardiovascular: Normal rate, regular rhythm, normal heart sounds and intact distal pulses. Exam reveals no gallop and no friction rub. No murmur heard. Pulmonary/Chest: Effort normal and breath sounds normal. No stridor. No respiratory distress. She has no wheezes. She has no rales. She exhibits no tenderness. Abdominal: Soft. Bowel sounds are normal. She exhibits no distension and no mass. There is no tenderness. There is no rebound and no guarding. No hernia. PD in place. No TTP Musculoskeletal: Normal range of motion. She exhibits no edema, tenderness or deformity. Neurological: She is alert and oriented to person, place, and time. No cranial nerve deficit. Coordination normal.  
Skin: Skin is warm and dry. No rash noted. No erythema. No pallor. Psychiatric: She has a normal mood and affect. Her behavior is normal.  
Nursing note and vitals reviewed. MDM Number of Diagnoses or Management Options Hypokalemia:  
Peritoneal dialysis status [facility]): Amount and/or Complexity of Data Reviewed Clinical lab tests: ordered and reviewed Tests in the medicine section of CPT®: reviewed and ordered Obtain history from someone other than the patient: yes Review and summarize past medical records: yes Independent visualization of images, tracings, or specimens: yes Critical Care Total time providing critical care: 30-74 minutes Procedures 12:15 PM 
Discussed pt, sx, hx and current findings with Janet Smith MD. He is in agreement with plan and will see pt. Will get labs, ekg and contact nephrology given pt's PD 
Ivone Husain. TONEY Jansen 
 
ED EKG interpretation: 1:08 PM 
Rhythm:accelerated junctional rhythm with retrograde conduction with occ pvc; and irregular. Rate (approx.): 89; Axis: left axis deviation; QRS interval: normal ; ST/T wave: non-specific changes; Other findings: abnormal ekg. This EKG was interpreted by Janet Smith MD,ED Provider.  
 
CONSULT NOTE: 
 12:23 PM Quentin Ponce MD spoke with Dr. Lana Daigle MD Consult for Nephrology. Discussed available diagnostic tests and clinical findings. Dr. Sarah Reina recommends giving two runs IV potassium as well as oral potassium, and follow up for repeat potassium. 2:22 PM  
Pt continuing with infusion of potassium. No new sx. Will continue to replete k and monitor Kaushal Mancia. TONEY Jansen 
 
PROGRESS NOTE: 
4:13 PM Patient's IV loosened and an unknown amount of potassium ended up on the floor and not into the patient. Provider spoke with Dr. Bud Leigh MD about patient and he did not recommend additional potassium, but to follow up with her neurologist. 
 
Critical Care: The reason for providing this level of medical care for this critically ill patient was due to a critical illness that impaired one or more vital organ systems such that there was a high probability of imminent or life threatening deterioration in the patients condition. This care involved high complexity decision making to assess, manipulate, and support vital system functions. Total critical care time spent exclusive of procedures:  35 min LABORATORY TESTS: 
Recent Results (from the past 12 hour(s)) CBC W/O DIFF Collection Time: 10/22/18 10:00 AM  
Result Value Ref Range WBC 7.1 3.6 - 11.0 K/uL  
 RBC 4.27 3.80 - 5.20 M/uL  
 HGB 13.6 11.5 - 16.0 g/dL HCT 41.6 35.0 - 47.0 % MCV 97.4 80.0 - 99.0 FL  
 MCH 31.9 26.0 - 34.0 PG  
 MCHC 32.7 30.0 - 36.5 g/dL  
 RDW 14.4 11.5 - 14.5 % PLATELET 216 (L) 176 - 400 K/uL MPV 10.5 8.9 - 12.9 FL  
 NRBC 0.0 0  WBC ABSOLUTE NRBC 0.00 0.00 - 0.01 K/uL METABOLIC PANEL, BASIC Collection Time: 10/22/18 10:00 AM  
Result Value Ref Range Sodium 142 136 - 145 mmol/L Potassium 2.8 (L) 3.5 - 5.1 mmol/L Chloride 101 97 - 108 mmol/L  
 CO2 30 21 - 32 mmol/L Anion gap 11 5 - 15 mmol/L Glucose 121 (H) 65 - 100 mg/dL  BUN 45 (H) 6 - 20 MG/DL  
 Creatinine 14.19 (H) 0.55 - 1.02 MG/DL  
 BUN/Creatinine ratio 3 (L) 12 - 20 GFR est AA 3 (L) >60 ml/min/1.73m2 GFR est non-AA 3 (L) >60 ml/min/1.73m2 Calcium 9.5 8.5 - 10.1 MG/DL  
CBC WITH AUTOMATED DIFF Collection Time: 10/22/18 11:50 AM  
Result Value Ref Range WBC 7.4 3.6 - 11.0 K/uL  
 RBC 4.55 3.80 - 5.20 M/uL  
 HGB 14.0 11.5 - 16.0 g/dL HCT 44.1 35.0 - 47.0 % MCV 96.9 80.0 - 99.0 FL  
 MCH 30.8 26.0 - 34.0 PG  
 MCHC 31.7 30.0 - 36.5 g/dL  
 RDW 14.0 11.5 - 14.5 % PLATELET 909 (L) 567 - 400 K/uL MPV 9.7 8.9 - 12.9 FL  
 NRBC 0.0 0  WBC ABSOLUTE NRBC 0.00 0.00 - 0.01 K/uL NEUTROPHILS 77 (H) 32 - 75 % LYMPHOCYTES 13 12 - 49 % MONOCYTES 6 5 - 13 % EOSINOPHILS 3 0 - 7 % BASOPHILS 1 0 - 1 % IMMATURE GRANULOCYTES 1 (H) 0.0 - 0.5 % ABS. NEUTROPHILS 5.7 1.8 - 8.0 K/UL  
 ABS. LYMPHOCYTES 1.0 0.8 - 3.5 K/UL  
 ABS. MONOCYTES 0.4 0.0 - 1.0 K/UL  
 ABS. EOSINOPHILS 0.2 0.0 - 0.4 K/UL  
 ABS. BASOPHILS 0.1 0.0 - 0.1 K/UL  
 ABS. IMM. GRANS. 0.0 0.00 - 0.04 K/UL  
 DF AUTOMATED METABOLIC PANEL, BASIC Collection Time: 10/22/18 11:50 AM  
Result Value Ref Range Sodium 140 136 - 145 mmol/L Potassium 3.0 (L) 3.5 - 5.1 mmol/L Chloride 100 97 - 108 mmol/L  
 CO2 26 21 - 32 mmol/L Anion gap 14 5 - 15 mmol/L Glucose 114 (H) 65 - 100 mg/dL BUN 44 (H) 6 - 20 MG/DL Creatinine 13.68 (H) 0.55 - 1.02 MG/DL  
 BUN/Creatinine ratio 3 (L) 12 - 20 GFR est AA 3 (L) >60 ml/min/1.73m2 GFR est non-AA 3 (L) >60 ml/min/1.73m2 Calcium 10.0 8.5 - 10.1 MG/DL MAGNESIUM Collection Time: 10/22/18 11:50 AM  
Result Value Ref Range Magnesium 2.5 (H) 1.6 - 2.4 mg/dL EKG, 12 LEAD, INITIAL Collection Time: 10/22/18  1:08 PM  
Result Value Ref Range Ventricular Rate 89 BPM  
 Atrial Rate 90 BPM  
 QRS Duration 108 ms Q-T Interval 378 ms QTC Calculation (Bezet) 459 ms Calculated R Axis -32 degrees Calculated T Axis 108 degrees Diagnosis Accelerated Junctional rhythm with retrograde conduction with occasional  
premature ventricular complexes Left axis deviation Possible Anterior infarct , age undetermined T wave abnormality, consider lateral ischemia Abnormal ECG When compared with ECG of 15-DEC-2015 08:33, 
premature ventricular complexes are now present IMAGING RESULTS: 
 
No results found. MEDICATIONS GIVEN: 
Medications  
potassium chloride SR (KLOR-CON 10) tablet 40 mEq (40 mEq Oral Given 10/22/18 1300) potassium chloride 10 mEq in 100 ml IVPB (0 mEq IntraVENous IV Completed 10/22/18 1400) potassium chloride 10 mEq in 100 ml IVPB (0 mEq IntraVENous IV Completed 10/22/18 1549) IMPRESSION: 
1. Hypokalemia 2. Peritoneal dialysis status ([facility] [address] PLAN: 
1. Current Discharge Medication List  
  
CONTINUE these medications which have NOT CHANGED Details  
spironolactone (ALDACTONE) 50 mg tablet Take 50 mg by mouth every morning. potassium chloride (KLOR-CON M20) 20 mEq tablet Take 40 mEq by mouth every morning. carvedilol (COREG) 25 mg tablet Take 1 Tab by mouth two (2) times daily (with meals). Qty: 60 Tab, Refills: 6  
  
omeprazole (PRILOSEC) 20 mg capsule Take 20 mg by mouth daily as needed. Associated Diagnoses: Cardiomyopathy, nonischemic ([address] Essential hypertension, benign; Chronic systolic heart failure ([address] ESRD on peritoneal dialysis ([address] Polycystic kidney disease; Paroxysmal atrial tachycardia (HCC)  
  
cinacalcet (SENSIPAR) 30 mg tablet Take 30 mg by mouth daily (with dinner). Associated Diagnoses: Cardiomyopathy, nonischemic ([address] Essential hypertension, benign; Chronic systolic heart failure ([address] ESRD on peritoneal dialysis ([address] Polycystic kidney disease; Paroxysmal atrial tachycardia ([address] ferric citrate (AURYXIA) 210 mg iron tablet Take 210 mg by mouth three (3) times daily (with meals). Associated Diagnoses: Cardiomyopathy, nonischemic ([address] Essential hypertension, benign; Chronic systolic heart failure ([address] ESRD on peritoneal dialysis ([address] Polycystic kidney disease; Paroxysmal atrial tachycardia (HCC)  
  
gentamicin (GARAMYCIN) 0.1 % topical cream Apply  to affected area nightly. SENNA-DOCUSATE SODIUM PO Take 1 Tab by mouth daily as needed (constipation). Associated Diagnoses: Cardiomyopathy, nonischemic ([address] Essential hypertension, benign; Chronic systolic heart failure ([address] ESRD on peritoneal dialysis ([address] Polycystic kidney disease; Paroxysmal atrial tachycardia ([address] 2. Follow-up Information Follow up With Specialties Details Why Contact Info Richi Galindo MD Nephrology Schedule an appointment as soon as possible for a visit 2-4 days for recheck  [address] [facility] [address] [place] [address] 
[phone] Return to ED if worse 4:15 PM 
Pt has been reexamined. Pt has no new complaints, changes or physical findings. Care plan outlined and precautions discussed. All available results were reviewed with pt. All medications were reviewed with pt. All of pt's questions and concerns were addressed. Pt agrees to F/U as instructed and agrees to return to ED upon further deterioration. Pt is ready to go home.  
JONATHON Yepez

## 2018-10-22 NOTE — DISCHARGE INSTRUCTIONS
Hypokalemia: Care Instructions  Your Care Instructions    Hypokalemia (say \"ez-er-uwv-SRINATH-corby-uh\") is a low level of potassium. The heart, muscles, kidneys, and nervous system all need potassium to work well. This problem has many different causes. Kidney problems, diet, and medicines like diuretics and laxatives can cause it. So can vomiting or diarrhea. In some cases, cancer is the cause. Your doctor may do tests to find the cause of your low potassium levels. You may need medicines to bring your potassium levels back to normal. You may also need regular blood tests to check your potassium. If you have very low potassium, you may need intravenous (IV) medicines. You also may need tests to check the electrical activity of your heart. Heart problems caused by low potassium levels can be very serious. Follow-up care is a key part of your treatment and safety. Be sure to make and go to all appointments, and call your doctor if you are having problems. It's also a good idea to know your test results and keep a list of the medicines you take. How can you care for yourself at home? · If your doctor recommends it, eat foods that have a lot of potassium. These include fresh fruits, juices, and vegetables. They also include nuts, beans, and milk. · Be safe with medicines. If your doctor prescribes medicines or potassium supplements, take them exactly as directed. Call your doctor if you have any problems with your medicines. · Get your potassium levels tested as often as your doctor tells you. When should you call for help? Call 911 anytime you think you may need emergency care. For example, call if:    · You feel like your heart is missing beats. Heart problems caused by low potassium can cause death.     · You passed out (lost consciousness).     · You have a seizure.    Call your doctor now or seek immediate medical care if:    · You feel weak or unusually tired.     · You have severe arm or leg cramps.   · You have tingling or numbness.     · You feel sick to your stomach, or you vomit.     · You have belly cramps.     · You feel bloated or constipated.     · You have to urinate a lot.     · You feel very thirsty most of the time.     · You are dizzy or lightheaded, or you feel like you may faint.     · You feel depressed, or you lose touch with reality.    Watch closely for changes in your health, and be sure to contact your doctor if:    · You do not get better as expected. Where can you learn more? Go to http://khoi-terry.info/. Enter G358 in the search box to learn more about \"Hypokalemia: Care Instructions. \"  Current as of: March 15, 2018  Content Version: 11.8  © 9729-4791 [employer]. Care instructions adapted under license by [employer] (which disclaims liability or warranty for this information). If you have questions about a medical condition or this instruction, always ask your healthcare professional. [address] any warranty or liability for your use of this information.

## 2018-11-01 ENCOUNTER — OFFICE VISIT (OUTPATIENT)
Dept: CARDIOLOGY CLINIC | Age: 58
End: 2018-11-01

## 2018-11-01 VITALS
WEIGHT: 117.8 LBS | SYSTOLIC BLOOD PRESSURE: 102 MMHG | OXYGEN SATURATION: 97 % | RESPIRATION RATE: 18 BRPM | BODY MASS INDEX: 19.63 KG/M2 | HEART RATE: 88 BPM | HEIGHT: 65 IN | DIASTOLIC BLOOD PRESSURE: 78 MMHG

## 2018-11-01 DIAGNOSIS — N18.6 ESRD ON PERITONEAL DIALYSIS (HCC): ICD-10-CM

## 2018-11-01 DIAGNOSIS — Z95.810 ICD (IMPLANTABLE CARDIOVERTER-DEFIBRILLATOR) IN PLACE: ICD-10-CM

## 2018-11-01 DIAGNOSIS — R31.0 GROSS HEMATURIA: ICD-10-CM

## 2018-11-01 DIAGNOSIS — Z99.2 ESRD ON PERITONEAL DIALYSIS (HCC): ICD-10-CM

## 2018-11-01 DIAGNOSIS — I42.8 CARDIOMYOPATHY, NONISCHEMIC (HCC): ICD-10-CM

## 2018-11-01 DIAGNOSIS — I50.22 SYSTOLIC CHF, CHRONIC (HCC): ICD-10-CM

## 2018-11-01 DIAGNOSIS — I10 ESSENTIAL HYPERTENSION, BENIGN: ICD-10-CM

## 2018-11-01 DIAGNOSIS — I47.1 PAROXYSMAL ATRIAL TACHYCARDIA (HCC): ICD-10-CM

## 2018-11-01 DIAGNOSIS — N32.89 BLADDER MASS: ICD-10-CM

## 2018-11-01 DIAGNOSIS — I48.0 PAF (PAROXYSMAL ATRIAL FIBRILLATION) (HCC): Primary | ICD-10-CM

## 2018-11-01 NOTE — PROGRESS NOTES
Charleen Juan, [facility]  [address] Freddie Hdez [facility], [address] [facility]    Office [phone]  Fax [phone]        Eugenio Mathews is a 61 y.o. female who is followed outpatient by Dr. Nilda Turpin.  Patient is here today to discuss anticoagulation. Assessment  Encounter Diagnoses   Name Primary?  PAF (paroxysmal atrial fibrillation) (HCC) Yes    Cardiomyopathy, nonischemic (HCC)     Systolic CHF, chronic (HCC)     ESRD on peritoneal dialysis ([facility] [address] [facility]     Bladder mass     Gross hematuria     ICD (implantable cardioverter-defibrillator) in place     Essential hypertension, benign     Paroxysmal atrial tachycardia (HCC)      Recommendations:  PAF  - off anticoag 2/2 bladder mass with episode of hematuria; INR 9.8 at time  - start aspirin 81mg daily; pt to stop if any bleeding  - cont coreg for rate control  - pt states asymptomatic of afib; given rate 135 at [facility], order 24 hr holter to assess for afib burden / rate control   - hx of atrial tachycardia  - have requested copy of records from [facility] as well    NICM, chronic systolic/diastolic heart failure, LVEF 25%, G3DD  - vol controlled with PD  - DMTs limited by blood pressure  - cont spironolactone 50mg daily  - s/p ICD    Bladder Mass w/ hematuria 08/2018  - cystoscopy planned for 12/3  - pending results can readdress whether or not to start anticoag    Follow-up Disposition:  Return in about 6 weeks (around 12/13/2018), or if symptoms worsen or fail to improve. Subjective:  Marked improvement in dyspnea and edema with 2.5 exchanges for PD. Continues to feel well. Patient denies any exertional chest pain, dyspnea, palpitations, syncope, orthopnea, edema or paroxysmal nocturnal dyspnea. Scheduled for cysto under [place] 12/3 with Dr Darius Christine to evaluate bladder tumor, recent hematuria    Has been off warfarin since her initial presentation with hematuria. Denies any abnormal bleeding since that time.       Was seen at [address] [place] [address] for eval of renal tx; when seen by cardiology Dr. Eliot Delvalle for clearance, she was told she needed anticaog. Discharge paperwork indicate HR of 135 and pt states she was recommended admission through ED. Pt unsure if she was in afib at that time. Also unsure if she discussed hx of hematuria with Dr. Eliot Delvalle; she was under the impression he had her records. Is here today concerned about stroke risk. Cardiac testing  ECHO: 10/14/12: EF 45% w/ posterior HK Grade 1 DD, LAE, mild MR, mild-mod TR RVSP 60 mmHG   Cath: 10/16/12: Normal cors. No AVG. Mild pulm HTN (43/16/26). Low-normal filling pressures. Echo 10/19/13 - EF 25- 30%. Severe diffuse hypokinesis. Mildly increased wall thickness. Mild concentric hypertrophy. Doppler parameters were consistent with a reversible restrictive pattern, indicative of decreased left ventricular  diastolic compliance and/or increased left atrial pressure (grade 3 diastolic dysfunction). LA mildly dilated, mild MR, mild TR, mod PA HTN, small pericardial effusion circumferential to the heart, no evidence of hemodynamic compromise. Echo 11/2/15 - EF 25-30%, global HK, mild MR  Lexiscan cardiolite 11/24/15 - focal anteroapical ischemia    Cath 12/15/2015 - EDP 10, LV dilated, EF 20% global HK, normal cors with right dominance. Unable to cannulate femoral vein. 12/29/15-implantation of a single-chamber [address] per Dr. Katt Tucker    Echo 1/30/17 - EF 20 %. Severe diffuse hypokinesis. Mild LAE. Mild MR. Mild Pulm HTN. Small pericardial effusion. No significant change when compared to study 02-Nov-2015.     Echo 1/26/18- LVEF 28%, severe LAE, PA systolic 50 mmHg    Past Medical History:   Diagnosis Date    Anemia associated with chronic renal failure     Arrhythmia     Paroxysmal a fib, paroxsymal atrial tach, SVT    Chronic kidney disease     ESRD- on PD    Chronic systolic heart failure ([address] 10/31/2012    GERD (gastroesophageal reflux disease)     Hx of non-ST elevation myocardial infarction (NSTEMI)     Hypertension     Peritoneal dialysis catheter in place [facility])     Polycystic kidney disease         Current Outpatient Medications   Medication Sig Dispense Refill    spironolactone (ALDACTONE) 50 mg tablet Take 50 mg by mouth every morning.  potassium chloride (KLOR-CON M20) 20 mEq tablet Take 40 mEq by mouth every morning.  carvedilol (COREG) 25 mg tablet Take 1 Tab by mouth two (2) times daily (with meals). 60 Tab 6    omeprazole (PRILOSEC) 20 mg capsule Take 20 mg by mouth daily as needed.  SENNA-DOCUSATE SODIUM PO Take 1 Tab by mouth daily as needed (constipation).  cinacalcet (SENSIPAR) 30 mg tablet Take 30 mg by mouth daily (with dinner).  ferric citrate (AURYXIA) 210 mg iron tablet Take 210 mg by mouth three (3) times daily (with meals).  gentamicin (GARAMYCIN) 0.1 % topical cream Apply  to affected area nightly. Allergies   Allergen Reactions    Iodinated Contrast- Oral And Iv Dye Other (comments)     Cellulitis on side of face after test          Review of Systems  Constitutional: Negative for fever, chills, malaise/fatigue and diaphoresis. Respiratory: Negative for cough, hemoptysis, sputum production, shortness of breath and wheezing. Cardiovascular: Negative for chest pain, palpitations, orthopnea, claudication, leg swelling and PND. Gastrointestinal: Negative for heartburn, nausea, vomiting, blood in stool and melena. Genitourinary: Negative for dysuria and flank pain. Skin: Negative for rash. Neurological: Negative for focal weakness, seizures, loss of consciousness, weakness and headaches. Endo/Heme/Allergies: Negative for recent abnormal bleeding. Psychiatric/Behavioral: Negative for memory loss.        Physical Exam    Visit Vitals  /78 (BP 1 Location: Left arm)   Pulse 88   Resp 18   Ht 5' 5\" (1.651 m)   Wt 117 lb 12.8 oz (53.4 kg)   SpO2 97%   BMI 19.60 kg/m²     Wt Readings from Last 3 Encounters:   11/01/18 117 lb 12.8 oz (53.4 kg)   10/22/18 115 lb (52.2 kg)   10/22/18 115 lb 4.8 oz (52.3 kg)      General - well developed well nourished  Neck - JVP normal  Cardiac - normal S1, S2, no murmurs, rubs or gallops.  No clicks  Vascular - carotids without bruits, radials, femorals and pedal pulses equal bilateral  Lungs - clear to auscultation bilaterals, no rales, wheezing or rhonchi  Abd - soft nontender, no HSM, no abd bruits  Extremities - no edema  Skin - no rash  Neuro - nonfocal  Psych - normal mood and affect      Labs:       Component      Latest Ref Rng & Units 10/22/2018 10/22/2018 10/22/2018 10/16/2018          11:50 AM 11:50 AM 10:00 AM  8:57 AM   Sodium      136 - 145 mmol/L  140 142 143   Potassium      3.5 - 5.1 mmol/L  3.0 (L) 2.8 (L) 3.1 (L)   Chloride      97 - 108 mmol/L  100 101 99   CO2      21 - 32 mmol/L  26 30 26   Anion gap      5 - 15 mmol/L  14 11 18 (H)   Glucose      65 - 100 mg/dL  114 (H) 121 (H) 99   BUN      6 - 20 MG/DL  44 (H) 45 (H) 46 (H)   Creatinine      0.55 - 1.02 MG/DL  13.68 (H) 14.19 (H) 13.69 (H)   BUN/Creatinine ratio      12 - 20    3 (L) 3 (L) 3 (L)   GFR est AA      >60 ml/min/1.73m2  3 (L) 3 (L) 3 (L)   GFR est non-AA      >60 ml/min/1.73m2  3 (L) 3 (L) 3 (L)   Calcium      8.5 - 10.1 MG/DL  10.0 9.5 10.0   Magnesium      1.6 - 2.4 mg/dL 2.5 (H)            Keiko Amin, ANP

## 2018-11-01 NOTE — PROGRESS NOTES
Visit Vitals  /78 (BP 1 Location: Left arm)   Pulse 88   Resp 18   Ht 5' 5\" (1.651 m)   Wt 117 lb 12.8 oz (53.4 kg)   SpO2 97%   BMI 19.60 kg/m²       Patient is here to discuss anticoagulants.

## 2018-11-01 NOTE — PATIENT INSTRUCTIONS
Start a baby aspirin 81mg daily (over the counter). This will need to be stopped prior to your procedure with urology, per their guidance. Stop this medication immediately if you have any abnormal bleeding. Follow-up in 6 weeks with Dr. Johnney Dancer to discuss resuming anticoag. Your HR was high at [facility] and borderline elevated today; I will order a 24 hour holter for further assessment.

## 2018-11-12 NOTE — PROGRESS NOTES
Addendum:   Records received from [facility] Dr. Mac Mcneil, 10/31/2018:     Seen as part of pre-tx eval for kidney transplant. In afib with RVR during visit and rec eval in ED. Pt declined. Given pt's bladder procedure was postponed till 12/15, Dr. Mac Mcneil rec anticoag for stroke risk until closer to procedure. Cardiac clearance pending better control of afib. [facility] / [address] 6/4/2018:    No sig CAD  RA 10, RV 39/5, W 23, PA 35/20/29, PA Sat 47.1%, Ao Sat 83.2%, /7/24  Ao 119/79/90, CO F 3.94, CI F 2.4, CO T 2.75, CI T 1.68

## 2018-11-26 ENCOUNTER — HOSPITAL ENCOUNTER (OUTPATIENT)
Dept: PREADMISSION TESTING | Age: 58
Discharge: HOME | End: 2018-11-26

## 2018-11-26 VITALS
BODY MASS INDEX: 20.81 KG/M2 | SYSTOLIC BLOOD PRESSURE: 97 MMHG | HEIGHT: 64 IN | OXYGEN SATURATION: 97 % | DIASTOLIC BLOOD PRESSURE: 66 MMHG | HEART RATE: 109 BPM | WEIGHT: 121.91 LBS | TEMPERATURE: 97.6 F | RESPIRATION RATE: 18 BRPM

## 2018-11-26 RX ORDER — ASPIRIN 81 MG/1
81 TABLET ORAL DAILY
COMMUNITY

## 2018-11-26 NOTE — H&P (VIEW-ONLY)
PAT Pre-Op History & Physical 
 
Patient: Elham Ceja                  MRN: [id number]          SSN: [id number] YOB: 1960          Age: 61 y.o. Sex: female Subjective:  
Patient is a 61 y.o.  female who presents with history of getting a CT scan that has determined she has a mass on her bladder. SHe denies pain or discomfort. The patient was evaluated in the surgeon's office and it was determined that the most appropriate plan of care is to proceed with surgical intervention. Patient's PCP Kyleigh Zayas MD 
 
 
Past Medical History:  
Diagnosis Date  Anemia associated with chronic renal failure  Anuria 2014  Arrhythmia Paroxysmal a fib, paroxsymal atrial tach, SVT  Chronic kidney disease ESRD- on PD  Chronic systolic heart failure ([address] 10/31/2012  Chronic tachycardia  GERD (gastroesophageal reflux disease)  Hx of non-ST elevation myocardial infarction (NSTEMI) 2011  Hypertension  Lipoma of right lower extremity 1980 Foot  Peritoneal dialysis catheter in place [facility]) 2014 Nightime exchanges  Polycystic kidney disease 2014 Past Surgical History:  
Procedure Laterality Date  HX HEART CATHETERIZATION  12/2015  
 no interventions  HX PACEMAKER  12/29/2015 ICD Prior to Admission medications Medication Sig Start Date End Date Taking? Authorizing Provider  
aspirin delayed-release 81 mg tablet Take 81 mg by mouth daily. Yes Provider, Historical  
carvedilol (COREG) 25 mg tablet Take 1 Tab by mouth two (2) times daily (with meals). 9/18/18  Yes Ramo Vásquez NP  
omeprazole (PRILOSEC) 20 mg capsule Take 20 mg by mouth daily as needed. Yes Provider, Historical  
ferric citrate (AURYXIA) 210 mg iron tablet Take 210 mg by mouth three (3) times daily (with meals). Yes Provider, Historical  
spironolactone (ALDACTONE) 50 mg tablet Take 50 mg by mouth every morning. Provider, Historical  
potassium chloride (KLOR-CON M20) 20 mEq tablet Take 40 mEq by mouth every morning. Provider, Historical  
SENNA-DOCUSATE SODIUM PO Take 1 Tab by mouth daily as needed (constipation). Provider, Historical  
cinacalcet (SENSIPAR) 30 mg tablet Take 30 mg by mouth daily (with dinner). Provider, Historical  
gentamicin (GARAMYCIN) 0.1 % topical cream Apply  to affected area nightly. 7/12/14   Provider, Historical  
 
Current Outpatient Medications Medication Sig  
 aspirin delayed-release 81 mg tablet Take 81 mg by mouth daily.  carvedilol (COREG) 25 mg tablet Take 1 Tab by mouth two (2) times daily (with meals).  omeprazole (PRILOSEC) 20 mg capsule Take 20 mg by mouth daily as needed.  ferric citrate (AURYXIA) 210 mg iron tablet Take 210 mg by mouth three (3) times daily (with meals).  spironolactone (ALDACTONE) 50 mg tablet Take 50 mg by mouth every morning.  potassium chloride (KLOR-CON M20) 20 mEq tablet Take 40 mEq by mouth every morning.  SENNA-DOCUSATE SODIUM PO Take 1 Tab by mouth daily as needed (constipation).  cinacalcet (SENSIPAR) 30 mg tablet Take 30 mg by mouth daily (with dinner).  gentamicin (GARAMYCIN) 0.1 % topical cream Apply  to affected area nightly. No current facility-administered medications for this encounter. Allergies Allergen Reactions  Iodinated Contrast- Oral And Iv Dye Angioedema Cellulitis on side of face after test  
  
Social History Tobacco Use  Smoking status: Never Smoker  Smokeless tobacco: Never Used Substance Use Topics  Alcohol use: No  
  
Social History Substance and Sexual Activity Drug Use No  
 
Family History Problem Relation Age of Onset  Diabetes Brother  Hypertension Brother  Hypertension Mother [facility] Arthritis-osteo Mother  Hypertension Sister  Diabetes Sister  Kidney Disease Father   
     polycystic  Parkinson's Disease Father  Heart Disease Father  Hypertension Father  Hypertension Brother Review of Systems Patient denies difficulty swallowing, mouth sores, or loose teeth. Patient denies any recent dental procedures or any planned prior to surgery. Patient denies chest pain, tightness, pain radiating down left arm, palpitations. Denies dizziness, visual disturbances, or lightheadedness. Patient denies shortness of breath, wheezing, cough, fever, or chills. Patient denies diarrhea, constipation, or abdominal pain. Patient denies urinary problems including dysuria, hesitancy, urgency, or incontinence. Denies skin breakdown, rashes, insect bites or open area. She states her dialysis site if free from redness or drainage. Objective:  
 
Patient Vitals for the past 24 hrs: 
 Temp Pulse Resp BP SpO2  
18 1119 97.6 °F (36.4 °C) (!) 109 18 97/66 97 % Temp (24hrs), Av.6 °F (36.4 °C), Min:97.6 °F (36.4 °C), Max:97.6 °F (36.4 °C) Body mass index is 20.93 kg/m². Wt Readings from Last 1 Encounters:  
18 55.3 kg (121 lb 14.6 oz) Physical Exam: 
 
 General: Pleasant,  cooperative, no apparent distress, appears stated age. Eyes: Conjunctivae/corneas clear. EOMs intact. Nose: Nares normal. 
 Mouth/Throat: Lips, mucosa, and tongue normal. Teeth and gums normal. 
 Lungs: Clear to auscultation bilaterally. Heart: Tachycardic, S1, S2 normal. No murmur, click, rub or gallop. Abdomen: Soft, non-tender. Bowel sounds normal. No distention. Musculoskeletal:  Unremarkable. Extremities:  Extremities normal, atraumatic, no cyanosis or edema. Calves 
                               supple, non tender to palpation. Pulses: 2+ and symmetric bilateral upper extremities. Cap. refill <2 seconds Skin: Skin color, texture, turgor normal. No visible open areas, examined fully clothed Neurologic: CN II-XII grossly intact. Alert and oriented x3. Labs: No results found for this or any previous visit (from the past 72 hour(s)). Assessment:  
 
Gross Hematuria Plan:  
 
Scheduled for CYSTOSCOPY BILATERAL RETROGRADES POSSIBLE BLADDER BIOPSY VERSUS TRANSURETHRAL RESECTION OF BLADDER TUMOR Cardiac clearance sent over by surgeons office along with pacemaker plan. Anti-coagulant plan attached also. Unable to obtain urine per orders r/t patient being anuric. VM left for Evette Daniels with surgeon's office to inform her.   
 
Crystal Frank NP

## 2018-11-26 NOTE — H&P
PAT Pre-Op History & Physical 
 
Patient: Marlon Turner                  MRN: [id number]          SSN: [id number] YOB: 1960          Age: 61 y.o. Sex: female Subjective:  
Patient is a 61 y.o.  female who presents with history of getting a CT scan that has determined she has a mass on her bladder. SHe denies pain or discomfort. The patient was evaluated in the surgeon's office and it was determined that the most appropriate plan of care is to proceed with surgical intervention. Patient's PCP Otis Humphries MD 
 
 
Past Medical History:  
Diagnosis Date  Anemia associated with chronic renal failure  Anuria 2014  Arrhythmia Paroxysmal a fib, paroxsymal atrial tach, SVT  Chronic kidney disease ESRD- on PD  Chronic systolic heart failure ([address] 10/31/2012  Chronic tachycardia  GERD (gastroesophageal reflux disease)  Hx of non-ST elevation myocardial infarction (NSTEMI) 2011  Hypertension  Lipoma of right lower extremity 1980 Foot  Peritoneal dialysis catheter in place [facility]) 2014 Nightime exchanges  Polycystic kidney disease 2014 Past Surgical History:  
Procedure Laterality Date  HX HEART CATHETERIZATION  12/2015  
 no interventions  HX PACEMAKER  12/29/2015 ICD Prior to Admission medications Medication Sig Start Date End Date Taking? Authorizing Provider  
aspirin delayed-release 81 mg tablet Take 81 mg by mouth daily. Yes Provider, Historical  
carvedilol (COREG) 25 mg tablet Take 1 Tab by mouth two (2) times daily (with meals). 9/18/18  Yes Michael MICHAEL Pappas  
omeprazole (PRILOSEC) 20 mg capsule Take 20 mg by mouth daily as needed. Yes Provider, Historical  
ferric citrate (AURYXIA) 210 mg iron tablet Take 210 mg by mouth three (3) times daily (with meals). Yes Provider, Historical  
spironolactone (ALDACTONE) 50 mg tablet Take 50 mg by mouth every morning. Provider, Historical  
potassium chloride (KLOR-CON M20) 20 mEq tablet Take 40 mEq by mouth every morning. Provider, Historical  
SENNA-DOCUSATE SODIUM PO Take 1 Tab by mouth daily as needed (constipation). Provider, Historical  
cinacalcet (SENSIPAR) 30 mg tablet Take 30 mg by mouth daily (with dinner). Provider, Historical  
gentamicin (GARAMYCIN) 0.1 % topical cream Apply  to affected area nightly. 7/12/14   Provider, Historical  
 
Current Outpatient Medications Medication Sig  
 aspirin delayed-release 81 mg tablet Take 81 mg by mouth daily.  carvedilol (COREG) 25 mg tablet Take 1 Tab by mouth two (2) times daily (with meals).  omeprazole (PRILOSEC) 20 mg capsule Take 20 mg by mouth daily as needed.  ferric citrate (AURYXIA) 210 mg iron tablet Take 210 mg by mouth three (3) times daily (with meals).  spironolactone (ALDACTONE) 50 mg tablet Take 50 mg by mouth every morning.  potassium chloride (KLOR-CON M20) 20 mEq tablet Take 40 mEq by mouth every morning.  SENNA-DOCUSATE SODIUM PO Take 1 Tab by mouth daily as needed (constipation).  cinacalcet (SENSIPAR) 30 mg tablet Take 30 mg by mouth daily (with dinner).  gentamicin (GARAMYCIN) 0.1 % topical cream Apply  to affected area nightly. No current facility-administered medications for this encounter. Allergies Allergen Reactions  Iodinated Contrast- Oral And Iv Dye Angioedema Cellulitis on side of face after test  
  
Social History Tobacco Use  Smoking status: Never Smoker  Smokeless tobacco: Never Used Substance Use Topics  Alcohol use: No  
  
Social History Substance and Sexual Activity Drug Use No  
 
Family History Problem Relation Age of Onset  Diabetes Brother  Hypertension Brother  Hypertension Mother [facility] Arthritis-osteo Mother  Hypertension Sister  Diabetes Sister  Kidney Disease Father   
     polycystic  Parkinson's Disease Father  Heart Disease Father  Hypertension Father  Hypertension Brother Review of Systems Patient denies difficulty swallowing, mouth sores, or loose teeth. Patient denies any recent dental procedures or any planned prior to surgery. Patient denies chest pain, tightness, pain radiating down left arm, palpitations. Denies dizziness, visual disturbances, or lightheadedness. Patient denies shortness of breath, wheezing, cough, fever, or chills. Patient denies diarrhea, constipation, or abdominal pain. Patient denies urinary problems including dysuria, hesitancy, urgency, or incontinence. Denies skin breakdown, rashes, insect bites or open area. She states her dialysis site if free from redness or drainage. Objective:  
 
Patient Vitals for the past 24 hrs: 
 Temp Pulse Resp BP SpO2  
18 1119 97.6 °F (36.4 °C) (!) 109 18 97/66 97 % Temp (24hrs), Av.6 °F (36.4 °C), Min:97.6 °F (36.4 °C), Max:97.6 °F (36.4 °C) Body mass index is 20.93 kg/m². Wt Readings from Last 1 Encounters:  
18 55.3 kg (121 lb 14.6 oz) Physical Exam: 
 
 General: Pleasant,  cooperative, no apparent distress, appears stated age. Eyes: Conjunctivae/corneas clear. EOMs intact. Nose: Nares normal. 
 Mouth/Throat: Lips, mucosa, and tongue normal. Teeth and gums normal. 
 Lungs: Clear to auscultation bilaterally. Heart: Tachycardic, S1, S2 normal. No murmur, click, rub or gallop. Abdomen: Soft, non-tender. Bowel sounds normal. No distention. Musculoskeletal:  Unremarkable. Extremities:  Extremities normal, atraumatic, no cyanosis or edema. Calves 
                               supple, non tender to palpation. Pulses: 2+ and symmetric bilateral upper extremities. Cap. refill <2 seconds Skin: Skin color, texture, turgor normal. No visible open areas, examined fully clothed Neurologic: CN II-XII grossly intact. Alert and oriented x3. Labs: No results found for this or any previous visit (from the past 72 hour(s)). Assessment:  
 
Gross Hematuria Plan:  
 
Scheduled for CYSTOSCOPY BILATERAL RETROGRADES POSSIBLE BLADDER BIOPSY VERSUS TRANSURETHRAL RESECTION OF BLADDER TUMOR Cardiac clearance sent over by surgeons office along with pacemaker plan. Anti-coagulant plan attached also. Unable to obtain urine per orders r/t patient being anuric. VM left for Ca Brown with surgeon's office to inform her.   
 
Dilshad Sarmiento NP

## 2018-11-26 NOTE — PERIOP NOTES
[address]                  
[address] [facility], [address] [facility] MAIN OR                                  [phone] MAIN PRE OP                          [phone]                                                                                AMBULATORY PRE OP          [phone] PRE-ADMISSION TESTING    21  Surgery Date:   Monday 12/3/18 Is surgery arrival time given by surgeon? NO If [facility] staff will call you between 3 and 7pm the day before your surgery with your arrival time. (If your surgery is on a Monday, we will call you the Friday before.) Call [phone] after 7pm Monday-Friday if you did not receive your arrival time. INSTRUCTIONS BEFORE YOUR SURGERY When You 
Arrive Arrive at the 2nd [facility] on the day of your surgery Have your insurance card, photo ID, and any copayment (if needed) Food 
 and  
Drink NO food or drink after midnight the night before surgery This means NO water, gum, mints, coffee, juice, etc. 
No alcohol (beer, wine, liquor) 24 hours before and after surgery Medications to TAKE Morning of Surgery MEDICATIONS TO TAKE THE MORNING OF SURGERY WITH A SIP OF WATER:  
? Coreg,omeprazole Medications To 
STOP      7 days before surgery ? Non-Steroidal anti-inflammatory Drugs (NSAID's): for example, Ibuprofen (Advil, Motrin), Naproxen (Aleve) ? Aspirin, if taking for pain ? Herbal supplements, vitamins, and fish oil Blood Thinners ? If you take  Aspirin, Plavix, Coumadin, or any blood-thinning or anti-blood clot medicine, talk to the doctor who prescribed the medications for pre-operative instructions. Bathing Clothing Jewelry Valuables ? If you shower the morning of surgery, please do not apply anything to your skin (lotions, powders, deodorant, or makeup, especially mascara) ? Do not shave or trim anywhere 24 hours before surgery ? Wear your hair loose or down; no pony-tails, buns, or metal hair clips ? Wear loose, comfortable, clean clothes ? Wear glasses instead of contacts ? Leave money, valuables, and jewelry, including body piercings, at home Going Home       or Spending the Night ? SAME-DAY SURGERY: You must have a responsible adult drive you home and stay with you 24 hours after surgery ? ADMITS: If your doctor is keeping you into the hospital after surgery, leave personal belongings/luggage in your car until you have a hospital room number. Hospital discharge time is 12 noon Drivers must be here before 12 noon unless you are told differently Special Instructions Free  parking 7a-5p. Bring completed medication list on day of surgery Follow all instructions so your surgery wont be cancelled. Please, be on time. If a situation occurs and you are delayed the day of surgery, call [phone] or    [phone]. If your physical condition changes (like a fever, cold, flu, etc.) call your surgeon. The patient was contacted  in person. The patient verbalizes understanding of all instructions and does not  need reinforcement.

## 2018-11-30 ENCOUNTER — ANESTHESIA EVENT (OUTPATIENT)
Dept: SURGERY | Age: 58
End: 2018-11-30
Payer: MEDICARE

## 2018-12-03 ENCOUNTER — HOSPITAL ENCOUNTER (OUTPATIENT)
Age: 58
Setting detail: OUTPATIENT SURGERY
Discharge: HOME | End: 2018-12-03
Attending: UROLOGY | Admitting: UROLOGY
Payer: MEDICARE

## 2018-12-03 ENCOUNTER — ANESTHESIA (OUTPATIENT)
Dept: SURGERY | Age: 58
End: 2018-12-03
Payer: MEDICARE

## 2018-12-03 ENCOUNTER — APPOINTMENT (OUTPATIENT)
Dept: GENERAL RADIOLOGY | Age: 58
End: 2018-12-03
Attending: UROLOGY
Payer: MEDICARE

## 2018-12-03 VITALS
HEIGHT: 64 IN | SYSTOLIC BLOOD PRESSURE: 112 MMHG | TEMPERATURE: 97.5 F | DIASTOLIC BLOOD PRESSURE: 83 MMHG | BODY MASS INDEX: 21.27 KG/M2 | OXYGEN SATURATION: 100 % | RESPIRATION RATE: 13 BRPM | HEART RATE: 61 BPM | WEIGHT: 124.56 LBS

## 2018-12-03 LAB
ALBUMIN SERPL-MCNC: 3 G/DL (ref 3.5–5)
ALBUMIN/GLOB SERPL: 0.7 {RATIO} (ref 1.1–2.2)
ALP SERPL-CCNC: 80 U/L (ref 45–117)
ALT SERPL-CCNC: 34 U/L (ref 12–78)
ANION GAP SERPL CALC-SCNC: 12 MMOL/L (ref 5–15)
AST SERPL-CCNC: 24 U/L (ref 15–37)
BASOPHILS # BLD: 0.1 K/UL (ref 0–0.1)
BASOPHILS NFR BLD: 1 % (ref 0–1)
BILIRUB SERPL-MCNC: 0.9 MG/DL (ref 0.2–1)
BUN SERPL-MCNC: 41 MG/DL (ref 6–20)
BUN/CREAT SERPL: 3 (ref 12–20)
CALCIUM SERPL-MCNC: 9.5 MG/DL (ref 8.5–10.1)
CHLORIDE SERPL-SCNC: 103 MMOL/L (ref 97–108)
CO2 SERPL-SCNC: 26 MMOL/L (ref 21–32)
CREAT SERPL-MCNC: 13.08 MG/DL (ref 0.55–1.02)
DIFFERENTIAL METHOD BLD: ABNORMAL
EOSINOPHIL # BLD: 0.4 K/UL (ref 0–0.4)
EOSINOPHIL NFR BLD: 5 % (ref 0–7)
ERYTHROCYTE [DISTWIDTH] IN BLOOD BY AUTOMATED COUNT: 15.1 % (ref 11.5–14.5)
GLOBULIN SER CALC-MCNC: 4.4 G/DL (ref 2–4)
GLUCOSE SERPL-MCNC: 89 MG/DL (ref 65–100)
HCT VFR BLD AUTO: 40.9 % (ref 35–47)
HGB BLD-MCNC: 13.1 G/DL (ref 11.5–16)
IMM GRANULOCYTES # BLD: 0 K/UL (ref 0–0.04)
IMM GRANULOCYTES NFR BLD AUTO: 0 % (ref 0–0.5)
LYMPHOCYTES # BLD: 1.1 K/UL (ref 0.8–3.5)
LYMPHOCYTES NFR BLD: 14 % (ref 12–49)
MCH RBC QN AUTO: 32 PG (ref 26–34)
MCHC RBC AUTO-ENTMCNC: 32 G/DL (ref 30–36.5)
MCV RBC AUTO: 99.8 FL (ref 80–99)
MONOCYTES # BLD: 0.3 K/UL (ref 0–1)
MONOCYTES NFR BLD: 4 % (ref 5–13)
NEUTS SEG # BLD: 6 K/UL (ref 1.8–8)
NEUTS SEG NFR BLD: 77 % (ref 32–75)
NRBC # BLD: 0 K/UL (ref 0–0.01)
NRBC BLD-RTO: 0 PER 100 WBC
PLATELET # BLD AUTO: 136 K/UL (ref 150–400)
PMV BLD AUTO: 10 FL (ref 8.9–12.9)
POTASSIUM SERPL-SCNC: 5.1 MMOL/L (ref 3.5–5.1)
PROT SERPL-MCNC: 7.4 G/DL (ref 6.4–8.2)
RBC # BLD AUTO: 4.1 M/UL (ref 3.8–5.2)
SODIUM SERPL-SCNC: 141 MMOL/L (ref 136–145)
WBC # BLD AUTO: 7.8 K/UL (ref 3.6–11)

## 2018-12-03 PROCEDURE — 76060000061 HC AMB SURG ANES 0.5 TO 1 HR: Performed by: UROLOGY

## 2018-12-03 PROCEDURE — 74011250636 HC RX REV CODE- 250/636: Performed by: UROLOGY

## 2018-12-03 PROCEDURE — 76210000035 HC AMBSU PH I REC 1 TO 1.5 HR: Performed by: UROLOGY

## 2018-12-03 PROCEDURE — 77030032490 HC SLV COMPR SCD KNE COVD -B

## 2018-12-03 PROCEDURE — C1758 CATHETER, URETERAL: HCPCS | Performed by: UROLOGY

## 2018-12-03 PROCEDURE — 74011250636 HC RX REV CODE- 250/636: Performed by: ANESTHESIOLOGY

## 2018-12-03 PROCEDURE — 74011636320 HC RX REV CODE- 636/320: Performed by: UROLOGY

## 2018-12-03 PROCEDURE — 77030020782 HC GWN BAIR PAWS FLX 3M -B

## 2018-12-03 PROCEDURE — 85025 COMPLETE CBC W/AUTO DIFF WBC: CPT

## 2018-12-03 PROCEDURE — 36415 COLL VENOUS BLD VENIPUNCTURE: CPT

## 2018-12-03 PROCEDURE — 88112 CYTOPATH CELL ENHANCE TECH: CPT

## 2018-12-03 PROCEDURE — 88121 CYTP URINE 3-5 PROBES CMPTR: CPT

## 2018-12-03 PROCEDURE — 74011250636 HC RX REV CODE- 250/636

## 2018-12-03 PROCEDURE — 74011000258 HC RX REV CODE- 258

## 2018-12-03 PROCEDURE — 80053 COMPREHEN METABOLIC PANEL: CPT

## 2018-12-03 PROCEDURE — 77030010545: Performed by: UROLOGY

## 2018-12-03 PROCEDURE — 74011000250 HC RX REV CODE- 250

## 2018-12-03 PROCEDURE — 77030018836 HC SOL IRR NACL ICUM -A: Performed by: UROLOGY

## 2018-12-03 PROCEDURE — 76000 FLUOROSCOPY <1 HR PHYS/QHP: CPT

## 2018-12-03 PROCEDURE — 76210000046 HC AMBSU PH II REC FIRST 0.5 HR: Performed by: UROLOGY

## 2018-12-03 PROCEDURE — 76030000000 HC AMB SURG OR TIME 0.5 TO 1: Performed by: UROLOGY

## 2018-12-03 PROCEDURE — 77030018830 HC SOL IRR GLYC ICUM-A: Performed by: UROLOGY

## 2018-12-03 PROCEDURE — 77030011640 HC PAD GRND REM COVD -A: Performed by: UROLOGY

## 2018-12-03 PROCEDURE — 77030019927 HC TBNG IRR CYSTO BAXT -A: Performed by: UROLOGY

## 2018-12-03 RX ORDER — GLYCOPYRROLATE 0.2 MG/ML
INJECTION INTRAMUSCULAR; INTRAVENOUS AS NEEDED
Status: DISCONTINUED | OUTPATIENT
Start: 2018-12-03 | End: 2018-12-03 | Stop reason: HOSPADM

## 2018-12-03 RX ORDER — KETAMINE HYDROCHLORIDE 10 MG/ML
INJECTION, SOLUTION INTRAMUSCULAR; INTRAVENOUS AS NEEDED
Status: DISCONTINUED | OUTPATIENT
Start: 2018-12-03 | End: 2018-12-03 | Stop reason: HOSPADM

## 2018-12-03 RX ORDER — DIPHENHYDRAMINE HYDROCHLORIDE 50 MG/ML
INJECTION, SOLUTION INTRAMUSCULAR; INTRAVENOUS AS NEEDED
Status: DISCONTINUED | OUTPATIENT
Start: 2018-12-03 | End: 2018-12-03 | Stop reason: HOSPADM

## 2018-12-03 RX ORDER — SODIUM CHLORIDE, SODIUM LACTATE, POTASSIUM CHLORIDE, CALCIUM CHLORIDE 600; 310; 30; 20 MG/100ML; MG/100ML; MG/100ML; MG/100ML
100 INJECTION, SOLUTION INTRAVENOUS CONTINUOUS
Status: DISCONTINUED | OUTPATIENT
Start: 2018-12-03 | End: 2018-12-03 | Stop reason: HOSPADM

## 2018-12-03 RX ORDER — DEXMEDETOMIDINE HYDROCHLORIDE 4 UG/ML
INJECTION, SOLUTION INTRAVENOUS AS NEEDED
Status: DISCONTINUED | OUTPATIENT
Start: 2018-12-03 | End: 2018-12-03 | Stop reason: HOSPADM

## 2018-12-03 RX ORDER — SODIUM CHLORIDE 0.9 % (FLUSH) 0.9 %
5-10 SYRINGE (ML) INJECTION AS NEEDED
Status: DISCONTINUED | OUTPATIENT
Start: 2018-12-03 | End: 2018-12-03 | Stop reason: HOSPADM

## 2018-12-03 RX ORDER — PROPOFOL 10 MG/ML
INJECTION, EMULSION INTRAVENOUS AS NEEDED
Status: DISCONTINUED | OUTPATIENT
Start: 2018-12-03 | End: 2018-12-03 | Stop reason: HOSPADM

## 2018-12-03 RX ORDER — LIDOCAINE HYDROCHLORIDE 10 MG/ML
0.1 INJECTION, SOLUTION EPIDURAL; INFILTRATION; INTRACAUDAL; PERINEURAL AS NEEDED
Status: DISCONTINUED | OUTPATIENT
Start: 2018-12-03 | End: 2018-12-03 | Stop reason: HOSPADM

## 2018-12-03 RX ORDER — CEFAZOLIN SODIUM IN 0.9 % NACL 2 G/100 ML
PLASTIC BAG, INJECTION (ML) INTRAVENOUS AS NEEDED
Status: DISCONTINUED | OUTPATIENT
Start: 2018-12-03 | End: 2018-12-03 | Stop reason: HOSPADM

## 2018-12-03 RX ORDER — SODIUM CHLORIDE 0.9 % (FLUSH) 0.9 %
5-10 SYRINGE (ML) INJECTION EVERY 8 HOURS
Status: DISCONTINUED | OUTPATIENT
Start: 2018-12-03 | End: 2018-12-03 | Stop reason: HOSPADM

## 2018-12-03 RX ORDER — FENTANYL CITRATE 50 UG/ML
INJECTION, SOLUTION INTRAMUSCULAR; INTRAVENOUS AS NEEDED
Status: DISCONTINUED | OUTPATIENT
Start: 2018-12-03 | End: 2018-12-03 | Stop reason: HOSPADM

## 2018-12-03 RX ORDER — MIDAZOLAM HYDROCHLORIDE 1 MG/ML
INJECTION, SOLUTION INTRAMUSCULAR; INTRAVENOUS AS NEEDED
Status: DISCONTINUED | OUTPATIENT
Start: 2018-12-03 | End: 2018-12-03 | Stop reason: HOSPADM

## 2018-12-03 RX ORDER — LIDOCAINE HCL/PF 100 MG/5ML
SYRINGE (ML) INTRAVENOUS AS NEEDED
Status: DISCONTINUED | OUTPATIENT
Start: 2018-12-03 | End: 2018-12-03 | Stop reason: HOSPADM

## 2018-12-03 RX ORDER — PROPOFOL 10 MG/ML
INJECTION, EMULSION INTRAVENOUS
Status: DISCONTINUED | OUTPATIENT
Start: 2018-12-03 | End: 2018-12-03 | Stop reason: HOSPADM

## 2018-12-03 RX ORDER — SODIUM CHLORIDE 9 MG/ML
INJECTION, SOLUTION INTRAVENOUS
Status: DISCONTINUED | OUTPATIENT
Start: 2018-12-03 | End: 2018-12-03 | Stop reason: HOSPADM

## 2018-12-03 RX ORDER — LIDOCAINE HYDROCHLORIDE 20 MG/ML
INJECTION, SOLUTION INFILTRATION; PERINEURAL AS NEEDED
Status: DISCONTINUED | OUTPATIENT
Start: 2018-12-03 | End: 2018-12-03 | Stop reason: HOSPADM

## 2018-12-03 RX ORDER — HYDROMORPHONE HYDROCHLORIDE 1 MG/ML
.25-1 INJECTION, SOLUTION INTRAMUSCULAR; INTRAVENOUS; SUBCUTANEOUS
Status: DISCONTINUED | OUTPATIENT
Start: 2018-12-03 | End: 2018-12-03 | Stop reason: HOSPADM

## 2018-12-03 RX ORDER — CEFAZOLIN SODIUM/WATER 2 G/20 ML
2 SYRINGE (ML) INTRAVENOUS ONCE
Status: DISCONTINUED | OUTPATIENT
Start: 2018-12-03 | End: 2018-12-03 | Stop reason: HOSPADM

## 2018-12-03 RX ADMIN — DEXMEDETOMIDINE HYDROCHLORIDE 12 MCG: 4 INJECTION, SOLUTION INTRAVENOUS at 14:12

## 2018-12-03 RX ADMIN — PROPOFOL 25 MCG/KG/MIN: 10 INJECTION, EMULSION INTRAVENOUS at 14:25

## 2018-12-03 RX ADMIN — PROPOFOL 10 MG: 10 INJECTION, EMULSION INTRAVENOUS at 14:25

## 2018-12-03 RX ADMIN — SODIUM CHLORIDE, SODIUM LACTATE, POTASSIUM CHLORIDE, AND CALCIUM CHLORIDE 100 ML/HR: 600; 310; 30; 20 INJECTION, SOLUTION INTRAVENOUS at 12:55

## 2018-12-03 RX ADMIN — Medication 2 G: at 14:06

## 2018-12-03 RX ADMIN — GLYCOPYRROLATE 0.1 MG: 0.2 INJECTION INTRAMUSCULAR; INTRAVENOUS at 14:03

## 2018-12-03 RX ADMIN — KETAMINE HYDROCHLORIDE 20 MG: 10 INJECTION, SOLUTION INTRAMUSCULAR; INTRAVENOUS at 14:07

## 2018-12-03 RX ADMIN — FENTANYL CITRATE 25 MCG: 50 INJECTION, SOLUTION INTRAMUSCULAR; INTRAVENOUS at 14:07

## 2018-12-03 RX ADMIN — SODIUM CHLORIDE: 9 INJECTION, SOLUTION INTRAVENOUS at 14:03

## 2018-12-03 RX ADMIN — LIDOCAINE HYDROCHLORIDE 40 MG: 20 INJECTION, SOLUTION INFILTRATION; PERINEURAL at 14:07

## 2018-12-03 RX ADMIN — MIDAZOLAM HYDROCHLORIDE 1 MG: 1 INJECTION, SOLUTION INTRAMUSCULAR; INTRAVENOUS at 14:03

## 2018-12-03 RX ADMIN — KETAMINE HYDROCHLORIDE 10 MG: 10 INJECTION, SOLUTION INTRAMUSCULAR; INTRAVENOUS at 14:25

## 2018-12-03 RX ADMIN — DIPHENHYDRAMINE HYDROCHLORIDE 25 MG: 50 INJECTION, SOLUTION INTRAMUSCULAR; INTRAVENOUS at 14:07

## 2018-12-03 RX ADMIN — FENTANYL CITRATE 25 MCG: 50 INJECTION, SOLUTION INTRAMUSCULAR; INTRAVENOUS at 14:25

## 2018-12-03 NOTE — INTERVAL H&P NOTE
H&P Update: 
Juanjo Renner was seen and examined. History and physical has been reviewed. The patient has been examined.  There have been no significant clinical changes since the completion of the originally dated History and Physical. 
 
Signed By: Leila Diaz MD   
 December 3, 2018 1:41 PM

## 2018-12-03 NOTE — BRIEF OP NOTE
BRIEF OPERATIVE NOTE Date of Procedure: 12/3/2018 Preoperative Diagnosis: GROSS HEMATURIA Postoperative Diagnosis: GROSS HEMATURIA Procedure(s): 
CYSTOSCOPY/ BILATERAL RETROGRADES Surgeon(s) and Role: Krysta Soni MD - Primary Surgical Assistant: 0 Surgical Staff: 
Circ-1: Oli Alva RN Scrub Tech-1: Loral Fleischer Event Time In Time Out Incision Start 1423 Incision Close 1440 Anesthesia: General  
Estimated Blood Loss: 0 Specimens:  
ID Type Source Tests Collected by Time Destination 1 : cathed urine for fish cytology Special Studies (Specify) Cath Urine  Laureen Soto MD 12/3/2018 1340 Pathology Findings: splayed calyx  B Complications: 0 Implants: * No implants in log *

## 2018-12-03 NOTE — ANESTHESIA POSTPROCEDURE EVALUATION
Procedure(s): 
CYSTOSCOPY/ BILATERAL RETROGRADES. Anesthesia Post Evaluation Multimodal analgesia: multimodal analgesia not used between 6 hours prior to anesthesia start to PACU discharge Patient location during evaluation: bedside Patient participation: complete - patient participated Level of consciousness: awake Pain management: adequate Airway patency: patent Anesthetic complications: no 
Cardiovascular status: acceptable Respiratory status: acceptable Hydration status: acceptable Post anesthesia nausea and vomiting:  controlled Visit Vitals /83 Pulse 61 Temp 36.4 °C (97.5 °F) Resp 13 Ht 5' 4\" (1.626 m) Wt 56.5 kg (124 lb 9 oz) SpO2 100% BMI 21.38 kg/m² Pt calling to ck the status of this message.  She has not had any response yet and would like one asap.

## 2018-12-03 NOTE — ANESTHESIA PREPROCEDURE EVALUATION
Anesthetic History No history of anesthetic complications Review of Systems / Medical History Patient summary reviewed, nursing notes reviewed and pertinent labs reviewed Pulmonary Within defined limits Neuro/Psych Within defined limits Cardiovascular Hypertension Dysrhythmias : atrial fibrillation Comments: Pacemaker,AICD has not fired MI in 2011 GI/Hepatic/Renal 
  
GERD Renal disease: ESRD and dialysis Endo/Other Within defined limits Comments: K 5.1 Peritoneal dialysis last night Other Findings Physical Exam 
 
Airway Mallampati: II 
 
Neck ROM: normal range of motion Mouth opening: Normal 
 
 Cardiovascular Rhythm: regular Rate: normal 
 
 
 
 Dental 
No notable dental hx Pulmonary Breath sounds clear to auscultation Abdominal 
GI exam deferred Other Findings Anesthetic Plan ASA: 4 Anesthesia type: MAC and general - backup Induction: Intravenous Anesthetic plan and risks discussed with: Patient

## 2018-12-03 NOTE — DISCHARGE INSTRUCTIONS
DISCHARGE SUMMARY from your Nurse      PATIENT INSTRUCTIONS    After general anesthesia or intravenous sedation, for 24 hours or while taking prescription Narcotics:  · Limit your activities  · Do not drive and operate hazardous machinery  · Do not make important personal or business decisions  · Do  not drink alcoholic beverages  · If you have not urinated within 8 hours after discharge, please contact your surgeon on call. Report the following to your surgeon:  · Excessive pain, swelling, redness or odor of or around the surgical area  · Temperature over 100.5  · Nausea and vomiting lasting longer than 4 hours or if unable to take medications  · Any signs of decreased circulation or nerve impairment to extremity: change in color, persistent  numbness, tingling, coldness or increase pain  · Any questions      COUGH AND DEEP BREATHE    Breathing deeply and coughing are very important exercises to do after surgery. Deep breathing and coughing open the little air tubes and air sacks in your lungs. You take deep breaths every day. You may not even notice - it is just something you do when you sigh or yawn. It is a natural exercise you do to keep these air passages open. After surgery, take deep breaths and cough, on purpose. DIRECTIONS:  · Take 10 to 15 slow deep breaths every hour while awake. · Breathe in deeply, and hold it for 2 seconds. · Exhale slowly through puckered lips, like blowing up a balloon. · After every 4th or 5th deep breath, hug your pillow to your chest or belly and give a hard, deep cough. Yes, it will probably hurt. But doing this exercise is a very important part of healing after surgery. Take your pain medicine to help you do this exercise without too much pain. Coughing and deep breathing help prevent bronchitis and pneumonia after surgery.   If you had chest or belly surgery, use a pillow as a \"hug buddy\" and hold it tightly to your chest or belly when you cough.       ANKLE PUMPS    Ankle pumps increase the circulation of oxygenated blood to your lower extremities and decrease your risk for circulation problems such as blood clots. They also stretch the muscles, tendons and ligaments in your foot and ankle, and prevent joint contracture in the ankle and foot, especially after surgeries on the legs. It is important to do ankle pump exercises regularly after surgery because immobility increases your risk for developing a blood clot. Your doctor may also have you take an Aspirin for the next few days as well. If your doctor did not ask you to take an Aspirin, consult with him before starting Aspirin therapy on your own. The exercise is quite simple. · Slowly point your foot forward, feeling the muscles on the top of your lower leg stretch, and hold this position for 5 seconds. · Next, pull your foot back toward you as far as possible, stretching the calf muscles, and hold that position for 5 seconds. · Repeat with the other foot. · Perform 10 repetitions every hour while awake for both ankles if possible (down and then up with the foot once is one repetition). You should feel gentle stretching of the muscles in your lower leg when doing this exercise. If you feel pain, or your range of motion is limited, don't push too hard. Only go the limit your joint and muscles will let you go. If you have increasing pain, progressively worsening leg warmth or swelling, STOP the exercise and call your doctor. MEDICATION AND   SIDE EFFECT GUIDE    The [facility] MEDICATION AND SIDE EFFECT GUIDE was provided to the PATIENT AND CARE PROVIDER.   Information provided includes instruction about drug purpose and common side effects for the following medications:   · No new medications prescribed at this visit        These are general instructions for a healthy lifestyle:    *   Please give a list of your current medications to your Primary Care Provider. *   Please update this list whenever your medications are discontinued, doses are changed, or new medications (including over-the-counter products) are added. *   Please carry medication information at all times in case of emergency situations. About Smoking  No smoking / No tobacco products  Avoid exposure to second hand smoke     Surgeon General's Warning:  Quitting smoking now greatly reduces serious risk to your health. Obesity, smoking, and sedentary lifestyle greatly increases your risk for illness and disease. A healthy diet, regular physical exercise & weight monitoring are important for maintaining a healthy lifestyle. Congestive Heart Failure  You may be retaining fluid if you have a history of heart failure or if you experience any of the following symptoms:  Weight gain of 3 pounds or more overnight or 5 pounds in a week, increased swelling in your hands or feet or shortness of breath while lying flat in bed. Please call your doctor as soon as you notice any of these symptoms; do not wait until your next office visit. Recognize signs and symptoms of STROKE:  F -  Face looks uneven  A -  Arms unable to move or move evenly  S -  Speech slurred or non-existent  T -  Time-call 911 as soon as signs and symptoms begin-DO NOT go          back to bed or wait to see if you get better-TIME IS BRAIN. Warning Signs of HEART ATTACK   Call 911 if you have these symptoms:     Chest discomfort. Most heart attacks involve discomfort in the center of the chest that lasts more than a few minutes, or that goes away and comes back. It can feel like uncomfortable pressure, squeezing, fullness, or pain.  Discomfort in other areas of the upper body. Symptoms can include pain or discomfort in one or both arms, the back, neck, jaw, or stomach.  Shortness of breath with or without chest discomfort.    Other signs may include breaking out in a cold sweat, nausea, or lightheadedness. Don't wait more than five minutes to call 911 - MINUTES MATTER! Fast action can save your life. Calling 911 is almost always the fastest way to get lifesaving treatment. Emergency Medical Services staff can begin treatment when they arrive -- up to an hour sooner than if someone gets to the hospital by car. The discharge information has been reviewed with the patient and caregiver. Any questions and concerns from the patient and caregiver have been addressed. The patient and caregiver verbalized understanding. Other information in your discharge envelope:  []     PRESCRIPTIONS  []     PHYSICAL THERAPY PRESCRIPTION  []     APPOINTMENT CARDS  []     Regional Anesthesia Pamphlet for block or block with On-Q Catheter from   Anesthesia Service  []     Medical device information sheets/pamphlets from their    []     School/work excuse note. []     /parent work excuse note. The following personal items collected during your admission are returned to you:   Dental Appliance: Dental Appliances: None  Vision: Visual Aid: Glasses  Hearing Aid:    Jewelry: Jewelry: None  Clothing: Clothing: Footwear, Pants, Shirt, Undergarments  Other Valuables:  Other Valuables: Purse(valuables in purse sent to safe)  Valuables sent to safe: Personal Items Sent to Safe: cell, keys, credit card

## 2018-12-04 NOTE — OP NOTES
Marcial Alatorre [facility] 78 
OPERATIVE REPORT Name:Marcela GILLETTE 
MR#: [id number] : 1960 ACCOUNT #: [de-identified] DATE OF SERVICE: 2018 PREOPERATIVE DIAGNOSES:  Renal failure, polycystic kidneys, gross hematuria, possible bladder mass. POSTOPERATIVE DIAGNOSES:  Renal failure, polycystic kidneys, gross hematuria, possible bladder mass. No bladder mass seen. PROCEDURES PERFORMED:  Cystoscopy, bilateral retrograde pyelograms and bladder barbotage. SURGEON:  Ivette Tejeda MD 
 
ASSISTANT:  0 
 
ANESTHESIA:  General. 
 
INDICATIONS:  A 71-year-old female with above diagnosis admitted with gross hematuria, was thought to have a bladder mass. Her hematuria resolved after irrigating out clots. She presents today to undergo endoscopic evaluation. Noncontrast CT showed polycystic kidney disease. FINDINGS AT TIME OF THE PROCEDURE: 
1. Normal bladder. 2.  Normal ureters on retrograde pyelogram.  Collecting system splayed due to polycystic kidneys with large dilated upper pole collecting systems bilaterally. No obvious filling defect. No blood per ureter on either side. SPECIMENS REMOVED:  Urine FISH. COMPLICATIONS:  None. IMPLANTS:  None. ESTIMATED BLOOD LOSS:  None. DESCRIPTION OF PROCEDURE:  After consent was obtained, the patient was taken to the operating room. After adequate anesthesia was obtained, she was prepped and draped in lithotomy position. The rigid cystoscope was inserted in the bladder per urethra with the above findings noted. Essentially, the bladder was small capacity, but normal.  No lesions seen. I did not see any blood per either ureteral orifice, although she did not efflux urine. Prior to that, I did bladder barbotage with saline, sent that for [facility] and cytology. I then engaged the left ureter with an open-ended catheter, injected contrast retrograde.   There were no ureteral filling defects or obstruction. Collecting system was splayed out and she had a very long upper pole infundibulum with a dilated upper pole calyceal system that drained slowly. There were no obvious filling defects. A similar procedure was performed on the right with similar findings noted with long infundibulum going to the upper pole and splaying of the upper pole calices. The ureter was normal.  No blood was seen to come out through either ureteral orifice. I then drained the bladder, withdrew the scope. Some lidocaine was placed within the urethra. She was awakened from anesthesia and taken to recovery room in stable condition. PLAN:  We will see her back in a few weeks to discuss the results. If anything concerning or if continues to be blood in the urine, have to consider ureteroscopy to get a better look at the upper pole systems or considering MR urogram as well. MD TEJ Vega / [place] 
D: 12/03/2018 14:43 T: 12/03/2018 23:39 
JOB #: [id number]

## 2018-12-17 ENCOUNTER — OFFICE VISIT (OUTPATIENT)
Dept: CARDIOLOGY CLINIC | Age: 58
End: 2018-12-17

## 2018-12-17 VITALS
BODY MASS INDEX: 21.07 KG/M2 | HEIGHT: 64 IN | OXYGEN SATURATION: 99 % | SYSTOLIC BLOOD PRESSURE: 88 MMHG | DIASTOLIC BLOOD PRESSURE: 64 MMHG | WEIGHT: 123.4 LBS | HEART RATE: 78 BPM

## 2018-12-17 DIAGNOSIS — I10 ESSENTIAL HYPERTENSION, BENIGN: ICD-10-CM

## 2018-12-17 DIAGNOSIS — I47.1 SVT (SUPRAVENTRICULAR TACHYCARDIA) (HCC): ICD-10-CM

## 2018-12-17 DIAGNOSIS — I42.8 CARDIOMYOPATHY, NONISCHEMIC (HCC): ICD-10-CM

## 2018-12-17 DIAGNOSIS — I47.1 PAROXYSMAL ATRIAL TACHYCARDIA (HCC): ICD-10-CM

## 2018-12-17 DIAGNOSIS — I50.22 SYSTOLIC CHF, CHRONIC (HCC): ICD-10-CM

## 2018-12-17 DIAGNOSIS — I48.0 PAF (PAROXYSMAL ATRIAL FIBRILLATION) (HCC): Primary | ICD-10-CM

## 2018-12-17 DIAGNOSIS — Z95.810 ICD (IMPLANTABLE CARDIOVERTER-DEFIBRILLATOR) IN PLACE: ICD-10-CM

## 2018-12-17 DIAGNOSIS — I48.0 PAROXYSMAL ATRIAL FIBRILLATION (HCC): ICD-10-CM

## 2018-12-17 NOTE — PROGRESS NOTES
[id number] Freddie Hdez [facility], [address] [facility]    Office [phone]  Fax [phone]    History of Present Illness  Jay Choe is a 60 y.o. female. Last seen 2 months ago. Problem List  Date Reviewed: 12/3/2018          Codes Class Noted    Systolic CHF, chronic (HCC) (Chronic) ICD-10-CM: I50.22  ICD-9-CM: 428.22, 428.0  8/25/2018        Bladder mass ICD-10-CM: N32.89  ICD-9-CM: 596.89  8/24/2018        Gross hematuria ICD-10-CM: R31.0  ICD-9-CM: 599.71  8/24/2018        Paroxysmal atrial fibrillation (HCC) (Chronic) ICD-10-CM: I48.0  ICD-9-CM: 427.31  7/31/2018        ICD (implantable cardioverter-defibrillator) in place (Chronic) ICD-10-CM: Z95.810  ICD-9-CM: V45.02  1/26/2018        Paroxysmal atrial tachycardia (HCC) ICD-10-CM: I47.1  ICD-9-CM: 427.0  4/17/2016        ESRD on peritoneal dialysis [facility]) (Chronic) ICD-10-CM: N18.6, Z99.2  ICD-9-CM: 585.6, V45.11  12/11/2015        Hypokalemia ICD-10-CM: E87.6  ICD-9-CM: 276.8  1/20/2015        Anemia (Chronic) ICD-10-CM: D64.9  ICD-9-CM: 285.9  1/20/2015        Cardiomyopathy, nonischemic ([facility] [address] ICD-10-CM: I42.8  ICD-9-CM: 425.4  10/19/2013    Overview Signed 10/19/2013  1:36 PM by Shine Chun MD     LVEF 25-30% by echo 10/19/2013 (previously 45%)             Polycystic kidney disease (Chronic) ICD-10-CM: Q61.3  ICD-9-CM: 753.12  10/18/2012        Essential hypertension, benign (Chronic) ICD-10-CM: I10  ICD-9-CM: 401.1  10/18/2012            Cardiac Testing  ECHO: 10/14/12: EF 45% w/ posterior HK Grade 1 DD, LAE, mild MR, mild-mod TR RVSP 60 mmHG   Cath: 10/16/12: Normal cors. No AVG. Mild pulm HTN (43/16/26). Low-normal filling pressures. Echo 10/19/13 - EF 25- 30%. Severe diffuse hypokinesis. Mildly increased wall thickness. Mild concentric hypertrophy.  Doppler parameters were consistent with a reversible restrictive pattern, indicative of decreased left ventricular  diastolic compliance and/or increased left atrial pressure (grade 3 diastolic dysfunction). LA mildly dilated, mild MR, mild TR, mod PA HTN, small pericardial effusion circumferential to the heart, no evidence of hemodynamic compromise. Echo 11/2/15 - EF 25-30%, global HK, mild MR  Lexiscan cardiolite 11/24/15 - focal anteroapical ischemia    Cath 12/15/2015 - EDP 10, LV dilated, EF 20% global HK, normal cors with right dominance. Unable to cannulate femoral vein. 12/29/15-implantation of a single-chamber [address] per Dr. Harsh Sands    Echo 1/30/17 - EF 20 %. Severe diffuse hypokinesis. Mild LAE. Mild MR. Mild Pulm HTN. Small pericardial effusion. No significant change when compared to study 02-Nov-2015. Echo 1/26/18- LVEF 28%, severe LAE, PA systolic 50 mmHg    LHC / RHC 6/4/2018: No sig CAD, RA 10, RV 39/5, W 23, PA 35/20/29, PA Sat 47.1%, Ao Sat 83.2%, /7/24 Ao 119/79/90, CO F 3.94, CI F 2.4, CO T 2.75, CI T 1.68    HPI  Ms. Enrico Ogden recently underwent a cystoscopy by Dr. Izabela Díaz, which was normal.    Her CKD is managed by Dr. Amelia Hoyos. She is on PD. She was told she was not currently a candidate for a kidney transplant. She denies any recent hematuria. She has been on Coumadin in the past.     Patient denies any exertional chest pain, dyspnea, palpitations, syncope, orthopnea, edema or paroxysmal nocturnal dyspnea. Current Outpatient Medications on File Prior to Visit   Medication Sig Dispense Refill    aspirin delayed-release 81 mg tablet Take 81 mg by mouth daily.  spironolactone (ALDACTONE) 50 mg tablet Take 50 mg by mouth every morning.  potassium chloride (KLOR-CON M20) 20 mEq tablet Take 40 mEq by mouth every morning.  carvedilol (COREG) 25 mg tablet Take 1 Tab by mouth two (2) times daily (with meals). 60 Tab 6    omeprazole (PRILOSEC) 20 mg capsule Take 20 mg by mouth daily as needed.  SENNA-DOCUSATE SODIUM PO Take 1 Tab by mouth daily as needed (constipation).       cinacalcet (SENSIPAR) 30 mg tablet Take 30 mg by mouth daily (with dinner).  ferric citrate (AURYXIA) 210 mg iron tablet Take 210 mg by mouth three (3) times daily (with meals).  gentamicin (GARAMYCIN) 0.1 % topical cream Apply  to affected area nightly. No current facility-administered medications on file prior to visit. Social History     Socioeconomic History    Marital status: SINGLE     Spouse name: Not on file    Number of children: Not on file    Years of education: Not on file    Highest education level: Not on file   Social Needs    Financial resource strain: Not on file    Food insecurity - worry: Not on file    Food insecurity - inability: Not on file    Transportation needs - medical: Not on file   [employer] needs - non-medical: Not on file   Occupational History    Not on file   Tobacco Use    Smoking status: Never Smoker    Smokeless tobacco: Never Used   Substance and Sexual Activity    Alcohol use: No    Drug use: No    Sexual activity: Not Currently     Partners: Male   Other Topics Concern    Not on file   Social History Narrative    Not on file     Review of Systems  Constitutional:  Negative for fever, chills and diaphoresis. Respiratory: Negative for hemoptysis. Cardiovascular:  Negative for chest pain and claudication. Gastrointestinal:  Negative for blood in stool and melena. Genitourinary:  Negative for dysuria, urgency and flank pain. Musculoskeletal:  Negative for back pain and joint pain. Skin:  Negative for rash. Neurological:  Negative for dizziness, weakness, seizures, loss of consciousness and headaches. Endo/Heme/Allergies:  Does not bruise/bleed easily. Psychiatric/Behavioral:  Negative for memory loss. The patient does not have insomnia.     Visit Vitals  BP (!) 88/64 (BP 1 Location: Right arm, BP Patient Position: Sitting)   Pulse 78   Ht 5' 4\" (1.626 m)   Wt 123 lb 6.4 oz (56 kg)   SpO2 99%   BMI 21.18 kg/m²     Wt Readings from Last 3 Encounters:   12/17/18 123 lb 6.4 oz (56 kg)   12/03/18 124 lb 9 oz (56.5 kg)   11/26/18 121 lb 14.6 oz (55.3 kg)     Physical Exam  Vitals reviewed. Constitutional:  She is oriented to person, place and time. She appears well-nourished. HENT:  Head:  Normocephalic. Neck:  Neck supple. Normal JVP  Cardiovascular:  Normal rate, regular rhythm, normal heart sounds and intact distal pulses. Exam reveals no gallop. No murmur heard. Pulmonary/Chest:  Effort normal and breath sounds normal.  Abdominal:  abd soft  Bowel sounds normal.  Musculoskeletal:  no ankle edema bilaterally. Neurological:  Alert and oriented to person, place and time. Skin:  Skin is warm and dry. Psychiatric:  She has a normal mood and affect. Cardiographics  EKG 4/15/2016 - atrial tachycardia 120   Device interrogation 9/15/17 - No device therapies. No VT (since 2/2017). EKG 9/15/17 - SB, first degree AV block    EKG 9/18/18 - AF vs. Atrial tach/, occ. PVC's    ASSESSMENT and PLAN  Encounter Diagnoses   Name Primary?  PAF (paroxysmal atrial fibrillation) (HCC) Yes    Cardiomyopathy, nonischemic (HCC)     Systolic CHF, chronic ([facility] [address]     ICD (implantable cardioverter-defibrillator) in place     Essential hypertension, benign     Paroxysmal atrial tachycardia (HCC)     Paroxysmal atrial fibrillation (HCC)     SVT (supraventricular tachycardia) ([facility] [address]      Ms. Jim Pascual has a NICM with severe LV dysfunction s/p ICD in the setting of HTN, ESRD on PD. She has class 1-2 sxs on current medical therapy, augmented by PD. Low BPs preclude ACE/ARB at this time. PAF. No sxs of AF. She has been off anticoagulation due to hematuria. Recent  evaluation was unremarkable. I favor resumption, this time with a NOAC - there is data to suggest safety and efficacy with standard dose apixaban. Will discuss with Dr Raimundo Burgos regular ICD checks    Follow-up Disposition:  Return in about 3 months (around 3/17/2019).      Written by yanci Chacko dictated by Dr. Haleigh Lagunas.      Haleigh Lagunas MD

## 2018-12-17 NOTE — PROGRESS NOTES
Patient has no cardiac complaints today     Here today to discuss a blood thinner medication     Visit Vitals  BP (!) 88/64 (BP 1 Location: Right arm, BP Patient Position: Sitting)   Pulse 78   Ht 5' 4\" (1.626 m)   Wt 123 lb 6.4 oz (56 kg)   SpO2 99%   BMI 21.18 kg/m²

## 2019-02-08 ENCOUNTER — CLINICAL SUPPORT (OUTPATIENT)
Dept: CARDIOLOGY CLINIC | Age: 59
End: 2019-02-08

## 2019-02-08 ENCOUNTER — OFFICE VISIT (OUTPATIENT)
Dept: CARDIOLOGY CLINIC | Age: 59
End: 2019-02-08

## 2019-02-08 VITALS
DIASTOLIC BLOOD PRESSURE: 68 MMHG | OXYGEN SATURATION: 99 % | BODY MASS INDEX: 21.31 KG/M2 | HEIGHT: 64 IN | SYSTOLIC BLOOD PRESSURE: 122 MMHG | RESPIRATION RATE: 16 BRPM | HEART RATE: 104 BPM | WEIGHT: 124.8 LBS

## 2019-02-08 DIAGNOSIS — Z95.810 PRESENCE OF AUTOMATIC CARDIOVERTER/DEFIBRILLATOR (AICD): Primary | ICD-10-CM

## 2019-02-08 DIAGNOSIS — I48.0 PAROXYSMAL ATRIAL FIBRILLATION (HCC): Primary | Chronic | ICD-10-CM

## 2019-02-08 RX ORDER — DILTIAZEM HYDROCHLORIDE 120 MG/1
120 CAPSULE, COATED, EXTENDED RELEASE ORAL DAILY
Qty: 30 CAP | Refills: 3 | Status: SHIPPED | OUTPATIENT
Start: 2019-02-08 | End: 2019-03-28 | Stop reason: ALTCHOICE

## 2019-02-08 NOTE — PROGRESS NOTES
HISTORY OF PRESENTING ILLNESS      Brian Linda is a 60 y.o. female with HF, HTN, non ischemic CM, LVEF 25-30%, ESRD on peritoneal dialysis, NYHA class II-III, SVT who presents for follow up of her ICD and SVT. Since our last visit, patient was diagnosed with atrial fibrillation but was felt to be asymptomatic. She recently underwent cystoscopy/bilateral retrograde barbotage and was found to have a normal ureter/bladder appearance with polycystic kidneys. She is planned to start Eliquis but is waiting to hear nephrologist's input. EKG shows tachycardia, possible AF, 118 bpm. She denies cardiac complaints.       ACTIVE PROBLEM LIST     Patient Active Problem List    Diagnosis Date Noted    Systolic CHF, chronic ([address] 08/25/2018    Bladder mass 08/24/2018    Gross hematuria 08/24/2018    Paroxysmal atrial fibrillation ([address] 07/31/2018    ICD (implantable cardioverter-defibrillator) in place 01/26/2018    Paroxysmal atrial tachycardia ([address] 04/17/2016    ESRD on peritoneal dialysis ([address] 12/11/2015    Hypokalemia 01/20/2015    Anemia 01/20/2015    Cardiomyopathy, nonischemic ([address] 10/19/2013    Polycystic kidney disease 10/18/2012    Essential hypertension, benign 10/18/2012           PAST MEDICAL HISTORY     Past Medical History:   Diagnosis Date    Anemia associated with chronic renal failure     Anuria 2014    Arrhythmia     Paroxysmal a fib, paroxsymal atrial tach, SVT    Chronic kidney disease     ESRD- on PD    Chronic systolic heart failure ([address] 10/31/2012    Chronic tachycardia     GERD (gastroesophageal reflux disease)     Hx of non-ST elevation myocardial infarction (NSTEMI) 2011    Hypertension     Lipoma of right lower extremity 1980    Foot    Peritoneal dialysis catheter in place [facility]) 2014    Nightime exchanges    Polycystic kidney disease 2014           PAST SURGICAL HISTORY     Past Surgical History:   Procedure Laterality Date    HX HEART CATHETERIZATION  12/2015 no interventions    HX PACEMAKER  12/29/2015    ICD          ALLERGIES     Allergies   Allergen Reactions    Iodinated Contrast- Oral And Iv Dye Angioedema     Cellulitis on side of face after test          FAMILY HISTORY     Family History   Problem Relation Age of Onset    Diabetes Brother     Hypertension Brother     Hypertension Mother    [place] Arthritis-osteo Mother     Hypertension Sister     Diabetes Sister     Kidney Disease Father         polycystic    Parkinson's Disease Father     Heart Disease Father     Hypertension Father     Hypertension Brother     negative for cardiac disease       SOCIAL HISTORY     Social History     Socioeconomic History    Marital status: SINGLE     Spouse name: Not on file    Number of children: Not on file    Years of education: Not on file    Highest education level: Not on file   Tobacco Use    Smoking status: Never Smoker    Smokeless tobacco: Never Used   Substance and Sexual Activity    Alcohol use: No    Drug use: No    Sexual activity: Not Currently     Partners: Male         MEDICATIONS     Current Outpatient Medications   Medication Sig    aspirin delayed-release 81 mg tablet Take 81 mg by mouth daily.  spironolactone (ALDACTONE) 50 mg tablet Take 50 mg by mouth every morning.  potassium chloride (KLOR-CON M20) 20 mEq tablet Take 40 mEq by mouth every morning.  carvedilol (COREG) 25 mg tablet Take 1 Tab by mouth two (2) times daily (with meals).  omeprazole (PRILOSEC) 20 mg capsule Take 20 mg by mouth daily as needed.  SENNA-DOCUSATE SODIUM PO Take 1 Tab by mouth daily as needed (constipation).  cinacalcet (SENSIPAR) 30 mg tablet Take 30 mg by mouth daily (with dinner).  ferric citrate (AURYXIA) 210 mg iron tablet Take 210 mg by mouth three (3) times daily (with meals).  gentamicin (GARAMYCIN) 0.1 % topical cream Apply  to affected area nightly. No current facility-administered medications for this visit.         I have reviewed the nurses notes, vitals, problem list, allergy list, medical history, family, social history and medications. REVIEW OF SYMPTOMS      General: Pt denies excessive weight gain or loss. Pt is able to conduct ADL's  HEENT: Denies blurred vision, headaches, hearing loss, epistaxis and difficulty swallowing. Respiratory: Denies cough, congestion, shortness of breath, [place], wheezing or stridor. Cardiovascular: Denies precordial pain, palpitations, edema or PND  Gastrointestinal: Denies poor appetite, indigestion, abdominal pain or blood in stool  Genitourinary: Denies hematuria, dysuria, increased urinary frequency  Musculoskeletal: Denies joint pain or swelling from muscles or joints  Neurologic: Denies tremor, paresthesias, headache, or sensory motor disturbance  Psychiatric: Denies confusion, insomnia, depression  Integumentray: Denies rash, itching or ulcers. Hematologic: Denies easy bruising, bleeding       PHYSICAL EXAMINATION      There were no vitals filed for this visit. General: Well developed, in no acute distress. HEENT: No jaundice, oral mucosa moist, no oral ulcers  Neck: Supple, no stiffness, no lymphadenopathy, supple  Heart:  Normal S1/S2 negative S3 or S4. Regular, no murmur, gallop or rub, no jugular venous distention  Respiratory: Clear bilaterally x 4, no wheezing or rales  Abdomen:   Soft, non-tender, bowel sounds are active.   Extremities:  No edema, normal cap refill, no cyanosis. Musculoskeletal: No clubbing, no deformities  Neuro: A&Ox3, speech clear, gait stable, cooperative, no focal neurologic deficits  Skin: Skin color is normal. No rashes or lesions.  Non diaphoretic, moist.  Vascular: 2+ pulses symmetric in all extremities       DIAGNOSTIC DATA      EKG:         LABORATORY DATA      Lab Results   Component Value Date/Time    WBC 7.8 12/03/2018 12:36 PM    Hemoglobin (POC) 9.5 (L) 10/18/2013 03:35 PM    HGB 13.1 12/03/2018 12:36 PM    Hematocrit (POC) 28 (L) 10/18/2013 03:35 PM    HCT 40.9 12/03/2018 12:36 PM    PLATELET 207 (L) 60/39/4105 12:36 PM    MCV 99.8 (H) 12/03/2018 12:36 PM      Lab Results   Component Value Date/Time    Sodium 141 12/03/2018 12:36 PM    Potassium 5.1 12/03/2018 12:36 PM    Chloride 103 12/03/2018 12:36 PM    CO2 26 12/03/2018 12:36 PM    Anion gap 12 12/03/2018 12:36 PM    Glucose 89 12/03/2018 12:36 PM    BUN 41 (H) 12/03/2018 12:36 PM    Creatinine 13.08 (H) 12/03/2018 12:36 PM    BUN/Creatinine ratio 3 (L) 12/03/2018 12:36 PM    GFR est AA 4 (L) 12/03/2018 12:36 PM    GFR est non-AA 3 (L) 12/03/2018 12:36 PM    Calcium 9.5 12/03/2018 12:36 PM    Bilirubin, total 0.9 12/03/2018 12:36 PM    AST (SGOT) 24 12/03/2018 12:36 PM    Alk. phosphatase 80 12/03/2018 12:36 PM    Protein, total 7.4 12/03/2018 12:36 PM    Albumin 3.0 (L) 12/03/2018 12:36 PM    Globulin 4.4 (H) 12/03/2018 12:36 PM    A-G Ratio 0.7 (L) 12/03/2018 12:36 PM    ALT (SGPT) 34 12/03/2018 12:36 PM           ASSESSMENT      1. Cardiomyopathy              A. Non ischemic                B. LV systolic              C. NYHA class III              D. Narrow QRS  2. Hypertension  3. End stage renal disease  4. Supraventricular tachycardia  5. ICD   6. Atrial fibrillation               A. Persistent              B. Asymptomatic       PLAN     Will start Dilitiazem 120mg and follow up in 2 weeks to evaluate for rate control. FOLLOW-UP   2 weeks    Thank you, Slade Bennett MD and Dr. Riya Le for allowing me to participate in the care of this extraordinarily pleasant female. Please do not hesitate to contact me for further questions/concerns. Modesta Flores NP    Patient seen and examined by me with nurse practitioner. I personally performed all components of the history, physical, and medical decision making and agree with the assessment and plan with minor modifications as noted.      Valentina Zavaleta MD  Cardiac Electrophysiology / Cardiology    [address] [facility]  [address] [facility], [facility], [address]  [address]    [place]  [phone] / [phone] Fax   [phone] / [phone] Fax

## 2019-02-08 NOTE — PROGRESS NOTES
Room # 7    Denies any cardiac complaints at this time.      Visit Vitals  /68 (BP 1 Location: Left arm, BP Patient Position: Sitting)   Pulse (!) 104   Resp 16   Ht 5' 4\" (1.626 m)   Wt 124 lb 12.8 oz (56.6 kg)   SpO2 99%   BMI 21.42 kg/m²

## 2019-02-18 ENCOUNTER — OFFICE VISIT (OUTPATIENT)
Dept: OBGYN CLINIC | Age: 59
End: 2019-02-18

## 2019-02-18 VITALS
DIASTOLIC BLOOD PRESSURE: 70 MMHG | SYSTOLIC BLOOD PRESSURE: 128 MMHG | HEIGHT: 64 IN | WEIGHT: 128.2 LBS | BODY MASS INDEX: 21.89 KG/M2

## 2019-02-18 DIAGNOSIS — N89.8 VAGINAL DISCHARGE: Primary | ICD-10-CM

## 2019-02-18 DIAGNOSIS — N89.8 VAGINAL ODOR: ICD-10-CM

## 2019-02-18 LAB — WET MOUNT POCT, WMPOCT: NORMAL

## 2019-02-18 RX ORDER — METRONIDAZOLE 7.5 MG/G
1 GEL VAGINAL
Qty: 70 G | Refills: 0 | Status: SHIPPED | OUTPATIENT
Start: 2019-02-18 | End: 2019-02-23

## 2019-02-18 NOTE — PROGRESS NOTES
Vaginitis evaluation    Chief Complaint   Vaginitis      HPI  60 y.o. female complains of malodorous vaginal discharge for 10+ days. No LMP recorded. Patient is postmenopausal.  She reports additional symptoms at this time:  Severe itching. The patient denies aggravating factors. She is not concerned about possible STI exposure at this time. She denies exposure to new chemicals ot hygenic agents  Previous treatment included: monistat and \"home remedies\"--nothing has helped.  Tried Monistat and Vagisil   On peritoneal dialysis    Past Medical History:   Diagnosis Date    Anemia associated with chronic renal failure     Anuria 2014    Arrhythmia     Paroxysmal a fib, paroxsymal atrial tach, SVT    Chronic kidney disease     ESRD- on PD    Chronic systolic heart failure ([facility] 10/31/2012    Chronic tachycardia     GERD (gastroesophageal reflux disease)     Hx of non-ST elevation myocardial infarction (NSTEMI) 2011    Hypertension     Lipoma of right lower extremity 1980    Foot    Peritoneal dialysis catheter in place [facility]) 2014    Nightime exchanges    Polycystic kidney disease 2014     Past Surgical History:   Procedure Laterality Date    HX HEART CATHETERIZATION  12/2015    no interventions    HX PACEMAKER  12/29/2015    ICD     Social History     Occupational History    Not on file   Tobacco Use    Smoking status: Never Smoker    Smokeless tobacco: Never Used   Substance and Sexual Activity    Alcohol use: No    Drug use: No    Sexual activity: Not Currently     Partners: Male     Family History   Problem Relation Age of Onset    Diabetes Brother     Hypertension Brother     Hypertension Mother     Arthritis-osteo Mother     Hypertension Sister     Diabetes Sister     Kidney Disease Father         polycystic    Parkinson's Disease Father     Heart Disease Father     Hypertension Father     Hypertension Brother         Allergies   Allergen Reactions    Iodinated Contrast- Oral And Iv Dye Angioedema     Cellulitis on side of face after test     Prior to Admission medications    Medication Sig Start Date End Date Taking? Authorizing Provider   metroNIDAZOLE (METROGEL VAGINAL) 0.75 % gel Insert 5 g into vagina nightly for 5 days. 2/18/19 2/23/19 Yes Jamia Moore MD   dilTIAZem CD (CARDIZEM CD) 120 mg ER capsule Take 1 Cap by mouth daily. 2/8/19  Yes Tomy Almendarez NP   apixaban (ELIQUIS) 2.5 mg tablet Take 1 Tab by mouth two (2) times a day. 2/8/19  Yes Tomy Almendarez NP   aspirin delayed-release 81 mg tablet Take 81 mg by mouth daily. Yes Provider, Historical   potassium chloride (KLOR-CON M20) 20 mEq tablet Take 40 mEq by mouth every morning. Yes Provider, Historical   carvedilol (COREG) 25 mg tablet Take 1 Tab by mouth two (2) times daily (with meals). 9/18/18  Yes Fermin Palacios NP   omeprazole (PRILOSEC) 20 mg capsule Take 20 mg by mouth daily as needed. Yes Provider, Historical   SENNA-DOCUSATE SODIUM PO Take 1 Tab by mouth daily as needed (constipation). Yes Provider, Historical   cinacalcet (SENSIPAR) 30 mg tablet Take 30 mg by mouth daily (with dinner). Yes Provider, Historical   ferric citrate (AURYXIA) 210 mg iron tablet Take 210 mg by mouth three (3) times daily (with meals). Yes Provider, Historical   gentamicin (GARAMYCIN) 0.1 % topical cream Apply  to affected area nightly. 7/12/14  Yes Provider, Historical   spironolactone (ALDACTONE) 50 mg tablet Take 50 mg by mouth every morning.     Provider, Historical                      Review of Systems - History obtained from the patient  Constitutional: negative for weight loss, fever, night sweats  Breast: negative for breast lumps, nipple discharge, galactorrhea  GI: negative for change in bowel habits, abdominal pain, black or bloody stools  : negative for frequency, dysuria, hematuria  MSK: negative for back pain, joint pain, muscle pain  Skin: negative for itching, rash, hives  Neuro: negative for dizziness, headache, confusion, weakness  Psych: negative for anxiety, depression, change in mood  Heme/lymph: negative for bleeding, bruising, pallor       Objective:    Visit Vitals  /70 (BP 1 Location: Left arm, BP Patient Position: Sitting)   Ht 5' 4\" (1.626 m)   Wt 128 lb 3.2 oz (58.2 kg)   BMI 22.01 kg/m²       Physical Exam:   PHYSICAL EXAMINATION    Constitutional  · Appearance: well-nourished, well developed, alert, in no acute distress    HENT  · Head and Face: appears normal    Genitourinary  · External Genitalia: normal appearance for age, no discharge present, no tenderness present, no inflammatory lesions present, no masses present, no atrophy present  · Vagina:  Copious yellow discharge present, otherwise normal vaginal vault without central or paravaginal defects, no inflammatory lesions present, no masses present, atrophic  · Bladder: non-tender to palpation  · Urethra: appears normal  · Cervix: normal   · Uterus: normal size, shape and consistency  · Adnexa: no adnexal tenderness present, no adnexal masses present  · Perineum: perineum within normal limits, no evidence of trauma, no rashes or skin lesions present  · Anus: anus within normal limits, no hemorrhoids present  · Inguinal Lymph Nodes: no lymphadenopathy present    Skin  · General Inspection: no rash, no lesions identified    Neurologic/Psychiatric  · Mental Status:  · Orientation: grossly oriented to person, place and time  · Mood and Affect: mood normal, affect appropriate      .     Results for orders placed or performed in visit on 02/18/19   AMB POC SMEAR, STAIN & INTERPRET, WET MOUNT   Result Value Ref Range    Wet mount (POC)      Narrative    SIRIA    Hypae: negative  Buds: negative    Wet Prep:  Trich: negative  Clue cells: pos   Hyphae: negative  Buds: negative  WBC's: normal         Assessment:   Vaginal discharge  BV  atrophy    Plan:   Metrogel vaginal x 5 days  Hydrocortisone externally prn    ROV prn if symptoms persist or worsen.

## 2019-02-18 NOTE — PATIENT INSTRUCTIONS

## 2019-02-20 LAB
A VAGINAE DNA VAG QL NAA+PROBE: NORMAL SCORE
BVAB2 DNA VAG QL NAA+PROBE: NORMAL SCORE
C ALBICANS DNA VAG QL NAA+PROBE: NEGATIVE
C GLABRATA DNA VAG QL NAA+PROBE: NEGATIVE
MEGA1 DNA VAG QL NAA+PROBE: NORMAL SCORE
T VAGINALIS RRNA SPEC QL NAA+PROBE: NEGATIVE

## 2019-02-20 NOTE — PROGRESS NOTES
Left message on machine for patient to call the office or to respond to the [employer] message sent regarding nuswab results.

## 2019-02-22 ENCOUNTER — TELEPHONE (OUTPATIENT)
Dept: CARDIOLOGY CLINIC | Age: 59
End: 2019-02-22

## 2019-03-11 ENCOUNTER — HOSPITAL ENCOUNTER (INPATIENT)
Age: 59
LOS: 8 days | Discharge: HOME HEALTH | DRG: 308 | End: 2019-03-19
Attending: EMERGENCY MEDICINE | Admitting: INTERNAL MEDICINE
Payer: MEDICARE

## 2019-03-11 ENCOUNTER — APPOINTMENT (OUTPATIENT)
Dept: CT IMAGING | Age: 59
DRG: 308 | End: 2019-03-11
Attending: EMERGENCY MEDICINE
Payer: MEDICARE

## 2019-03-11 ENCOUNTER — APPOINTMENT (OUTPATIENT)
Dept: GENERAL RADIOLOGY | Age: 59
DRG: 308 | End: 2019-03-11
Attending: EMERGENCY MEDICINE
Payer: MEDICARE

## 2019-03-11 DIAGNOSIS — I47.1 SVT (SUPRAVENTRICULAR TACHYCARDIA) (HCC): ICD-10-CM

## 2019-03-11 DIAGNOSIS — E87.20 LACTIC ACIDOSIS: Primary | ICD-10-CM

## 2019-03-11 DIAGNOSIS — R41.82 ALTERED MENTAL STATUS, UNSPECIFIED ALTERED MENTAL STATUS TYPE: ICD-10-CM

## 2019-03-11 DIAGNOSIS — G93.40 ENCEPHALOPATHY: ICD-10-CM

## 2019-03-11 PROBLEM — I10 HTN (HYPERTENSION): Status: ACTIVE | Noted: 2019-03-11

## 2019-03-11 PROBLEM — K21.9 GERD (GASTROESOPHAGEAL REFLUX DISEASE): Status: ACTIVE | Noted: 2019-03-11

## 2019-03-11 PROBLEM — R19.7 DIARRHEA: Status: ACTIVE | Noted: 2019-03-11

## 2019-03-11 PROBLEM — R11.2 NAUSEA & VOMITING: Status: ACTIVE | Noted: 2019-03-11

## 2019-03-11 PROBLEM — R47.1 DYSARTHRIA: Status: ACTIVE | Noted: 2019-03-11

## 2019-03-11 LAB
ALBUMIN SERPL-MCNC: 2.8 G/DL (ref 3.5–5)
ALBUMIN/GLOB SERPL: 0.6 {RATIO} (ref 1.1–2.2)
ALP SERPL-CCNC: 82 U/L (ref 45–117)
ALT SERPL-CCNC: 37 U/L (ref 12–78)
ANION GAP SERPL CALC-SCNC: 23 MMOL/L (ref 5–15)
AST SERPL-CCNC: 50 U/L (ref 15–37)
BASOPHILS # BLD: 0 K/UL (ref 0–0.1)
BASOPHILS NFR BLD: 0 % (ref 0–1)
BILIRUB SERPL-MCNC: 1.7 MG/DL (ref 0.2–1)
BUN SERPL-MCNC: 105 MG/DL (ref 6–20)
BUN/CREAT SERPL: 5 (ref 12–20)
CALCIUM SERPL-MCNC: 8.3 MG/DL (ref 8.5–10.1)
CHLORIDE SERPL-SCNC: 99 MMOL/L (ref 97–108)
CO2 SERPL-SCNC: 15 MMOL/L (ref 21–32)
COMMENT, HOLDF: NORMAL
CREAT SERPL-MCNC: 21.9 MG/DL (ref 0.55–1.02)
DIFFERENTIAL METHOD BLD: ABNORMAL
EOSINOPHIL # BLD: 0 K/UL (ref 0–0.4)
EOSINOPHIL NFR BLD: 0 % (ref 0–7)
ERYTHROCYTE [DISTWIDTH] IN BLOOD BY AUTOMATED COUNT: 13.3 % (ref 11.5–14.5)
FLUAV AG NPH QL IA: NEGATIVE
FLUBV AG NOSE QL IA: NEGATIVE
GLOBULIN SER CALC-MCNC: 4.6 G/DL (ref 2–4)
GLUCOSE SERPL-MCNC: 133 MG/DL (ref 65–100)
HCT VFR BLD AUTO: 37.7 % (ref 35–47)
HGB BLD-MCNC: 12.2 G/DL (ref 11.5–16)
IMM GRANULOCYTES # BLD AUTO: 0.1 K/UL (ref 0–0.04)
IMM GRANULOCYTES NFR BLD AUTO: 1 % (ref 0–0.5)
LACTATE BLD-SCNC: 5.42 MMOL/L (ref 0.4–2)
LYMPHOCYTES # BLD: 0.7 K/UL (ref 0.8–3.5)
LYMPHOCYTES NFR BLD: 6 % (ref 12–49)
MAGNESIUM SERPL-MCNC: 2.3 MG/DL (ref 1.6–2.4)
MCH RBC QN AUTO: 34.1 PG (ref 26–34)
MCHC RBC AUTO-ENTMCNC: 32.4 G/DL (ref 30–36.5)
MCV RBC AUTO: 105.3 FL (ref 80–99)
MONOCYTES # BLD: 0.4 K/UL (ref 0–1)
MONOCYTES NFR BLD: 3 % (ref 5–13)
NEUTS SEG # BLD: 11.1 K/UL (ref 1.8–8)
NEUTS SEG NFR BLD: 90 % (ref 32–75)
NRBC # BLD: 0 K/UL (ref 0–0.01)
NRBC BLD-RTO: 0 PER 100 WBC
PLATELET # BLD AUTO: 160 K/UL (ref 150–400)
PMV BLD AUTO: 10.7 FL (ref 8.9–12.9)
POTASSIUM SERPL-SCNC: 5.5 MMOL/L (ref 3.5–5.1)
PROT SERPL-MCNC: 7.4 G/DL (ref 6.4–8.2)
RBC # BLD AUTO: 3.58 M/UL (ref 3.8–5.2)
RBC MORPH BLD: ABNORMAL
SAMPLES BEING HELD,HOLD: NORMAL
SODIUM SERPL-SCNC: 137 MMOL/L (ref 136–145)
TROPONIN I SERPL-MCNC: 1.47 NG/ML
TROPONIN I SERPL-MCNC: 1.53 NG/ML
WBC # BLD AUTO: 12.3 K/UL (ref 3.6–11)
WBC MORPH BLD: ABNORMAL

## 2019-03-11 PROCEDURE — 74011250636 HC RX REV CODE- 250/636: Performed by: INTERNAL MEDICINE

## 2019-03-11 PROCEDURE — 77030022643 HC PAD DEFIB AD HRTSTRT PHL -B

## 2019-03-11 PROCEDURE — 74011250636 HC RX REV CODE- 250/636: Performed by: EMERGENCY MEDICINE

## 2019-03-11 PROCEDURE — 96375 TX/PRO/DX INJ NEW DRUG ADDON: CPT

## 2019-03-11 PROCEDURE — 36415 COLL VENOUS BLD VENIPUNCTURE: CPT

## 2019-03-11 PROCEDURE — 74011250637 HC RX REV CODE- 250/637: Performed by: INTERNAL MEDICINE

## 2019-03-11 PROCEDURE — 96367 TX/PROPH/DG ADDL SEQ IV INF: CPT

## 2019-03-11 PROCEDURE — 71045 X-RAY EXAM CHEST 1 VIEW: CPT

## 2019-03-11 PROCEDURE — 85025 COMPLETE CBC W/AUTO DIFF WBC: CPT

## 2019-03-11 PROCEDURE — 87804 INFLUENZA ASSAY W/OPTIC: CPT

## 2019-03-11 PROCEDURE — 87040 BLOOD CULTURE FOR BACTERIA: CPT

## 2019-03-11 PROCEDURE — A4722 DIALYS SOL FLD VOL > 1999CC: HCPCS | Performed by: INTERNAL MEDICINE

## 2019-03-11 PROCEDURE — 80053 COMPREHEN METABOLIC PANEL: CPT

## 2019-03-11 PROCEDURE — 96365 THER/PROPH/DIAG IV INF INIT: CPT

## 2019-03-11 PROCEDURE — 93005 ELECTROCARDIOGRAM TRACING: CPT

## 2019-03-11 PROCEDURE — 96361 HYDRATE IV INFUSION ADD-ON: CPT

## 2019-03-11 PROCEDURE — 83735 ASSAY OF MAGNESIUM: CPT

## 2019-03-11 PROCEDURE — 84484 ASSAY OF TROPONIN QUANT: CPT

## 2019-03-11 PROCEDURE — 74011000258 HC RX REV CODE- 258: Performed by: EMERGENCY MEDICINE

## 2019-03-11 PROCEDURE — 65270000029 HC RM PRIVATE

## 2019-03-11 PROCEDURE — 83605 ASSAY OF LACTIC ACID: CPT

## 2019-03-11 PROCEDURE — 70450 CT HEAD/BRAIN W/O DYE: CPT

## 2019-03-11 PROCEDURE — 99285 EMERGENCY DEPT VISIT HI MDM: CPT

## 2019-03-11 RX ORDER — SODIUM CHLORIDE 0.9 % (FLUSH) 0.9 %
5-40 SYRINGE (ML) INJECTION AS NEEDED
Status: DISCONTINUED | OUTPATIENT
Start: 2019-03-11 | End: 2019-03-19 | Stop reason: HOSPADM

## 2019-03-11 RX ORDER — SODIUM CHLORIDE 0.9 % (FLUSH) 0.9 %
5-40 SYRINGE (ML) INJECTION EVERY 8 HOURS
Status: DISCONTINUED | OUTPATIENT
Start: 2019-03-11 | End: 2019-03-19 | Stop reason: HOSPADM

## 2019-03-11 RX ORDER — HYDROCORTISONE 25 MG/G
CREAM TOPICAL 4 TIMES DAILY
Status: DISCONTINUED | OUTPATIENT
Start: 2019-03-11 | End: 2019-03-19 | Stop reason: HOSPADM

## 2019-03-11 RX ORDER — ADENOSINE 3 MG/ML
6 INJECTION, SOLUTION INTRAVENOUS
Status: COMPLETED | OUTPATIENT
Start: 2019-03-11 | End: 2019-03-11

## 2019-03-11 RX ORDER — ONDANSETRON 2 MG/ML
4 INJECTION INTRAMUSCULAR; INTRAVENOUS
Status: DISCONTINUED | OUTPATIENT
Start: 2019-03-11 | End: 2019-03-19 | Stop reason: HOSPADM

## 2019-03-11 RX ADMIN — ONDANSETRON 4 MG: 2 INJECTION INTRAMUSCULAR; INTRAVENOUS at 22:29

## 2019-03-11 RX ADMIN — PIPERACILLIN SODIUM,TAZOBACTAM SODIUM 3.38 G: 3; .375 INJECTION, POWDER, FOR SOLUTION INTRAVENOUS at 17:53

## 2019-03-11 RX ADMIN — AMIODARONE HYDROCHLORIDE 1 MG/MIN: 50 INJECTION, SOLUTION INTRAVENOUS at 19:28

## 2019-03-11 RX ADMIN — SODIUM CHLORIDE 500 ML: 900 INJECTION, SOLUTION INTRAVENOUS at 16:39

## 2019-03-11 RX ADMIN — Medication 10 ML: at 22:00

## 2019-03-11 RX ADMIN — HYDROCORTISONE 2.5%: 25 CREAM TOPICAL at 22:57

## 2019-03-11 RX ADMIN — SODIUM CHLORIDE, SODIUM LACTATE, CALCIUM CHLORIDE, MAGNESIUM CHLORIDE AND DEXTROSE 2000 ML: 1.5; 538; 448; 18.3; 5.08 INJECTION, SOLUTION INTRAPERITONEAL at 22:00

## 2019-03-11 RX ADMIN — AMIODARONE HYDROCHLORIDE 150 MG: 50 INJECTION, SOLUTION INTRAVENOUS at 18:56

## 2019-03-11 RX ADMIN — ADENOSINE 6 MG: 3 INJECTION, SOLUTION INTRAVENOUS at 17:30

## 2019-03-11 NOTE — ED NOTES
Patient requesting water at this time. Dr. Cardona Filter states patient is to be NPO at this time. Patient informed and verbalizes understanding.

## 2019-03-11 NOTE — CONSULTS
Prosper Corona MD. [facility] [place]              Patient: Jade Lowe  : 1960      Today's Date: 3/11/2019      HISTORY OF PRESENT ILLNESS:     History of Present Illness:  Jade Lowe is a 60 y.o. female with HF, HTN, non ischemic CM, LVEF 25-30%, ESRD on peritoneal dialysis, NYHA class II-III, SVT (atrial tachycardia) who comes to ER after she \"lost a day\" - memory loss - also vomiting and diarrhea today. Per ER - \"More than 10 episodes of vomiting. More than 10 episodes of diarrhea. Pt's relative went over Pt's house at 1330 today. Asked Pt if she had dialysis, Pt was unable to remember because she thought today was . Pt receives peritoneal dialysis, unsure of her last dialysis. \"     Cardiology asked to see for SVT - EKG shows SVT rate ~ 150 bpm.      Patient denies any chest pain or heart racing complaints. Has HENDERSON walking. Says she has not taken any of her meds for over a day since she slept all day yesterday. PAST MEDICAL HISTORY:     Past Medical History:   Diagnosis Date    Anemia associated with chronic renal failure     Anuria     Arrhythmia     Paroxysmal a fib, paroxsymal atrial tach, SVT    Chronic kidney disease     ESRD- on PD    Chronic systolic heart failure ([address] 10/31/2012    Chronic tachycardia     GERD (gastroesophageal reflux disease)     Hx of non-ST elevation myocardial infarction (NSTEMI)     Hypertension     Lipoma of right lower extremity 1980    Foot    Peritoneal dialysis catheter in place [facility]) 2014    Nightime exchanges    Polycystic kidney disease 2014         Past Surgical History:   Procedure Laterality Date    HX HEART CATHETERIZATION  2015    no interventions    HX PACEMAKER  2015    ICD         MEDICATIONS:     No current facility-administered medications on file prior to encounter.       Current Outpatient Medications on File Prior to Encounter   Medication Sig Dispense Refill    dilTIAZem CD (CARDIZEM CD) 120 mg ER capsule Take 1 Cap by mouth daily. 30 Cap 3    apixaban (ELIQUIS) 2.5 mg tablet Take 1 Tab by mouth two (2) times a day. 60 Tab 3    aspirin delayed-release 81 mg tablet Take 81 mg by mouth daily.  spironolactone (ALDACTONE) 50 mg tablet Take 50 mg by mouth every morning.  potassium chloride (KLOR-CON M20) 20 mEq tablet Take 40 mEq by mouth every morning.  carvedilol (COREG) 25 mg tablet Take 1 Tab by mouth two (2) times daily (with meals). 60 Tab 6    omeprazole (PRILOSEC) 20 mg capsule Take 20 mg by mouth daily as needed.  SENNA-DOCUSATE SODIUM PO Take 1 Tab by mouth daily as needed (constipation).  cinacalcet (SENSIPAR) 30 mg tablet Take 30 mg by mouth daily (with dinner).  ferric citrate (AURYXIA) 210 mg iron tablet Take 210 mg by mouth three (3) times daily (with meals).  gentamicin (GARAMYCIN) 0.1 % topical cream Apply  to affected area nightly. Allergies   Allergen Reactions    Iodinated Contrast- Oral And Iv Dye Angioedema     Cellulitis on side of face after test             SOCIAL HISTORY:     Social History     Tobacco Use    Smoking status: Never Smoker    Smokeless tobacco: Never Used   Substance Use Topics    Alcohol use: No    Drug use: No         FAMILY HISTORY:     Family History   Problem Relation Age of Onset    Diabetes Brother     Hypertension Brother     Hypertension Mother    [facility] Arthritis-osteo Mother     Hypertension Sister     Diabetes Sister     Kidney Disease Father         polycystic    Parkinson's Disease Father     Heart Disease Father     Hypertension Father     Hypertension Brother            REVIEW OF SYMPTOMS:     Review of Symptoms:  Constitutional: Negative for fever  HEENT: Negative for nosebleeds, tinnitus, and vision changes. Respiratory: Negative for cough, wheezing  Cardiovascular: Negative for orthopnea, syncope, and PND.    Gastrointestinal: + N/V/D  Genitourinary: Negative for dysuria  Musculoskeletal: Negative for myalgias. Skin: Negative for rash  Heme: No problems bleeding. Neurological: Negative for speech change and focal weakness. PHYSICAL EXAM:     Physical Exam:  Visit Vitals  /89   Pulse (!) 124   Temp 99.3 °F (37.4 °C)   Resp 26   Ht 5' 5\" (1.651 m)   Wt 120 lb (54.4 kg)   SpO2 97%   BMI 19.97 kg/m²     Patient in NAD  HEENT:  Hearing intact, non-icteric, normocephalic, atraumatic. Neck Exam: Supple  Lung Exam: Clear to auscultation, even breath sounds. Cardiac Exam: tachycardic with no murmur  Abdomen: Soft, mildly-tende  Extremities: Moves all ext well. No lower extremity edema. Psych: Appropriate affect  Neuro - Grossly intact        LABS / OTHER STUDIES:     Recent Results (from the past 24 hour(s))   CBC WITH AUTOMATED DIFF    Collection Time: 03/11/19  4:42 PM   Result Value Ref Range    WBC 12.3 (H) 3.6 - 11.0 K/uL    RBC 3.58 (L) 3.80 - 5.20 M/uL    HGB 12.2 11.5 - 16.0 g/dL    HCT 37.7 35.0 - 47.0 %    .3 (H) 80.0 - 99.0 FL    MCH 34.1 (H) 26.0 - 34.0 PG    MCHC 32.4 30.0 - 36.5 g/dL    RDW 13.3 11.5 - 14.5 %    PLATELET 132 177 - 066 K/uL    MPV 10.7 8.9 - 12.9 FL    NRBC 0.0 0  WBC    ABSOLUTE NRBC 0.00 0.00 - 0.01 K/uL    NEUTROPHILS 90 (H) 32 - 75 %    LYMPHOCYTES 6 (L) 12 - 49 %    MONOCYTES 3 (L) 5 - 13 %    EOSINOPHILS 0 0 - 7 %    BASOPHILS 0 0 - 1 %    IMMATURE GRANULOCYTES 1 (H) 0.0 - 0.5 %    ABS. NEUTROPHILS 11.1 (H) 1.8 - 8.0 K/UL    ABS. LYMPHOCYTES 0.7 (L) 0.8 - 3.5 K/UL    ABS. MONOCYTES 0.4 0.0 - 1.0 K/UL    ABS. EOSINOPHILS 0.0 0.0 - 0.4 K/UL    ABS. BASOPHILS 0.0 0.0 - 0.1 K/UL    ABS. IMM.  GRANS. 0.1 (H) 0.00 - 0.04 K/UL    DF AUTOMATED      RBC COMMENTS NORMOCYTIC, NORMOCHROMIC      WBC COMMENTS REACTIVE LYMPHS     METABOLIC PANEL, COMPREHENSIVE    Collection Time: 03/11/19  4:42 PM   Result Value Ref Range    Sodium 137 136 - 145 mmol/L    Potassium 5.5 (H) 3.5 - 5.1 mmol/L    Chloride 99 97 - 108 mmol/L    CO2 15 (LL) 21 - 32 mmol/L    Anion gap 23 (H) 5 - 15 mmol/L    Glucose 133 (H) 65 - 100 mg/dL     (H) 6 - 20 MG/DL    Creatinine 21.90 (H) 0.55 - 1.02 MG/DL    BUN/Creatinine ratio 5 (L) 12 - 20      GFR est AA 2 (L) >60 ml/min/1.73m2    GFR est non-AA 2 (L) >60 ml/min/1.73m2    Calcium 8.3 (L) 8.5 - 10.1 MG/DL    Bilirubin, total 1.7 (H) 0.2 - 1.0 MG/DL    ALT (SGPT) 37 12 - 78 U/L    AST (SGOT) 50 (H) 15 - 37 U/L    Alk. phosphatase 82 45 - 117 U/L    Protein, total 7.4 6.4 - 8.2 g/dL    Albumin 2.8 (L) 3.5 - 5.0 g/dL    Globulin 4.6 (H) 2.0 - 4.0 g/dL    A-G Ratio 0.6 (L) 1.1 - 2.2     SAMPLES BEING HELD    Collection Time: 03/11/19  4:42 PM   Result Value Ref Range    SAMPLES BEING HELD [email or web address]     COMMENT        Add-on orders for these samples will be processed based on acceptable specimen integrity and analyte stability, which may vary by analyte. MAGNESIUM    Collection Time: 03/11/19  4:42 PM   Result Value Ref Range    Magnesium 2.3 1.6 - 2.4 mg/dL   TROPONIN I    Collection Time: 03/11/19  4:42 PM   Result Value Ref Range    Troponin-I, Qt. 1.53 (H) <0.05 ng/mL   INFLUENZA A & B AG (RAPID TEST)    Collection Time: 03/11/19  4:42 PM   Result Value Ref Range    Influenza A Antigen NEGATIVE  NEG      Influenza B Antigen NEGATIVE  NEG     POC LACTIC ACID    Collection Time: 03/11/19  4:44 PM   Result Value Ref Range    Lactic Acid (POC) 5.42 (HH) 0.40 - 2.00 mmol/L           CARDIAC DIAGNOSTICS:     Cardiac Evaluation Includes:  ECHO: 10/14/12: EF 45% w/ posterior HK Grade 1 DD, LAE, mild MR, mild-mod TR RVSP 60 mmHG   Cath: 10/16/12: Normal cors. No AVG. Mild pulm HTN (43/16/26). Low-normal filling pressures. Echo 10/19/13 - EF 25- 30%. Severe diffuse hypokinesis. Mildly increased wall thickness. Mild concentric hypertrophy.  Doppler parameters were consistent with a reversible restrictive pattern, indicative of decreased left ventricular  diastolic compliance and/or increased left atrial pressure (grade 3 diastolic dysfunction). LA mildly dilated, mild MR, mild TR, mod PA HTN, small pericardial effusion circumferential to the heart, no evidence of hemodynamic compromise. Echo 11/2/15 - EF 25-30%, global HK, mild MR  Lexiscan cardiolite 11/24/15 - focal anteroapical ischemia    Cath 12/15/2015 - EDP 10, LV dilated, EF 20% global HK, normal cors with right dominance. Unable to cannulate femoral vein. 12/29/15-implantation of a single-chamber [address] per Dr. Aldo Montanez    Echo 1/30/17 - EF 20 %. Severe diffuse hypokinesis. Mild LAE. Mild MR. Mild Pulm HTN. Small pericardial effusion. No significant change when compared to study 02-Nov-2015. Echo 1/26/18- LVEF 28%, severe LAE, PA systolic 50 mmHg    LHC / RHC 6/4/2018: No sig CAD, RA 10, RV 39/5, W 23, PA 35/20/29, PA Sat 47.1%, Ao Sat 83.2%, /7/24 Ao 119/79/90, CO F 3.94, CI F 2.4, CO T 2.75, CI T 1.68        ASSESSMENT AND PLAN:     Assessment and Plan:  1) SVT   - She has a history of atrial tach and atrial fibrillation   - current SVT could be junctional tach or atrial tach? - Looking at her overall story, the tachycardia is likely secondary to her acute illness  - Agree with gentle hydration    - Given severely reduced LVEF, would try and avoid using Cardizem. Can try metoprolol IV and if BP is low, consider IV amiodarone. 2) Elevated troponin   - denies CP and no ischemic EKG changes   - elevated trop likely due to acute illness and not an ACS  - recheck an echo tomorrow     3) Non-ischemic cardiomyopathy   - history of severe LV dysfunction   - At this time is being hydrated for suspected dehydration/sepsis?   - would gradually resume her cardiomyopathy if BP remains stable     4) History of PAF  - has been on Elqiuis     5) ESRD on PD  - Apparently missed PD yesterday     Vero Lerma MD, [address]  [address] [facility], [address], [address] [place] [address]  Ph: [phone]    [phone]

## 2019-03-11 NOTE — ED TRIAGE NOTES
Patient states \" I lost a day\". Patient relays last day she remembers was 2 days ago. Patient states she has been sick, ? When last time peritoneal dialysis was, has vomiting and diarrhea.

## 2019-03-11 NOTE — ED PROVIDER NOTES
Not feeling like herself today. Reports memory loss, last day she remembers is Saturday (2 days ago). V/D today. More than 10 episodes of vomiting. More than 10 episodes of diarrhea. Pt's relative went over Pt's house at 1330 today. Asked Pt if she had dialysis, Pt was unable to remember because she thought today was Sunday. Pt receives peritoneal dialysis, unsure of her last dialysis. Pt does not make urine. Unsure of last meal, had some Ginger Ale PTA. Mild cough. Denies hx of PNA or sepsis. Denies fever or abdominal pain. I have evaluated the patient as the Provider in Triage. I have reviewed Her vital signs and the triage nurse assessment. I have talked with the patient and any available family and advised that I am the provider in triage and have ordered the appropriate study to initiate their work up based on the clinical presentation during my assessment. I have advised that the patient will be accommodated in the Main ED as soon as possible. I have also requested to contact the triage nurse or myself immediately if the patient experiences any changes in their condition during this brief waiting period. Note written by Amor Montoya, as dictated by Hallie San MD 3:48 PM      60 y.o. female with past medical history significant for anemia, CKD, GERD, arrhythmia, peritoneal catheter, CHF, NSTEMI, HTN, and polycystic kidney disease who presents from home via private vehicle for unexplainable memory loss. Patient states she \"lost a day\" and does not remember anything from yesterday - she slept through the entire day. She awoke today thinking it was Jagdish 3/10/2019 and did not present to work - c/o shortness of breath exacerbated by movement and dry cough since this morning, noting diarrhea and vomiting a few days ago. She also notes bloody stool due to hemorrhoids. Patient has a pacemaker. Denies history of blood clots.  Specifically denies chest pain, leg swelling, and any other pain or symptoms. There are no other acute medical concerns at this time. Social hx: Never tobacco smoker; Denies EtOH use; Denies illicit drug use  PCP: Henrry Montes MD    Note written by Amor Thibodeaux, as dictated by Radha Younger MD 5:00 PM        The history is provided by the patient. No  was used.         Past Medical History:   Diagnosis Date    Anemia associated with chronic renal failure     Anuria 2014    Arrhythmia     Paroxysmal a fib, paroxsymal atrial tach, SVT    Chronic kidney disease     ESRD- on PD    Chronic systolic heart failure ([address] 10/31/2012    Chronic tachycardia     GERD (gastroesophageal reflux disease)     Hx of non-ST elevation myocardial infarction (NSTEMI) 2011    Hypertension     Lipoma of right lower extremity 1980    Foot    Peritoneal dialysis [facility] in [facility]) 2014    Nightime exchanges    Polycystic kidney disease 2014       Past Surgical History:   Procedure Laterality Date    HX HEART CATHETERIZATION  12/2015    no interventions    HX PACEMAKER  12/29/2015    ICD         Family History:   Problem Relation Age of Onset    Diabetes Brother     Hypertension Brother     Hypertension Mother     Arthritis-osteo Mother     Hypertension Sister     Diabetes Sister     Kidney Disease Father         polycystic    Parkinson's Disease Father     Heart Disease Father     Hypertension Father     Hypertension Brother        Social History     Socioeconomic History    Marital status: SINGLE     Spouse name: Not on file    Number of children: Not on file    Years of education: Not on file    Highest education level: Not on file   Social Needs    Financial resource strain: Not on file    Food insecurity - worry: Not on file    Food insecurity - inability: Not on file   [employer] needs - medical: Not on file   [employer] needs - non-medical: Not on file   Occupational History    Not on file   Tobacco Use  Smoking status: Never Smoker    Smokeless tobacco: Never Used   Substance and Sexual Activity    Alcohol use: No    Drug use: No    Sexual activity: Not Currently     Partners: Male   Other Topics Concern    Not on file   Social History Narrative    Not on file         ALLERGIES: Iodinated contrast- oral and iv dye    Review of Systems   Constitutional: Negative for chills and fever. HENT: Negative for ear pain and sore throat. Eyes: Negative for pain. Respiratory: Positive for cough and shortness of breath. Negative for chest tightness. Cardiovascular: Negative for chest pain and leg swelling. Gastrointestinal: Positive for blood in stool, diarrhea and vomiting. Negative for abdominal pain and nausea. Genitourinary: Negative for dysuria and flank pain. Musculoskeletal: Negative for back pain. Skin: Negative for rash. Neurological: Negative for headaches. Psychiatric/Behavioral: Positive for confusion. All other systems reviewed and are negative. There were no vitals filed for this visit. Physical Exam   Constitutional: No distress. HENT:   Head: Normocephalic and atraumatic. Mouth/Throat: Oropharynx is clear and moist.   Eyes: Pupils are equal, round, and reactive to light. No scleral icterus. Pt has pale conjunctiva   Neck: Neck supple. No tracheal deviation present. Cardiovascular: Regular rhythm and intact distal pulses. Tachycardia present. Pt is tachycardic. Pulmonary/Chest: Effort normal. No respiratory distress. She has no wheezes. She has no rales. Abdominal: Soft. She exhibits no distension. There is no tenderness. Genitourinary:   Genitourinary Comments: deferred   Musculoskeletal: She exhibits no edema or deformity. Neurological: She is alert. No cranial nerve deficit or sensory deficit. No cranial nerve deficit. Normal motor sensory of upper and lower extremities. Skin: Skin is warm and dry.    Psychiatric: She has a normal mood and affect. Nursing note and vitals reviewed. Note written by Amor Celesitn, as dictated by Paula Iqbal MD 5:00 PM    MDM       Procedures         CONSULT NOTE:  6:03 PM Heather Ponce MD spoke with Dr. Micheline Khan, Consult for Cardiology. Discussed available diagnostic tests and clinical findings. Dr. Micheline Khan recommends 5 of Metoprolol IV. CONSULT NOTE:  6:07 PM Paula Iqbal MD spoke with Dr. Micheline Khan, Consult for Cardiology. Discussed available diagnostic tests and clinical findings. Dr. Micheline Khan changes his previous recommendation and recommends patient is given Amiodarone instead. 7:16 PM  Patient is being admitted to the hospital.  The results of their tests and reasons for their admission have been discussed with them and/or available family. They convey agreement and understanding for the need to be admitted and for their admission diagnosis. Consultation has been made with the inpatient physician specialist for hospitalization. LABORATORY TESTS:  Labs Reviewed   CBC WITH AUTOMATED DIFF - Abnormal; Notable for the following components:       Result Value    WBC 12.3 (*)     RBC 3.58 (*)     .3 (*)     MCH 34.1 (*)     NEUTROPHILS 90 (*)     LYMPHOCYTES 6 (*)     MONOCYTES 3 (*)     IMMATURE GRANULOCYTES 1 (*)     ABS. NEUTROPHILS 11.1 (*)     ABS. LYMPHOCYTES 0.7 (*)     ABS. IMM.  GRANS. 0.1 (*)     All other components within normal limits   METABOLIC PANEL, COMPREHENSIVE - Abnormal; Notable for the following components:    Potassium 5.5 (*)     CO2 15 (*)     Anion gap 23 (*)     Glucose 133 (*)      (*)     Creatinine 21.90 (*)     BUN/Creatinine ratio 5 (*)     GFR est AA 2 (*)     GFR est non-AA 2 (*)     Calcium 8.3 (*)     Bilirubin, total 1.7 (*)     AST (SGOT) 50 (*)     Albumin 2.8 (*)     Globulin 4.6 (*)     A-G Ratio 0.6 (*)     All other components within normal limits   TROPONIN I - Abnormal; Notable for the following components: Troponin-I, Qt. 1.53 (*)     All other components within normal limits   POC LACTIC ACID - Abnormal; Notable for the following components:    Lactic Acid (POC) 5.42 (*)     All other components within normal limits   CULTURE, BLOOD, PERIPHERAL   CULTURE, BLOOD, PERIPHERAL   INFLUENZA A & B AG (RAPID TEST)   SAMPLES BEING HELD   MAGNESIUM   SAMPLE TO BLOOD BANK       IMAGING RESULTS:  Ct Head Wo Cont    Result Date: 3/11/2019  EXAM: CT HEAD WO CONT INDICATION: AMS COMPARISON: None. CONTRAST: None. TECHNIQUE: Unenhanced CT of the head was performed using 5 mm images. Brain and bone windows were generated. CT dose reduction was achieved through use of a standardized protocol tailored for this examination and automatic exposure control for dose modulation. FINDINGS: There is no extra-axial fluid collection hemorrhage or shift no masses. Possible  small remote right cerebellar infarct. IMPRESSION: No acute findings. Xr Chest Port    Result Date: 3/11/2019  Clinical indication: Weakness, hypertension, heart failure. Arrhythmia. Portable AP semiupright view of the chest is obtained compared to August 26, 2018. Patient has a cardiac pacemaker. The heart size is normal. There is no acute infiltrate. impression: No acute findings seen. MEDICATIONS GIVEN:  Medications   amiodarone (CORDARONE) 375 mg in dextrose 5% 250 mL infusion (not administered)   sodium chloride 0.9 % bolus infusion 500 mL (0 mL IntraVENous IV Completed 3/11/19 1753)   adenosine (ADENOCARD) injection 6 mg (6 mg IntraVENous Given 3/11/19 1730)   piperacillin-tazobactam (ZOSYN) 3.375 g in 0.9% sodium chloride (MBP/ADV) 100 mL (0 g IntraVENous IV Completed 3/11/19 1823)   amiodarone (CORDARONE) 150 mg in dextrose 5% 100 mL bolus infusion (0 mg IntraVENous IV Completed 3/11/19 1912)       IMPRESSION:  1. Lactic acidosis    2. SVT (supraventricular tachycardia) (HCC)        PLAN:  1.  Admit to hospitalist    Total critical care time spent exclusive of procedures:  44 minutes      Cody Solares MD        ED EKG interpretation: 1557  Rhythm: Supraventricular tachycardia. Rate (approx.): 148 bpm.  Note written by Amor Cronin, as dictated by Miguel Angel Musa MD 7:52 PM    ED Repeat EKG interpretation: 1735  Rhythm: Supraventricular tachycardia. Rate (approx.): 143 bpm; Axis: left axis deviation; Wide QRS.   Note written by Amor Cronin, as dictated by Miguel Angel Musa MD 7:52 PM

## 2019-03-12 ENCOUNTER — APPOINTMENT (OUTPATIENT)
Dept: NON INVASIVE DIAGNOSTICS | Age: 59
DRG: 308 | End: 2019-03-12
Attending: SPECIALIST
Payer: MEDICARE

## 2019-03-12 LAB
ALBUMIN SERPL-MCNC: 2.2 G/DL (ref 3.5–5)
AMMONIA PLAS-SCNC: 34 UMOL/L
ANION GAP SERPL CALC-SCNC: 16 MMOL/L (ref 5–15)
ANION GAP SERPL CALC-SCNC: 17 MMOL/L (ref 5–15)
ATRIAL RATE: 127 BPM
ATRIAL RATE: 150 BPM
ATRIAL RATE: 163 BPM
BUN SERPL-MCNC: 115 MG/DL (ref 6–20)
BUN SERPL-MCNC: 117 MG/DL (ref 6–20)
BUN/CREAT SERPL: 5 (ref 12–20)
BUN/CREAT SERPL: 6 (ref 12–20)
CALCIUM SERPL-MCNC: 7.3 MG/DL (ref 8.5–10.1)
CALCIUM SERPL-MCNC: 7.8 MG/DL (ref 8.5–10.1)
CALCULATED P AXIS, ECG09: 96 DEGREES
CALCULATED R AXIS, ECG10: -39 DEGREES
CALCULATED R AXIS, ECG10: -43 DEGREES
CALCULATED R AXIS, ECG10: -49 DEGREES
CALCULATED T AXIS, ECG11: 79 DEGREES
CALCULATED T AXIS, ECG11: 83 DEGREES
CALCULATED T AXIS, ECG11: 90 DEGREES
CHLORIDE SERPL-SCNC: 98 MMOL/L (ref 97–108)
CHLORIDE SERPL-SCNC: 99 MMOL/L (ref 97–108)
CO2 SERPL-SCNC: 20 MMOL/L (ref 21–32)
CO2 SERPL-SCNC: 20 MMOL/L (ref 21–32)
CREAT SERPL-MCNC: 20.8 MG/DL (ref 0.55–1.02)
CREAT SERPL-MCNC: 21.4 MG/DL (ref 0.55–1.02)
DIAGNOSIS, 93000: NORMAL
ERYTHROCYTE [DISTWIDTH] IN BLOOD BY AUTOMATED COUNT: 13.7 % (ref 11.5–14.5)
FOLATE SERPL-MCNC: 19.3 NG/ML (ref 5–21)
GLUCOSE SERPL-MCNC: 123 MG/DL (ref 65–100)
GLUCOSE SERPL-MCNC: 139 MG/DL (ref 65–100)
HCT VFR BLD AUTO: 36.7 % (ref 35–47)
HGB BLD-MCNC: 12 G/DL (ref 11.5–16)
MAGNESIUM SERPL-MCNC: 2.4 MG/DL (ref 1.6–2.4)
MCH RBC QN AUTO: 34.3 PG (ref 26–34)
MCHC RBC AUTO-ENTMCNC: 32.7 G/DL (ref 30–36.5)
MCV RBC AUTO: 104.9 FL (ref 80–99)
NRBC # BLD: 0 K/UL (ref 0–0.01)
NRBC BLD-RTO: 0 PER 100 WBC
P-R INTERVAL, ECG05: 274 MS
PHOSPHATE SERPL-MCNC: 8.8 MG/DL (ref 2.6–4.7)
PLATELET # BLD AUTO: 130 K/UL (ref 150–400)
PMV BLD AUTO: 11.3 FL (ref 8.9–12.9)
POTASSIUM SERPL-SCNC: 5.3 MMOL/L (ref 3.5–5.1)
POTASSIUM SERPL-SCNC: 6.5 MMOL/L (ref 3.5–5.1)
Q-T INTERVAL, ECG07: 314 MS
Q-T INTERVAL, ECG07: 314 MS
Q-T INTERVAL, ECG07: 318 MS
QRS DURATION, ECG06: 108 MS
QRS DURATION, ECG06: 128 MS
QRS DURATION, ECG06: 92 MS
QTC CALCULATION (BEZET), ECG08: 456 MS
QTC CALCULATION (BEZET), ECG08: 484 MS
QTC CALCULATION (BEZET), ECG08: 499 MS
RBC # BLD AUTO: 3.5 M/UL (ref 3.8–5.2)
SODIUM SERPL-SCNC: 134 MMOL/L (ref 136–145)
SODIUM SERPL-SCNC: 136 MMOL/L (ref 136–145)
TROPONIN I SERPL-MCNC: 1.63 NG/ML
TROPONIN I SERPL-MCNC: 1.74 NG/ML
VENTRICULAR RATE, ECG03: 127 BPM
VENTRICULAR RATE, ECG03: 143 BPM
VENTRICULAR RATE, ECG03: 148 BPM
VIT B12 SERPL-MCNC: 1173 PG/ML (ref 193–986)
WBC # BLD AUTO: 11.1 K/UL (ref 3.6–11)
WBC #/AREA STL HPF: NORMAL /HPF (ref 0–4)

## 2019-03-12 PROCEDURE — 74011250637 HC RX REV CODE- 250/637: Performed by: INTERNAL MEDICINE

## 2019-03-12 PROCEDURE — 80069 RENAL FUNCTION PANEL: CPT

## 2019-03-12 PROCEDURE — 74011000258 HC RX REV CODE- 258: Performed by: INTERNAL MEDICINE

## 2019-03-12 PROCEDURE — 74011000250 HC RX REV CODE- 250: Performed by: INTERNAL MEDICINE

## 2019-03-12 PROCEDURE — 82746 ASSAY OF FOLIC ACID SERUM: CPT

## 2019-03-12 PROCEDURE — 74011000258 HC RX REV CODE- 258: Performed by: SPECIALIST

## 2019-03-12 PROCEDURE — 36415 COLL VENOUS BLD VENIPUNCTURE: CPT

## 2019-03-12 PROCEDURE — 82607 VITAMIN B-12: CPT

## 2019-03-12 PROCEDURE — 84484 ASSAY OF TROPONIN QUANT: CPT

## 2019-03-12 PROCEDURE — 85027 COMPLETE CBC AUTOMATED: CPT

## 2019-03-12 PROCEDURE — 93005 ELECTROCARDIOGRAM TRACING: CPT

## 2019-03-12 PROCEDURE — 95951 EEG 24 HR W/ VIDEO: CPT | Performed by: PSYCHIATRY & NEUROLOGY

## 2019-03-12 PROCEDURE — 83735 ASSAY OF MAGNESIUM: CPT

## 2019-03-12 PROCEDURE — 89055 LEUKOCYTE ASSESSMENT FECAL: CPT

## 2019-03-12 PROCEDURE — 74011250636 HC RX REV CODE- 250/636: Performed by: INTERNAL MEDICINE

## 2019-03-12 PROCEDURE — 82140 ASSAY OF AMMONIA: CPT

## 2019-03-12 PROCEDURE — 96366 THER/PROPH/DIAG IV INF ADDON: CPT

## 2019-03-12 PROCEDURE — 65270000029 HC RM PRIVATE

## 2019-03-12 PROCEDURE — 74011250636 HC RX REV CODE- 250/636: Performed by: SPECIALIST

## 2019-03-12 PROCEDURE — 87045 FECES CULTURE AEROBIC BACT: CPT

## 2019-03-12 PROCEDURE — 95816 EEG AWAKE AND DROWSY: CPT | Performed by: INTERNAL MEDICINE

## 2019-03-12 PROCEDURE — 74011250637 HC RX REV CODE- 250/637: Performed by: SPECIALIST

## 2019-03-12 PROCEDURE — 74011000258 HC RX REV CODE- 258: Performed by: EMERGENCY MEDICINE

## 2019-03-12 PROCEDURE — 80048 BASIC METABOLIC PNL TOTAL CA: CPT

## 2019-03-12 PROCEDURE — 93306 TTE W/DOPPLER COMPLETE: CPT

## 2019-03-12 PROCEDURE — 96375 TX/PRO/DX INJ NEW DRUG ADDON: CPT

## 2019-03-12 PROCEDURE — 74011250636 HC RX REV CODE- 250/636: Performed by: EMERGENCY MEDICINE

## 2019-03-12 RX ORDER — SODIUM POLYSTYRENE SULFONATE 15 G/60ML
30 SUSPENSION ORAL; RECTAL
Status: COMPLETED | OUTPATIENT
Start: 2019-03-12 | End: 2019-03-12

## 2019-03-12 RX ORDER — GENTAMICIN SULFATE 1 MG/G
CREAM TOPICAL
Status: DISCONTINUED | OUTPATIENT
Start: 2019-03-12 | End: 2019-03-19 | Stop reason: HOSPADM

## 2019-03-12 RX ORDER — METOPROLOL TARTRATE 25 MG/1
25 TABLET, FILM COATED ORAL EVERY 8 HOURS
Status: DISCONTINUED | OUTPATIENT
Start: 2019-03-12 | End: 2019-03-13

## 2019-03-12 RX ORDER — HEPARIN SODIUM 5000 [USP'U]/ML
5000 INJECTION, SOLUTION INTRAVENOUS; SUBCUTANEOUS EVERY 8 HOURS
Status: DISCONTINUED | OUTPATIENT
Start: 2019-03-12 | End: 2019-03-19 | Stop reason: HOSPADM

## 2019-03-12 RX ORDER — SEVELAMER CARBONATE 800 MG/1
800 TABLET, FILM COATED ORAL
Status: DISCONTINUED | OUTPATIENT
Start: 2019-03-12 | End: 2019-03-14

## 2019-03-12 RX ORDER — AMOXICILLIN 250 MG
1 CAPSULE ORAL
Status: DISCONTINUED | OUTPATIENT
Start: 2019-03-12 | End: 2019-03-19 | Stop reason: HOSPADM

## 2019-03-12 RX ORDER — DIPHENHYDRAMINE HYDROCHLORIDE 50 MG/ML
25 INJECTION, SOLUTION INTRAMUSCULAR; INTRAVENOUS
Status: COMPLETED | OUTPATIENT
Start: 2019-03-12 | End: 2019-03-12

## 2019-03-12 RX ORDER — METOPROLOL TARTRATE 25 MG/1
25 TABLET, FILM COATED ORAL EVERY 6 HOURS
Status: DISCONTINUED | OUTPATIENT
Start: 2019-03-12 | End: 2019-03-12

## 2019-03-12 RX ORDER — AMIODARONE HYDROCHLORIDE 200 MG/1
200 TABLET ORAL
Status: DISCONTINUED | OUTPATIENT
Start: 2019-03-12 | End: 2019-03-13

## 2019-03-12 RX ORDER — ASPIRIN 81 MG/1
81 TABLET ORAL DAILY
Status: DISCONTINUED | OUTPATIENT
Start: 2019-03-13 | End: 2019-03-19 | Stop reason: HOSPADM

## 2019-03-12 RX ORDER — AMIODARONE HYDROCHLORIDE 200 MG/1
200 TABLET ORAL DAILY
Status: DISCONTINUED | OUTPATIENT
Start: 2019-03-13 | End: 2019-03-12

## 2019-03-12 RX ORDER — CINACALCET 30 MG/1
30 TABLET, FILM COATED ORAL
Status: DISCONTINUED | OUTPATIENT
Start: 2019-03-12 | End: 2019-03-19 | Stop reason: HOSPADM

## 2019-03-12 RX ADMIN — SODIUM POLYSTYRENE SULFONATE 30 G: 15 SUSPENSION ORAL; RECTAL at 05:57

## 2019-03-12 RX ADMIN — HYDROCORTISONE 2.5%: 25 CREAM TOPICAL at 09:00

## 2019-03-12 RX ADMIN — DIPHENHYDRAMINE HYDROCHLORIDE 25 MG: 50 INJECTION INTRAMUSCULAR; INTRAVENOUS at 03:51

## 2019-03-12 RX ADMIN — AMIODARONE HYDROCHLORIDE 0.5 MG/MIN: 50 INJECTION, SOLUTION INTRAVENOUS at 04:44

## 2019-03-12 RX ADMIN — AMIODARONE HYDROCHLORIDE 150 MG: 50 INJECTION, SOLUTION INTRAVENOUS at 17:00

## 2019-03-12 RX ADMIN — SODIUM CHLORIDE 250 ML: 900 INJECTION, SOLUTION INTRAVENOUS at 19:21

## 2019-03-12 RX ADMIN — DEXTROSE MONOHYDRATE AND SODIUM CHLORIDE: 5; .45 INJECTION, SOLUTION INTRAVENOUS at 01:09

## 2019-03-12 RX ADMIN — METOPROLOL TARTRATE 25 MG: 25 TABLET ORAL at 10:45

## 2019-03-12 RX ADMIN — AMIODARONE HYDROCHLORIDE 0.5 MG/MIN: 50 INJECTION, SOLUTION INTRAVENOUS at 15:39

## 2019-03-12 NOTE — PROGRESS NOTES
[facility]: MED RECONCILIATION    Comments/Recommendations:    The patient is awake and alert.  Verifies allergies   Pill bottles present for review. The patient will not take her own medications with the exception of Auryxia. The patient's sister will take the medications home today. The patient is aware the Auryixa will be labeled, stored in the Pyxis, and returned at discharge.  Apixaban - Prescribed 19 for atrial fibrillation. The patient never started this medication because her insurance would not pay for it. The patient is waiting for her provider to prescribe an alternative.  Carvedilol, potassium, and spironolactone pill bottles are from 2018 for a 30 day supply. The patient reports these are old pill bottles and that she has had her medications filled more recently than November.  The patient obtains cinacalcet from the  for free.  The patient has an  bottle of cephalexin 500 mg BID x 3 days in the box of pill bottles. The patient reports she is to use this prescription if she develops an infection and the dialysis clinic is closed. Counseled the patient that the prescription is  and she should obtain a new prescription.  The patient reports she lost two days due to feeling unwell. She last took medications on Friday. She reports she is compliant on a regular basis and has an active supply of the medications listed below. Medications added:     · None    Medications removed:    · Apixaban    Medications adjusted:    · None    Allergies: Iodinated contrast- oral and iv dye    Prior to Admission Medications:   Prior to Admission Medications   Prescriptions Last Dose Informant Patient Reported? Taking? SENNA-DOCUSATE SODIUM PO  Self Yes Yes   Sig: Take 1 Tab by mouth daily as needed (constipation). aspirin delayed-release 81 mg tablet 3/8/2019 Self Yes Yes   Sig: Take 81 mg by mouth daily.    carvedilol (COREG) 25 mg tablet 3/8/2019 Self No Yes   Sig: Take 1 Tab by mouth two (2) times daily (with meals). cinacalcet (SENSIPAR) 30 mg tablet 3/8/2019 Self Yes Yes   Sig: Take 30 mg by mouth daily (with dinner). dilTIAZem CD (CARDIZEM CD) 120 mg ER capsule 3/8/2019 Self No Yes   Sig: Take 1 Cap by mouth daily. ferric citrate (AURYXIA) 210 mg iron tablet 3/8/2019 Self Yes Yes   Sig: Take 210 mg by mouth three (3) times daily (with meals). gentamicin (GARAMYCIN) 0.1 % topical cream 3/8/2019 Self Yes Yes   Sig: Apply  to affected area nightly. omeprazole (PRILOSEC) 20 mg capsule  Self Yes Yes   Sig: Take 20 mg by mouth daily as needed (indigestion). potassium chloride (KLOR-CON M20) 20 mEq tablet 3/8/2019 Self Yes Yes   Sig: Take 40 mEq by mouth every morning. spironolactone (ALDACTONE) 50 mg tablet 3/8/2019 Self Yes Yes   Sig: Take 50 mg by mouth every morning.       Facility-Administered Medications: None      Thank you,      Flor Blue, PharmD, BCPS

## 2019-03-12 NOTE — PROGRESS NOTES
Prosper Corona MD. [facility]              Patient: Jade Lowe  : 1960      Today's Date: 3/12/2019        CARDIOLOGY PROGRESS NOTE  S: Feels a little better today. Still with HENDERSON. Heart racing sometimes. No CP.   O:   Visit Vitals  /76   Pulse (!) 127   Temp 97.5 °F (36.4 °C)   Resp 30   Ht 5' 5\" (1.651 m)   Wt 120 lb (54.4 kg)   SpO2 93%   BMI 19.97 kg/m²     Patient in NAD. Frail. HEENT:  Hearing intact, non-icteric, normocephalic, atraumatic. Neck Exam: Supple  Lung Exam: Clear to auscultation, even breath sounds. Cardiac Exam: tachycardic with no murmur  Abdomen: Soft, mildly-tende  Extremities: Moves all ext well. No lower extremity edema. Psych: Appropriate affect  Neuro - Grossly intact        Review of Symptoms:  Constitutional: Negative for fever   HEENT: Negative for vision changes. Respiratory: Negative for productive cough  Cardiovascular: Negative for syncope    Gastrointestinal: Negative for abdominal pain, melena  Genitourinary: Negative for dysuria  Skin: Negative for rash  Heme: No problems bleeding.   Neuro - no speech changes or focal weaknesses              LABS / OTHER STUDIES:     Recent Results (from the past 24 hour(s))   EKG, 12 LEAD, INITIAL    Collection Time: 19  3:57 PM   Result Value Ref Range    Ventricular Rate 148 BPM    Atrial Rate 150 BPM    QRS Duration 108 ms    Q-T Interval 318 ms    QTC Calculation (Bezet) 499 ms    Calculated R Axis -49 degrees    Calculated T Axis 90 degrees    Diagnosis       ** Poor data quality, interpretation may be adversely affected  Supraventricular tachycardia  Left anterior fascicular block  Nonspecific intraventricular conduction delay  Abnormal ECG  Confirmed by Micheline Khan MD., Giorgio ([id number]) on 3/12/2019 12:52:49 PM     CBC WITH AUTOMATED DIFF    Collection Time: 19  4:42 PM   Result Value Ref Range    WBC 12.3 (H) 3.6 - 11.0 K/uL    RBC 3.58 (L) 3.80 - 5.20 M/uL    HGB 12.2 11.5 - 16.0 g/dL    HCT 37.7 35.0 - 47.0 %    .3 (H) 80.0 - 99.0 FL    MCH 34.1 (H) 26.0 - 34.0 PG    MCHC 32.4 30.0 - 36.5 g/dL    RDW 13.3 11.5 - 14.5 %    PLATELET 306 599 - 638 K/uL    MPV 10.7 8.9 - 12.9 FL    NRBC 0.0 0  WBC    ABSOLUTE NRBC 0.00 0.00 - 0.01 K/uL    NEUTROPHILS 90 (H) 32 - 75 %    LYMPHOCYTES 6 (L) 12 - 49 %    MONOCYTES 3 (L) 5 - 13 %    EOSINOPHILS 0 0 - 7 %    BASOPHILS 0 0 - 1 %    IMMATURE GRANULOCYTES 1 (H) 0.0 - 0.5 %    ABS. NEUTROPHILS 11.1 (H) 1.8 - 8.0 K/UL    ABS. LYMPHOCYTES 0.7 (L) 0.8 - 3.5 K/UL    ABS. MONOCYTES 0.4 0.0 - 1.0 K/UL    ABS. EOSINOPHILS 0.0 0.0 - 0.4 K/UL    ABS. BASOPHILS 0.0 0.0 - 0.1 K/UL    ABS. IMM. GRANS. 0.1 (H) 0.00 - 0.04 K/UL    DF AUTOMATED      RBC COMMENTS NORMOCYTIC, NORMOCHROMIC      WBC COMMENTS REACTIVE LYMPHS     METABOLIC PANEL, COMPREHENSIVE    Collection Time: 03/11/19  4:42 PM   Result Value Ref Range    Sodium 137 136 - 145 mmol/L    Potassium 5.5 (H) 3.5 - 5.1 mmol/L    Chloride 99 97 - 108 mmol/L    CO2 15 (LL) 21 - 32 mmol/L    Anion gap 23 (H) 5 - 15 mmol/L    Glucose 133 (H) 65 - 100 mg/dL     (H) 6 - 20 MG/DL    Creatinine 21.90 (H) 0.55 - 1.02 MG/DL    BUN/Creatinine ratio 5 (L) 12 - 20      GFR est AA 2 (L) >60 ml/min/1.73m2    GFR est non-AA 2 (L) >60 ml/min/1.73m2    Calcium 8.3 (L) 8.5 - 10.1 MG/DL    Bilirubin, total 1.7 (H) 0.2 - 1.0 MG/DL    ALT (SGPT) 37 12 - 78 U/L    AST (SGOT) 50 (H) 15 - 37 U/L    Alk. phosphatase 82 45 - 117 U/L    Protein, total 7.4 6.4 - 8.2 g/dL    Albumin 2.8 (L) 3.5 - 5.0 g/dL    Globulin 4.6 (H) 2.0 - 4.0 g/dL    A-G Ratio 0.6 (L) 1.1 - 2.2     SAMPLES BEING HELD    Collection Time: 03/11/19  4:42 PM   Result Value Ref Range    SAMPLES BEING HELD [email or web address]     COMMENT        Add-on orders for these samples will be processed based on acceptable specimen integrity and analyte stability, which may vary by analyte.    MAGNESIUM    Collection Time: 03/11/19  4:42 PM   Result Value Ref Range    Magnesium 2. 3 1.6 - 2.4 mg/dL   TROPONIN I    Collection Time: 03/11/19  4:42 PM   Result Value Ref Range    Troponin-I, Qt. 1.53 (H) <0.05 ng/mL   CULTURE, BLOOD, PERIPHERAL    Collection Time: 03/11/19  4:42 PM   Result Value Ref Range    Special Requests: NO SPECIAL REQUESTS      Culture result: NO GROWTH AFTER 12 HOURS     INFLUENZA A & B AG (RAPID TEST)    Collection Time: 03/11/19  4:42 PM   Result Value Ref Range    Influenza A Antigen NEGATIVE  NEG      Influenza B Antigen NEGATIVE  NEG     POC LACTIC ACID    Collection Time: 03/11/19  4:44 PM   Result Value Ref Range    Lactic Acid (POC) 5.42 (HH) 0.40 - 2.00 mmol/L   CULTURE, BLOOD, PERIPHERAL    Collection Time: 03/11/19  5:16 PM   Result Value Ref Range    Special Requests: NO SPECIAL REQUESTS      Culture result: NO GROWTH AFTER 11 HOURS     EKG, 12 LEAD, INITIAL    Collection Time: 03/11/19  5:35 PM   Result Value Ref Range    Ventricular Rate 143 BPM    Atrial Rate 163 BPM    QRS Duration 128 ms    Q-T Interval 314 ms    QTC Calculation (Bezet) 484 ms    Calculated R Axis -43 degrees    Calculated T Axis 83 degrees    Diagnosis       Supraventricular tachycardia  Left axis deviation  Nonspecific intraventricular block  Abnormal ECG  Confirmed by Fran Sierra MD., Giorgio ([id number]) on 3/12/2019 12:53:40 PM     TROPONIN I    Collection Time: 03/11/19  9:44 PM   Result Value Ref Range    Troponin-I, Qt. 1.47 (H) <2.47 ng/mL   METABOLIC PANEL, BASIC    Collection Time: 03/12/19  3:50 AM   Result Value Ref Range    Sodium 134 (L) 136 - 145 mmol/L    Potassium 6.5 (H) 3.5 - 5.1 mmol/L    Chloride 98 97 - 108 mmol/L    CO2 20 (L) 21 - 32 mmol/L    Anion gap 16 (H) 5 - 15 mmol/L    Glucose 139 (H) 65 - 100 mg/dL     (H) 6 - 20 MG/DL    Creatinine 20.80 (H) 0.55 - 1.02 MG/DL    BUN/Creatinine ratio 6 (L) 12 - 20      GFR est AA 2 (L) >60 ml/min/1.73m2    GFR est non-AA 2 (L) >60 ml/min/1.73m2    Calcium 7.8 (L) 8.5 - 10.1 MG/DL   CBC W/O DIFF    Collection Time: 03/12/19 3:50 AM   Result Value Ref Range    WBC 11.1 (H) 3.6 - 11.0 K/uL    RBC 3.50 (L) 3.80 - 5.20 M/uL    HGB 12.0 11.5 - 16.0 g/dL    HCT 36.7 35.0 - 47.0 %    .9 (H) 80.0 - 99.0 FL    MCH 34.3 (H) 26.0 - 34.0 PG    MCHC 32.7 30.0 - 36.5 g/dL    RDW 13.7 11.5 - 14.5 %    PLATELET 667 (L) 172 - 400 K/uL    MPV 11.3 8.9 - 12.9 FL    NRBC 0.0 0  WBC    ABSOLUTE NRBC 0.00 0.00 - 0.01 K/uL   TROPONIN I    Collection Time: 03/12/19  3:50 AM   Result Value Ref Range    Troponin-I, Qt. 1.63 (H) <0.05 ng/mL   FOLATE    Collection Time: 03/12/19  3:50 AM   Result Value Ref Range    Folate 19.3 5.0 - 21.0 ng/mL   VITAMIN B12    Collection Time: 03/12/19  3:50 AM   Result Value Ref Range    Vitamin B12 1,173 (H) 193 - 986 pg/mL   TROPONIN I    Collection Time: 03/12/19 11:02 AM   Result Value Ref Range    Troponin-I, Qt. 1.74 (H) <0.05 ng/mL   AMMONIA    Collection Time: 03/12/19 11:51 AM   Result Value Ref Range    Ammonia 34 (H) <32 UMOL/L   RENAL FUNCTION PANEL    Collection Time: 03/12/19 11:51 AM   Result Value Ref Range    Sodium 136 136 - 145 mmol/L    Potassium 5.3 (H) 3.5 - 5.1 mmol/L    Chloride 99 97 - 108 mmol/L    CO2 20 (L) 21 - 32 mmol/L    Anion gap 17 (H) 5 - 15 mmol/L    Glucose 123 (H) 65 - 100 mg/dL     (H) 6 - 20 MG/DL    Creatinine 21.40 (H) 0.55 - 1.02 MG/DL    BUN/Creatinine ratio 5 (L) 12 - 20      GFR est AA 2 (L) >60 ml/min/1.73m2    GFR est non-AA 2 (L) >60 ml/min/1.73m2    Calcium 7.3 (L) 8.5 - 10.1 MG/DL    Phosphorus 8.8 (H) 2.6 - 4.7 MG/DL    Albumin 2.2 (L) 3.5 - 5.0 g/dL   MAGNESIUM    Collection Time: 03/12/19 11:51 AM   Result Value Ref Range    Magnesium 2.4 1.6 - 2.4 mg/dL              CARDIAC DIAGNOSTICS:      Cardiac Evaluation Includes:  ECHO: 10/14/12: EF 45% w/ posterior HK Grade 1 DD, LAE, mild MR, mild-mod TR RVSP 60 mmHG   Cath: 10/16/12: Normal cors. No AVG. Mild pulm HTN (43/16/26). Low-normal filling pressures. Echo 10/19/13 - EF 25- 30%.  Severe diffuse hypokinesis. Mildly increased wall thickness. Mild concentric hypertrophy. Doppler parameters were consistent with a reversible restrictive pattern, indicative of decreased left ventricular  diastolic compliance and/or increased left atrial pressure (grade 3 diastolic dysfunction). LA mildly dilated, mild MR, mild TR, mod PA HTN, small pericardial effusion circumferential to the heart, no evidence of hemodynamic compromise. Echo 11/2/15 - EF 25-30%, global HK, mild MR  Lexiscan cardiolite 11/24/15 - focal anteroapical ischemia    Cath 12/15/2015 - EDP 10, LV dilated, EF 20% global HK, normal cors with right dominance. Unable to cannulate femoral vein. 12/29/15-implantation of a single-chamber [address] per Dr. Esvin Maldonado    Echo 1/30/17 - EF 20 %. Severe diffuse hypokinesis. Mild LAE. Mild MR. Mild Pulm HTN. Small pericardial effusion. No significant change when compared to study 02-Nov-2015. Echo 1/26/18- LVEF 28%, severe LAE, PA systolic 50 mmHg    LHC / RHC 6/4/2018: No sig CAD, RA 10, RV 39/5, W 23, PA 35/20/29, PA Sat 47.1%, Ao Sat 83.2%, /7/24 Ao 119/79/90, CO F 3.94, CI F 2.4, CO T 2.75, CI T 1.68           ASSESSMENT AND PLAN:      Assessment and Plan:  1) SVT   - She has a history of atrial tach and atrial fibrillation  - current SVT could be junctional tach or atrial tach? - She comes in with SVT 3/11/19 and did break into NSR on Amiodarone gtt, but is back in an SVT this afternoon off of Amio. - Will resume Amiodarone IV  - Have started metoprolol with plans to get her back on her Coreg eventually when is a little more stable      2) Elevated troponin   - denies CP and no ischemic EKG changes   - elevated trop likely due to acute illness and not an ACS  - recheck an echo     3) Non-ischemic cardiomyopathy   - history of severe LV dysfunction   - At this time is being hydrated for suspected dehydration/sepsis?   - would gradually resume her cardiomyopathy when BP is a little better      4) History of PAF  - has been on Elqiuis which is being held for now  - Plan to restart Eliquis when it is clear no invasive measures are needed   - on heparin DVT propphylaxis now      5) ESRD on PD       Nate Gauthier MD, [address] [address], [address]      [address] [place] [address] Drive.  Suite [facility] [address], [address]                 [place], [address] [place] [address]  Ph: [phone]                               Ph [phone]

## 2019-03-12 NOTE — CONSULTS
A PD patient who last did her exchanges on Friday  PD was resumed. Will follow the chemistry.   D/W ER physician

## 2019-03-12 NOTE — CONSULTS
[address] [facility]    Name: Javi Hamilton      Admitted: 3/11/2019  MRN #: [id number]      : 1960  Account #: [de-identified]     Age: 60 y.o. Location:[facility]   Physician: Shyanne Ospina MD         REASON FOR ADMISSION:  Active Problems:    Essential hypertension, benign (10/18/2012)      Cardiomyopathy, nonischemic ([address] (10/19/2013)      Overview: LVEF 25-30% by echo 10/19/2013 (previously 45%)      Anemia (2015)      ESRD (end stage renal disease) ([address] (2015)      ICD (implantable cardioverter-defibrillator) in place (2018)      Paroxysmal atrial fibrillation ([address] (9685)      Systolic CHF, chronic ([address] (2018)      SVT (supraventricular tachycardia) ([address] (3/11/2019)      Acidosis (3/11/2019)      GERD (gastroesophageal reflux disease) (3/11/2019)      HTN (hypertension) (3/11/2019)      Dysarthria (3/11/2019)      Nausea & vomiting (3/11/2019)      Diarrhea (3/11/2019)        HISTORY OF PRESENT ILLNESS:   Ms. Nilda Iyer is a 59 y/o F with PMH listed below who presented with memory loss and change in mental status since Saturday. States that she slept all day yesterday and can not remember what happened yesterday. In ER, she was found to have SVT. Labs notable for hyperkalemia. PD was performed overnight. She reports diarrhea and nausea but no vomiting. No abdominal pain, fever or chills. No constipation. No cloudy PF fluid. Last PD was performed on Friday. PAST MEDICAL HISTORY:   ESRD on PD   Secondary Hyperparathyroidism   HTN         MEDICATIONS ON ADMISSION:  Prior to Admission medications    Medication Sig Start Date End Date Taking? Authorizing Provider   dilTIAZem CD (CARDIZEM CD) 120 mg ER capsule Take 1 Cap by mouth daily. 19  Yes Contreras Jackson NP   aspirin delayed-release 81 mg tablet Take 81 mg by mouth daily.    Yes Provider, Historical   spironolactone (ALDACTONE) 50 mg tablet Take 50 mg by mouth every morning. Yes Provider, Historical   potassium chloride (KLOR-CON M20) 20 mEq tablet Take 40 mEq by mouth every morning. Yes Provider, Historical   carvedilol (COREG) 25 mg tablet Take 1 Tab by mouth two (2) times daily (with meals). 9/18/18  Yes Liana Madera NP   omeprazole (PRILOSEC) 20 mg capsule Take 20 mg by mouth daily as needed (indigestion). Yes Provider, Historical   SENNA-DOCUSATE SODIUM PO Take 1 Tab by mouth daily as needed (constipation). Yes Provider, Historical   cinacalcet (SENSIPAR) 30 mg tablet Take 30 mg by mouth daily (with dinner). Yes Provider, Historical   ferric citrate (AURYXIA) 210 mg iron tablet Take 210 mg by mouth three (3) times daily (with meals). Yes Provider, Historical   gentamicin (GARAMYCIN) 0.1 % topical cream Apply  to affected area nightly. 7/12/14  Yes Provider, Historical       ALLERGIES:   Allergies   Allergen Reactions    Iodinated Contrast- Oral And Iv Dye Angioedema     Cellulitis on side of face after test       SOCIAL HISTORY:   No smoking       FAMILY HISTORY:   Non-contributory. REVIEW OF SYSTEMS:   +nausea   +Diarrhea     No fever, chills or abdominal pain       All other systems were reviewed and found neg     PHYSICAL EXAMINATION:      GENERAL:   She looks ill. She a 60 y.o.  female. VITAL SIGNS:   The patients  height is 5' 5\" (1.651 m) and weight is 54.4 kg (120 lb). Her temperature is 98.1 °F (36.7 °C). Her blood pressure is 121/83 and her pulse is 72. Her respiration is 30 and oxygen saturation is 94%. She is afebrile. Physical Exam:  General appearance: alert, cooperative, no distress  Neck: supple, symmetrical, trachea midline, no adenopathy and no JVD  Lungs: clear to auscultation bilaterally  Heart: regular rate and rhythm, no S3 or S4, no rub  Abdomen: soft, non-tender. Bowel sounds normal. PD catheter intact.  No signs of infection   Extremities: no edema, no cyanosis   Neurologic: A&O x3, no focal deficits             LABORATORY DATA:       CBC W/O DIFF    Collection Time: 03/12/19  3:50 AM   Result Value Ref Range    WBC 11.1 (H) 3.6 - 11.0 K/uL    RBC 3.50 (L) 3.80 - 5.20 M/uL    HGB 12.0 11.5 - 16.0 g/dL    HCT 36.7 35.0 - 47.0 %    .9 (H) 80.0 - 99.0 FL    MCH 34.3 (H) 26.0 - 34.0 PG    MCHC 32.7 30.0 - 36.5 g/dL    RDW 13.7 11.5 - 14.5 %    PLATELET 465 (L) 075 - 400 K/uL    MPV 11.3 8.9 - 12.9 FL    NRBC 0.0 0  WBC    ABSOLUTE NRBC 0.00 0.00 - 0.01 K/uL     BMP:   Lab Results   Component Value Date/Time     (L) 03/12/2019 03:50 AM    K 6.5 (H) 03/12/2019 03:50 AM    CL 98 03/12/2019 03:50 AM    CO2 20 (L) 03/12/2019 03:50 AM    AGAP 16 (H) 03/12/2019 03:50 AM     (H) 03/12/2019 03:50 AM     (H) 03/12/2019 03:50 AM    CREA 20.80 (H) 03/12/2019 03:50 AM    GFRAA 2 (L) 03/12/2019 03:50 AM    GFRNA 2 (L) 03/12/2019 03:50 AM          RADIOLOGY:   Clinical indication: Weakness, hypertension, heart failure. Arrhythmia.     Portable AP semiupright view of the chest is obtained compared to August 26, 2018. Patient has a cardiac pacemaker. The heart size is normal. There is no  acute infiltrate.     IMPRESSION  impression: No acute findings seen. IMPRESSION:  ESRD on PD     Hyperkalemia--addressed with PD last night     Mild Hyponatremia     SVT     AMS     PLAN:     Continue with PD daily.  PD Rx: 1.5% dextrose, 2000 cc fill volume, 4 cycles daily     Check serum Mg     Apply Gentamicin cream to PD catheter daily     Hold PTA Aldactone and K supplement     D/c bicarb infusion     Low K, phos and Na diet    Limit daily fluid intake to <1 L     Recheck RFP     Asiya Mcdonald MD  3/12/2019

## 2019-03-12 NOTE — ED NOTES
Bedside and Verbal shift change report given to Flaquita (oncoming nurse) by Abiodun Varela (offgoing nurse). Report included the following information SBAR, ED Summary, MAR, Recent Results and Cardiac Rhythm sinus tach.

## 2019-03-12 NOTE — ED NOTES
Reported recent heart rate in 70s post bowel movement, last set vitals to Kelli España MD, discussed amiodarone drip and held metropolol d/t BP. Instructed to contact Cardiology. Charge to contact cards at this time.

## 2019-03-12 NOTE — PROGRESS NOTES
[facility]: [id number] [address] Neurology  [place] [address]  [phone]        Name:   Javi Hamilton   Medical record #: [id number]  Admission Date: 3/11/2019   Who Consulted: Dr Shahrzad Helton  Reason for Consult:  AMS    HISTORY OF PRESENT ILLNESS:   This is a 60 y.o. female with  has a past medical history of Anemia associated with chronic renal failure, Anuria (2014), Arrhythmia, Chronic kidney disease, Chronic systolic heart failure ([facility] [address] (10/31/2012), Chronic tachycardia, GERD (gastroesophageal reflux disease), non-ST elevation myocardial infarction (NSTEMI) (2011), Hypertension, Lipoma of right lower extremity (1980), Peritoneal dialysis catheter in place [facility]) (2014), and Polycystic kidney disease (2014). who is admitted for***. The Neurology Service is asked to evaluate for ***. Clinical Data:  Imaging review:     Current rhythm:    Assessment/Plan:   1. Altered Mental Status:    · Neurochecks:  Every 4 hours  · Check CBC, CMP, Folate, B12, TSH, Ammonia  · EEG to rule out subclinical seizures  · CT head   · Consider LP  · Stop sedating medications    2. Mobility:   · Has been/not been OOB. · PT/OT to eval for rehab  4. Diet:    · Needs SLP     5. VTE Prophylaxes:   · Lovenox 40 mg, SQ daily     Thank you for allowing the Neurology Service the pleasure of participating in the care of your patient. This patient will be discussed with my collaborating care team physician *** and he may have further recommendations regarding this patient's care. Attending Attestation:                        Review of Systems: 10 point ROS was performed. Pertinent positives listed in HPI. Negative ROS is as follows.   Pt denies: angina, palpitations, paresthesias, weakness, vision loss, slurred speech, aphasia, confusion, fever, chills, falls, headache, diplopia, back pain, neck pain, prior episodes of vertigo, hallucinations, new medications or dosage changes. PHYSICAL EXAM:     Visit Vitals  /83   Pulse 72   Temp 98.1 °F (36.7 °C)   Resp 30   Ht 5' 5\" (1.651 m)   Wt 54.4 kg (120 lb)   SpO2 94%   BMI 19.97 kg/m²      O2 Device: Room air    Temp (24hrs), Av.8 °F (37.1 °C), Min:98.1 °F (36.7 °C), Max:99.3 °F (37.4 °C)    No intake/output data recorded. 03/10 1901 -  0700  In: 700 [I.V.:700]  Out: 1500      General:  Alert, cooperative, no acute distress. Lungs:   Clear to auscultation bilaterally. No crackles/wheezes. Heart:  Abdominal:  Regular rate and rhythm, No murmur, click, rub or gallop. Soft and nondistended   Skin: Skin color, texture, turgor normal.      NEUROLOGICAL EXAM:    Appearance:  Well developed, well nourished,  and is in no acute distress. Mental Status: Oriented to time, place and person. Fully attentive. No aphasia. Full fund of knowledge. Normal recent and remote memory. Cranial Nerves:   Intact visual fields. PERRL, EOM's full, no nystagmus, no ptosis. Facial sensation is normal. Facial movement is symmetric. Palate is midline. Normal sternocleidomastoid strength. Tongue is midline. Reflexes:   Deep tendon reflexes 2+/4 and symmetrical.   Sensory:   Normal to temperature and vibration. Gait:  Normal gait. Tremor:   No tremor noted. Cerebellar:  No cerebellar signs present. Neurovascular:  Normal heart sounds and regular rhythm. No carotid bruits. Motor: No pronator drift of either outstretched arm.          Deltoid Biceps Triceps Wrist Extension Finger Abduction   L 5 5 5 5 5   R 5 5 5 5 5      Hip Flexion Hip Extension Knee Flexion Knee Extension Ankle Dorsiflexion Ankle Plantarflexion   L 5 5 5 5 5 5   R 5 5 5 5 5 5        Reflexes:     Biceps Triceps Plantar Patellar Achilles   L 2 2 1 1 1   R 2 2 1 1 1     Past Medical History:   Diagnosis Date    Anemia associated with chronic renal failure     Anuria     Arrhythmia     Paroxysmal a fib, paroxsymal atrial tach, SVT    Chronic kidney disease     ESRD- on PD    Chronic systolic heart failure ([facility] [address] 10/31/2012    Chronic tachycardia     GERD (gastroesophageal reflux disease)     Hx of non-ST elevation myocardial infarction (NSTEMI) 2011    Hypertension     Lipoma of right lower extremity 1980    Foot    Peritoneal dialysis catheter in place [facility]) 2014    Nightime exchanges    Polycystic kidney disease 2014     Past Surgical History:   Procedure Laterality Date    HX HEART CATHETERIZATION  12/2015    no interventions    HX PACEMAKER  12/29/2015    ICD     Family History   Problem Relation Age of Onset    Diabetes Brother     Hypertension Brother     Hypertension Mother     Arthritis-osteo Mother     Hypertension Sister     Diabetes Sister     Kidney Disease Father         polycystic    Parkinson's Disease Father     Heart Disease Father     Hypertension Father     Hypertension Brother      Social History     Socioeconomic History    Marital status: SINGLE     Spouse name: Not on file    Number of children: Not on file    Years of education: Not on file    Highest education level: Not on file   Social Needs    Financial resource strain: Not on file    Food insecurity - worry: Not on file    Food insecurity - inability: Not on file   [employer] needs - medical: Not on file   [employer] needs - non-medical: Not on file   Occupational History    Not on file   Tobacco Use    Smoking status: Never Smoker    Smokeless tobacco: Never Used   Substance and Sexual Activity    Alcohol use: No    Drug use: No    Sexual activity: Not Currently     Partners: Male   Other Topics Concern    Not on file   Social History Narrative    Not on file       Allergies: Allergies   Allergen Reactions    Iodinated Contrast- Oral And Iv Dye Angioedema     Cellulitis on side of face after test       Outpatient Meds  No current facility-administered medications on file prior to encounter.       Current Outpatient Medications on File Prior to Encounter   Medication Sig Dispense Refill    dilTIAZem CD (CARDIZEM CD) 120 mg ER capsule Take 1 Cap by mouth daily. 30 Cap 3    aspirin delayed-release 81 mg tablet Take 81 mg by mouth daily.  spironolactone (ALDACTONE) 50 mg tablet Take 50 mg by mouth every morning.  potassium chloride (KLOR-CON M20) 20 mEq tablet Take 40 mEq by mouth every morning.  carvedilol (COREG) 25 mg tablet Take 1 Tab by mouth two (2) times daily (with meals). 60 Tab 6    omeprazole (PRILOSEC) 20 mg capsule Take 20 mg by mouth daily as needed (indigestion).  SENNA-DOCUSATE SODIUM PO Take 1 Tab by mouth daily as needed (constipation).  cinacalcet (SENSIPAR) 30 mg tablet Take 30 mg by mouth daily (with dinner).  ferric citrate (AURYXIA) 210 mg iron tablet Take 210 mg by mouth three (3) times daily (with meals).  gentamicin (GARAMYCIN) 0.1 % topical cream Apply  to affected area nightly. Inpatient Meds    Current Facility-Administered Medications:     metoprolol tartrate (LOPRESSOR) tablet 25 mg, 25 mg, Oral, Q8H, Brennan Thomson MD, 25 mg at 03/12/19 1045    sodium chloride (NS) flush 5-40 mL, 5-40 mL, IntraVENous, Q8H, Janusz Garcia MD, 10 mL at 03/11/19 2200    sodium chloride (NS) flush 5-40 mL, 5-40 mL, IntraVENous, PRN, Janusz Garcia MD    ondansetron (ZOFRAN) injection 4 mg, 4 mg, IntraVENous, Q4H PRN, Janusz Garcia MD, 4 mg at 03/11/19 2229    peritoneal dialysis DEXTROSE 1.5% (2.5 mEq/L low calcium) solution 2,000 mL, 2,000 mL, IntraPERitoneal, QID, Frances Styles MD, 2,000 mL at 03/11/19 2200    hydrocortisone (ANUSOL-HC) 2.5 % rectal cream, , PeriANAL, QID, Janusz Garcia MD    Current Outpatient Medications:     dilTIAZem CD (CARDIZEM CD) 120 mg ER capsule, Take 1 Cap by mouth daily. , Disp: 30 Cap, Rfl: 3    aspirin delayed-release 81 mg tablet, Take 81 mg by mouth daily. , Disp: , Rfl:     spironolactone (ALDACTONE) 50 mg tablet, Take 50 mg by mouth every morning., Disp: , Rfl:     potassium chloride (KLOR-CON M20) 20 mEq tablet, Take 40 mEq by mouth every morning., Disp: , Rfl:     carvedilol (COREG) 25 mg tablet, Take 1 Tab by mouth two (2) times daily (with meals). , Disp: 60 Tab, Rfl: 6    omeprazole (PRILOSEC) 20 mg capsule, Take 20 mg by mouth daily as needed (indigestion). , Disp: , Rfl:     SENNA-DOCUSATE SODIUM PO, Take 1 Tab by mouth daily as needed (constipation). , Disp: , Rfl:     cinacalcet (SENSIPAR) 30 mg tablet, Take 30 mg by mouth daily (with dinner). , Disp: , Rfl:     ferric citrate (AURYXIA) 210 mg iron tablet, Take 210 mg by mouth three (3) times daily (with meals). , Disp: , Rfl:     gentamicin (GARAMYCIN) 0.1 % topical cream, Apply  to affected area nightly., Disp: , Rfl:     Recent Results (from the past 24 hour(s))   CBC WITH AUTOMATED DIFF    Collection Time: 03/11/19  4:42 PM   Result Value Ref Range    WBC 12.3 (H) 3.6 - 11.0 K/uL    RBC 3.58 (L) 3.80 - 5.20 M/uL    HGB 12.2 11.5 - 16.0 g/dL    HCT 37.7 35.0 - 47.0 %    .3 (H) 80.0 - 99.0 FL    MCH 34.1 (H) 26.0 - 34.0 PG    MCHC 32.4 30.0 - 36.5 g/dL    RDW 13.3 11.5 - 14.5 %    PLATELET 206 428 - 184 K/uL    MPV 10.7 8.9 - 12.9 FL    NRBC 0.0 0  WBC    ABSOLUTE NRBC 0.00 0.00 - 0.01 K/uL    NEUTROPHILS 90 (H) 32 - 75 %    LYMPHOCYTES 6 (L) 12 - 49 %    MONOCYTES 3 (L) 5 - 13 %    EOSINOPHILS 0 0 - 7 %    BASOPHILS 0 0 - 1 %    IMMATURE GRANULOCYTES 1 (H) 0.0 - 0.5 %    ABS. NEUTROPHILS 11.1 (H) 1.8 - 8.0 K/UL    ABS. LYMPHOCYTES 0.7 (L) 0.8 - 3.5 K/UL    ABS. MONOCYTES 0.4 0.0 - 1.0 K/UL    ABS. EOSINOPHILS 0.0 0.0 - 0.4 K/UL    ABS. BASOPHILS 0.0 0.0 - 0.1 K/UL    ABS. IMM.  GRANS. 0.1 (H) 0.00 - 0.04 K/UL    DF AUTOMATED      RBC COMMENTS NORMOCYTIC, NORMOCHROMIC      WBC COMMENTS REACTIVE LYMPHS     METABOLIC PANEL, COMPREHENSIVE    Collection Time: 03/11/19  4:42 PM   Result Value Ref Range    Sodium 137 136 - 145 mmol/L    Potassium 5.5 (H) 3.5 - 5.1 mmol/L    Chloride 99 97 - 108 mmol/L    CO2 15 (LL) 21 - 32 mmol/L    Anion gap 23 (H) 5 - 15 mmol/L    Glucose 133 (H) 65 - 100 mg/dL     (H) 6 - 20 MG/DL    Creatinine 21.90 (H) 0.55 - 1.02 MG/DL    BUN/Creatinine ratio 5 (L) 12 - 20      GFR est AA 2 (L) >60 ml/min/1.73m2    GFR est non-AA 2 (L) >60 ml/min/1.73m2    Calcium 8.3 (L) 8.5 - 10.1 MG/DL    Bilirubin, total 1.7 (H) 0.2 - 1.0 MG/DL    ALT (SGPT) 37 12 - 78 U/L    AST (SGOT) 50 (H) 15 - 37 U/L    Alk. phosphatase 82 45 - 117 U/L    Protein, total 7.4 6.4 - 8.2 g/dL    Albumin 2.8 (L) 3.5 - 5.0 g/dL    Globulin 4.6 (H) 2.0 - 4.0 g/dL    A-G Ratio 0.6 (L) 1.1 - 2.2     SAMPLES BEING HELD    Collection Time: 03/11/19  4:42 PM   Result Value Ref Range    SAMPLES BEING HELD [email or web address]     COMMENT        Add-on orders for these samples will be processed based on acceptable specimen integrity and analyte stability, which may vary by analyte.    MAGNESIUM    Collection Time: 03/11/19  4:42 PM   Result Value Ref Range    Magnesium 2.3 1.6 - 2.4 mg/dL   TROPONIN I    Collection Time: 03/11/19  4:42 PM   Result Value Ref Range    Troponin-I, Qt. 1.53 (H) <0.05 ng/mL   CULTURE, BLOOD, PERIPHERAL    Collection Time: 03/11/19  4:42 PM   Result Value Ref Range    Special Requests: NO SPECIAL REQUESTS      Culture result: NO GROWTH AFTER 12 HOURS     INFLUENZA A & B AG (RAPID TEST)    Collection Time: 03/11/19  4:42 PM   Result Value Ref Range    Influenza A Antigen NEGATIVE  NEG      Influenza B Antigen NEGATIVE  NEG     POC LACTIC ACID    Collection Time: 03/11/19  4:44 PM   Result Value Ref Range    Lactic Acid (POC) 5.42 (HH) 0.40 - 2.00 mmol/L   CULTURE, BLOOD, PERIPHERAL    Collection Time: 03/11/19  5:16 PM   Result Value Ref Range    Special Requests: NO SPECIAL REQUESTS      Culture result: NO GROWTH AFTER 11 HOURS     EKG, 12 LEAD, INITIAL    Collection Time: 03/11/19  5:35 PM   Result Value Ref Range    Ventricular Rate 143 BPM    Atrial Rate 163 BPM    QRS Duration 128 ms    Q-T Interval 314 ms    QTC Calculation (Bezet) 484 ms    Calculated R Axis -43 degrees    Calculated T Axis 83 degrees    Diagnosis       Wide QRS tachycardia  Left axis deviation  Nonspecific intraventricular block  Abnormal ECG  When compared with ECG of 11-MAR-2019 15:57,  MANUAL COMPARISON REQUIRED, DATA IS UNCONFIRMED     TROPONIN I    Collection Time: 03/11/19  9:44 PM   Result Value Ref Range    Troponin-I, Qt. 1.47 (H) <9.76 ng/mL   METABOLIC PANEL, BASIC    Collection Time: 03/12/19  3:50 AM   Result Value Ref Range    Sodium 134 (L) 136 - 145 mmol/L    Potassium 6.5 (H) 3.5 - 5.1 mmol/L    Chloride 98 97 - 108 mmol/L    CO2 20 (L) 21 - 32 mmol/L    Anion gap 16 (H) 5 - 15 mmol/L    Glucose 139 (H) 65 - 100 mg/dL     (H) 6 - 20 MG/DL    Creatinine 20.80 (H) 0.55 - 1.02 MG/DL    BUN/Creatinine ratio 6 (L) 12 - 20      GFR est AA 2 (L) >60 ml/min/1.73m2    GFR est non-AA 2 (L) >60 ml/min/1.73m2    Calcium 7.8 (L) 8.5 - 10.1 MG/DL   CBC W/O DIFF    Collection Time: 03/12/19  3:50 AM   Result Value Ref Range    WBC 11.1 (H) 3.6 - 11.0 K/uL    RBC 3.50 (L) 3.80 - 5.20 M/uL    HGB 12.0 11.5 - 16.0 g/dL    HCT 36.7 35.0 - 47.0 %    .9 (H) 80.0 - 99.0 FL    MCH 34.3 (H) 26.0 - 34.0 PG    MCHC 32.7 30.0 - 36.5 g/dL    RDW 13.7 11.5 - 14.5 %    PLATELET 923 (L) 088 - 400 K/uL    MPV 11.3 8.9 - 12.9 FL    NRBC 0.0 0  WBC    ABSOLUTE NRBC 0.00 0.00 - 0.01 K/uL   TROPONIN I    Collection Time: 03/12/19  3:50 AM   Result Value Ref Range    Troponin-I, Qt. 1.63 (H) <0.05 ng/mL         Care Plan discussed with:  Patient x   Family    RN    Care Manager    Consultant/Specialist:         Michelle Phelps Chol, ACNP-BC

## 2019-03-12 NOTE — CONSULTS
[facility]: [address] Neurology  Aidee Crockett  [phone]        Name:   Srinivasa Truong   Medical record #: [id number]  Admission Date: 3/11/2019   Who Consulted: Grover Del Rosario  Reason for Consult:  Dysarthria, AMS    HISTORY OF PRESENT ILLNESS:   This is a 60 y.o. female with  has a past medical history of Anemia associated with chronic renal failure, Anuria (2014), Arrhythmia, Chronic kidney disease, Chronic systolic heart failure ([address] (10/31/2012), Chronic tachycardia, GERD (gastroesophageal reflux disease), non-ST elevation myocardial infarction (NSTEMI) (2011), Hypertension, Lipoma of right lower extremity (1980), Peritoneal dialysis catheter in place [facility]) (2014), and Polycystic kidney disease (2014). who is admitted for  AMS. The Neurology Service is asked to evaluate for AMS. Ms. Tania Marino presented with memory loss, vomiting and diarrhrea. PPt was unable to remember because she thought today was Sunday. Pt receives peritoneal dialysis, unsure of her last dialysis. Pt does not make urine. Upon admission she was found to be uremic and with elevated troponin. She has not been seen by neurology before. Clinical Data:  Imaging review:     CT head:  No acute findings      Current rhythm:  SR with wide QRS    Assessment/Plan:   1. Altered Mental Status:    · Neurochecks:  Every 4 hours   · CBC with elevated WBC, CMP with creatinine 20.8 and K of 6.5  · Check Folate, B12, TSH, Ammonia  · EEG to rule out subclinical seizures  · MRI if remains altered    2. Mobility:   · Has not been OOB. · PT/OT to [facility] for [facility]hab    4. Diet:    · Admitted for dysarthia, did not receive STAND prior to po medications    5. VTE Prophylaxes:   · Per primary team    Thank you for allowing the Neurology Service the pleasure of participating in the care of your patient.   This patient will be discussed with my collaborating care team physician Dr. Siddharth Quiñonez and he may have further recommendations regarding this patient's care. Attending Attestation:     NEUROLOGY NOTE ADDENDUM:    3/12/2019      I have reviewed the documentation provided by the nurse practitioner, discussed her findings, clinical impression, and the proposed management plans with regards to this patient's encounter. I have personally performed a face to face diagnostic evaluation on this patient. My findings are as follows:      Srinivasa Truong is a 60 y.o. female who  has a past medical history of Anemia associated with chronic renal failure, Anuria (2014), Arrhythmia, Chronic kidney disease, Chronic systolic heart failure ([facility] (10/31/2012), Chronic tachycardia, GERD (gastroesophageal reflux disease), non-ST elevation myocardial infarction (NSTEMI) (2011), Hypertension, Lipoma of right lower extremity (1980), Peritoneal dialysis catheter in place [facility]) (2014), and Polycystic kidney disease (2014). who presents for evaluation of AMS for several days. Patient apparently fell asleep and swas \"out\" for several days missing her dialysis  Found to be uremic and elevated tropnonin. Exam:  Visit Vitals  /83   Pulse 73   Temp 98.1 °F (36.7 °C)   Resp 30   Ht 5' 5\" (1.651 m)   Wt 54.4 kg (120 lb)   SpO2 100%   BMI 19.97 kg/m²     Gen: Awake, alert, follows commands  Appropriate appearance, normal speech. Oriented to all spheres. No visual field defect on confrontation exam.  Full eyes movement, with no nystagmus, no diplopia, no ptosis. Normal gag and swallow. All remaining cranial nerves were normal  Motor function: 5/5 in all extremities  Sensory: intact to LT, PP and JPS  DTRs + in all extremities, (-) Babinski  Good FTN and HTS   Gait: Deferred      Assessment  AMS, more likely metabolic encephalopathy due to renal failure    Plan  1) Will check Vit B12, Folate and ammonia  2) physical therapy      Thank you for the consultation.       Lacey Rivera MD  Diplomate, American Board of Psychiatry and Neurology  Diplomate, Neuromuscular Medicine  Diplomate, American Board of Electrodiagnostic Medicine                     Review of Systems: 10 point ROS was performed. Pertinent positives listed in HPI. Negative ROS is as follows. Pt denies: angina, palpitations, paresthesias, weakness, vision loss, slurred speech, aphasia, confusion, fever, chills, falls, headache, diplopia, back pain, neck pain, prior episodes of vertigo, hallucinations, new medications or dosage changes. PHYSICAL EXAM:     Visit Vitals  /88   Pulse 73   Temp 98.1 °F (36.7 °C)   Resp 30   Ht 5' 5\" (1.651 m)   Wt 54.4 kg (120 lb)   SpO2 100%   BMI 19.97 kg/m²      O2 Device: Room air    Temp (24hrs), Av.8 °F (37.1 °C), Min:98.1 °F (36.7 °C), Max:99.3 °F (37.4 °C)    No intake/output data recorded. 03/10 1901 -  0700  In: 700 [I.V.:700]  Out: 1500      General:  Alert, cooperative, no acute distress. Lungs:   Clear to auscultation bilaterally. No crackles/wheezes. Heart:  Abdominal:  Regular rate and rhythm, No murmur, click, rub or gallop. Soft and nondistended   Skin: Skin color, texture, turgor normal.      NEUROLOGICAL EXAM:    Appearance:  Well developed, well nourished,  and is in no acute distress. Mental Status: Oriented to time, place and person. Fully attentive. No aphasia. Full fund of knowledge. Normal recent and remote memory. Cranial Nerves:   Intact visual fields. PERRL, EOM's full, no nystagmus, no ptosis. Facial sensation is normal. Facial movement is symmetric. Palate is midline. Normal sternocleidomastoid strength. Tongue is midline. Reflexes:   Deep tendon reflexes 2+/4 and symmetrical.   Sensory:   Normal to temperature and vibration. Gait:  Not tested   Tremor:   No tremor noted. Cerebellar:  No cerebellar signs present. Neurovascular:  Normal heart sounds and regular rhythm. No carotid bruits. Motor: No pronator drift of either outstretched arm.          Deltoid Biceps Triceps Wrist Extension Finger Abduction   L 5 5 5 5 5   R 5 5 5 5 5      Hip Flexion Hip Extension Knee Flexion Knee Extension Ankle Dorsiflexion Ankle Plantarflexion   L 5 5 5 5 5 5   R 5 5 5 5 5 5        Reflexes:     Biceps Triceps Plantar Patellar Achilles   L 2 2 +1 +1 +1   R 2 2 +1 +1 +1     Past Medical History:   Diagnosis Date    Anemia associated with chronic renal failure     Anuria 2014    Arrhythmia     Paroxysmal a fib, paroxsymal atrial tach, SVT    Chronic kidney disease     ESRD- on PD    Chronic systolic heart failure (HCC) 10/31/2012    Chronic tachycardia     GERD (gastroesophageal reflux disease)     Hx of non-ST elevation myocardial infarction (NSTEMI) 2011    Hypertension     Lipoma of right lower extremity 1980    Foot    Peritoneal dialysis catheter in place ([facility] 2014    Nightime exchanges    Polycystic kidney disease 2014     Past Surgical History:   Procedure Laterality Date    HX HEART CATHETERIZATION  12/2015    no interventions    HX PACEMAKER  12/29/2015    ICD     Family History   Problem Relation Age of Onset    Diabetes Brother     Hypertension Brother     Hypertension Mother     Arthritis-osteo Mother     Hypertension Sister     Diabetes Sister     Kidney Disease Father         polycystic    Parkinson's Disease Father     Heart Disease Father     Hypertension Father     Hypertension Brother      Social History     Socioeconomic History    Marital status: SINGLE     Spouse name: Not on file    Number of children: Not on file    Years of education: Not on file    Highest education level: Not on file   Social Needs    Financial resource strain: Not on file    Food insecurity - worry: Not on file    Food insecurity - inability: Not on file   [facility] needs - medical: Not on file   [facility] needs - non-medical: Not on file   Occupational History    Not on file   Tobacco Use    Smoking status: Never Smoker    Smokeless tobacco: Never Used   Substance and Sexual Activity    Alcohol use: No    Drug use: No    Sexual activity: Not Currently     Partners: Male   Other Topics Concern    Not on file   Social History Narrative    Not on file       Allergies: Allergies   Allergen Reactions    Iodinated Contrast- Oral And Iv Dye Angioedema     Cellulitis on side of face after test       Outpatient Meds  No current facility-administered medications on file prior to encounter. Current Outpatient Medications on File Prior to Encounter   Medication Sig Dispense Refill    dilTIAZem CD (CARDIZEM CD) 120 mg ER capsule Take 1 Cap by mouth daily. 30 Cap 3    aspirin delayed-release 81 mg tablet Take 81 mg by mouth daily.  spironolactone (ALDACTONE) 50 mg tablet Take 50 mg by mouth every morning.  potassium chloride (KLOR-CON M20) 20 mEq tablet Take 40 mEq by mouth every morning.  carvedilol (COREG) 25 mg tablet Take 1 Tab by mouth two (2) times daily (with meals). 60 Tab 6    omeprazole (PRILOSEC) 20 mg capsule Take 20 mg by mouth daily as needed (indigestion).  SENNA-DOCUSATE SODIUM PO Take 1 Tab by mouth daily as needed (constipation).  cinacalcet (SENSIPAR) 30 mg tablet Take 30 mg by mouth daily (with dinner).  ferric citrate (AURYXIA) 210 mg iron tablet Take 210 mg by mouth three (3) times daily (with meals).  gentamicin (GARAMYCIN) 0.1 % topical cream Apply  to affected area nightly.          Inpatient Meds    Current Facility-Administered Medications:     metoprolol tartrate (LOPRESSOR) tablet 25 mg, 25 mg, Oral, Q8H, Jamaal Hayden MD    amiodarone (CORDARONE) 375 mg in dextrose 5% 250 mL infusion, 0.5-1 mg/min, IntraVENous, TITRATE, Brenda Ponce MD, Last Rate: 20 mL/hr at 03/12/19 0444, 0.5 mg/min at 03/12/19 0444    sodium chloride (NS) flush 5-40 mL, 5-40 mL, IntraVENous, Q8H, Janusz Garcia MD, 10 mL at 03/11/19 2200    sodium chloride (NS) flush 5-40 mL, 5-40 mL, IntraVENous, PRN, Radha Machelle Nair MD    sodium bicarbonate (8.4%) 100 mEq in dextrose 5 % - 0.45% NaCl 1,000 mL infusion, , IntraVENous, CONTINUOUS, Janusz Garcia MD, Last Rate: 42 mL/hr at 03/12/19 0109    ondansetron (ZOFRAN) injection 4 mg, 4 mg, IntraVENous, Q4H PRN, Janusz Garcia MD, 4 mg at 03/11/19 2229    peritoneal dialysis DEXTROSE 1.5% (2.5 mEq/L low calcium) solution 2,000 mL, 2,000 mL, IntraPERitoneal, QID, Kiet Mccormack MD, 2,000 mL at 03/11/19 2200    hydrocortisone (ANUSOL-HC) 2.5 % rectal cream, , PeriANAL, QID, Janusz Garcia MD    Current Outpatient Medications:     dilTIAZem CD (CARDIZEM CD) 120 mg ER capsule, Take 1 Cap by mouth daily. , Disp: 30 Cap, Rfl: 3    aspirin delayed-release 81 mg tablet, Take 81 mg by mouth daily. , Disp: , Rfl:     spironolactone (ALDACTONE) 50 mg tablet, Take 50 mg by mouth every morning., Disp: , Rfl:     potassium chloride (KLOR-CON M20) 20 mEq tablet, Take 40 mEq by mouth every morning., Disp: , Rfl:     carvedilol (COREG) 25 mg tablet, Take 1 Tab by mouth two (2) times daily (with meals). , Disp: 60 Tab, Rfl: 6    omeprazole (PRILOSEC) 20 mg capsule, Take 20 mg by mouth daily as needed (indigestion). , Disp: , Rfl:     SENNA-DOCUSATE SODIUM PO, Take 1 Tab by mouth daily as needed (constipation). , Disp: , Rfl:     cinacalcet (SENSIPAR) 30 mg tablet, Take 30 mg by mouth daily (with dinner). , Disp: , Rfl:     ferric citrate (AURYXIA) 210 mg iron tablet, Take 210 mg by mouth three (3) times daily (with meals). , Disp: , Rfl:     gentamicin (GARAMYCIN) 0.1 % topical cream, Apply  to affected area nightly., Disp: , Rfl:     Recent Results (from the past 24 hour(s))   CBC WITH AUTOMATED DIFF    Collection Time: 03/11/19  4:42 PM   Result Value Ref Range    WBC 12.3 (H) 3.6 - 11.0 K/uL    RBC 3.58 (L) 3.80 - 5.20 M/uL    HGB 12.2 11.5 - 16.0 g/dL    HCT 37.7 35.0 - 47.0 %    .3 (H) 80.0 - 99.0 FL    MCH 34.1 (H) 26.0 - 34.0 PG    MCHC 32.4 30.0 - 36.5 g/dL    RDW 13.3 11.5 - 14.5 %    PLATELET 747 512 - 808 K/uL    MPV 10.7 8.9 - 12.9 FL    NRBC 0.0 0  WBC    ABSOLUTE NRBC 0.00 0.00 - 0.01 K/uL    NEUTROPHILS 90 (H) 32 - 75 %    LYMPHOCYTES 6 (L) 12 - 49 %    MONOCYTES 3 (L) 5 - 13 %    EOSINOPHILS 0 0 - 7 %    BASOPHILS 0 0 - 1 %    IMMATURE GRANULOCYTES 1 (H) 0.0 - 0.5 %    ABS. NEUTROPHILS 11.1 (H) 1.8 - 8.0 K/UL    ABS. LYMPHOCYTES 0.7 (L) 0.8 - 3.5 K/UL    ABS. MONOCYTES 0.4 0.0 - 1.0 K/UL    ABS. EOSINOPHILS 0.0 0.0 - 0.4 K/UL    ABS. BASOPHILS 0.0 0.0 - 0.1 K/UL    ABS. IMM. GRANS. 0.1 (H) 0.00 - 0.04 K/UL    DF AUTOMATED      RBC COMMENTS NORMOCYTIC, NORMOCHROMIC      WBC COMMENTS REACTIVE LYMPHS     METABOLIC PANEL, COMPREHENSIVE    Collection Time: 03/11/19  4:42 PM   Result Value Ref Range    Sodium 137 136 - 145 mmol/L    Potassium 5.5 (H) 3.5 - 5.1 mmol/L    Chloride 99 97 - 108 mmol/L    CO2 15 (LL) 21 - 32 mmol/L    Anion gap 23 (H) 5 - 15 mmol/L    Glucose 133 (H) 65 - 100 mg/dL     (H) 6 - 20 MG/DL    Creatinine 21.90 (H) 0.55 - 1.02 MG/DL    BUN/Creatinine ratio 5 (L) 12 - 20      GFR est AA 2 (L) >60 ml/min/1.73m2    GFR est non-AA 2 (L) >60 ml/min/1.73m2    Calcium 8.3 (L) 8.5 - 10.1 MG/DL    Bilirubin, total 1.7 (H) 0.2 - 1.0 MG/DL    ALT (SGPT) 37 12 - 78 U/L    AST (SGOT) 50 (H) 15 - 37 U/L    Alk. phosphatase 82 45 - 117 U/L    Protein, total 7.4 6.4 - 8.2 g/dL    Albumin 2.8 (L) 3.5 - 5.0 g/dL    Globulin 4.6 (H) 2.0 - 4.0 g/dL    A-G Ratio 0.6 (L) 1.1 - 2.2     SAMPLES BEING HELD    Collection Time: 03/11/19  4:42 PM   Result Value Ref Range    SAMPLES BEING HELD [email or web address]     COMMENT        Add-on orders for these samples will be processed based on acceptable specimen integrity and analyte stability, which may vary by analyte.    MAGNESIUM    Collection Time: 03/11/19  4:42 PM   Result Value Ref Range    Magnesium 2.3 1.6 - 2.4 mg/dL   TROPONIN I    Collection Time: 03/11/19  4:42 PM   Result Value Ref Range    Troponin-I, Qt. 1.53 (H) <0.05 ng/mL   CULTURE, BLOOD, PERIPHERAL    Collection Time: 03/11/19  4:42 PM   Result Value Ref Range    Special Requests: NO SPECIAL REQUESTS      Culture result: NO GROWTH AFTER 12 HOURS     INFLUENZA A & B AG (RAPID TEST)    Collection Time: 03/11/19  4:42 PM   Result Value Ref Range    Influenza A Antigen NEGATIVE  NEG      Influenza B Antigen NEGATIVE  NEG     POC LACTIC ACID    Collection Time: 03/11/19  4:44 PM   Result Value Ref Range    Lactic Acid (POC) 5.42 (HH) 0.40 - 2.00 mmol/L   CULTURE, BLOOD, PERIPHERAL    Collection Time: 03/11/19  5:16 PM   Result Value Ref Range    Special Requests: NO SPECIAL REQUESTS      Culture result: NO GROWTH AFTER 11 HOURS     EKG, 12 LEAD, INITIAL    Collection Time: 03/11/19  5:35 PM   Result Value Ref Range    Ventricular Rate 143 BPM    Atrial Rate 163 BPM    QRS Duration 128 ms    Q-T Interval 314 ms    QTC Calculation (Bezet) 484 ms    Calculated R Axis -43 degrees    Calculated T Axis 83 degrees    Diagnosis       Wide QRS tachycardia  Left axis deviation  Nonspecific intraventricular block  Abnormal ECG  When compared with ECG of 11-MAR-2019 15:57,  MANUAL COMPARISON REQUIRED, DATA IS UNCONFIRMED     TROPONIN I    Collection Time: 03/11/19  9:44 PM   Result Value Ref Range    Troponin-I, Qt. 1.47 (H) <0.68 ng/mL   METABOLIC PANEL, BASIC    Collection Time: 03/12/19  3:50 AM   Result Value Ref Range    Sodium 134 (L) 136 - 145 mmol/L    Potassium 6.5 (H) 3.5 - 5.1 mmol/L    Chloride 98 97 - 108 mmol/L    CO2 20 (L) 21 - 32 mmol/L    Anion gap 16 (H) 5 - 15 mmol/L    Glucose 139 (H) 65 - 100 mg/dL     (H) 6 - 20 MG/DL    Creatinine 20.80 (H) 0.55 - 1.02 MG/DL    BUN/Creatinine ratio 6 (L) 12 - 20      GFR est AA 2 (L) >60 ml/min/1.73m2    GFR est non-AA 2 (L) >60 ml/min/1.73m2    Calcium 7.8 (L) 8.5 - 10.1 MG/DL   CBC W/O DIFF    Collection Time: 03/12/19  3:50 AM   Result Value Ref Range    WBC 11.1 (H) 3.6 - 11.0 K/uL    RBC 3.50 (L) 3.80 - 5.20 M/uL HGB 12.0 11.5 - 16.0 g/dL    HCT 36.7 35.0 - 47.0 %    .9 (H) 80.0 - 99.0 FL    MCH 34.3 (H) 26.0 - 34.0 PG    MCHC 32.7 30.0 - 36.5 g/dL    RDW 13.7 11.5 - 14.5 %    PLATELET 950 (L) 768 - 400 K/uL    MPV 11.3 8.9 - 12.9 FL    NRBC 0.0 0  WBC    ABSOLUTE NRBC 0.00 0.00 - 0.01 K/uL   TROPONIN I    Collection Time: 03/12/19  3:50 AM   Result Value Ref Range    Troponin-I, Qt. 1.63 (H) <0.05 ng/mL         Care Plan discussed with:  Patient x   Family    RN    Care Manager    Consultant/Specialist:         Michelle Ruiz, [facility]

## 2019-03-12 NOTE — PROGRESS NOTES
[facility] [place] [facility]  [address] [facility], [address] [place] [address]  [phone]      Medical Progress Note      NAME:         Srinivasa Truong   :        1960  MRM:        [id number]    Date:          3/12/2019      Subjective: Patient has been seen and examined as a follow up for multiple issues. Chart, labs, diagnostics reviewed. She remains weak with palpitations. No chest pain or SOB. Afebrile. No nausea or vomiting     Objective:    Vital Signs:    Visit Vitals  /81   Pulse (!) 128   Temp 97.5 °F (36.4 °C)   Resp 28   Ht 5' 5\" (1.651 m)   Wt 54.4 kg (120 lb)   SpO2 96%   BMI 19.97 kg/m²          Intake/Output Summary (Last 24 hours) at 3/12/2019 1501  Last data filed at 3/12/2019 1015  Gross per 24 hour   Intake 700 ml   Output 1500 ml   Net -800 ml        Physical Examination:    General:   Weak looking and not in any acute distress   Eyes:   pink conjunctivae, PERRLA with no discharge. ENT:   no ottorrhea or rhinorrhea with moist mucous membranes  Neck: no masses, thyroid non-tender and trachea central.  Pulm:  no accessory muscle use, decreased breath sounds without crackles or wheezes  Card:  no JVD or murmurs, has sinus tachycardia, without thrills, bruits. + peripheral edema  Abd:  Soft, non-tender, non-distended, normoactive bowel sounds with no palpable organomegaly  Musc:  No cyanosis, clubbing, atrophy or deformities. Skin:  No rashes, bruising or ulcers. Neuro: Awake and alert. Mild confusion.  Generally a non focal exam. Follows commands appropriately  Psych:  Has a good insight and is oriented x 3    Current Facility-Administered Medications   Medication Dose Route Frequency    metoprolol tartrate (LOPRESSOR) tablet 25 mg  25 mg Oral Q8H    gentamicin (GARAMYCIN) 0.1 % cream   Topical QHS    amiodarone (CORDARONE) 150 mg in dextrose 5% 100 mL bolus infusion  150 mg IntraVENous ONCE    heparin (porcine) injection 5,000 Units  5,000 Units SubCUTAneous Q8H    amiodarone (CORDARONE) 375 mg in dextrose 5% 250 mL infusion  0.5-1 mg/min IntraVENous TITRATE    sodium chloride (NS) flush 5-40 mL  5-40 mL IntraVENous Q8H    sodium chloride (NS) flush 5-40 mL  5-40 mL IntraVENous PRN    ondansetron (ZOFRAN) injection 4 mg  4 mg IntraVENous Q4H PRN    peritoneal dialysis DEXTROSE 1.5% (2.5 mEq/L low calcium) solution 2,000 mL  2,000 mL IntraPERitoneal QID    hydrocortisone (ANUSOL-HC) 2.5 % rectal cream   PeriANAL QID     Current Outpatient Medications   Medication Sig    dilTIAZem CD (CARDIZEM CD) 120 mg ER capsule Take 1 Cap by mouth daily.  aspirin delayed-release 81 mg tablet Take 81 mg by mouth daily.  spironolactone (ALDACTONE) 50 mg tablet Take 50 mg by mouth every morning.  potassium chloride (KLOR-CON M20) 20 mEq tablet Take 40 mEq by mouth every morning.  carvedilol (COREG) 25 mg tablet Take 1 Tab by mouth two (2) times daily (with meals).  omeprazole (PRILOSEC) 20 mg capsule Take 20 mg by mouth daily as needed (indigestion).  SENNA-DOCUSATE SODIUM PO Take 1 Tab by mouth daily as needed (constipation).  cinacalcet (SENSIPAR) 30 mg tablet Take 30 mg by mouth daily (with dinner).  ferric citrate (AURYXIA) 210 mg iron tablet Take 210 mg by mouth three (3) times daily (with meals).  gentamicin (GARAMYCIN) 0.1 % topical cream Apply  to affected area nightly.         Laboratory data and review:    Recent Labs     03/12/19  0350 03/11/19  1642   WBC 11.1* 12.3*   HGB 12.0 12.2   HCT 36.7 37.7   * 160     Recent Labs     03/12/19  1151 03/12/19  0350 03/11/19  1642    134* 137   K 5.3* 6.5* 5.5*   CL 99 98 99   CO2 20* 20* 15*   * 139* 133*   * 115* 105*   CREA 21.40* 20.80* 21.90*   CA 7.3* 7.8* 8.3*   MG 2.4  --  2.3   PHOS 8.8*  --   --    ALB 2.2*  --  2.8*   SGOT  --   --  50*   ALT  --   --  37     No components found for: [place]    Other Diagnostics:    Telemetry reviewed: SVT       Assessment and Plan:    SVT (supraventricular tachycardia) ([facility] (3/11/2019) / type 2 MI POA: patient still with bursts of SVT with no chest discomfort. Already been reviewed by cardiology. Continue Amiodarone gtt, Metoprolol. Echocardiogram pending. Acute metabolic encephalopathy / Dysarthria (3/11/2019) POA: intermittent confusion. Now resolving. Suspect multifactorial from uremia, metabolic derangements. CT scan of the head is unremarkable. No MRI due to her ICD. EEG non specific but neg for seizure activity. Ammonia level unremarkable. Appreciate neurology consult. Essential hypertension, benign (10/18/2012): BP low normal. Continue current medications and follow. Cardiomyopathy, nonischemic (HCC) (62/22/1720)/ Systolic CHF, chronic (HCC) (8/25/2018)/ ICD (implantable cardioverter-defibrillator) in place (1/26/2018): recent Echo showed an EF 25%. Holding bumex, ARB and coreg due to borderline low BP. Repeat Echo pending. On metoprolol. Cardiology following     Nausea & vomiting (3/11/2019)/ Diarrhea (3/11/2019): suspect viral GE. Better. Stool WBCs normal. Follow clinically. ESRD (end stage renal disease) ([facility] (12/11/2015) /  Hyperkalemia POA: Missed two days of peritoneal dialysis. Seen by renal. Continue PD as instructed.      Total time spent for the patient's care: [address] [facility] discussed with: Patient, Nursing Staff and Consultant/Specialist    Discussed:  Care Plan    Prophylaxis:  Hep SQ    Anticipated Disposition:  Home w/Family           ___________________________________________________    Attending Physician:   Alexandra Wyatt MD

## 2019-03-12 NOTE — PROGRESS NOTES
EEG Reviewed and dictated  8cps with bursts of generalized delta non specific etiology but can be seen with encephalopathy    Wallace Smith MD

## 2019-03-12 NOTE — PROGRESS NOTES
3/12/2019  10:49 AM  Case management note    Met with patient to discuss discharge planning. Patient lives alone in a single story home with 6 steps to enter. Patient is independent with ADL's. [facility] @ [address] [place] [address]  No advance directive  Sister can transport on discharge, [place] [phone]    Reason for Admission:   SVT                  RRAT Score:     13             Do you (patient/family) have any concerns for transition/discharge? Lives alone              Plan for utilizing home health:     TBD    Likelihood of readmission?    Moderate/yellow            Transition of Care Plan:      Home with family assistance and out patient follow up    Care Management Interventions  PCP Verified by CM: Yes(dr. annika sims no nn)  Mode of Transport at Discharge: Self  Transition of Care Consult (CM Consult): Discharge Planning  Current Support Network: Lives Alone  Confirm Follow Up Transport: Family  Plan discussed with Pt/Family/Caregiver: Yes  Discharge Location  Discharge Placement: Home with family assistance  [place], [place]

## 2019-03-12 NOTE — H&P
[facility]  [address] [facility], [facility] [address]  [phone]    Admission History and Physical      NAME:  Javi Hamilton   :   1960   MRN:  [id number]     PCP:  Lai Lowry MD     Date/Time:  3/11/2019         Subjective:     CHIEF COMPLAINT: dysarthria, confusion, nausea and vomiting      HISTORY OF PRESENT ILLNESS:     Ms. Nilda Iyer is a 60 y.o.  female who is admitted with SVT. Ms. Nilda Iyer with PMH of PAF, ESRD, HTN, GERD, chronic systolic CHF, anemia presented to ER for check up due to \" lost a day\" yesterday. Pt is alert and oriented on my evaluation and stated that she slept all day yesterday( ) only get up once to use the bathroom. Pt's family went to see her today and she thought today is  and couldn't remember what happened on  all day. She noted expressive aphasia today, which later resolved. . Her last peritoneal dialysis was on Friday. Now c/o nausea, vomiting and diarrhea. Denies abdomen pain. In ER, she was found to be in SVT, which couldn't be converted with adenosine.  Evaluated by cardiology and started on amio drip             Past Medical History:   Diagnosis Date    Anemia associated with chronic renal failure     Anuria     Arrhythmia     Paroxysmal a fib, paroxsymal atrial tach, SVT    Chronic kidney disease     ESRD- on PD    Chronic systolic heart failure ([facility] 10/31/2012    Chronic tachycardia     GERD (gastroesophageal reflux disease)     Hx of non-ST elevation myocardial infarction (NSTEMI)     Hypertension     Lipoma of right lower extremity 1980    Foot    Peritoneal dialysis catheter in place [facility]) 2014    Nightime exchanges    Polycystic kidney disease 2014        Past Surgical History:   Procedure Laterality Date    HX HEART CATHETERIZATION  2015    no interventions    HX PACEMAKER  2015    ICD       Social History     Tobacco Use    Smoking status: Never Smoker    Smokeless tobacco: Never Used   Substance Use Topics    Alcohol use: No        Family History   Problem Relation Age of Onset    Diabetes Brother     Hypertension Brother     Hypertension Mother    [facility] Arthritis-osteo Mother     Hypertension Sister     Diabetes Sister     Kidney Disease Father         polycystic    Parkinson's Disease Father     Heart Disease Father     Hypertension Father     Hypertension Brother         Allergies   Allergen Reactions    Iodinated Contrast- Oral And Iv Dye Angioedema     Cellulitis on side of face after test        Prior to Admission medications    Medication Sig Start Date End Date Taking? Authorizing Provider   dilTIAZem CD (CARDIZEM CD) 120 mg ER capsule Take 1 Cap by mouth daily. 2/8/19   Aung Breaux NP   apixaban (ELIQUIS) 2.5 mg tablet Take 1 Tab by mouth two (2) times a day. 2/8/19   Aung Breaux NP   aspirin delayed-release 81 mg tablet Take 81 mg by mouth daily. Provider, Historical   spironolactone (ALDACTONE) 50 mg tablet Take 50 mg by mouth every morning. Provider, Historical   potassium chloride (KLOR-CON M20) 20 mEq tablet Take 40 mEq by mouth every morning. Provider, Historical   carvedilol (COREG) 25 mg tablet Take 1 Tab by mouth two (2) times daily (with meals). 9/18/18   Chris Amaya NP   omeprazole (PRILOSEC) 20 mg capsule Take 20 mg by mouth daily as needed. Provider, Historical   SENNA-DOCUSATE SODIUM PO Take 1 Tab by mouth daily as needed (constipation). Provider, Historical   cinacalcet (SENSIPAR) 30 mg tablet Take 30 mg by mouth daily (with dinner). Provider, Historical   ferric citrate (AURYXIA) 210 mg iron tablet Take 210 mg by mouth three (3) times daily (with meals). Provider, Historical   gentamicin (GARAMYCIN) 0.1 % topical cream Apply  to affected area nightly.  7/12/14   Provider, Historical         Review of Systems:  (bold if positive, if negative)    Gen:  Eyes:  ENT:  CVS:  Pulm:  GI:  nausea, emesis, diarrhea,   :    MS:  Skin:  Psych:  Endo:    Hem:  Renal:    Neuro:            Objective:      VITALS:    Vital signs reviewed; most recent are:    Visit Vitals  /90   Pulse (!) 124   Temp 99.3 °F (37.4 °C)   Resp 28   Ht 5' 5\" (1.651 m)   Wt 54.4 kg (120 lb)   SpO2 97%   BMI 19.97 kg/m²     SpO2 Readings from Last 6 Encounters:   03/11/19 97%   02/08/19 99%   12/17/18 99%   12/03/18 100%   11/26/18 97%   11/01/18 97%            Intake/Output Summary (Last 24 hours) at 3/11/2019 2046  Last data filed at 3/11/2019 1912  Gross per 24 hour   Intake 700 ml   Output    Net 700 ml            Exam:     Physical Exam:    Gen:  Well-developed, well-nourished, in no acute distress  HEENT:  Pink conjunctivae, PERRL, hearing intact to voice, moist mucous membranes  Neck:  Supple, without masses, thyroid non-tender  Resp:  No accessory muscle use, clear breath sounds without wheezes rales or rhonchi  Card:  No murmurs, normal S1, S2 without thrills, bruits or peripheral edema  Abd:  Soft, non-tender, non-distended, normoactive bowel sounds are present, no palpable organomegaly and no detectable hernias  Lymph:  No cervical or inguinal adenopathy  Musc:  No cyanosis or clubbing  Skin:  No rashes or ulcers, skin turgor is good  Neuro:  Cranial nerves are grossly intact, no focal motor weakness, follows commands appropriately  Psych:  Good insight, oriented to person, place and time, alert       Labs:    Recent Labs     03/11/19  1642   WBC 12.3*   HGB 12.2   HCT 37.7        Recent Labs     03/11/19  1642      K 5.5*   CL 99   CO2 15*   *   *   CREA 21.90*   CA 8.3*   MG 2.3   ALB 2.8*   TBILI 1.7*   SGOT 50*   ALT 37     Lab Results   Component Value Date/Time    Glucose (POC) 94 10/18/2013 03:35 PM    Glucose (POC) 102 10/14/2012 05:58 AM     No results for input(s): PH, PCO2, PO2, HCO3, FIO2 in the last 72 hours. No results for input(s): INR in the last 72 hours.     No lab exists for component: INREXT    Telemetry reviewed:   SVT       Assessment/Plan:    1. SVT (supraventricular tachycardia) ([facility] [address] (3/11/2019)/ NSTEMI/ elevated troponin/ type 2 MI. Denies chest pain. Evaluated by cardiology and on amiodarone drip. Her elevated troponin is likely due to type 2 MI secondary to SVT. Monitor on telemetry. Heart rate is still high. Check serial cardiac enzymes. 2.  Dysarthria (3/11/2019)/ confusion. Now resolved. CT scan of the head is unremarkable. Couldn't do MRI due to ICD. Check EEG and consult neurology in am. Neuro watch     3.  Acidosis (3/11/2019)/ lactic acidosis. Likely due to vomiting, diarrhea, ERSD. Will start bicarb drip. 4.  Essential hypertension, benign (10/18/2012). Hold BP med as BP is borderline low. 5.   [facility], [facility]ischemic [facility] (65/41/9874)/ Systolic CHF, chronic ([facility] (8/25/2018)/ ICD (implantable cardioverter-defibrillator) in place (1/26/2018). Recent EF 25%. Holding bumex, ARB and coreg due to borderline low BP. Pt was given IVF in ER      6. Anemia (1/20/2015). Likely due to ERSD. Continue to monitor     7. Nausea & vomiting (3/11/2019)/ Diarrhea (3/11/2019). Possible viral. Was given IVF in ER. Check stool studies. Doubt bacterial infection. Symptomatic treatment     8. ESRD (end stage renal disease) ([facility] [address] (12/11/2015). Missed two days of peritoneal dialysis. Consulted nephrology     9. Paroxysmal atrial fibrillation ([facility] [address] (7/31/2018). Continue eliquis. 10.  Hyperkalemia. Needs dialysis.           Previous medical records reviewed     Risk of deterioration: high      Total time spent with patient: [address] discussed with: Patient, Nursing Staff and >50% of time spent in counseling and coordination of care    Discussed:  Care Plan    Prophylaxis:  eliquis    Probable Disposition:  Home w/Family           ___________________________________________________    Attending Physician: Aydin Almazan MD

## 2019-03-12 NOTE — ED NOTES
.Verbal shift change report given to Syeda Valencia (oncoming nurse) by Tiera Miles (offgoing nurse). Report included the following information SBAR, ED Summary, MAR and Recent Results.

## 2019-03-12 NOTE — ED NOTES
Bedside and Verbal shift change report given to Flaquita (oncoming nurse) by Jai Austin (offgoing nurse). Report included the following information SBAR, ED Summary, MAR, Recent Results and Cardiac Rhythm sinus tach.

## 2019-03-13 ENCOUNTER — APPOINTMENT (OUTPATIENT)
Dept: INTERVENTIONAL RADIOLOGY/VASCULAR | Age: 59
DRG: 308 | End: 2019-03-13
Attending: INTERNAL MEDICINE
Payer: MEDICARE

## 2019-03-13 ENCOUNTER — APPOINTMENT (OUTPATIENT)
Dept: GENERAL RADIOLOGY | Age: 59
DRG: 308 | End: 2019-03-13
Attending: RADIOLOGY
Payer: MEDICARE

## 2019-03-13 PROBLEM — R57.0 CARDIOGENIC SHOCK (HCC): Status: ACTIVE | Noted: 2019-03-13

## 2019-03-13 LAB
ALBUMIN SERPL-MCNC: 2.2 G/DL (ref 3.5–5)
ANION GAP SERPL CALC-SCNC: 19 MMOL/L (ref 5–15)
ATRIAL RATE: 127 BPM
ATRIAL RATE: 70 BPM
BASOPHILS # BLD: 0.1 K/UL (ref 0–0.1)
BASOPHILS NFR BLD: 1 % (ref 0–1)
BUN SERPL-MCNC: 116 MG/DL (ref 6–20)
BUN/CREAT SERPL: 5 (ref 12–20)
CALCIUM SERPL-MCNC: 7.2 MG/DL (ref 8.5–10.1)
CALCULATED P AXIS, ECG09: 28 DEGREES
CALCULATED R AXIS, ECG10: -41 DEGREES
CALCULATED R AXIS, ECG10: -42 DEGREES
CALCULATED T AXIS, ECG11: 57 DEGREES
CALCULATED T AXIS, ECG11: 69 DEGREES
CHLORIDE SERPL-SCNC: 94 MMOL/L (ref 97–108)
CO2 SERPL-SCNC: 19 MMOL/L (ref 21–32)
CREAT SERPL-MCNC: 21.2 MG/DL (ref 0.55–1.02)
DIAGNOSIS, 93000: NORMAL
DIAGNOSIS, 93000: NORMAL
DIFFERENTIAL METHOD BLD: ABNORMAL
ECHO AO ASC DIAM: 3.27 CM
ECHO AO ROOT DIAM: 3.23 CM
ECHO AV PEAK GRADIENT: 2.4 MMHG
ECHO AV PEAK VELOCITY: 78.21 CM/S
ECHO AV REGURGITANT PHT: 773.2 CM
ECHO LA AREA 4C: 19.4 CM2
ECHO LA MAJOR AXIS: 3.22 CM
ECHO LA TO AORTIC ROOT RATIO: 1
ECHO LA VOL 2C: 58.83 ML (ref 22–52)
ECHO LA VOL 4C: 50.38 ML (ref 22–52)
ECHO LA VOL BP: 55.8 ML (ref 22–52)
ECHO LA VOL/BSA BIPLANE: 34.8 ML/M2 (ref 16–28)
ECHO LA VOLUME INDEX A2C: 36.69 ML/M2 (ref 16–28)
ECHO LA VOLUME INDEX A4C: 31.42 ML/M2 (ref 16–28)
ECHO LV INTERNAL DIMENSION DIASTOLIC: 4.4 CM (ref 3.9–5.3)
ECHO LV INTERNAL DIMENSION SYSTOLIC: 3.95 CM
ECHO LV IVSD: 0.96 CM (ref 0.6–0.9)
ECHO LV MASS 2D: 137 G (ref 67–162)
ECHO LV MASS INDEX 2D: 85.4 G/M2 (ref 43–95)
ECHO LV POSTERIOR WALL DIASTOLIC: 0.76 CM (ref 0.6–0.9)
ECHO LVOT DIAM: 1.73 CM
ECHO MV A VELOCITY: 45.56 CM/S
ECHO MV AREA PHT: 5.9 CM2
ECHO MV E DECELERATION TIME (DT): 129.6 MS
ECHO MV E VELOCITY: 60.96 CM/S
ECHO MV E/A RATIO: 1.34
ECHO MV PRESSURE HALF TIME (PHT): 37.6 MS
ECHO PV MAX VELOCITY: 59.8 CM/S
ECHO PV PEAK GRADIENT: 1.4 MMHG
ECHO RV INTERNAL DIMENSION: 3.63 CM
ECHO RV TAPSE: 0.82 CM (ref 1.5–2)
ECHO TV REGURGITANT MAX VELOCITY: 324.1 CM/S
ECHO TV REGURGITANT PEAK GRADIENT: 42 MMHG
EOSINOPHIL # BLD: 0.2 K/UL (ref 0–0.4)
EOSINOPHIL NFR BLD: 1 % (ref 0–7)
ERYTHROCYTE [DISTWIDTH] IN BLOOD BY AUTOMATED COUNT: 13.7 % (ref 11.5–14.5)
GLUCOSE SERPL-MCNC: 251 MG/DL (ref 65–100)
HCT VFR BLD AUTO: 34.4 % (ref 35–47)
HGB BLD-MCNC: 11 G/DL (ref 11.5–16)
IMM GRANULOCYTES # BLD AUTO: 0.1 K/UL (ref 0–0.04)
IMM GRANULOCYTES NFR BLD AUTO: 1 % (ref 0–0.5)
LYMPHOCYTES # BLD: 0.9 K/UL (ref 0.8–3.5)
LYMPHOCYTES NFR BLD: 7 % (ref 12–49)
MCH RBC QN AUTO: 33.5 PG (ref 26–34)
MCHC RBC AUTO-ENTMCNC: 32 G/DL (ref 30–36.5)
MCV RBC AUTO: 104.9 FL (ref 80–99)
MONOCYTES # BLD: 0.4 K/UL (ref 0–1)
MONOCYTES NFR BLD: 3 % (ref 5–13)
NEUTS SEG # BLD: 11.2 K/UL (ref 1.8–8)
NEUTS SEG NFR BLD: 87 % (ref 32–75)
NRBC # BLD: 0.03 K/UL (ref 0–0.01)
NRBC BLD-RTO: 0.2 PER 100 WBC
P-R INTERVAL, ECG05: 186 MS
PHOSPHATE SERPL-MCNC: 8.3 MG/DL (ref 2.6–4.7)
PISA AR MAX VEL: 316.16 CM/S
PLATELET # BLD AUTO: 131 K/UL (ref 150–400)
PMV BLD AUTO: 11.3 FL (ref 8.9–12.9)
POTASSIUM SERPL-SCNC: 5.1 MMOL/L (ref 3.5–5.1)
Q-T INTERVAL, ECG07: 360 MS
Q-T INTERVAL, ECG07: 462 MS
QRS DURATION, ECG06: 162 MS
QRS DURATION, ECG06: 88 MS
QTC CALCULATION (BEZET), ECG08: 498 MS
QTC CALCULATION (BEZET), ECG08: 523 MS
RBC # BLD AUTO: 3.28 M/UL (ref 3.8–5.2)
SODIUM SERPL-SCNC: 132 MMOL/L (ref 136–145)
TROPONIN I SERPL-MCNC: 1.74 NG/ML
VENTRICULAR RATE, ECG03: 127 BPM
VENTRICULAR RATE, ECG03: 70 BPM
WBC # BLD AUTO: 12.9 K/UL (ref 3.6–11)

## 2019-03-13 PROCEDURE — 80069 RENAL FUNCTION PANEL: CPT

## 2019-03-13 PROCEDURE — 02H633Z INSERTION OF INFUSION DEVICE INTO RIGHT ATRIUM, PERCUTANEOUS APPROACH: ICD-10-PCS | Performed by: RADIOLOGY

## 2019-03-13 PROCEDURE — 74011250637 HC RX REV CODE- 250/637: Performed by: INTERNAL MEDICINE

## 2019-03-13 PROCEDURE — A4722 DIALYS SOL FLD VOL > 1999CC: HCPCS | Performed by: INTERNAL MEDICINE

## 2019-03-13 PROCEDURE — 76937 US GUIDE VASCULAR ACCESS: CPT

## 2019-03-13 PROCEDURE — 36415 COLL VENOUS BLD VENIPUNCTURE: CPT

## 2019-03-13 PROCEDURE — 74011250636 HC RX REV CODE- 250/636: Performed by: RADIOLOGY

## 2019-03-13 PROCEDURE — C1894 INTRO/SHEATH, NON-LASER: HCPCS

## 2019-03-13 PROCEDURE — 74011250637 HC RX REV CODE- 250/637: Performed by: SPECIALIST

## 2019-03-13 PROCEDURE — 93005 ELECTROCARDIOGRAM TRACING: CPT

## 2019-03-13 PROCEDURE — 74011000258 HC RX REV CODE- 258: Performed by: INTERNAL MEDICINE

## 2019-03-13 PROCEDURE — 71045 X-RAY EXAM CHEST 1 VIEW: CPT

## 2019-03-13 PROCEDURE — C1751 CATH, INF, PER/CENT/MIDLINE: HCPCS

## 2019-03-13 PROCEDURE — 74011000258 HC RX REV CODE- 258: Performed by: SPECIALIST

## 2019-03-13 PROCEDURE — 65610000006 HC RM INTENSIVE CARE

## 2019-03-13 PROCEDURE — 74011250636 HC RX REV CODE- 250/636: Performed by: SPECIALIST

## 2019-03-13 PROCEDURE — 84484 ASSAY OF TROPONIN QUANT: CPT

## 2019-03-13 PROCEDURE — 85025 COMPLETE CBC W/AUTO DIFF WBC: CPT

## 2019-03-13 PROCEDURE — 77030020847 HC STATLOK BARD -A

## 2019-03-13 PROCEDURE — 74011250636 HC RX REV CODE- 250/636: Performed by: INTERNAL MEDICINE

## 2019-03-13 PROCEDURE — 74011000250 HC RX REV CODE- 250: Performed by: INTERNAL MEDICINE

## 2019-03-13 RX ORDER — CARVEDILOL 6.25 MG/1
6.25 TABLET ORAL 2 TIMES DAILY WITH MEALS
Status: DISCONTINUED | OUTPATIENT
Start: 2019-03-13 | End: 2019-03-13

## 2019-03-13 RX ORDER — SODIUM CHLORIDE 9 MG/ML
250 INJECTION, SOLUTION INTRAVENOUS ONCE
Status: COMPLETED | OUTPATIENT
Start: 2019-03-13 | End: 2019-03-13

## 2019-03-13 RX ORDER — LIDOCAINE HYDROCHLORIDE 10 MG/ML
5 INJECTION, SOLUTION EPIDURAL; INFILTRATION; INTRACAUDAL; PERINEURAL ONCE
Status: COMPLETED | OUTPATIENT
Start: 2019-03-13 | End: 2019-03-13

## 2019-03-13 RX ORDER — AMIODARONE HYDROCHLORIDE 200 MG/1
200 TABLET ORAL 2 TIMES DAILY WITH MEALS
Status: DISCONTINUED | OUTPATIENT
Start: 2019-03-13 | End: 2019-03-19 | Stop reason: HOSPADM

## 2019-03-13 RX ADMIN — SODIUM CHLORIDE 250 ML: 9 INJECTION, SOLUTION INTRAVENOUS at 12:45

## 2019-03-13 RX ADMIN — SODIUM CHLORIDE, SODIUM LACTATE, CALCIUM CHLORIDE, MAGNESIUM CHLORIDE AND DEXTROSE 2000 ML: 1.5; 538; 448; 18.3; 5.08 INJECTION, SOLUTION INTRAPERITONEAL at 22:03

## 2019-03-13 RX ADMIN — SODIUM CHLORIDE, SODIUM LACTATE, CALCIUM CHLORIDE, MAGNESIUM CHLORIDE AND DEXTROSE 2000 ML: 1.5; 538; 448; 18.3; 5.08 INJECTION, SOLUTION INTRAPERITONEAL at 09:44

## 2019-03-13 RX ADMIN — ASPIRIN 81 MG: 81 TABLET ORAL at 08:45

## 2019-03-13 RX ADMIN — FERRIC CITRATE 210 MG: 210 TABLET, COATED ORAL at 17:15

## 2019-03-13 RX ADMIN — FERRIC CITRATE 210 MG: 210 TABLET, COATED ORAL at 13:39

## 2019-03-13 RX ADMIN — HYDROCORTISONE 2.5%: 25 CREAM TOPICAL at 00:41

## 2019-03-13 RX ADMIN — GENTAMICIN SULFATE: 1 CREAM TOPICAL at 00:41

## 2019-03-13 RX ADMIN — HEPARIN SODIUM 5000 UNITS: 5000 INJECTION INTRAVENOUS; SUBCUTANEOUS at 21:58

## 2019-03-13 RX ADMIN — AMIODARONE HYDROCHLORIDE 200 MG: 200 TABLET ORAL at 08:51

## 2019-03-13 RX ADMIN — AMIODARONE HYDROCHLORIDE 200 MG: 200 TABLET ORAL at 17:58

## 2019-03-13 RX ADMIN — SODIUM CHLORIDE, SODIUM LACTATE, CALCIUM CHLORIDE, MAGNESIUM CHLORIDE AND DEXTROSE 2000 ML: 1.5; 538; 448; 18.3; 5.08 INJECTION, SOLUTION INTRAPERITONEAL at 13:30

## 2019-03-13 RX ADMIN — AMIODARONE HYDROCHLORIDE 1 MG/MIN: 50 INJECTION, SOLUTION INTRAVENOUS at 01:30

## 2019-03-13 RX ADMIN — CINACALCET HYDROCHLORIDE 30 MG: 30 TABLET, COATED ORAL at 17:14

## 2019-03-13 RX ADMIN — SODIUM CHLORIDE, SODIUM LACTATE, CALCIUM CHLORIDE, MAGNESIUM CHLORIDE AND DEXTROSE 2000 ML: 1.5; 538; 448; 18.3; 5.08 INJECTION, SOLUTION INTRAPERITONEAL at 19:10

## 2019-03-13 RX ADMIN — SODIUM CHLORIDE, SODIUM LACTATE, CALCIUM CHLORIDE, MAGNESIUM CHLORIDE AND DEXTROSE 2000 ML: 1.5; 538; 448; 18.3; 5.08 INJECTION, SOLUTION INTRAPERITONEAL at 00:42

## 2019-03-13 RX ADMIN — SEVELAMER CARBONATE 800 MG: 800 TABLET, FILM COATED ORAL at 17:14

## 2019-03-13 RX ADMIN — HEPARIN SODIUM 5000 UNITS: 5000 INJECTION INTRAVENOUS; SUBCUTANEOUS at 00:40

## 2019-03-13 RX ADMIN — CARVEDILOL 6.25 MG: 6.25 TABLET, FILM COATED ORAL at 09:47

## 2019-03-13 RX ADMIN — LIDOCAINE HYDROCHLORIDE 5 ML: 10 INJECTION, SOLUTION EPIDURAL; INFILTRATION; INTRACAUDAL; PERINEURAL at 16:27

## 2019-03-13 RX ADMIN — Medication 10 ML: at 21:58

## 2019-03-13 RX ADMIN — SEVELAMER CARBONATE 800 MG: 800 TABLET, FILM COATED ORAL at 13:39

## 2019-03-13 RX ADMIN — SODIUM CHLORIDE 250 ML: 900 INJECTION, SOLUTION INTRAVENOUS at 13:52

## 2019-03-13 RX ADMIN — HEPARIN SODIUM 5000 UNITS: 5000 INJECTION INTRAVENOUS; SUBCUTANEOUS at 13:40

## 2019-03-13 RX ADMIN — AMIODARONE HYDROCHLORIDE 200 MG: 200 TABLET ORAL at 00:41

## 2019-03-13 RX ADMIN — Medication 5 ML: at 17:19

## 2019-03-13 RX ADMIN — AMIODARONE HYDROCHLORIDE 0.5 MG/MIN: 50 INJECTION, SOLUTION INTRAVENOUS at 08:44

## 2019-03-13 RX ADMIN — NOREPINEPHRINE BITARTRATE 4 MCG/MIN: 1 INJECTION INTRAVENOUS at 17:52

## 2019-03-13 NOTE — ED NOTES
Patient converted to NSR, HR in the 60-70's. EKG completed and confirmed. Dr. Rey Turpin aware will continue with the Iesha polancoip.

## 2019-03-13 NOTE — ED NOTES
Rhythm change noted to monitor, sinus with a 1st degree block noted. Pt denies complaints. See scanned rhythm strip. Pt's mother at bedside.

## 2019-03-13 NOTE — ED NOTES
Pt Throughput: Receiving ICU Charge RN made aware of patient's bed placement.      Marychuy Patel RN

## 2019-03-13 NOTE — ED NOTES
TRANSFER - OUT REPORT:    Verbal report given to Jana(name) on Devyn Mehta  being transferred to ICU(unit) for routine progression of care       Report consisted of patients Situation, Background, Assessment and   Recommendations(SBAR). Information from the following report(s) SBAR, ED Summary, MAR, Recent Results and Cardiac Rhythm NSR w/ occasional PACs/PVCs was reviewed with the receiving nurse. Lines:   Triple Lumen 03/13/19 Right Internal jugular (Active)   Central Line Being Utilized Yes 3/13/2019  6:00 PM   Criteria for Appropriate Use Hemodynamically unstable, requiring monitoring lines, vasopressors, or volume resuscitation 3/13/2019  6:00 PM   Site Assessment Clean, dry, & intact 3/13/2019  6:00 PM   Infiltration Assessment 0 3/13/2019  6:00 PM   Date of Last Dressing Change 03/13/19 3/13/2019  6:00 PM   Dressing Status Clean, dry, & intact 3/13/2019  6:00 PM   Dressing Type Transparent 3/13/2019  6:00 PM   Proximal Hub Color/Line Status Capped; Patent 3/13/2019  6:00 PM   Positive Blood Return (Medial Site) Yes 3/13/2019  4:32 PM   Medial Hub Color/Line Status Patent 3/13/2019  6:00 PM   Positive Blood Return (Lateral Site) Yes 3/13/2019  4:32 PM   Distal Hub Color/Line Status Patent 3/13/2019  6:00 PM   Positive Blood Return (Site #3) Yes 3/13/2019  4:32 PM   Alcohol Cap Used Yes 3/13/2019  6:00 PM        Opportunity for questions and clarification was provided.       Patient transported with:   Monitor  O2 @ 4 liters  Registered Nurse

## 2019-03-13 NOTE — PROCEDURES
[facility]  EEG    Name:  Sudha Brown  MR#:  [id number]  :  1960  ACCOUNT #:  [de-identified]  DATE OF SERVICE:  2019      REQUESTING PHYSICIAN:  DR. Alicia Jackson. INDICATION:  EEG is requested in this 60year-old with confusion to evaluate for epileptiform abnormality. MEDICATIONS:  Include Cardizem, Eliquis, aspirin, Aldactone, Sensipar, Prilosec, Coreg. EEG REPORT:  This tracing is obtained portably in the Emergency Department when the patient is noted to be awake, oriented, talkative and following directions. During this state, there are brief intermittent runs of posteriorly dominant and symmetric low-to-medium amplitude 8 cycle per second activities which attenuate with eye opening. Periodically throughout the tracing, there are bursts of generalized mixed frequency delta and theta range activities of high amplitude. These are not rhythmic and these do not evolve. Activating procedures were not performed. Sleep is not attained. INTERPRETATION:  This EEG recorded portably in the Emergency Department while the patient is noted to be awake, alert and oriented is abnormal secondary to the aforementioned bursts of mixed frequency delta and theta range activities which is commonly seen in encephalopathy which may have contributions from toxic, metabolic, diffuse structural and/or pharmacologic effects. Clinical correlation is recommended. No epileptiform abnormalities are seen.         Michelle Hearn MD [id number]  D:  2019 13:15  T:  2019 5:24  JOB #:  [id number]  CC:

## 2019-03-13 NOTE — PROGRESS NOTES
[facility]  [address] [facility], [address] [place] [address]  [phone]      Medical Progress Note      NAME:         Ghazal Bauer   :        1960  MRM:        [id number]    Date:          3/13/2019      Subjective: Patient has been seen and examined as a follow up for multiple medical issues. Chart, labs, diagnostics reviewed. She feels very weak this afternoon. Still on amiodarone gtt and now hypotensive. Not responded to IV fluid boluses. She denies any chest pain but lightheaded. No nausea or vomiting    Objective:    Vital Signs:    Visit Vitals  BP (!) 76/48   Pulse 64   Temp 97.3 °F (36.3 °C)   Resp 21   Ht 5' 5\" (1.651 m)   Wt 55.3 kg (122 lb)   SpO2 97%   BMI 20.30 kg/m²          Intake/Output Summary (Last 24 hours) at 3/13/2019 1353  Last data filed at 3/13/2019 0945  Gross per 24 hour   Intake 4370 ml   Output 4000 ml   Net 370 ml        Physical Examination:    General:   Weak and very ill looking female patient, uncomfortable but not in distress   Eyes:   pink conjunctivae, PERRLA with no discharge. ENT:   no ottorrhea or rhinorrhea with dry mucous membranes  Neck: no masses, thyroid non-tender and trachea central.  Pulm:  no accessory muscle use, decreased breath sounds without crackles or wheezes  Card:  no JVD or murmurs, has atrial fibrillation, without thrills, bruits. + peripheral edema  Abd:  Soft, non-tender, non-distended, normoactive bowel sounds   Musc:  No cyanosis, clubbing, atrophy or deformities. Skin:  No rashes, bruising or ulcers. Neuro: Awake and alert but seems somnolent at times. Follows commands appropriately  Psych:  Has a fair insight to her illness.      Current Facility-Administered Medications   Medication Dose Route Frequency    amiodarone (CORDARONE) tablet 200 mg  200 mg Oral BID WITH MEALS    PHENYLephrine (JUAN CARLOS-SYNEPHRINE) 30 mg in 0.9% sodium chloride 250 mL infusion   mcg/min IntraVENous TITRATE    sodium chloride 0.9 % bolus infusion 250 mL  250 mL IntraVENous ONCE    gentamicin (GARAMYCIN) 0.1 % cream   Topical QHS    heparin (porcine) injection 5,000 Units  5,000 Units SubCUTAneous Q8H    aspirin delayed-release tablet 81 mg  81 mg Oral DAILY    cinacalcet (SENSIPAR) tablet 30 mg  30 mg Oral DAILY WITH DINNER    ferric citrate (AURYXIA) tablet 210 mg  210 mg Oral TID WITH MEALS    senna-docusate (PERICOLACE) 8.6-50 mg per tablet 1 Tab  1 Tab Oral DAILY PRN    sevelamer carbonate (RENVELA) tab 800 mg  800 mg Oral TID WITH MEALS    sodium chloride (NS) flush 5-40 mL  5-40 mL IntraVENous Q8H    sodium chloride (NS) flush 5-40 mL  5-40 mL IntraVENous PRN    ondansetron (ZOFRAN) injection 4 mg  4 mg IntraVENous Q4H PRN    peritoneal dialysis DEXTROSE 1.5% (2.5 mEq/L low calcium) solution 2,000 mL  2,000 mL IntraPERitoneal QID    hydrocortisone (ANUSOL-HC) 2.5 % rectal cream   PeriANAL QID     Current Outpatient Medications   Medication Sig    dilTIAZem CD (CARDIZEM CD) 120 mg ER capsule Take 1 Cap by mouth daily.  aspirin delayed-release 81 mg tablet Take 81 mg by mouth daily.  spironolactone (ALDACTONE) 50 mg tablet Take 50 mg by mouth every morning.  potassium chloride (KLOR-CON M20) 20 mEq tablet Take 40 mEq by mouth every morning.  carvedilol (COREG) 25 mg tablet Take 1 Tab by mouth two (2) times daily (with meals).  omeprazole (PRILOSEC) 20 mg capsule Take 20 mg by mouth daily as needed (indigestion).  SENNA-DOCUSATE SODIUM PO Take 1 Tab by mouth daily as needed (constipation).  cinacalcet (SENSIPAR) 30 mg tablet Take 30 mg by mouth daily (with dinner).  ferric citrate (AURYXIA) 210 mg iron tablet Take 210 mg by mouth three (3) times daily (with meals).  gentamicin (GARAMYCIN) 0.1 % topical cream Apply  to affected area nightly.         Laboratory data and review:    Recent Labs     03/13/19  0246 03/12/19  2784 03/11/19  1642   WBC 12.9* 11.1* 12.3*   HGB 11.0* 12.0 12.2   HCT 34.4* 36.7 37.7   * 130* 160     Recent Labs     03/13/19  0246 03/12/19  1151 03/12/19  0350 03/11/19  1642   * 136 134* 137   K 5.1 5.3* 6.5* 5.5*   CL 94* 99 98 99   CO2 19* 20* 20* 15*   * 123* 139* 133*   * 117* 115* 105*   CREA 21.20* 21.40* 20.80* 21.90*   CA 7.2* 7.3* 7.8* 8.3*   MG  --  2.4  --  2.3   PHOS 8.3* 8.8*  --   --    ALB 2.2* 2.2*  --  2.8*   SGOT  --   --   --  50*   ALT  --   --   --  37     No components found for: [place]    Other Diagnostics:    Telemetry reviewed: atrial fibrillation       Assessment and Plan:    Cardiogenic shock ([facility] [address] (3/13/2019) / Cardiomyopathy, nonischemic (HCC) (10/19/2013)/ Systolic CHF, chronic (HCC) (8/25/2018)/ ICD (implantable cardioverter-defibrillator) in place (1/26/2018): recent Echo showed an EF 25%. Now with persistent hypotension despite IV fluid boluses. Blood cultures neg Start IV Vahid and change to levophed once a central line has been placed. Hold BP medications. Transfer to ICU. Monitor closely.      SVT (supraventricular tachycardia) ([facility] [address] (3/11/2019) / type 2 MI POA: patient still with bursts of SVT with no chest discomfort. Seen and followed by cardiology. Continue Amiodarone orally, may hold Metoprolol with hypotension.       Acute metabolic encephalopathy / Dysarthria (3/11/2019) POA: intermittent confusion. Improving. Suspect multifactorial from uremia, metabolic derangements. CT scan of the head is unremarkable. No MRI due to her ICD. EEG non specific but neg for seizure activity. Ammonia level unremarkable. Appreciate neurology consult. Continue to monitor     Essential hypertension, benign (10/18/2012): now hypotensive. Hold all BP medications for now.       Nausea & vomiting (3/11/2019)/ Diarrhea (3/11/2019): suspect viral GE. Better.  Stool WBCs normal. Monitor       ESRD (end stage renal disease) ([facility] [address] (12/11/2015) /  Hyperkalemia POA: Missed two days of peritoneal dialysis. Seen by renal. She Would NOT want hemodialysis. Continue PD as instructed.      Total time spent for the patient's care: [address] discussed with: Patient, Nursing Staff and Consultant/Specialist    Discussed:  Care Plan    Prophylaxis:  Hep SQ    Anticipated Disposition:  Home w/Family           ___________________________________________________    Attending Physician:   Jona Hutson MD

## 2019-03-13 NOTE — ED NOTES
Bedside and Verbal shift change report given to [facility] (oncoming nurse) by Jamar Beach (offgoing nurse). Report included the following information SBAR, ED Summary, MAR and Recent Results.

## 2019-03-13 NOTE — PROGRESS NOTES
Cardiology Progress Note       ED hold   NAME:  Crissy Segundo   :   1960   MRN:   [id number]     Assessment/Plan:   1. SVT: converted to SR overnight. Stop IV amio,  cont po BID > every day at discharge. Resumed coreg this am with parameters. Held due to low BP. ECHO pending. 2. Elevated troponin: likely due to acute illness and not an ACS  3. NICM s/p ICD, ECHO pending, gradually resume her cardiomyopathy when BP is a little better   4. Hx PAF: - Plan to restart Eliquis when it is clear no invasive measures are needed   5. ESRD on PD: per renal   6. Metabolic encephalopathy/Dysarthria/intermittent confusion. CT scan of the head is unremarkable. No MRI due to her ICD. EEG in process. Neurology following       CARDIOLOGY ATTENDING  Patient personally seen and examined. All the elements of history and examination were personally performed. Assessment and plan was discussed and agree as written above    Events notes. Levophed started for hypotension. Hold Coreg. Reviewed echo images -- LVEF severely depressed (LVEF ~ 15%). Regarding SVT, she is in NSR on Amiodarone. Will continue PO Amiodarone. Peter Jeffery MD, [facility] [place]            Subjective:   Cardiac ROS: Patient denies any exertional chest pain, dyspnea, palpitations, syncope, orthopnea, edema or paroxysmal nocturnal dyspnea. Previous Cardiac Eval  ECHO: 10/14/12: EF 45% w/ posterior HK Grade 1 DD, LAE, mild MR, mild-mod TR RVSP 60 mmHG   Cath: 10/16/12: Normal cors. No AVG. Mild pulm HTN (). Low-normal filling pressures. Echo 10/19/13 - EF 25- 30%. Severe diffuse hypokinesis. Mildly increased wall thickness. Mild concentric hypertrophy. Doppler parameters were consistent with a reversible restrictive pattern, indicative of decreased left ventricular  diastolic compliance and/or increased left atrial pressure (grade 3 diastolic dysfunction).  LA mildly dilated, mild MR, mild TR, mod PA HTN, small pericardial effusion circumferential to the heart, no evidence of hemodynamic compromise. Echo 11/2/15 - EF 25-30%, global HK, mild MR  Lexiscan cardiolite 11/24/15 - focal anteroapical ischemia    Cath 12/15/2015 - EDP 10, LV dilated, EF 20% global HK, normal cors with right dominance. Unable to cannulate femoral vein. 12/29/15-implantation of a single-chamber [address] per Dr. Candi Cardoso    Echo 17 - EF 20 %. Severe diffuse hypokinesis. Mild LAE. Mild MR. Mild Pulm HTN. Small pericardial effusion. No significant change when compared to study 2015. Echo 18- LVEF 28%, severe LAE, PA systolic 50 mmHg    LHC / RHC 2018: No sig CAD, RA 10, RV 39/5, W 23, PA 35/20/29, PA Sat 47.1%, Ao Sat 83.2%, /7/24 Ao 119/79/90, CO F 3.94, CI F 2.4, CO T 2.75, CI T 1.68         Review of Systems: No nausea, indigestion, vomiting, pain, cough, sputum. No bleeding. Taking po. Objective:     Visit Vitals  BP 99/69   Pulse 69   Temp 97.3 °F (36.3 °C)   Resp 17   Ht 5' 5\" (1.651 m)   Wt 122 lb (55.3 kg)   SpO2 (!) 88%   BMI 20.30 kg/m²    O2 Flow Rate (L/min): 4 l/min O2 Device: Nasal cannula    Temp (24hrs), Av.4 °F (36.3 °C), Min:97 °F (36.1 °C), Max:97.8 °F (36.6 °C)      No intake/output data recorded.  1901 -  0700  In:  [I.V.:100]  Out: 2500   TELE: SB/SR     General: AAOx3 cooperative, no acute distress. Lethargic   HEENT: Atraumatic. Pink and moist.  Anicteric sclerae. Neck : Supple,  Lungs: CTA bilaterally. No wheezing/rhonchi/rales. SPO2 93% 2 liters   Heart: Regular rhythm, no murmur, No carotid bruits. Abdomen: PD catheter site with crusty brown discharge. Soft, non-distended, non-tender. + Bowel sounds. Extremities: No edema, no clubbing, no cyanosis. No calf tenderness  Neurologic: Grossly intact. Alert and oriented X 3. Slow to respond. Psych: Fair insight. Not anxious or agitated.         Care Plan discussed with:    Comments   Patient x Family      RN x    Care Manager                    Consultant:          Data Review:     No lab exists for component: ITNL   Recent Labs     03/13/19  0246 03/12/19  1102 03/12/19  0350   TROIQ 1.74* 1.74* 1.63*     Recent Labs     03/13/19  0246 03/12/19  1151 03/12/19  0350 03/11/19  1642   * 136 134* 137   K 5.1 5.3* 6.5* 5.5*   CL 94* 99 98 99   CO2 19* 20* 20* 15*   * 117* 115* 105*   CREA 21.20* 21.40* 20.80* 21.90*   * 123* 139* 133*   PHOS 8.3* 8.8*  --   --    MG  --  2.4  --  2.3   ALB 2.2* 2.2*  --  2.8*   WBC 12.9*  --  11.1* 12.3*   HGB 11.0*  --  12.0 12.2   HCT 34.4*  --  36.7 37.7   *  --  130* 160     No results for input(s): INR, PTP, APTT in the last 72 hours.     No lab exists for component: INREXT    Medications reviewed  Current Facility-Administered Medications   Medication Dose Route Frequency    carvedilol (COREG) tablet 6.25 mg  6.25 mg Oral BID WITH MEALS    amiodarone (CORDARONE) tablet 200 mg  200 mg Oral BID WITH MEALS    gentamicin (GARAMYCIN) 0.1 % cream   Topical QHS    heparin (porcine) injection 5,000 Units  5,000 Units SubCUTAneous Q8H    aspirin delayed-release tablet 81 mg  81 mg Oral DAILY    cinacalcet (SENSIPAR) tablet 30 mg  30 mg Oral DAILY WITH DINNER    ferric citrate (AURYXIA) tablet 210 mg  210 mg Oral TID WITH MEALS    senna-docusate (PERICOLACE) 8.6-50 mg per tablet 1 Tab  1 Tab Oral DAILY PRN    sevelamer carbonate (RENVELA) tab 800 mg  800 mg Oral TID WITH MEALS    sodium chloride (NS) flush 5-40 mL  5-40 mL IntraVENous Q8H    sodium chloride (NS) flush 5-40 mL  5-40 mL IntraVENous PRN    ondansetron (ZOFRAN) injection 4 mg  4 mg IntraVENous Q4H PRN    peritoneal dialysis DEXTROSE 1.5% (2.5 mEq/L low calcium) solution 2,000 mL  2,000 mL IntraPERitoneal QID    hydrocortisone (ANUSOL-HC) 2.5 % rectal cream   PeriANAL QID     Current Outpatient Medications   Medication Sig    dilTIAZem CD (CARDIZEM CD) 120 mg ER capsule Take 1 Cap by mouth daily.  aspirin delayed-release 81 mg tablet Take 81 mg by mouth daily.  spironolactone (ALDACTONE) 50 mg tablet Take 50 mg by mouth every morning.  potassium chloride (KLOR-CON M20) 20 mEq tablet Take 40 mEq by mouth every morning.  carvedilol (COREG) 25 mg tablet Take 1 Tab by mouth two (2) times daily (with meals).  omeprazole (PRILOSEC) 20 mg capsule Take 20 mg by mouth daily as needed (indigestion).  SENNA-DOCUSATE SODIUM PO Take 1 Tab by mouth daily as needed (constipation).  cinacalcet (SENSIPAR) 30 mg tablet Take 30 mg by mouth daily (with dinner).  ferric citrate (AURYXIA) 210 mg iron tablet Take 210 mg by mouth three (3) times daily (with meals).  gentamicin (GARAMYCIN) 0.1 % topical cream Apply  to affected area nightly.          Murali Barraza NP

## 2019-03-13 NOTE — ED NOTES
S/W Dr. Blanquita Chamberlain for clarification. Pt has converted from tachy rhythm. Amiodarone ordered by Dr. May Harden (SEE [facility]) and if pt's has a tachy rhythm change then restart the Amiodarone drip. Receiving RN aware.

## 2019-03-13 NOTE — PROGRESS NOTES
[facility]       Renal Daily Progress Note:     Admission Date: 3/11/2019     Subjective:  - PD ongoing. Has a headache and feels nauseated. Review of Systems  A comprehensive review of systems was negative except for that written in the HPI. Objective:     Visit Vitals  /66   Pulse 69   Temp 97.3 °F (36.3 °C)   Resp (!) 33   Ht 5' 5\" (1.651 m)   Wt 55.3 kg (122 lb)   SpO2 93%   BMI 20.30 kg/m²     Temp (24hrs), Av.4 °F (36.3 °C), Min:97 °F (36.1 °C), Max:97.8 °F (36.6 °C)        Intake/Output Summary (Last 24 hours) at 3/13/2019 1220  Last data filed at 3/13/2019 0030  Gross per 24 hour   Intake 1950 ml   Output 1000 ml   Net 950 ml     Current Facility-Administered Medications   Medication Dose Route Frequency    carvedilol (COREG) tablet 6.25 mg  6.25 mg Oral BID WITH MEALS    amiodarone (CORDARONE) tablet 200 mg  200 mg Oral BID WITH MEALS    gentamicin (GARAMYCIN) 0.1 % cream   Topical QHS    heparin (porcine) injection 5,000 Units  5,000 Units SubCUTAneous Q8H    aspirin delayed-release tablet 81 mg  81 mg Oral DAILY    cinacalcet (SENSIPAR) tablet 30 mg  30 mg Oral DAILY WITH DINNER    ferric citrate (AURYXIA) tablet 210 mg  210 mg Oral TID WITH MEALS    senna-docusate (PERICOLACE) 8.6-50 mg per tablet 1 Tab  1 Tab Oral DAILY PRN    sevelamer carbonate (RENVELA) tab 800 mg  800 mg Oral TID WITH MEALS    sodium chloride (NS) flush 5-40 mL  5-40 mL IntraVENous Q8H    sodium chloride (NS) flush 5-40 mL  5-40 mL IntraVENous PRN    ondansetron (ZOFRAN) injection 4 mg  4 mg IntraVENous Q4H PRN    peritoneal dialysis DEXTROSE 1.5% (2.5 mEq/L low calcium) solution 2,000 mL  2,000 mL IntraPERitoneal QID    hydrocortisone (ANUSOL-HC) 2.5 % rectal cream   PeriANAL QID     Current Outpatient Medications   Medication Sig    dilTIAZem CD (CARDIZEM CD) 120 mg ER capsule Take 1 Cap by mouth daily.  aspirin delayed-release 81 mg tablet Take 81 mg by mouth daily.     spironolactone (ALDACTONE) 50 mg tablet Take 50 mg by mouth every morning.  potassium chloride (KLOR-CON M20) 20 mEq tablet Take 40 mEq by mouth every morning.  carvedilol (COREG) 25 mg tablet Take 1 Tab by mouth two (2) times daily (with meals).  omeprazole (PRILOSEC) 20 mg capsule Take 20 mg by mouth daily as needed (indigestion).  SENNA-DOCUSATE SODIUM PO Take 1 Tab by mouth daily as needed (constipation).  cinacalcet (SENSIPAR) 30 mg tablet Take 30 mg by mouth daily (with dinner).  ferric citrate (AURYXIA) 210 mg iron tablet Take 210 mg by mouth three (3) times daily (with meals).  gentamicin (GARAMYCIN) 0.1 % topical cream Apply  to affected area nightly. Physical Exam:  Visit Vitals  /66   Pulse 69   Temp 97.3 °F (36.3 °C)   Resp (!) 33   Ht 5' 5\" (1.651 m)   Wt 55.3 kg (122 lb)   SpO2 93%   BMI 20.30 kg/m²     General appearance: alert, cooperative, no distress, appears stated age  Head: Normocephalic, without obvious abnormality, atraumatic  Lungs: clear to auscultation bilaterally  Heart: regular rate and rhythm, S1, S2 normal, no murmur, click, rub or gallop  Abdomen: soft, non-tender. Bowel sounds normal. No masses,  PD catheter intact   Extremities: extremities normal, atraumatic, no cyanosis or edema    Data Review:     LABS:  Recent Labs     03/13/19  0246 03/12/19  1151 03/12/19  0350 03/11/19  1642   * 136 134* 137   K 5.1 5.3* 6.5* 5.5*   CL 94* 99 98 99   CO2 19* 20* 20* 15*   * 117* 115* 105*   CREA 21.20* 21.40* 20.80* 21.90*   CA 7.2* 7.3* 7.8* 8.3*   ALB 2.2* 2.2*  --  2.8*   PHOS 8.3* 8.8*  --   --    MG  --  2.4  --  2.3     Recent Labs     03/13/19  0246 03/12/19  0350 03/11/19  1642   WBC 12.9* 11.1* 12.3*   HGB 11.0* 12.0 12.2   HCT 34.4* 36.7 37.7   * 130* 160     No results for input(s): ROMAN, KU, CLU, CREAU in the last 72 hours.     No lab exists for component: PROU    Assessment:   Renal Specific Problems    ESRD on PD     Hyperphosphatemia Hyperkalemia     Hyperphosphatemia     SVT       Plan:     Obtain/ Order: labs/cultures/radiology/procedures:        Therapeutic:    Continue with PD treatment     Sevelamer added yesterday for hyperphosphatemia     Limit daily fluid intake to <1 L     Low K, phos and Na diet       Maribel Sanchez MD

## 2019-03-13 NOTE — PROGRESS NOTES
Neurology Hospital Progress Note    Patient ID:  Esther Pollock  [id number]  60 y.o.  1960      Subjective:   History:  Esther Pollock is a 60 y.o. female who  has a past medical history of Anemia associated with chronic renal failure, Anuria (2014), Arrhythmia, Chronic kidney disease, Chronic systolic heart failure ([facility] [address] (10/31/2012), Chronic tachycardia, GERD (gastroesophageal reflux disease), non-ST elevation myocardial infarction (NSTEMI) (2011), Hypertension, Lipoma of right lower extremity (1980), Peritoneal dialysis catheter in place [facility]) (2014), and Polycystic kidney disease (2014). who presents for evaluation of AMS for several days. Patient apparently fell asleep and was \"out\" for several days missing her dialysis. Found to be uremic and elevated tropnonin. Patient feels better but tired since she is still in the ER and has not gotten a good sleep. Currently getting peritoneal dialysis. (-) new event    CEEG:  shows bursts of frontally dominant slow waves, at times rhythmic and of triphasic configuration. Likely represents non specific encephalopathy. ROS:  Per HPI-  Otherwise the remainder of the review of system was negative      Social Hx:  Social History     Socioeconomic History    Marital status: SINGLE     Spouse name: Not on file    Number of children: Not on file    Years of education: Not on file    Highest education level: Not on file   Tobacco Use    Smoking status: Never Smoker    Smokeless tobacco: Never Used   Substance and Sexual Activity    Alcohol use: No    Drug use: No    Sexual activity: Not Currently     Partners: Male       Meds:  No current facility-administered medications on file prior to encounter. Current Outpatient Medications on File Prior to Encounter   Medication Sig Dispense Refill    dilTIAZem CD (CARDIZEM CD) 120 mg ER capsule Take 1 Cap by mouth daily. 30 Cap 3    aspirin delayed-release 81 mg tablet Take 81 mg by mouth daily.       spironolactone (ALDACTONE) 50 mg tablet Take 50 mg by mouth every morning.  potassium chloride (KLOR-CON M20) 20 mEq tablet Take 40 mEq by mouth every morning.  carvedilol (COREG) 25 mg tablet Take 1 Tab by mouth two (2) times daily (with meals). 60 Tab 6    omeprazole (PRILOSEC) 20 mg capsule Take 20 mg by mouth daily as needed (indigestion).  SENNA-DOCUSATE SODIUM PO Take 1 Tab by mouth daily as needed (constipation).  cinacalcet (SENSIPAR) 30 mg tablet Take 30 mg by mouth daily (with dinner).  ferric citrate (AURYXIA) 210 mg iron tablet Take 210 mg by mouth three (3) times daily (with meals).  gentamicin (GARAMYCIN) 0.1 % topical cream Apply  to affected area nightly. Imaging:    CT Results (recent):  Results from Hospital Encounter encounter on 03/11/19   CT HEAD WO CONT    Narrative EXAM: CT HEAD WO CONT    INDICATION: AMS    COMPARISON: None. CONTRAST: None. TECHNIQUE: Unenhanced CT of the head was performed using 5 mm images. Brain and  bone windows were generated. CT dose reduction was achieved through use of a  standardized protocol tailored for this examination and automatic exposure  control for dose modulation. FINDINGS:  There is no extra-axial fluid collection hemorrhage or shift no masses. Possible   small remote right cerebellar infarct. Impression IMPRESSION: No acute findings. MRI Results (recent):  No results found for this or any previous visit. IR Results (recent):  Results from [place] encounter on 10/14/12   IR INSERT NON TUNL CVC OVER 5 YRS    Narrative **Final Report**       ICD Codes / Adm. Diagnosis: 786.09  790.6 / Other dyspnea and respiratory    Other abnormal blood chemistr  Examination:  XA NON TUNNELED CENT CATH Sarah   - [id number] - Oct 15 2012    1:22PM  Accession No:  [id number]  Reason:  Triple lumen R IJ.  Unable to obtain PICC per nephrology due to   potential for fis      REPORT:  INDICATION: Central venous access needed    TECHNIQUE: The risks and benefits of the procedure were discussed with the   patient. Written consent was obtained. Preliminary ultrasound imaging of the   right neck demonstrated a patent internal jugular vein. Maximal sterile   barrier technique (cap and mask and sterile gown and sterile gloves and a   large sterile drape and hand hygiene and cutaneous antisepsis) was utilized   for this procedure. 1% lidocaine was injected locally. A micropuncture   needle was advanced into the internal jugular vein under ultrasound   guidance. A micropuncture wire was then advanced to the superior vena cava   under fluoroscopic guidance. A dermatotomy was made, and a micropuncture   sheath was inserted. A 0.035 inch guidewire was then advanced through the   sheath to the level of the superior vena cava. Dilatation of the tract was   performed. A triple lumen central venous catheter was then inserted over the   wire. The tip of the catheter was positioned at the junction of the SVC and   right atrium under fluoroscopic guidance. The wire was then removed. The   catheter was sutured to the skin and dressed appropriately. The patient   tolerated the procedure well. There were no immediate complications. The   catheter demonstrates appropriate blood flow. ESTIMATED BLOOD LOSS: Less than 5 ml        IMPRESSION: Successful right internal jugular central venous catheter    placement as described above. The catheter is functioning and is ready for   use. Signing/Reading Doctor: Ivania Grade ([id number])    Approved: Ivania Grade ([id number])  10/15/2012                                      VAS/US Results (recent):  No results found for this or any previous visit. Reviewed records in [facility] and [facility] tab today    Lab Review     Admission on 03/11/2019   No results displayed because visit has over 200 results.       Office Visit on 02/18/2019   Component Date Value Ref Range Status    Atopobium vaginae 02/18/2019 Low - 0  Score Final    BVAB 2 02/18/2019 Low - 0  Score Final    Megasphaera 1 02/18/2019 Low - 0  Score Final    Comment: Calculate total score by adding the 3 individual bacterial  vaginosis (BV) marker scores together. Total score is  interpreted as follows: Total score 0-1: Indicates the absence of BV. Total score   2: Indeterminate for BV. Additional clinical                   data should be evaluated to establish a                   diagnosis. Total score 3-6: Indicates the presence of BV. This test was developed and its performance characteristics  determined by [employer].  It has not been cleared or approved  by the Food and Drug Administration. The FDA has determined  that such clearance or approval is not necessary.  C. albicans, JEREMIAS 02/18/2019 Negative  Negative Final    C. glabrata, JEREMIAS 02/18/2019 Negative  Negative Final    Comment: This test was developed and its performance characteristics determined  by [employer].  It has not been cleared or approved by the Food and Drug  Administration. The FDA has determined that such clearance or  approval is not necessary.  T. vaginalis, JEREMIAS 02/18/2019 Negative  Negative Final         Objective:       Exam:  Visit Vitals  BP (!) 86/60   Pulse 61   Temp 97.3 °F (36.3 °C)   Resp (!) 33   Ht 5' 5\" (1.651 m)   Wt 55.3 kg (122 lb)   SpO2 97%   BMI 20.30 kg/m²     Gen: Awake, alert, follows commands  Appropriate appearance, normal speech. Oriented to all spheres. No visual field defect on confrontation exam.  Full eyes movement, with no nystagmus, no diplopia, no ptosis. Normal gag and swallow. All remaining cranial nerves were normal  Motor function: 5/5 in all extremities  Sensory: intact to LT, PP and JPS  Good FTN and HTS   Gait: Deferred    Assessment:     1. Lactic acidosis    2. SVT (supraventricular tachycardia) ([facility] [address]    3. Altered mental status, unspecified altered mental status type    4.  Encephalopathy        AMS, more likely metabolic encephalopathy due to renal failure    Plan:   1. Discussed cEEG shows findings consistent with metabolic encephalopathy and no seizure activity  2. Continue treating kidney issues  3. Correct electrolyte imbalance    Will sign off. Please call for questions    35 mins of time spent, 50% of which was spent on counseling and coordination of care.       Guilherme Beavers MD  Diplomate, American Board of Psychiatry and Neurology  Diplomate, Neuromuscular Medicine  Diplomate, American Board of Electrodiagnostic Medicine

## 2019-03-13 NOTE — PROGRESS NOTES
cEEG shows bursts of frontally dominant slow waves, at times rhythmic and of triphasic configuration. Likely represents non specific encephalopathy.    Sanjuana Henriquez MD

## 2019-03-13 NOTE — PROGRESS NOTES
3/13/2019  9:35 AM  EMR reviewed, pt is continuing to require medical management,  At baseline, independent ADLs, ambulatory, drives. CM will continue to follow for d/c needs.   Leeanne Goldberg

## 2019-03-13 NOTE — ED NOTES
After completion of PD patient's HR went into the 120's, will do an EKG to confirm rhythm and re-start Amio.

## 2019-03-13 NOTE — ED NOTES
Patient states she feels like she has a \"cold\" coming on and has a complaint of a headache. She did not eat breakfast as she states no appetite. Will phone hospitalist regarding the headache and cold symptoms.

## 2019-03-13 NOTE — ED NOTES
Paged Diane Angelo per dr Harless Holstein request.  Will hang steffi drip for the low blood pressure not corrected with saline bolus

## 2019-03-14 ENCOUNTER — APPOINTMENT (OUTPATIENT)
Dept: GENERAL RADIOLOGY | Age: 59
DRG: 308 | End: 2019-03-14
Attending: INTERNAL MEDICINE
Payer: MEDICARE

## 2019-03-14 LAB
ALBUMIN SERPL-MCNC: 2.2 G/DL (ref 3.5–5)
ANION GAP SERPL CALC-SCNC: 14 MMOL/L (ref 5–15)
APPEARANCE FLD: CLEAR
B PERT DNA SPEC QL NAA+PROBE: NOT DETECTED
BACTERIA SPEC CULT: NORMAL
BASOPHILS # BLD: 0 K/UL (ref 0–0.1)
BASOPHILS NFR BLD: 0 % (ref 0–1)
BUN SERPL-MCNC: 109 MG/DL (ref 6–20)
BUN/CREAT SERPL: 6 (ref 12–20)
C JEJUNI+C COLI AG STL QL: NEGATIVE
C PNEUM DNA SPEC QL NAA+PROBE: NOT DETECTED
CALCIUM SERPL-MCNC: 7.7 MG/DL (ref 8.5–10.1)
CHLORIDE SERPL-SCNC: 95 MMOL/L (ref 97–108)
CO2 SERPL-SCNC: 24 MMOL/L (ref 21–32)
COLOR FLD: YELLOW
CREAT SERPL-MCNC: 19.7 MG/DL (ref 0.55–1.02)
DIFFERENTIAL METHOD BLD: ABNORMAL
E COLI SXT1+2 STL IA: NEGATIVE
EOSINOPHIL # BLD: 0.2 K/UL (ref 0–0.4)
EOSINOPHIL NFR BLD: 1 % (ref 0–7)
ERYTHROCYTE [DISTWIDTH] IN BLOOD BY AUTOMATED COUNT: 13.8 % (ref 11.5–14.5)
FLUAV H1 2009 PAND RNA SPEC QL NAA+PROBE: NOT DETECTED
FLUAV H1 RNA SPEC QL NAA+PROBE: NOT DETECTED
FLUAV H3 RNA SPEC QL NAA+PROBE: NOT DETECTED
FLUAV SUBTYP SPEC NAA+PROBE: NOT DETECTED
FLUBV RNA SPEC QL NAA+PROBE: NOT DETECTED
GLUCOSE BLD STRIP.AUTO-MCNC: 114 MG/DL (ref 65–100)
GLUCOSE BLD STRIP.AUTO-MCNC: 125 MG/DL (ref 65–100)
GLUCOSE SERPL-MCNC: 142 MG/DL (ref 65–100)
HADV DNA SPEC QL NAA+PROBE: NOT DETECTED
HCOV 229E RNA SPEC QL NAA+PROBE: NOT DETECTED
HCOV HKU1 RNA SPEC QL NAA+PROBE: NOT DETECTED
HCOV NL63 RNA SPEC QL NAA+PROBE: NOT DETECTED
HCOV OC43 RNA SPEC QL NAA+PROBE: NOT DETECTED
HCT VFR BLD AUTO: 34.6 % (ref 35–47)
HGB BLD-MCNC: 11.4 G/DL (ref 11.5–16)
HMPV RNA SPEC QL NAA+PROBE: NOT DETECTED
HPIV1 RNA SPEC QL NAA+PROBE: NOT DETECTED
HPIV2 RNA SPEC QL NAA+PROBE: NOT DETECTED
HPIV3 RNA SPEC QL NAA+PROBE: NOT DETECTED
HPIV4 RNA SPEC QL NAA+PROBE: NOT DETECTED
IMM GRANULOCYTES # BLD AUTO: 0.2 K/UL (ref 0–0.04)
IMM GRANULOCYTES NFR BLD AUTO: 1 % (ref 0–0.5)
LYMPHOCYTES # BLD: 0.7 K/UL (ref 0.8–3.5)
LYMPHOCYTES NFR BLD: 4 % (ref 12–49)
LYMPHOCYTES NFR FLD: 36 %
M PNEUMO DNA SPEC QL NAA+PROBE: NOT DETECTED
MAGNESIUM SERPL-MCNC: 2.1 MG/DL (ref 1.6–2.4)
MCH RBC QN AUTO: 34.2 PG (ref 26–34)
MCHC RBC AUTO-ENTMCNC: 32.9 G/DL (ref 30–36.5)
MCV RBC AUTO: 103.9 FL (ref 80–99)
MONOCYTES # BLD: 0.5 K/UL (ref 0–1)
MONOCYTES NFR BLD: 3 % (ref 5–13)
MONOS+MACROS NFR FLD: 5 %
NEUTROPHILS NFR FLD: 59 %
NEUTS SEG # BLD: 15.8 K/UL (ref 1.8–8)
NEUTS SEG NFR BLD: 91 % (ref 32–75)
NRBC # BLD: 0.07 K/UL (ref 0–0.01)
NRBC BLD-RTO: 0.4 PER 100 WBC
NUC CELL # FLD: 18 /CU MM
PHOSPHATE SERPL-MCNC: 8.4 MG/DL (ref 2.6–4.7)
PLATELET # BLD AUTO: 114 K/UL (ref 150–400)
PMV BLD AUTO: 10.6 FL (ref 8.9–12.9)
POTASSIUM SERPL-SCNC: 4.1 MMOL/L (ref 3.5–5.1)
RBC # BLD AUTO: 3.33 M/UL (ref 3.8–5.2)
RBC # FLD: 19 /CU MM
RBC MORPH BLD: ABNORMAL
RSV RNA SPEC QL NAA+PROBE: NOT DETECTED
RV+EV RNA SPEC QL NAA+PROBE: NOT DETECTED
SERVICE CMNT-IMP: ABNORMAL
SERVICE CMNT-IMP: ABNORMAL
SERVICE CMNT-IMP: NORMAL
SODIUM SERPL-SCNC: 133 MMOL/L (ref 136–145)
SPECIMEN SOURCE FLD: ABNORMAL
WBC # BLD AUTO: 17.4 K/UL (ref 3.6–11)

## 2019-03-14 PROCEDURE — 65610000006 HC RM INTENSIVE CARE

## 2019-03-14 PROCEDURE — 71046 X-RAY EXAM CHEST 2 VIEWS: CPT

## 2019-03-14 PROCEDURE — 85025 COMPLETE CBC W/AUTO DIFF WBC: CPT

## 2019-03-14 PROCEDURE — 80069 RENAL FUNCTION PANEL: CPT

## 2019-03-14 PROCEDURE — 87040 BLOOD CULTURE FOR BACTERIA: CPT

## 2019-03-14 PROCEDURE — 83735 ASSAY OF MAGNESIUM: CPT

## 2019-03-14 PROCEDURE — 89050 BODY FLUID CELL COUNT: CPT

## 2019-03-14 PROCEDURE — 86738 MYCOPLASMA ANTIBODY: CPT

## 2019-03-14 PROCEDURE — A4722 DIALYS SOL FLD VOL > 1999CC: HCPCS | Performed by: INTERNAL MEDICINE

## 2019-03-14 PROCEDURE — 74011250637 HC RX REV CODE- 250/637: Performed by: SPECIALIST

## 2019-03-14 PROCEDURE — 87633 RESP VIRUS 12-25 TARGETS: CPT

## 2019-03-14 PROCEDURE — 74011250636 HC RX REV CODE- 250/636: Performed by: SPECIALIST

## 2019-03-14 PROCEDURE — 87205 SMEAR GRAM STAIN: CPT

## 2019-03-14 PROCEDURE — 36415 COLL VENOUS BLD VENIPUNCTURE: CPT

## 2019-03-14 PROCEDURE — 77010033678 HC OXYGEN DAILY

## 2019-03-14 PROCEDURE — 77030027138 HC INCENT SPIROMETER -A

## 2019-03-14 PROCEDURE — 82962 GLUCOSE BLOOD TEST: CPT

## 2019-03-14 PROCEDURE — 74011250637 HC RX REV CODE- 250/637: Performed by: INTERNAL MEDICINE

## 2019-03-14 PROCEDURE — 74011250636 HC RX REV CODE- 250/636: Performed by: INTERNAL MEDICINE

## 2019-03-14 RX ORDER — PANTOPRAZOLE SODIUM 40 MG/1
40 TABLET, DELAYED RELEASE ORAL
Status: DISCONTINUED | OUTPATIENT
Start: 2019-03-14 | End: 2019-03-14

## 2019-03-14 RX ORDER — DEXTROSE 50 % IN WATER (D50W) INTRAVENOUS SYRINGE
12.5-25 AS NEEDED
Status: DISCONTINUED | OUTPATIENT
Start: 2019-03-14 | End: 2019-03-19 | Stop reason: HOSPADM

## 2019-03-14 RX ORDER — MAGNESIUM SULFATE 100 %
4 CRYSTALS MISCELLANEOUS AS NEEDED
Status: DISCONTINUED | OUTPATIENT
Start: 2019-03-14 | End: 2019-03-19 | Stop reason: HOSPADM

## 2019-03-14 RX ORDER — INSULIN LISPRO 100 [IU]/ML
INJECTION, SOLUTION INTRAVENOUS; SUBCUTANEOUS
Status: DISCONTINUED | OUTPATIENT
Start: 2019-03-14 | End: 2019-03-19 | Stop reason: HOSPADM

## 2019-03-14 RX ORDER — PANTOPRAZOLE SODIUM 40 MG/1
40 TABLET, DELAYED RELEASE ORAL ONCE
Status: COMPLETED | OUTPATIENT
Start: 2019-03-14 | End: 2019-03-14

## 2019-03-14 RX ORDER — SEVELAMER CARBONATE 800 MG/1
1600 TABLET, FILM COATED ORAL
Status: DISCONTINUED | OUTPATIENT
Start: 2019-03-14 | End: 2019-03-19 | Stop reason: HOSPADM

## 2019-03-14 RX ORDER — PANTOPRAZOLE SODIUM 40 MG/1
40 TABLET, DELAYED RELEASE ORAL
Status: DISCONTINUED | OUTPATIENT
Start: 2019-03-15 | End: 2019-03-19 | Stop reason: HOSPADM

## 2019-03-14 RX ADMIN — AMIODARONE HYDROCHLORIDE 200 MG: 200 TABLET ORAL at 16:19

## 2019-03-14 RX ADMIN — SODIUM CHLORIDE, SODIUM LACTATE, CALCIUM CHLORIDE, MAGNESIUM CHLORIDE AND DEXTROSE 2000 ML: 1.5; 538; 448; 18.3; 5.08 INJECTION, SOLUTION INTRAPERITONEAL at 13:55

## 2019-03-14 RX ADMIN — SODIUM CHLORIDE, SODIUM LACTATE, CALCIUM CHLORIDE, MAGNESIUM CHLORIDE AND DEXTROSE 2000 ML: 1.5; 538; 448; 18.3; 5.08 INJECTION, SOLUTION INTRAPERITONEAL at 22:00

## 2019-03-14 RX ADMIN — FERRIC CITRATE 210 MG: 210 TABLET, COATED ORAL at 18:15

## 2019-03-14 RX ADMIN — Medication 10 ML: at 22:04

## 2019-03-14 RX ADMIN — CINACALCET HYDROCHLORIDE 30 MG: 30 TABLET, COATED ORAL at 16:19

## 2019-03-14 RX ADMIN — HEPARIN SODIUM 5000 UNITS: 5000 INJECTION INTRAVENOUS; SUBCUTANEOUS at 05:58

## 2019-03-14 RX ADMIN — SODIUM CHLORIDE, SODIUM LACTATE, CALCIUM CHLORIDE, MAGNESIUM CHLORIDE AND DEXTROSE 2000 ML: 1.5; 538; 448; 18.3; 5.08 INJECTION, SOLUTION INTRAPERITONEAL at 18:16

## 2019-03-14 RX ADMIN — HYDROCORTISONE 2.5%: 25 CREAM TOPICAL at 08:24

## 2019-03-14 RX ADMIN — HEPARIN SODIUM 5000 UNITS: 5000 INJECTION INTRAVENOUS; SUBCUTANEOUS at 21:59

## 2019-03-14 RX ADMIN — SEVELAMER CARBONATE 1600 MG: 800 TABLET, FILM COATED ORAL at 16:19

## 2019-03-14 RX ADMIN — FERRIC CITRATE 210 MG: 210 TABLET, COATED ORAL at 08:23

## 2019-03-14 RX ADMIN — GENTAMICIN SULFATE: 1 CREAM TOPICAL at 21:57

## 2019-03-14 RX ADMIN — ONDANSETRON 4 MG: 2 INJECTION INTRAMUSCULAR; INTRAVENOUS at 04:50

## 2019-03-14 RX ADMIN — SODIUM CHLORIDE, SODIUM LACTATE, CALCIUM CHLORIDE, MAGNESIUM CHLORIDE AND DEXTROSE 2000 ML: 1.5; 538; 448; 18.3; 5.08 INJECTION, SOLUTION INTRAPERITONEAL at 09:21

## 2019-03-14 RX ADMIN — PANTOPRAZOLE SODIUM 40 MG: 40 TABLET, DELAYED RELEASE ORAL at 04:50

## 2019-03-14 RX ADMIN — FERRIC CITRATE 210 MG: 210 TABLET, COATED ORAL at 12:38

## 2019-03-14 RX ADMIN — SEVELAMER CARBONATE 800 MG: 800 TABLET, FILM COATED ORAL at 08:22

## 2019-03-14 RX ADMIN — HEPARIN SODIUM 5000 UNITS: 5000 INJECTION INTRAVENOUS; SUBCUTANEOUS at 16:19

## 2019-03-14 RX ADMIN — Medication 10 ML: at 05:59

## 2019-03-14 RX ADMIN — ASPIRIN 81 MG: 81 TABLET ORAL at 08:23

## 2019-03-14 RX ADMIN — SEVELAMER CARBONATE 1600 MG: 800 TABLET, FILM COATED ORAL at 12:37

## 2019-03-14 RX ADMIN — AMIODARONE HYDROCHLORIDE 200 MG: 200 TABLET ORAL at 08:22

## 2019-03-14 NOTE — PROCEDURES
[address]   EEG    Name:  Isidro Garcia  MR#:  [id number]  :  1960  ACCOUNT #:  [de-identified]  DATE OF SERVICE:  2019    REQUESTING PHYSICIAN:  Latha Mancini NP    CLINICAL HISTORY:  The patient is a 62-year-old female who is being evaluated for altered mental status. DESCRIPTION:  This is an 18-channel prolonged EEG with video monitoring performed on 2019. The recording starts at 03:00 p.m. and continues until 09:10 a.m. on 2019. The dominant posterior background rhythm consists of medium-voltage rhythms in the 6-7 Hz frequency range. Intermittently and frequently, frontally dominant bursts of delta slowing were seen with bursts of varying duration. At times, the slow waveforms during the bursts appear rhythmic and alpha triphasic configuration. There was generalized attenuation of the background amplitudes and frequencies during sleep. No electroclinical seizure activity was noted on the video. EEG SUMMARY:  Abnormal EEG due to mild slowing of the background rhythms with intermittent bursts of moderate-to-severe slowing. CLINICAL INTERPRETATION:  This EEG is suggestive of a moderate generalized encephalopathic process, nonspecific in type. This may be related to an underlying structural brain injury and more likely from a toxic/metabolic abnormality. No epileptiform activity was noted. No clinical seizure was noted on the video.       MD PRAMOD [id number]  D:  2019 10:11  T:  2019 13:25  JOB #:  [id number]

## 2019-03-14 NOTE — PROGRESS NOTES
[facility]       Renal Daily Progress Note:     Admission Date: 3/11/2019     Subjective:  - transferred to ICU for hypotension. She required vasopressors for pressure support. Hemodynamically has stabilized now. Review of Systems  A comprehensive review of systems was negative except for that written in the HPI.     Objective:     Visit Vitals  /69   Pulse 62   Temp 98.7 °F (37.1 °C)   Resp 25   Ht 5' 5\" (1.651 m)   Wt 55.3 kg (122 lb)   SpO2 96%   BMI 20.30 kg/m²     Temp (24hrs), Av.9 °F (36.6 °C), Min:97.5 °F (36.4 °C), Max:98.7 °F (37.1 °C)        Intake/Output Summary (Last 24 hours) at 3/14/2019 1153  Last data filed at 3/14/2019 0800  Gross per 24 hour   Intake 66393.62 ml   Output 2100 ml   Net 27845.62 ml     Current Facility-Administered Medications   Medication Dose Route Frequency    [START ON 3/15/2019] pantoprazole (PROTONIX) tablet 40 mg  40 mg Oral ACB    amiodarone (CORDARONE) tablet 200 mg  200 mg Oral BID WITH MEALS    NOREPINephrine (LEVOPHED) 32 mg in dextrose 5% 250 mL infusion  2-16 mcg/min IntraVENous TITRATE    gentamicin (GARAMYCIN) 0.1 % cream   Topical QHS    heparin (porcine) injection 5,000 Units  5,000 Units SubCUTAneous Q8H    aspirin delayed-release tablet 81 mg  81 mg Oral DAILY    cinacalcet (SENSIPAR) tablet 30 mg  30 mg Oral DAILY WITH DINNER    ferric citrate (AURYXIA) tablet 210 mg  210 mg Oral TID WITH MEALS    senna-docusate (PERICOLACE) 8.6-50 mg per tablet 1 Tab  1 Tab Oral DAILY PRN    sevelamer carbonate (RENVELA) tab 800 mg  800 mg Oral TID WITH MEALS    sodium chloride (NS) flush 5-40 mL  5-40 mL IntraVENous Q8H    sodium chloride (NS) flush 5-40 mL  5-40 mL IntraVENous PRN    ondansetron (ZOFRAN) injection 4 mg  4 mg IntraVENous Q4H PRN    peritoneal dialysis DEXTROSE 1.5% (2.5 mEq/L low calcium) solution 2,000 mL  2,000 mL IntraPERitoneal QID    hydrocortisone (ANUSOL-HC) 2.5 % rectal cream   PeriANAL QID       Physical Exam:  Visit Vitals  /69   Pulse 62   Temp 98.7 °F (37.1 °C)   Resp 25   Ht 5' 5\" (1.651 m)   Wt 55.3 kg (122 lb)   SpO2 96%   BMI 20.30 kg/m²     General appearance: alert, cooperative, no distress, appears stated age  Head: Normocephalic, without obvious abnormality, atraumatic  Lungs: clear to auscultation bilaterally  Heart: regular rate and rhythm, S1, S2 normal, no murmur, click, rub or gallop  Abdomen: soft, non-tender. Bowel sounds normal. No masses,  PD catheter intact   Extremities: extremities normal, atraumatic, no cyanosis or edema    Data Review:     LABS:  Recent Labs     03/14/19  0444 03/13/19  0246 03/12/19  1151  03/11/19  1642   * 132* 136   < > 137   K 4.1 5.1 5.3*   < > 5.5*   CL 95* 94* 99   < > 99   CO2 24 19* 20*   < > 15*   * 116* 117*   < > 105*   CREA 19.70* 21.20* 21.40*   < > 21.90*   CA 7.7* 7.2* 7.3*   < > 8.3*   ALB 2.2* 2.2* 2.2*  --  2.8*   PHOS 8.4* 8.3* 8.8*  --   --    MG  --   --  2.4  --  2.3    < > = values in this interval not displayed. Recent Labs     03/13/19  0246 03/12/19  0350 03/11/19  1642   WBC 12.9* 11.1* 12.3*   HGB 11.0* 12.0 12.2   HCT 34.4* 36.7 37.7   * 130* 160     No results for input(s): ROMAN, KU, CLU, CREAU in the last 72 hours.     No lab exists for component: PROU    Assessment:   Renal Specific Problems    ESRD on PD     Hyperphosphatemia     Hyperkalemia     Hyperphosphatemia     SVT       Plan:     Obtain/ Order: labs/cultures/radiology/procedures:        Therapeutic:    Continue with PD treatment four times daily     Increase Renvela to 1600 mg tid with meals     Continue Sensipar     Apply gentamicin cream daily to PD catheter exit site     Low K, phos and Na diet       Claudette Hasting, MD

## 2019-03-14 NOTE — PROGRESS NOTES
Nutrition Assessment:    RECOMMENDATIONS/INTERVENTION(S):   1. Continue current diet. 2. Trial of Ensure Clear (0mg phos) 1/day to support intake. Ensure pudding (100mg phos) for am snack. 3. Monitor intake, wt, renal labs. ASSESSMENT:   3/14: pt screened for ICU length of stay. Pt admitted for cardiogenic shock with acute metabolic encephalopathy. PMHX for ESRD on peritoneal dialysis. CHF with EF 25%, HTN. Reported N/V/D PTA. Pt stated she hadn't eaten in days. Decreased appetite continues, observed about 50% of meal tray consumed. At home pt states she doesn't cook and mostly eats out at restaurants, may take leftovers home for multiple meals. Weight has been relatively stable. BMI normal category. High potassium on admission that improved with dialysis, Phos continues to be elevated at 8.4. Na 133, Creatinine 19.4, BG controlled. Pt on 1.5% dextrose PD, 4 cycles, with 60-80% absorption provides 306-408 kcal/day. Discussed low phos nutrition supplements, pt willing to try Ensure Clear, berry flavor. Does not like jello. Diet Order: Cardiac, Other (comment)(Low phos)1000 mL FR  % Eaten:  No data found. Pertinent Medications: [x] Reviewed  Chemistries: [x] Reviewed    Anthropometrics: Height: 5' 5\" (165.1 cm) Weight: 55.3 kg (122 lb)    IBW (%IBW):   ( ) UBW (%UBW):   (  %)    BMI: Body mass index is 20.3 kg/m². This BMI is indicative of:   [] Underweight    [x] Normal    [] Overweight    []  Obesity    []  Extreme Obesity (BMI>40)  Estimated Nutrition Needs (Based on): 1936 Kcals/day(35kcal/kg) , 72 g(-83(1.3-1.5g/kg actual bw)) Protein  Carbohydrate: At Least 130 g/day  Fluids: 1000 ml(-1500 (anuric)) (1mL/kcal)    Last BM: 3/12, melena       Bowel Sounds: hypoactive  Skin:    [x] Intact   [] Incision  [] Breakdown   [] DTI   [] Tears/Excoriation/Abrasion  []Edema [] Other:    Wt Readings from Last 30 Encounters:   03/12/19 55.3 kg (122 lb)   02/18/19 58.2 kg (128 lb 3.2 oz)   02/08/19 56.6 kg (124 lb 12.8 oz)   12/17/18 56 kg (123 lb 6.4 oz)   12/03/18 56.5 kg (124 lb 9 oz)   11/26/18 55.3 kg (121 lb 14.6 oz)   11/01/18 53.4 kg (117 lb 12.8 oz)   10/22/18 52.2 kg (115 lb)   10/22/18 52.3 kg (115 lb 4.8 oz)   10/16/18 52.3 kg (115 lb 4.8 oz)   10/11/18 52.4 kg (115 lb 9.6 oz)   09/17/18 54.4 kg (120 lb)   08/26/18 58.7 kg (129 lb 6.6 oz)   08/02/18 57.2 kg (126 lb 3.2 oz)   07/27/18 55.8 kg (123 lb)   01/26/18 64 kg (141 lb)   12/08/17 64 kg (141 lb 3.2 oz)   09/14/17 63.9 kg (140 lb 12.8 oz)   01/30/17 63.5 kg (140 lb)   11/09/16 63.5 kg (140 lb)   04/15/16 65.7 kg (144 lb 12.8 oz)   03/23/16 66.3 kg (146 lb 3.2 oz)   02/10/16 65.9 kg (145 lb 3.2 oz)   01/05/16 66.3 kg (146 lb 3.2 oz)   12/30/15 68.1 kg (150 lb 2.1 oz)   12/23/15 66 kg (145 lb 6.4 oz)   12/15/15 65.3 kg (144 lb)   12/10/15 65.5 kg (144 lb 6.4 oz)   10/13/15 64.4 kg (142 lb)   07/06/15 64.4 kg (142 lb)      NUTRITION DIAGNOSES:   Problem:  Inadequate protein-energy intake     Etiology: related to increased needs with peritoneal dialysis      Signs/Symptoms: as evidenced by reported minimal to no intake x 2-3 days PTA. observed intake <EEN      NUTRITION INTERVENTIONS:  Meals/Snacks: General/healthful diet   Supplements: Commercial supplement              GOAL:   Pt to meed >75% of estimated kcal/protein needs in 3-5 days    Cultural, [place], or Ethnic Dietary Needs: None     EDUCATION & DISCHARGE NEEDS:    [x] None Identified   [] Identified and Education Provided/Documented   [] Identified and Pt declined/was not appropriate      [x] Interdisciplinary Care Plan Reviewed/Documented    [x] Discharge Needs:  Low sodium, k+ and phos.     [] No Nutrition Related Discharge Needs    NUTRITION RISK:   Pt Is At Nutrition Risk  [x]     No Nutrition Risk Identified  []       PT SEEN FOR:    []  MD Consult: []Calorie Count      []Diabetic Diet Education        []Diet Education     []Electrolyte Management     []General Nutrition Management and Supplements     []Management of Tube Feeding     []TPN Recommendations    []  RN Referral:  []MST score >=2     []Enteral/Parenteral Nutrition PTA     []Pregnant: Gestational DM or Multigestation                 [] Pressure Ulcer    []  Low BMI      [x]  Length of Stay (ICU)       [] Dysphagia Diet         [] Ventilator  []  Follow-up     Previous Recommendations:   [] Implemented          [] Not Implemented          [x] Not Applicable    Previous Goal:   [] Met              [] Progressing Towards Goal              [] Not Progressing Towards Goal   [x] Not Applicable            [address]  Pager [phone]  Office [phone]

## 2019-03-14 NOTE — PROGRESS NOTES
2100 - Admission assessment completed. See flowsheet. Dual skin check complete. Skin intact, no pressure injuries, PD catheter site on abdomen. Patient alert and oriented x4. 4L O2 nasal cannula. Able to walk with 1 assist. RTLC. Levophed at 8mcg/min. Fall precautions in place at this time. Call bell in reach. 2203 - Peritoneal dialysis draining. 1600cc drained. 2225 - Peritoneal dialysis infusing. 2000cc infused. 2248 - Peritoneal dialysis complete. Patient disconnected and port cleaned. 0000 - Reassessment completed. No changes to previous assessment. 5L O2 nasal cannula. Levophed at 8mcg/min. Patient resting quietly. Will continue to monitor. 0100 - Patient sleeping. Will continue to monitor. 12 - Patient resting quietly. In no acute distress. 0400 - Reassessment completed. Patient alert and oriented x4. 4LO2 nasal cannula. Resting quietly in no acute distress. Levophed at 8mcg/min. 0440 - Levophed decreased to 6mcg/min.  0600 - Levophed increased to 8mcg/min.  2526 - Bedside and Verbal shift change report given to Nelson Estrada RN (oncoming nurse) by Mason Pulido RN (offgoing nurse). Report included the following information SBAR, Kardex, ED Summary, Intake/Output, MAR and Recent Results.

## 2019-03-14 NOTE — ROUTINE PROCESS
1915: Bedside shift change report received from [address] [place] [address] (offgoing nurse) by Armando Mcneil (oncoming nurse). Report included the following information SBAR, Kardex, Procedure Summary, Intake/Output, MAR, Accordion, Recent Results, Cardiac Rhythm sinus arrythmia with pvc's and Alarm Parameters . 0725: Bedside shift change report given to [facility] and Sudeep Amin RN (oncoming nurses) by Rashad Camarillo RN (offgoing nurses). Report included the following information SBAR, Kardex, OR Summary, Procedure Summary, Intake/Output, MAR, Accordion, Med Rec Status, Cardiac Rhythm SR, PVC's and Alarm Parameters .

## 2019-03-14 NOTE — PROGRESS NOTES
Problem: Falls - Risk of  Goal: *Absence of Falls  Document Jimmy Fall Risk and appropriate interventions in the flowsheet. Outcome: Progressing Towards Goal  Fall Risk Interventions:  Mobility Interventions: Bed/chair exit alarm, Communicate number of staff needed for ambulation/transfer, Patient to call before getting OOB, Utilize gait belt for transfers/ambulation  Medication Interventions: Bed/chair exit alarm, Evaluate medications/consider consulting pharmacy, Patient to call before getting OOB, Teach patient to arise slowly, Utilize gait belt for transfers/ambulation  Elimination Interventions: Bed/chair exit alarm, Call light in reach, Patient to call for help with toileting needs, Toileting schedule/hourly rounds     Fall precautions in place at this time. Call bell in reach.

## 2019-03-14 NOTE — ED NOTES
Asked Dr. Kirit Collazo about needed repeat troponins for pt. Per Dr. Kirit Collazo, the pt does not need to have troponins continued since they have already peaked.

## 2019-03-14 NOTE — PROGRESS NOTES
Cardiology Progress Note       ICU  NAME:  Faheem Gentile   :   1960   MRN:   [id number]     Assessment/Plan:   1. SVT: remains in SR/SA this am. Cont po amio  2. Hypotension: post PD yesterday pm. Central line placed and levophed initiated. BP better this am on levophed at 8 mcg. Cont weaning. 3. Elevated troponin: likely due to acute illness and not an ACS  4. NICM s/p ICD, now with severely depressed EF 15 % , gradually resume her cardiomyopathy when off pressors  5. Hx PAF: - Plan to restart Eliquis when it is clear no invasive measures are needed   6. ESRD on PD: per renal   7. Metabolic encephalopathy/Dysarthria/intermittent confusion. CT scan of the head is unremarkable. No MRI due to her ICD. EEG in process. Neurology following  8. DVT prophylaxis: heparin         CARDIOLOGY ATTENDING  Patient personally seen and examined. All the elements of history and examination were personally performed. Assessment and plan was discussed and agree as written above    She remains in NSR on Amiodarone. Her BP is better and she is off of pressors. Altagracia Krishnamurthy MD, [facility]            Subjective:   Cardiac ROS: Patient denies any exertional chest pain, dyspnea, palpitations, syncope, orthopnea, edema or paroxysmal nocturnal dyspnea. Previous Cardiac Eval  ECHO: 10/14/12: EF 45% w/ posterior HK Grade 1 DD, LAE, mild MR, mild-mod TR RVSP 60 mmHG   Cath: 10/16/12: Normal cors. No AVG. Mild pulm HTN (43/16/26). Low-normal filling pressures. Echo 10/19/13 - EF 25- 30%. Severe diffuse hypokinesis. Mildly increased wall thickness. Mild concentric hypertrophy. Doppler parameters were consistent with a reversible restrictive pattern, indicative of decreased left ventricular  diastolic compliance and/or increased left atrial pressure (grade 3 diastolic dysfunction).  LA mildly dilated, mild MR, mild TR, mod PA HTN, small pericardial effusion circumferential to the heart, no evidence of hemodynamic compromise. Echo 11/2/15 - EF 25-30%, global HK, mild MR  Lexiscan cardiolite 11/24/15 - focal anteroapical ischemia    Cath 12/15/2015 - EDP 10, LV dilated, EF 20% global HK, normal cors with right dominance. Unable to cannulate femoral vein. 12/29/15-implantation of a single-chamber [address] per Dr. Cortez Ashraf    Echo 17 - EF 20 %. Severe diffuse hypokinesis. Mild LAE. Mild MR. Mild Pulm HTN. Small pericardial effusion. No significant change when compared to study 2015. Echo 18- LVEF 28%, severe LAE, PA systolic 50 mmHg    LHC / RHC 2018: No sig CAD, RA 10, RV 39/5, W 23, PA 35/20/29, PA Sat 47.1%, Ao Sat 83.2%, /7/24 Ao 119/79/90, CO F 3.94, CI F 2.4, CO T 2.75, CI T 1.68         Review of Systems: No nausea, indigestion, vomiting, pain, cough, sputum. No bleeding. Taking po. Objective:     Visit Vitals  /63   Pulse 64   Temp 97.9 °F (36.6 °C)   Resp 19   Ht 5' 5\" (1.651 m)   Wt 122 lb (55.3 kg)   SpO2 98%   BMI 20.30 kg/m²    O2 Flow Rate (L/min): 4 l/min O2 Device: Nasal cannula, Humidifier    Temp (24hrs), Av.7 °F (36.5 °C), Min:97.5 °F (36.4 °C), Max:97.9 °F (36.6 °C)      No intake/output data recorded.  1901 -  0700  In: 8415.6 [P.O.:120; I.V.:45.6]  Out: 6100   TELE: SB/SR     General: slow to respond  HEENT: atraumatic. Pink and moist.  Anicteric sclerae. Neck : Supple  Lungs: CTA bilaterally. No wheezing/rhonchi/rales. SPO2 98% 2 liters   Heart:  Regular rhythm, no murmur, No carotid bruits. Abdomen: PD catheter intact,  Soft, non-distended, non-tender. + Bowel sounds. Extremities: No edema, no clubbing, no cyanosis. No calf tenderness  Neurologic: Grossly intact. Slow to respond. Psych: ?  insight. Not anxious or agitated.         Care Plan discussed with:    Comments   Patient x    Family      RN x    Care Manager                    Consultant:          Data Review:     No lab exists for component: [place] Recent Labs     03/13/19  0246 03/12/19  1102 03/12/19  0350   TROIQ 1.74* 1.74* 1.63*     Recent Labs     03/14/19  0444 03/13/19  0246 03/12/19  1151 03/12/19  0350 03/11/19  1642   * 132* 136 134* 137   K 4.1 5.1 5.3* 6.5* 5.5*   CL 95* 94* 99 98 99   CO2 24 19* 20* 20* 15*   * 116* 117* 115* 105*   CREA 19.70* 21.20* 21.40* 20.80* 21.90*   * 251* 123* 139* 133*   PHOS 8.4* 8.3* 8.8*  --   --    MG  --   --  2.4  --  2.3   ALB 2.2* 2.2* 2.2*  --  2.8*   WBC  --  12.9*  --  11.1* 12.3*   HGB  --  11.0*  --  12.0 12.2   HCT  --  34.4*  --  36.7 37.7   PLT  --  131*  --  130* 160     No results for input(s): INR, PTP, APTT in the last 72 hours.     No lab exists for component: INREXT, INREXT    Medications reviewed  Current Facility-Administered Medications   Medication Dose Route Frequency    [START ON 3/15/2019] pantoprazole (PROTONIX) tablet 40 mg  40 mg Oral ACB    amiodarone (CORDARONE) tablet 200 mg  200 mg Oral BID WITH MEALS    NOREPINephrine (LEVOPHED) 32 mg in dextrose 5% 250 mL infusion  2-16 mcg/min IntraVENous TITRATE    gentamicin (GARAMYCIN) 0.1 % cream   Topical QHS    heparin (porcine) injection 5,000 Units  5,000 Units SubCUTAneous Q8H    aspirin delayed-release tablet 81 mg  81 mg Oral DAILY    cinacalcet (SENSIPAR) tablet 30 mg  30 mg Oral DAILY WITH DINNER    ferric citrate (AURYXIA) tablet 210 mg  210 mg Oral TID WITH MEALS    senna-docusate (PERICOLACE) 8.6-50 mg per tablet 1 Tab  1 Tab Oral DAILY PRN    sevelamer carbonate (RENVELA) tab 800 mg  800 mg Oral TID WITH MEALS    sodium chloride (NS) flush 5-40 mL  5-40 mL IntraVENous Q8H    sodium chloride (NS) flush 5-40 mL  5-40 mL IntraVENous PRN    ondansetron (ZOFRAN) injection 4 mg  4 mg IntraVENous Q4H PRN    peritoneal dialysis DEXTROSE 1.5% (2.5 mEq/L low calcium) solution 2,000 mL  2,000 mL IntraPERitoneal QID    hydrocortisone (ANUSOL-HC) 2.5 % rectal cream   PeriANAL QID         Ashley Champagne NP

## 2019-03-14 NOTE — PROGRESS NOTES
[facility] Alator[facility]  [address] [facility], [place], [id number] [place] [address]  [phone]      Medical Progress Note      NAME:         Shahid Nix   :        1960  MRM:        [id number]    Date:          3/14/2019      Subjective: Patient has been seen and examined as a follow up for multiple medical issues. Chart, labs, diagnostics reviewed. She feels very weak this afternoon. She feels better this morning. More alert. Denies any chest pain or SOB. No fever or chills. BP better. Objective:    Vital Signs:    Visit Vitals  /63   Pulse 64   Temp 97.9 °F (36.6 °C)   Resp 19   Ht 5' 5\" (1.651 m)   Wt 55.3 kg (122 lb)   SpO2 98%   BMI 20.30 kg/m²          Intake/Output Summary (Last 24 hours) at 3/14/2019 0758  Last data filed at 3/14/2019 0700  Gross per 24 hour   Intake 6465.56 ml   Output 5100 ml   Net 1365.56 ml        Physical Examination:    General:   Weak and still ill looking female patient, not in distress    Eyes:   pink conjunctivae, PERRLA with no discharge. ENT:   no ottorrhea or rhinorrhea with dry mucous membranes  Neck: no masses, thyroid non-tender and trachea central.  Pulm:  decreased breath sounds without crackles or wheezes  Card:  no JVD or murmurs, has atrial fibrillation, without thrills, bruits. + peripheral edema  Abd:  Soft, non-tender, non-distended, normoactive bowel sounds   Musc:  No cyanosis, clubbing, atrophy or deformities. Skin:  No rashes, bruising or ulcers. Neuro: Awake and alert, non focal symptoms. Follows commands appropriately  Psych:  Has a fair insight to her illness.      Current Facility-Administered Medications   Medication Dose Route Frequency    [START ON 3/15/2019] pantoprazole (PROTONIX) tablet 40 mg  40 mg Oral ACB    amiodarone (CORDARONE) tablet 200 mg  200 mg Oral BID WITH MEALS    NOREPINephrine (LEVOPHED) 32 mg in dextrose 5% 250 mL infusion  2-16 mcg/min IntraVENous TITRATE    gentamicin (GARAMYCIN) 0.1 % cream   Topical QHS    heparin (porcine) injection 5,000 Units  5,000 Units SubCUTAneous Q8H    aspirin delayed-release tablet 81 mg  81 mg Oral DAILY    cinacalcet (SENSIPAR) tablet 30 mg  30 mg Oral DAILY WITH DINNER    ferric citrate (AURYXIA) tablet 210 mg  210 mg Oral TID WITH MEALS    senna-docusate (PERICOLACE) 8.6-50 mg per tablet 1 Tab  1 Tab Oral DAILY PRN    sevelamer carbonate (RENVELA) tab 800 mg  800 mg Oral TID WITH MEALS    sodium chloride (NS) flush 5-40 mL  5-40 mL IntraVENous Q8H    sodium chloride (NS) flush 5-40 mL  5-40 mL IntraVENous PRN    ondansetron (ZOFRAN) injection 4 mg  4 mg IntraVENous Q4H PRN    peritoneal dialysis DEXTROSE 1.5% (2.5 mEq/L low calcium) solution 2,000 mL  2,000 mL IntraPERitoneal QID    hydrocortisone (ANUSOL-HC) 2.5 % rectal cream   PeriANAL QID        Laboratory data and review:    Recent Labs     03/13/19  0246 03/12/19  0350 03/11/19  1642   WBC 12.9* 11.1* 12.3*   HGB 11.0* 12.0 12.2   HCT 34.4* 36.7 37.7   * 130* 160     Recent Labs     03/14/19  0444 03/13/19  0246 03/12/19  1151  03/11/19  1642   * 132* 136   < > 137   K 4.1 5.1 5.3*   < > 5.5*   CL 95* 94* 99   < > 99   CO2 24 19* 20*   < > 15*   * 251* 123*   < > 133*   * 116* 117*   < > 105*   CREA 19.70* 21.20* 21.40*   < > 21.90*   CA 7.7* 7.2* 7.3*   < > 8.3*   MG  --   --  2.4  --  2.3   PHOS 8.4* 8.3* 8.8*  --   --    ALB 2.2* 2.2* 2.2*  --  2.8*   SGOT  --   --   --   --  50*   ALT  --   --   --   --  37    < > = values in this interval not displayed. No components found for: [place]    Other Diagnostics:    Telemetry reviewed: atrial fibrillation       Assessment and Plan:    Cardiogenic shock ([address] (3/13/2019) / Cardiomyopathy, nonischemic (HCC) (10/19/2013)/ Systolic CHF, chronic ([address] (8/25/2018)/ ICD (implantable cardioverter-defibrillator) in place (1/26/2018): recent Echo showed an EF 25%.  BP better and holding. Blood cultures remain neg. Wean down IV levophed. Hold all other BP medications. Continue to monitor in ICU.       SVT (supraventricular tachycardia) ([facility] (3/11/2019) / type 2 MI POA: rate better controlled. No chest pain or SOB. Seen and followed by cardiology. Continue Amiodarone orally, may hold Metoprolol for now.        Acute metabolic encephalopathy / Dysarthria (3/11/2019) POA: intermittent confusion. Improving. Suspect multifactorial from uremia, metabolic derangements. CT scan of the head is unremarkable. No MRI due to her ICD. EEG non specific but neg for seizure activity. Ammonia level unremarkable. Appreciate neurology consult. Getting better. Continue to monitor     Essential hypertension, benign (10/18/2012): now hypotensive. Hold all BP medications as above.        Nausea & vomiting (3/11/2019)/ Diarrhea (3/11/2019): suspect viral GE. Better. Stool WBCs normal. Monitor       ESRD (end stage renal disease) ([facility] (12/11/2015) /  Hyperkalemia POA: Missed two days of peritoneal dialysis. Seen by renal. She Would NOT want hemodialysis.  Continue PD as per renal, sevelamer, sensipar    Total time spent for the patient's care: [address] discussed with: Patient, Nursing Staff and Consultant/Specialist    Discussed:  Care Plan    Prophylaxis:  Hep SQ    Anticipated Disposition:  Home w/Family           ___________________________________________________    Attending Physician:   Mary Palmer MD

## 2019-03-14 NOTE — PROGRESS NOTES
Bedside shift change report given to Umesh Hidalgo RN (oncoming nurse) by Vega Pan RN (offgoing nurse). Report included the following information SBAR, Kardex, ED Summary, Intake/Output, MAR and Recent Results. SHIFT SUMMARY:    0800 Initial Shift  Assessment performed           Mental Status: oriented x4           Respiratory: 4L NC           Cardiac: Sinus arrhythmia with frequent PVCs. GI/: Anuric           IV Drips: Levophed 6mcg/min  0900 PD completed. 1132 Levophed stopped   1300 Patient taken for x-ray. 1335 PD performed. Cultures sent per intensivist.  1600 Reassessment performed. No new changes at this time. Will continue to closely monitor. 1805 PD performed and charted. Pt now eating dinner. Bedside shift change report given to Alicia Sales RN (oncoming nurse) by Umesh Hidalgo RN (offgoing nurse). Report included the following information SBAR, Kardex, ED Summary, Intake/Output, MAR and Recent Results.

## 2019-03-15 LAB
ALBUMIN SERPL-MCNC: 1.8 G/DL (ref 3.5–5)
ANION GAP SERPL CALC-SCNC: 11 MMOL/L (ref 5–15)
BASOPHILS # BLD: 0 K/UL (ref 0–0.1)
BASOPHILS NFR BLD: 0 % (ref 0–1)
BUN SERPL-MCNC: 98 MG/DL (ref 6–20)
BUN/CREAT SERPL: 5 (ref 12–20)
CALCIUM SERPL-MCNC: 7.3 MG/DL (ref 8.5–10.1)
CHLORIDE SERPL-SCNC: 95 MMOL/L (ref 97–108)
CO2 SERPL-SCNC: 28 MMOL/L (ref 21–32)
CREAT SERPL-MCNC: 17.9 MG/DL (ref 0.55–1.02)
DIFFERENTIAL METHOD BLD: ABNORMAL
EOSINOPHIL # BLD: 0.2 K/UL (ref 0–0.4)
EOSINOPHIL NFR BLD: 1 % (ref 0–7)
ERYTHROCYTE [DISTWIDTH] IN BLOOD BY AUTOMATED COUNT: 13.7 % (ref 11.5–14.5)
GLUCOSE BLD STRIP.AUTO-MCNC: 116 MG/DL (ref 65–100)
GLUCOSE BLD STRIP.AUTO-MCNC: 77 MG/DL (ref 65–100)
GLUCOSE BLD STRIP.AUTO-MCNC: 92 MG/DL (ref 65–100)
GLUCOSE BLD STRIP.AUTO-MCNC: 94 MG/DL (ref 65–100)
GLUCOSE BLD STRIP.AUTO-MCNC: 97 MG/DL (ref 65–100)
GLUCOSE SERPL-MCNC: 81 MG/DL (ref 65–100)
HCT VFR BLD AUTO: 33 % (ref 35–47)
HGB BLD-MCNC: 10.9 G/DL (ref 11.5–16)
IMM GRANULOCYTES # BLD AUTO: 0.1 K/UL (ref 0–0.04)
IMM GRANULOCYTES NFR BLD AUTO: 1 % (ref 0–0.5)
INR PPP: 1.2 (ref 0.9–1.1)
LYMPHOCYTES # BLD: 0.7 K/UL (ref 0.8–3.5)
LYMPHOCYTES NFR BLD: 5 % (ref 12–49)
MAGNESIUM SERPL-MCNC: 2.2 MG/DL (ref 1.6–2.4)
MCH RBC QN AUTO: 34.2 PG (ref 26–34)
MCHC RBC AUTO-ENTMCNC: 33 G/DL (ref 30–36.5)
MCV RBC AUTO: 103.4 FL (ref 80–99)
MONOCYTES # BLD: 0.5 K/UL (ref 0–1)
MONOCYTES NFR BLD: 3 % (ref 5–13)
NEUTS SEG # BLD: 13.5 K/UL (ref 1.8–8)
NEUTS SEG NFR BLD: 89 % (ref 32–75)
NRBC # BLD: 0.02 K/UL (ref 0–0.01)
NRBC BLD-RTO: 0.1 PER 100 WBC
PHOSPHATE SERPL-MCNC: 6.5 MG/DL (ref 2.6–4.7)
PLATELET # BLD AUTO: 110 K/UL (ref 150–400)
PMV BLD AUTO: 11.2 FL (ref 8.9–12.9)
POTASSIUM SERPL-SCNC: 3.6 MMOL/L (ref 3.5–5.1)
PROTHROMBIN TIME: 11.9 SEC (ref 9–11.1)
RBC # BLD AUTO: 3.19 M/UL (ref 3.8–5.2)
SERVICE CMNT-IMP: ABNORMAL
SERVICE CMNT-IMP: NORMAL
SODIUM SERPL-SCNC: 134 MMOL/L (ref 136–145)
WBC # BLD AUTO: 15.1 K/UL (ref 3.6–11)

## 2019-03-15 PROCEDURE — 80069 RENAL FUNCTION PANEL: CPT

## 2019-03-15 PROCEDURE — 77030013797 HC KT TRNSDUC PRSSR EDWD -A

## 2019-03-15 PROCEDURE — 36415 COLL VENOUS BLD VENIPUNCTURE: CPT

## 2019-03-15 PROCEDURE — 65610000006 HC RM INTENSIVE CARE

## 2019-03-15 PROCEDURE — A4722 DIALYS SOL FLD VOL > 1999CC: HCPCS | Performed by: INTERNAL MEDICINE

## 2019-03-15 PROCEDURE — 74011250637 HC RX REV CODE- 250/637: Performed by: NURSE PRACTITIONER

## 2019-03-15 PROCEDURE — 74011250637 HC RX REV CODE- 250/637: Performed by: SPECIALIST

## 2019-03-15 PROCEDURE — 74011250636 HC RX REV CODE- 250/636: Performed by: INTERNAL MEDICINE

## 2019-03-15 PROCEDURE — 97116 GAIT TRAINING THERAPY: CPT

## 2019-03-15 PROCEDURE — 82962 GLUCOSE BLOOD TEST: CPT

## 2019-03-15 PROCEDURE — 85610 PROTHROMBIN TIME: CPT

## 2019-03-15 PROCEDURE — 74011250636 HC RX REV CODE- 250/636: Performed by: SPECIALIST

## 2019-03-15 PROCEDURE — 85025 COMPLETE CBC W/AUTO DIFF WBC: CPT

## 2019-03-15 PROCEDURE — 74011250637 HC RX REV CODE- 250/637: Performed by: INTERNAL MEDICINE

## 2019-03-15 PROCEDURE — 83735 ASSAY OF MAGNESIUM: CPT

## 2019-03-15 PROCEDURE — 97163 PT EVAL HIGH COMPLEX 45 MIN: CPT

## 2019-03-15 RX ADMIN — SEVELAMER CARBONATE 1600 MG: 800 TABLET, FILM COATED ORAL at 12:00

## 2019-03-15 RX ADMIN — FERRIC CITRATE 210 MG: 210 TABLET, COATED ORAL at 13:17

## 2019-03-15 RX ADMIN — SODIUM CHLORIDE, SODIUM LACTATE, CALCIUM CHLORIDE, MAGNESIUM CHLORIDE AND DEXTROSE 2000 ML: 1.5; 538; 448; 18.3; 5.08 INJECTION, SOLUTION INTRAPERITONEAL at 09:19

## 2019-03-15 RX ADMIN — FERRIC CITRATE 210 MG: 210 TABLET, COATED ORAL at 18:15

## 2019-03-15 RX ADMIN — ASPIRIN 81 MG: 81 TABLET ORAL at 09:18

## 2019-03-15 RX ADMIN — Medication 10 ML: at 13:16

## 2019-03-15 RX ADMIN — SODIUM CHLORIDE, SODIUM LACTATE, CALCIUM CHLORIDE, MAGNESIUM CHLORIDE AND DEXTROSE 2000 ML: 1.5; 538; 448; 18.3; 5.08 INJECTION, SOLUTION INTRAPERITONEAL at 22:02

## 2019-03-15 RX ADMIN — HEPARIN SODIUM 5000 UNITS: 5000 INJECTION INTRAVENOUS; SUBCUTANEOUS at 06:01

## 2019-03-15 RX ADMIN — SODIUM CHLORIDE, SODIUM LACTATE, CALCIUM CHLORIDE, MAGNESIUM CHLORIDE AND DEXTROSE 2000 ML: 1.5; 538; 448; 18.3; 5.08 INJECTION, SOLUTION INTRAPERITONEAL at 13:15

## 2019-03-15 RX ADMIN — CINACALCET HYDROCHLORIDE 30 MG: 30 TABLET, COATED ORAL at 18:14

## 2019-03-15 RX ADMIN — Medication 10 ML: at 22:01

## 2019-03-15 RX ADMIN — SODIUM CHLORIDE, SODIUM LACTATE, CALCIUM CHLORIDE, MAGNESIUM CHLORIDE AND DEXTROSE 2000 ML: 1.5; 538; 448; 18.3; 5.08 INJECTION, SOLUTION INTRAPERITONEAL at 18:16

## 2019-03-15 RX ADMIN — SEVELAMER CARBONATE 1600 MG: 800 TABLET, FILM COATED ORAL at 09:18

## 2019-03-15 RX ADMIN — AMIODARONE HYDROCHLORIDE 200 MG: 200 TABLET ORAL at 18:21

## 2019-03-15 RX ADMIN — HEPARIN SODIUM 5000 UNITS: 5000 INJECTION INTRAVENOUS; SUBCUTANEOUS at 22:14

## 2019-03-15 RX ADMIN — AMIODARONE HYDROCHLORIDE 200 MG: 200 TABLET ORAL at 08:03

## 2019-03-15 RX ADMIN — GENTAMICIN SULFATE: 1 CREAM TOPICAL at 22:03

## 2019-03-15 RX ADMIN — HYDROCORTISONE 2.5%: 25 CREAM TOPICAL at 09:19

## 2019-03-15 RX ADMIN — PANTOPRAZOLE SODIUM 40 MG: 40 TABLET, DELAYED RELEASE ORAL at 08:03

## 2019-03-15 RX ADMIN — Medication 10 ML: at 06:02

## 2019-03-15 RX ADMIN — SEVELAMER CARBONATE 1600 MG: 800 TABLET, FILM COATED ORAL at 18:15

## 2019-03-15 RX ADMIN — HEPARIN SODIUM 5000 UNITS: 5000 INJECTION INTRAVENOUS; SUBCUTANEOUS at 13:14

## 2019-03-15 RX ADMIN — WARFARIN SODIUM 2.5 MG: 2 TABLET ORAL at 18:14

## 2019-03-15 NOTE — PROGRESS NOTES
Bedside and Verbal shift change report given to Arminda (oncoming nurse) by Orin Bauman RN (offgoing nurse). Report included the following information SBAR, Kardex, ED Summary, OR Summary, Procedure Summary, Intake/Output, MAR, Accordion, Recent Results, Med Rec Status and Cardiac Rhythm Bundle Branch Block. 0800 - Assessment performed. BG 77, patient given cranberry juice and ordered breakfast.     0900 - Patient had cranberry juice, peanut butter with apple and BG came up to 92. PD exchange performed. 1500ml yellow, drained after 1500 put in previously. 1300 - PD exchange performed, PT assisted patient up to chair. 1900 - Final PD exchange performed for day shift. Bedside and Verbal shift change report given to Narciso Be RN (oncoming nurse) by Gabrielle Jaquez RN (offgoing nurse). Report included the following information SBAR, Kardex, ED Summary, OR Summary, Procedure Summary, Intake/Output, MAR, Accordion, Recent Results, Med Rec Status and Cardiac Rhythm sinus arrhythmia.

## 2019-03-15 NOTE — PROGRESS NOTES
[facility]  Pulmonary, Critical Care, and Sleep Medicine    Name: Yao Green MRN: [id number]   : 1960 Hospital: [facility]   Date: 3/15/2019            IMPRESSION:   · Acute hypoxemic respiratory failure  · Shock - off pressors 3/14. BP relatively soft  · SVT, h/o afib  · Elevated troponin  · ams / metabolic encephalopathy  · H/o cardiomyopathy (EF21-25%) s/p ICD, mild-moderate AR  · ESRD on PD, h/o PCKD  · GERD  · hyperglycemia      RECOMMENDATIONS:   · Supplemental O2 as needed to keep sats > 90%  · May need to resume pressors  · Follow cultures  · Add empiric abx if pressors resumed  · continue SSI  · Continue ASA  · Continue amio    DVT ppx: sq heparin  GI ppx: protonix     Subjective/History:       Ms. Sherry Batista is a 64yo female w/ history of cardiomyopathy (EF 21-25%) s/p ICD, afib/SVT, ESRD on PD, HTN, PCKD and GERD who presented to the ER on 3/11 w/ complaints of sore throat, shortness of breath, dry cough, n/v/d, dysarthria and memory loss (doesn't remember any events of Saturday and missed PD). On amio for SVT. Seen by neuro. Head CT negative. EEG c/w toxi-metabolic encephalopathy. Transferred to the ICU overnight 3/13 w/ hypotension requiring pressors.      *all cultures NGTD  *peritoneal fluid not c/w SBP  *RVP negative  ------------------------------------------------------------    24hr events:  C/o shortness of breath and cough w/ movement  sats 100% on 4L  Off pressors ~24hrs; however, BP soft     Past Medical History:   Diagnosis Date    Anemia associated with chronic renal failure     Anuria     Arrhythmia     Paroxysmal a fib, paroxsymal atrial tach, SVT    Chronic kidney disease     ESRD- on PD    Chronic systolic heart failure ([address] 10/31/2012    Chronic tachycardia     GERD (gastroesophageal reflux disease)     Hx of non-ST elevation myocardial infarction (NSTEMI)     Hypertension     Lipoma of right lower extremity 1980    Foot    Peritoneal dialysis catheter in place [facility]) 2014    Nightime exchanges    Polycystic kidney disease 2014      Past Surgical History:   Procedure Laterality Date    HX HEART CATHETERIZATION  12/2015    no interventions    HX PACEMAKER  12/29/2015    ICD    IR INSERT NON TUNL CVC OVER 5 YRS  3/13/2019      Prior to Admission medications    Medication Sig Start Date End Date Taking? Authorizing Provider   dilTIAZem CD (CARDIZEM CD) 120 mg ER capsule Take 1 Cap by mouth daily. 2/8/19  Yes Dora Saavedra NP   aspirin delayed-release 81 mg tablet Take 81 mg by mouth daily. Yes Provider, Historical   spironolactone (ALDACTONE) 50 mg tablet Take 50 mg by mouth every morning. Yes Provider, Historical   potassium chloride (KLOR-CON M20) 20 mEq tablet Take 40 mEq by mouth every morning. Yes Provider, Historical   carvedilol (COREG) 25 mg tablet Take 1 Tab by mouth two (2) times daily (with meals). 9/18/18  Yes Obed Lobo NP   omeprazole (PRILOSEC) 20 mg capsule Take 20 mg by mouth daily as needed (indigestion). Yes Provider, Historical   SENNA-DOCUSATE SODIUM PO Take 1 Tab by mouth daily as needed (constipation). Yes Provider, Historical   cinacalcet (SENSIPAR) 30 mg tablet Take 30 mg by mouth daily (with dinner). Yes Provider, Historical   ferric citrate (AURYXIA) 210 mg iron tablet Take 210 mg by mouth three (3) times daily (with meals). Yes Provider, Historical   gentamicin (GARAMYCIN) 0.1 % topical cream Apply  to affected area nightly.  7/12/14  Yes Provider, Historical     Current Facility-Administered Medications   Medication Dose Route Frequency    pantoprazole (PROTONIX) tablet 40 mg  40 mg Oral ACB    sevelamer carbonate (RENVELA) tab 1,600 mg  1,600 mg Oral TID WITH MEALS    insulin lispro (HUMALOG) injection   SubCUTAneous AC&HS    amiodarone (CORDARONE) tablet 200 mg  200 mg Oral BID WITH MEALS    NOREPINephrine (LEVOPHED) 32 mg in dextrose 5% 250 mL infusion  2-16 mcg/min IntraVENous TITRATE    gentamicin (GARAMYCIN) 0.1 % cream   Topical QHS    heparin (porcine) injection 5,000 Units  5,000 Units SubCUTAneous Q8H    aspirin delayed-release tablet 81 mg  81 mg Oral DAILY    cinacalcet (SENSIPAR) tablet 30 mg  30 mg Oral DAILY WITH DINNER    ferric citrate (AURYXIA) tablet 210 mg  210 mg Oral TID WITH MEALS    sodium chloride (NS) flush 5-40 mL  5-40 mL IntraVENous Q8H    peritoneal dialysis DEXTROSE 1.5% (2.5 mEq/L low calcium) solution 2,000 mL  2,000 mL IntraPERitoneal QID    hydrocortisone (ANUSOL-HC) 2.5 % rectal cream   PeriANAL QID     Allergies   Allergen Reactions    Iodinated Contrast- Oral And Iv Dye Angioedema     Cellulitis on side of face after test      Social History     Tobacco Use    Smoking status: Never Smoker    Smokeless tobacco: Never Used   Substance Use Topics    Alcohol use: No      Family History   Problem Relation Age of Onset    Diabetes Brother     Hypertension Brother     Hypertension Mother     Arthritis-osteo Mother     Hypertension Sister     Diabetes Sister     Kidney Disease Father         polycystic    Parkinson's Disease Father     Heart Disease Father     Hypertension Father     Hypertension Brother             Objective:   Vital Signs:    Visit Vitals  BP 97/59   Pulse 63   Temp 98.8 °F (37.1 °C)   Resp 21   Ht 5' 5\" (1.651 m)   Wt 64.4 kg (141 lb 15.6 oz)   SpO2 100%   BMI 23.63 kg/m²       O2 Device: Nasal cannula   O2 Flow Rate (L/min): 4 l/min   Temp (24hrs), Av °F (36.7 °C), Min:97.5 °F (36.4 °C), Max:98.8 °F (37.1 °C)       Intake/Output:   Last shift:      03/15 07 - 03/15 1900  In: 120 [P.O.:120]  Out: 1500   Last 3 shifts:  1901 - 03/15 0700  In: 52930.4 [I.V.:38042.4]  Out: 8100     Intake/Output Summary (Last 24 hours) at 3/15/2019 1313  Last data filed at 3/15/2019 0900  Gross per 24 hour   Intake 2120 ml   Output 6000 ml   Net -3880 ml     Hemodynamics:   PAP:   CO:     Wedge:   CI:     CVP: SVR:       PVR:       Ventilator Settings:  Mode Rate Tidal Volume Pressure FiO2 PEEP                    Peak airway pressure:      Minute ventilation:        Physical Exam:    General:  Alert, cooperative, no distress, appears stated age. Head:  Normocephalic, atraumatic   Eyes:  Conjunctiva clear, EOMI   Nose: No discharge   Throat: Moist mucous membranes   Neck: Supple, symmetrical, trachea midline,   Back:      Lungs:   Diminished but clear   Chest wall:  . Heart:  Regular rate and rhythm, S1, S2 normal, no murmur, click, rub or gallop. Abdomen:   Soft, non-tender, distended. Bowel sounds normal.   Extremities: Extremities normal, atraumatic, no cyanosis or edema.    Pulses:    Skin: Skin color, texture, turgor normal. No rashes or lesions   Lymph nodes:    Neurologic: Grossly nonfocal; Psych: normal affect       Data:     Recent Results (from the past 24 hour(s))   CELL COUNT, BODY FLUID    Collection Time: 03/14/19  2:24 PM   Result Value Ref Range    BODY FLUID TYPE PERITONEAL FLUID      FLUID COLOR YELLOW      FLUID APPEARANCE CLEAR      FLUID RBC CT. 19 (H) 0 /cu mm    FLUID NUCLEATED CELLS 18 /cu mm    FLD NEUTROPHILS 59 (A) NRRE %    FLD LYMPHS 36 (A) NRRE %    FLD MONO/MACROPHAGES 5 (A) NRRE %   CULTURE, BODY FLUID W GRAM STAIN    Collection Time: 03/14/19  2:24 PM   Result Value Ref Range    Special Requests: NO SPECIAL REQUESTS      GRAM STAIN RARE WBCS SEEN      GRAM STAIN NO ORGANISMS SEEN      Culture result: NO GROWTH AFTER 15 HOURS     CULTURE, BLOOD    Collection Time: 03/14/19  2:51 PM   Result Value Ref Range    Special Requests: NO SPECIAL REQUESTS      Culture result: NO GROWTH AFTER 17 HOURS     GLUCOSE, POC    Collection Time: 03/14/19  4:18 PM   Result Value Ref Range    Glucose (POC) 125 (H) 65 - 100 mg/dL    Performed by Yasmani Swanson    GLUCOSE, POC    Collection Time: 03/14/19  9:23 PM   Result Value Ref Range    Glucose (POC) 114 (H) 65 - 100 mg/dL    Performed by Lita Saavedra Yazmin    MAGNESIUM    Collection Time: 03/15/19  4:19 AM   Result Value Ref Range    Magnesium 2.2 1.6 - 2.4 mg/dL   CBC WITH AUTOMATED DIFF    Collection Time: 03/15/19  4:19 AM   Result Value Ref Range    WBC 15.1 (H) 3.6 - 11.0 K/uL    RBC 3.19 (L) 3.80 - 5.20 M/uL    HGB 10.9 (L) 11.5 - 16.0 g/dL    HCT 33.0 (L) 35.0 - 47.0 %    .4 (H) 80.0 - 99.0 FL    MCH 34.2 (H) 26.0 - 34.0 PG    MCHC 33.0 30.0 - 36.5 g/dL    RDW 13.7 11.5 - 14.5 %    PLATELET 158 (L) 037 - 400 K/uL    MPV 11.2 8.9 - 12.9 FL    NRBC 0.1 (H) 0  WBC    ABSOLUTE NRBC 0.02 (H) 0.00 - 0.01 K/uL    NEUTROPHILS 89 (H) 32 - 75 %    LYMPHOCYTES 5 (L) 12 - 49 %    MONOCYTES 3 (L) 5 - 13 %    EOSINOPHILS 1 0 - 7 %    BASOPHILS 0 0 - 1 %    IMMATURE GRANULOCYTES 1 (H) 0.0 - 0.5 %    ABS. NEUTROPHILS 13.5 (H) 1.8 - 8.0 K/UL    ABS. LYMPHOCYTES 0.7 (L) 0.8 - 3.5 K/UL    ABS. MONOCYTES 0.5 0.0 - 1.0 K/UL    ABS. EOSINOPHILS 0.2 0.0 - 0.4 K/UL    ABS. BASOPHILS 0.0 0.0 - 0.1 K/UL    ABS. IMM.  GRANS. 0.1 (H) 0.00 - 0.04 K/UL    DF AUTOMATED     RENAL FUNCTION PANEL    Collection Time: 03/15/19  4:19 AM   Result Value Ref Range    Sodium 134 (L) 136 - 145 mmol/L    Potassium 3.6 3.5 - 5.1 mmol/L    Chloride 95 (L) 97 - 108 mmol/L    CO2 28 21 - 32 mmol/L    Anion gap 11 5 - 15 mmol/L    Glucose 81 65 - 100 mg/dL    BUN 98 (H) 6 - 20 MG/DL    Creatinine 17.90 (H) 0.55 - 1.02 MG/DL    BUN/Creatinine ratio 5 (L) 12 - 20      GFR est AA 2 (L) >60 ml/min/1.73m2    GFR est non-AA 2 (L) >60 ml/min/1.73m2    Calcium 7.3 (L) 8.5 - 10.1 MG/DL    Phosphorus 6.5 (H) 2.6 - 4.7 MG/DL    Albumin 1.8 (L) 3.5 - 5.0 g/dL   GLUCOSE, POC    Collection Time: 03/15/19  7:53 AM   Result Value Ref Range    Glucose (POC) 77 65 - 100 mg/dL    Performed by Corrina Davis    GLUCOSE, POC    Collection Time: 03/15/19  9:41 AM   Result Value Ref Range    Glucose (POC) 92 65 - 100 mg/dL    Performed by [address], POC    Collection Time: 03/15/19 11:42 AM Result Value Ref Range    Glucose (POC) 116 (H) 65 - 100 mg/dL    Performed by Denia López                  Imaging:  I have personally reviewed the patients radiographs and have reviewed the reports:  CXR w/ bilateral effusions.  No obvious infiltrate          Charles Jewell MD

## 2019-03-15 NOTE — PROGRESS NOTES
I met with pt as a follow-up visit,Pt lives alone in a single story home with 6 entry steps. She states her sister,Jihan [phone]) lives near her and is available to assist her if needed. Sister will transport pt home when discharged. In talking with pt,she does not anticipate any home health needs but I will discuss this with her attending especially since pt lives alone and missed two PD sessions . Levophed has been discontinued. Pt has a history of systolic CHF with a markedly reduced EF. I am checking with pt.'s attending to see if it is okay for pt to be evaluated by PT and OT to further assess for discharge needs. As pt missed two sessions of PD prior to hospitalization and lives alone.,it may be beneficial for pt to have @ least home health for skilled nursing for CHF management and PT & OT  if therapy recommends these modalities. .Pt states she had \"two days where she cannot remember anything\" prior to hospitalization. \"    I will discuss home health when attending rounds. At this time,there is no projected discharge date per attending so I will hand-off to the weekend  to follow pt for the second medicare letter notification as this needs to be done within 48 hours of discharge but not within 4 hours of discharge. Deidra Pean discharged,pt states she will need a work excuse. Keshia Mercer RN    Addendum-OT and PT orders obtained from attending. Marcial James

## 2019-03-15 NOTE — PROGRESS NOTES
[facility]  [address] [facility] [address] [place] [address]  [phone]      Medical Progress Note      NAME:         Perla Ramos   :        1960  MRM:        [id number]    Date:          3/15/2019      Subjective: Patient has been seen and examined as a follow up for multiple medical issues. Chart, labs, diagnostics reviewed. She feels better today. No SOB and no cough. Afebrile. BP better. Objective:    Vital Signs:    Visit Vitals  /65 (BP 1 Location: Left arm, BP Patient Position: Post activity)   Pulse 64   Temp 98.8 °F (37.1 °C)   Resp 18   Ht 5' 5\" (1.651 m)   Wt 64.4 kg (141 lb 15.6 oz)   SpO2 94%   BMI 23.63 kg/m²          Intake/Output Summary (Last 24 hours) at 3/15/2019 1530  Last data filed at 3/15/2019 1430  Gross per 24 hour   Intake 5240 ml   Output 9000 ml   Net -3760 ml        Physical Examination:    General:   Weak and ill looking female patient, not in distress    Eyes:   pink conjunctivae, PERRLA with no discharge. ENT:   no ottorrhea or rhinorrhea with dry mucous membranes  Neck: no masses, thyroid non-tender and trachea central.  Pulm:  clear  breath sounds without crackles or wheezes  Card:  has atrial fibrillation, without thrills, bruits. + peripheral edema  Abd:  Soft, non-tender, non-distended, normoactive bowel sounds   Musc:  No cyanosis, clubbing, atrophy or deformities. Skin:  No rashes, bruising or ulcers. Neuro: Awake and alert, non focal symptoms. Psych:  Has a fair insight to her illness.      Current Facility-Administered Medications   Medication Dose Route Frequency    NOREPINephrine (LEVOPHED) 8 mg in dextrose 5% 250 mL infusion  2-16 mcg/min IntraVENous TITRATE    pantoprazole (PROTONIX) tablet 40 mg  40 mg Oral ACB    sevelamer carbonate (RENVELA) tab 1,600 mg  1,600 mg Oral TID WITH MEALS    insulin lispro (HUMALOG) injection   SubCUTAneous AC&HS    glucose chewable tablet 16 g  4 Tab Oral PRN    dextrose (D50W) injection syrg 12.5-25 g  12.5-25 g IntraVENous PRN    glucagon (GLUCAGEN) injection 1 mg  1 mg IntraMUSCular PRN    amiodarone (CORDARONE) tablet 200 mg  200 mg Oral BID WITH MEALS    gentamicin (GARAMYCIN) 0.1 % cream   Topical QHS    heparin (porcine) injection 5,000 Units  5,000 Units SubCUTAneous Q8H    aspirin delayed-release tablet 81 mg  81 mg Oral DAILY    cinacalcet (SENSIPAR) tablet 30 mg  30 mg Oral DAILY WITH DINNER    ferric citrate (AURYXIA) tablet 210 mg  210 mg Oral TID WITH MEALS    senna-docusate (PERICOLACE) 8.6-50 mg per tablet 1 Tab  1 Tab Oral DAILY PRN    sodium chloride (NS) flush 5-40 mL  5-40 mL IntraVENous Q8H    sodium chloride (NS) flush 5-40 mL  5-40 mL IntraVENous PRN    ondansetron (ZOFRAN) injection 4 mg  4 mg IntraVENous Q4H PRN    peritoneal dialysis DEXTROSE 1.5% (2.5 mEq/L low calcium) solution 2,000 mL  2,000 mL IntraPERitoneal QID    hydrocortisone (ANUSOL-HC) 2.5 % rectal cream   PeriANAL QID        Laboratory data and review:    Recent Labs     03/15/19  0419 03/14/19  1258 03/13/19  0246   WBC 15.1* 17.4* 12.9*   HGB 10.9* 11.4* 11.0*   HCT 33.0* 34.6* 34.4*   * 114* 131*     Recent Labs     03/15/19  0419 03/14/19  1258 03/14/19  0444 03/13/19  0246   *  --  133* 132*   K 3.6  --  4.1 5.1   CL 95*  --  95* 94*   CO2 28  --  24 19*   GLU 81  --  142* 251*   BUN 98*  --  109* 116*   CREA 17.90*  --  19.70* 21.20*   CA 7.3*  --  7.7* 7.2*   MG 2.2 2.1  --   --    PHOS 6.5*  --  8.4* 8.3*   ALB 1.8*  --  2.2* 2.2*     No components found for: [place]    Other Diagnostics:    Telemetry reviewed: atrial fibrillation       Assessment and Plan:    Cardiogenic shock ([address] (3/13/2019) / Cardiomyopathy, nonischemic (HCC) (10/19/2013)/ Systolic CHF, chronic (HCC) (8/25/2018)/ ICD (implantable cardioverter-defibrillator) in place (1/26/2018): recent Echo showed an EF 25%.  This has resolved and her BP is now stable. Off vasopressors. Blood cultures remain neg. Hold all other BP medications. Cardiology following.        SVT (supraventricular tachycardia) ([facility] [facility] (3/11/2019) / Hx PAF / Type 2 MI POA: rate better controlled. No chest pain or SOB. Seen and followed by cardiology. Continue Amiodarone. Metoprolol on hold. Discussed with cardiology. Will start coumadin and follow INR. Patient was not able to afford NOACs and unclear if they can be used with PD      Acute metabolic encephalopathy / Dysarthria (3/11/2019) POA: this has resolved. Suspect multifactorial from uremia, metabolic derangements. CT scan of the head is unremarkable. No MRI due to her ICD. EEG non specific but neg for seizure activity. Ammonia level unremarkable. Appreciate neurology consult. Continue to monitor     Essential hypertension, benign (10/18/2012): BP stable. Hold off medications.         Nausea & vomiting (3/11/2019)/ Diarrhea (3/11/2019): suspect viral GE. Resolved. Stool WBCs normal.    ESRD (end stage renal disease) ([facility] [address] (12/11/2015) /  Hyperkalemia POA: Missed two days of peritoneal dialysis. Seen by renal. She Would NOT want hemodialysis.  Continue PD as per renal, sevelamer, sensipar    Total time spent for the patient's care: [address] discussed with: Patient, Nursing Staff and Consultant/Specialist    Discussed:  Care Plan    Prophylaxis:  Hep SQ    Anticipated Disposition:  Home w/Family           ___________________________________________________    Attending Physician:   Mary Palmer MD

## 2019-03-15 NOTE — PROGRESS NOTES
SHIFT SUMMARY:    2000:  Initial assessment complete as charted and ongoing as per unit protocol. Pt is awake, a&ox4, pleasant and appropriate. Denies any pain or distress at this time. Levophed gtt remains off at this time. sats maintained on 4lnc. Plan for peritoneal dialysis tonight as per orders. Assist with turning [id number] and prn for comfort. Report any acute changes. 2045: sat probe with poor waveform and tracing. Changed x2, now on pt's forehead. sats >95% w good pleth. 2233: PD complete for this evening. Dressing to site changed. Pt tolerated well. See flowsheet for accurate I/O's.    0000: pt resting, eyes closed, respirs even and nonlabored. Arouses easily to voice. Assessment remains ongoing without acute changes. VSS.    0200: assisted with repositioning for comfort. Denies complaints. 0400:  Reassessment complete. No acute changes. Pt resting comfortably. AM lab work drawn and sent. Results pending at this time. VSS. Will continue to monitor. 0918: AM meds given. Pt assisted with repositioning. Provided with menu and phone to order breakfast after 6:30.

## 2019-03-15 NOTE — PROGRESS NOTES
[facility] Dosing Services: 3/15/2019    Consult for Warfarin Dosing by Pharmacy by VERONICA Sal NP  Consult provided for this 60 y.o.  female , for indication of Atrial Fibrillation. Day of Therapy: 1  Dose to achieve an INR goal of 2-3    Order entered for Warfarin 2.5 mg to be given today at 18:00. Significant drug interactions: Amiodarone; Heparin SC (as bridge therapy)  Previous dose given none   PT/INR Lab Results   Component Value Date/Time    INR 1.2 (H) 03/15/2019 04:34 PM      Platelets Lab Results   Component Value Date/Time    PLATELET 401 (L) 94/16/3229 04:19 AM      H/H Lab Results   Component Value Date/Time    HGB 10.9 (L) 03/15/2019 04:19 AM        Pharmacist will follow daily and will provide subsequent Warfarin dosing based on clinical status.   Rufino Ya, Pharmacist

## 2019-03-15 NOTE — PROGRESS NOTES
In further discussing pt with cardiology NP,pt has not filled her eliquis for months so pt is being switched to coumadin. Further concerns have been voiced by the treatment team that pt will benefit from placement such as SNF or inpatient rehab. At the minimum,pt will benefit from home health for SN for CHF management but due to living alone and due to pt.'s non-compliance with her medication regimen and recent non-compliance with PD,pt will likely need SNF  or inpatient rehab. It will need to be a facility that accepts PD patients.     Kenneth Murphy

## 2019-03-15 NOTE — PROGRESS NOTES
Problem: Mobility Impaired (Adult and Pediatric)  Goal: *Acute Goals and Plan of Care (Insert Text)  Physical Therapy Goals  Initiated 3/15/2019  1. Patient will move from supine to sit and sit to supine  in bed with modified independence within 7 day(s). 2.  Patient will transfer from bed to chair and chair to bed with modified independence using the least restrictive device within 7 day(s). 3.  Patient will perform sit to stand with modified independence within 7 day(s). 4.  Patient will ambulate with modified independence for 150 feet with the least restrictive device within 7 day(s). 5.  Patient will ascend/descend 6 stairs with 1 handrail(s) with modified independence within 7 day(s). physical Therapy EVALUATION  Patient: Saeed Fierro (66 y.o. female)  Date: 3/15/2019  Primary Diagnosis: SVT (supraventricular tachycardia) (HCC) [I47.1]  SVT (supraventricular tachycardia) (HCC) [I47.1]       Precautions:   Fall    ASSESSMENT :  Based on the objective data described below, the patient presents with confusion and SVT and missing PD. Patient prior to admission lives in a 2nd story apartment with 6 steps to her apartment, she lives alone and does not require an assistive device. Patient performs daily PD, she has recent non-compliance with anticoagulation and with increased medical complexity. Patient with complex hospital course. Initially admitted on 3/11/18, experienced hypotension and required transfer to the ICU and IV pressor support. Patient has now been weaned off IV pressors x1 day, still with lower blood pressures. She continues to have intermittent confusion. She is requiring amnio for her SVT. Patient today received supine in ICU. She is agreeable to therapy. She required MIN A for bed mobility due to abdominal pain. She was overall CGA for transfers and short distance ambulation. Patient demonstrated no loss of balance but slow tala.   Patients vitals monitored throughout, and stable see below. She was returned to sitting up in chair. She is below her normal independent premorbid status and would benefit from rehab at discharge . Patient will benefit from skilled intervention to address the above impairments. Patients rehabilitation potential is considered to be Good  Factors which may influence rehabilitation potential include:   []         None noted  []         Mental ability/status  [x]         Medical condition  []         Home/family situation and support systems  []         Safety awareness  []         Pain tolerance/management  []         Other:      PLAN :  Recommendations and Planned Interventions:  [x]           Bed Mobility Training             [x]    Neuromuscular Re-Education  [x]           Transfer Training                   []    Orthotic/Prosthetic Training  [x]           Gait Training                         []    Modalities  [x]           Therapeutic Exercises           []    Edema Management/Control  [x]           Therapeutic Activities            [x]    Patient and Family Training/Education  []           Other (comment):    Frequency/Duration: Patient will be followed by physical therapy  5 times a week to address goals. Discharge Recommendations: Rehab and To Be Determined  Further Equipment Recommendations for Discharge:      SUBJECTIVE:   Patient stated Miles Fairchild want to be out of here by Sunday.     OBJECTIVE DATA SUMMARY:   HISTORY:    Past Medical History:   Diagnosis Date    Anemia associated with chronic renal failure     Anuria 2014    Arrhythmia     Paroxysmal a fib, paroxsymal atrial tach, SVT    Chronic kidney disease     ESRD- on PD    Chronic systolic heart failure ([facility] [address] 10/31/2012    Chronic tachycardia     GERD (gastroesophageal reflux disease)     Hx of non-ST elevation myocardial infarction (NSTEMI) 2011    Hypertension     Lipoma of right lower extremity 1980    Foot    Peritoneal dialysis catheter in place [facility]) 2014    Nightime exchanges    Polycystic kidney disease 2014     Past Surgical History:   Procedure Laterality Date    HX HEART CATHETERIZATION  12/2015    no interventions    HX PACEMAKER  12/29/2015    ICD    IR INSERT NON TUNL CVC OVER 5 YRS  3/13/2019     Prior Level of Function/Home Situation: see above  Personal factors and/or comorbidities impacting plan of care: see below    [address]: Apartment(2nd story)  # Steps to Enter: 6  Rails to Enter: Yes  Hand Rails : Right  Wheelchair Ramp: No  One/Two Story Residence: One story  Living Alone: No  Support Systems: Family member(s)  Patient Expects to be Discharged to[de-identified] Apartment  Current DME Used/Available at Home: None    EXAMINATION/PRESENTATION/DECISION MAKING:   Vitals:    03/15/19 1515 03/15/19 1520 03/15/19 1526 03/15/19 1530   BP: 106/67  Comment: Simultaneous filing. User may not have seen previous data. 108/57 108/65 112/64   BP 1 Location: Left arm Left arm Left arm    BP Patient Position: At rest Sitting Post activity    Pulse: 64  Comment: Simultaneous filing. User may not have seen previous data. 74 64 67   Resp: 18   24   Temp:       SpO2: 100% 96% 94% (!) 59%   Weight:       Height:           Critical Behavior:  Neurologic State: Alert, Appropriate for age  Orientation Level: Appropriate for age, Oriented X4  Cognition: Appropriate decision making, Appropriate for age attention/concentration, Appropriate safety awareness, Follows commands, Recognition of people/places     Hearing:   Auditory  Auditory Impairment: None  Skin:  All exposed intacg  Edema: increased abdominal swelling- nursing aware  Range Of Motion:  AROM: Within functional limits           PROM: Within functional limits           Strength:    Strength: Generally decreased, functional                    Tone & Sensation:   Tone: Normal              Sensation: Intact               Coordination:  Coordination: Within functional limits  Vision:      Functional Mobility:  Bed Mobility:  Rolling: Supervision  Supine to Sit: Minimum assistance        Transfers:  Sit to Stand: Contact guard assistance  Stand to Sit: Contact guard assistance        Bed to Chair: Contact guard assistance              Balance:   Sitting: Intact  Standing: Intact; With support  Ambulation/Gait Training:  Distance (ft): 50 Feet (ft)  Assistive Device: Gait belt  Ambulation - Level of Assistance: Contact guard assistance     Gait Description (WDL): Exceptions to WDL  Gait Abnormalities: Decreased step clearance; Step to gait(high guard)                 Therapeutic Exercises:       Functional Measure:  Barthel Index:    Bathin  Bladder: 10  Bowels: 10  Groomin  Dressing: 10  Feeding: 10  Mobility: 10  Stairs: 5  Toilet Use: 10  Transfer (Bed to Chair and Back): 10  Total: 85/100       Percentage of impairment   0%   1-19%   20-39%   40-59%   60-79%   80-99%   100%   Barthel Score 0-100 100 99-80 79-60 59-40 20-39 1-19   0     The Barthel ADL Index: Guidelines  1. The index should be used as a record of what a patient does, not as a record of what a patient could do. 2. The main aim is to establish degree of independence from any help, physical or verbal, however minor and for whatever reason. 3. The need for supervision renders the patient not independent. 4. A patient's performance should be established using the best available evidence. Asking the patient, friends/relatives and nurses are the usual sources, but direct observation and common sense are also important. However direct testing is not needed. 5. Usually the patient's performance over the preceding 24-48 hours is important, but occasionally longer periods will be relevant. 6. Middle categories imply that the patient supplies over 50 per cent of the effort. 7. Use of aids to be independent is allowed. Lola Oscar., Barthel, D.W. (2489). Functional evaluation: the Barthel Index. [address] [facility] (14)2.   SELINA Candelaria, Rossi, EKTA Sierra (1999). Measuring the change indisability after inpatient rehabilitation; comparison of the responsiveness of the Barthel Index and Functional Alexander Measure. Journal of Neurology, Neurosurgery, and Psychiatry, 66(4), 807-616. NICO Perdomo, LARRY Viramontes, & Iris Tafoya M.A. (2004.) Assessment of post-stroke quality of life in cost-effectiveness studies: The usefulness of the Barthel Index and the EuroQoL-5D. Quality of Life Research, 15, 308-47          Physical Therapy Evaluation Charge Determination   History Examination Presentation Decision-Making   HIGH Complexity :3+ comorbidities / personal factors will impact the outcome/ POC  MEDIUM Complexity : 3 Standardized tests and measures addressing body structure, function, activity limitation and / or participation in recreation  MEDIUM Complexity : Evolving with changing characteristics  Other outcome measures Barthel Index  MEDIUM      Based on the above components, the patient evaluation is determined to be of the following complexity level: MEDIUM    Pain:  Pain Scale 1: Numeric (0 - 10)  Pain Intensity 1: 0  Pain Location 1: Chest;Other (comment)(states from coughing)  Pain Orientation 1: Medial;Upper  Pain Description 1: Other (comment)(with movement or coughing)  Pain Intervention(s) 1: Repositioned  Activity Tolerance:   No complications with upright activity  Please refer to the flowsheet for vital signs taken during this treatment. After treatment:   [x]         Patient left in no apparent distress sitting up in chair  []         Patient left in no apparent distress in bed  [x]         Call bell left within reach  [x]         Nursing notified  []         Caregiver present  []         Bed alarm activated    COMMUNICATION/EDUCATION:   The patients plan of care was discussed with: Registered Nurse and .   [x]         Fall prevention education was provided and the patient/caregiver indicated understanding. [x]         Patient/family have participated as able in goal setting and plan of care. [x]         Patient/family agree to work toward stated goals and plan of care. []         Patient understands intent and goals of therapy, but is neutral about his/her participation. []         Patient is unable to participate in goal setting and plan of care.     Thank you for this referral.  Mary Jaquez, PT, DPT   Time Calculation: 18 mins

## 2019-03-15 NOTE — PROGRESS NOTES
Cardiology Progress Note       ICU  NAME:  Vicki Vasquez   :   1960   MRN:   [id number]     Assessment/Plan:   1. SVT: remains in SR/SA this am. Cont po amio  2. Hypotension: pressors off. BP marginal.   3. Elevated troponin: likely due to acute illness and not an ACS. 4. NICM s/p ICD, now with severely depressed EF 15 % , gradually resume her cardiomyopathy when hemodynamically stable. 5. Hx PAF: Apixaban - Initially Prescribed 19 for atrial fibrillation. The patient never started this medication because her insurance would not pay for it. Being a PD pt , lternative would be coumadin and will need INR checks. 6. ESRD on PD: per renal   7. Metabolic encephalopathy/Dysarthria/intermittent confusion. s/s improved. 8. DVT prophylaxis: heparin     CARDIOLOGY ATTENDING  Patient personally seen and examined. All the elements of history and examination were personally performed. Assessment and plan was discussed and agree as written above    She is feeling better. BP fair when I see her. Cont Amio for SVT -- she remains in sinus    Nancy Whelan MD, [facility]          Subjective:   Cardiac ROS: Patient denies any exertional chest pain, dyspnea, palpitations, syncope, orthopnea, edema or paroxysmal nocturnal dyspnea. Previous Cardiac Eval  ECHO: 10/14/12: EF 45% w/ posterior HK Grade 1 DD, LAE, mild MR, mild-mod TR RVSP 60 mmHG   Cath: 10/16/12: Normal cors. No AVG. Mild pulm HTN (43). Low-normal filling pressures. Echo 10/19/13 - EF 25- 30%. Severe diffuse hypokinesis. Mildly increased wall thickness. Mild concentric hypertrophy. Doppler parameters were consistent with a reversible restrictive pattern, indicative of decreased left ventricular  diastolic compliance and/or increased left atrial pressure (grade 3 diastolic dysfunction).  LA mildly dilated, mild MR, mild TR, mod PA HTN, small pericardial effusion circumferential to the heart, no evidence of hemodynamic compromise. Echo 11/2/15 - EF 25-30%, global HK, mild MR  Lexiscan cardiolite 11/24/15 - focal anteroapical ischemia    Cath 12/15/2015 - EDP 10, LV dilated, EF 20% global HK, normal cors with right dominance. Unable to cannulate femoral vein. 12/29/15-implantation of a single-chamber [address] per Dr. Dhruv Penningtno    Echo 17 - EF 20 %. Severe diffuse hypokinesis. Mild LAE. Mild MR. Mild Pulm HTN. Small pericardial effusion. No significant change when compared to study 2015. Echo 18- LVEF 28%, severe LAE, PA systolic 50 mmHg    LHC / RHC 2018: No sig CAD, RA 10, RV 39/5, W 23, PA 35/20/29, PA Sat 47.1%, Ao Sat 83.2%, /7/24 Ao 119/79/90, CO F 3.94, CI F 2.4, CO T 2.75, CI T 1.68         Review of Systems: No nausea, indigestion, vomiting, pain, cough, sputum. No bleeding. Taking po. Objective:     Visit Vitals  BP 94/68   Pulse 60   Temp 98 °F (36.7 °C)   Resp 19   Ht 5' 5\" (1.651 m)   Wt 141 lb 15.6 oz (64.4 kg)   SpO2 93%   BMI 23.63 kg/m²    O2 Flow Rate (L/min): 4 l/min O2 Device: Nasal cannula    Temp (24hrs), Av °F (36.7 °C), Min:97.5 °F (36.4 °C), Max:98.8 °F (37.1 °C)      No intake/output data recorded.  1901 - 03/15 0700  In: 23975.4 [I.V.:29854.4]  Out: 8100   TELE: SB/SR     General: slow to respond  HEENT: atraumatic. Pink and moist.  Anicteric sclerae. Neck : Supple  Lungs: CTA bilaterally. No wheezing/rhonchi/rales. SPO2 98% 2 liters   Heart:  Regular rhythm, no murmur, No carotid bruits. Abdomen: PD catheter intact,  Soft, non-distended, non-tender. + Bowel sounds. Extremities: No edema, no clubbing, no cyanosis. No calf tenderness  Neurologic: Grossly intact. Slow to respond. Psych: ?  insight. Not anxious or agitated.         Care Plan discussed with:    Comments   Patient x    Family      RN x    Care Manager                    Consultant:          Data Review:     No lab exists for component: [address] 03/13/19  0246 03/12/19  1102   TROIQ 1.74* 1.74*     Recent Labs     03/15/19  0419 03/14/19  1258 03/14/19  0444 03/13/19  0246 03/12/19  1151   *  --  133* 132* 136   K 3.6  --  4.1 5.1 5.3*   CL 95*  --  95* 94* 99   CO2 28  --  24 19* 20*   BUN 98*  --  109* 116* 117*   CREA 17.90*  --  19.70* 21.20* 21.40*   GLU 81  --  142* 251* 123*   PHOS 6.5*  --  8.4* 8.3* 8.8*   MG 2.2 2.1  --   --  2.4   ALB 1.8*  --  2.2* 2.2* 2.2*   WBC 15.1* 17.4*  --  12.9*  --    HGB 10.9* 11.4*  --  11.0*  --    HCT 33.0* 34.6*  --  34.4*  --    * 114*  --  131*  --      No results for input(s): INR, PTP, APTT in the last 72 hours.     No lab exists for component: INREXT, INREXT    Medications reviewed  Current Facility-Administered Medications   Medication Dose Route Frequency    pantoprazole (PROTONIX) tablet 40 mg  40 mg Oral ACB    sevelamer carbonate (RENVELA) tab 1,600 mg  1,600 mg Oral TID WITH MEALS    insulin lispro (HUMALOG) injection   SubCUTAneous AC&HS    glucose chewable tablet 16 g  4 Tab Oral PRN    dextrose (D50W) injection syrg 12.5-25 g  12.5-25 g IntraVENous PRN    glucagon (GLUCAGEN) injection 1 mg  1 mg IntraMUSCular PRN    amiodarone (CORDARONE) tablet 200 mg  200 mg Oral BID WITH MEALS    NOREPINephrine (LEVOPHED) 32 mg in dextrose 5% 250 mL infusion  2-16 mcg/min IntraVENous TITRATE    gentamicin (GARAMYCIN) 0.1 % cream   Topical QHS    heparin (porcine) injection 5,000 Units  5,000 Units SubCUTAneous Q8H    aspirin delayed-release tablet 81 mg  81 mg Oral DAILY    cinacalcet (SENSIPAR) tablet 30 mg  30 mg Oral DAILY WITH DINNER    ferric citrate (AURYXIA) tablet 210 mg  210 mg Oral TID WITH MEALS    senna-docusate (PERICOLACE) 8.6-50 mg per tablet 1 Tab  1 Tab Oral DAILY PRN    sodium chloride (NS) flush 5-40 mL  5-40 mL IntraVENous Q8H    sodium chloride (NS) flush 5-40 mL  5-40 mL IntraVENous PRN    ondansetron (ZOFRAN) injection 4 mg  4 mg IntraVENous Q4H PRN    peritoneal dialysis DEXTROSE 1.5% (2.5 mEq/L low calcium) solution 2,000 mL  2,000 mL IntraPERitoneal QID    hydrocortisone (ANUSOL-HC) 2.5 % rectal cream   PeriANAL QID         Zebedee Bosworth, NP

## 2019-03-15 NOTE — DIABETES MGMT
DTC Progress Note    Recommendations/ Comments: Hypoglycemia this AM, BG 77 mg/dL. Otherwise BG's in range; has required no lispro correction  ESRD on PD  Creatinine 17.9    As discussed in rounds - change lispro correction to high sensitivity due to renal status     Current hospital DM medication: lispro normal sensitivity    Chart reviewed on Vicki Vasquez. Patient is a 60 y.o. female with no hx DM     A1c:   No results found for: HBA1C, HGBE8, IIX4EBUX    Recent Glucose Results:   Lab Results   Component Value Date/Time    GLU 81 03/15/2019 04:19 AM    GLUCPOC 116 (H) 03/15/2019 11:42 AM    GLUCPOC 92 03/15/2019 09:41 AM    GLUCPOC 77 03/15/2019 07:53 AM        Lab Results   Component Value Date/Time    Creatinine 17.90 (H) 03/15/2019 04:19 AM     Estimated Creatinine Clearance: 3 mL/min (A) (based on SCr of 17.9 mg/dL (H)). Active Orders   Diet    DIET CARDIAC Regular; FR 1000ML; Low Phosphorus        PO intake:   Patient Vitals for the past 72 hrs:   % Diet Eaten   03/15/19 1000 25 %       Will continue to follow as needed.     Thank you  Haven Dong RN, CDE        Time spent: 5 min

## 2019-03-15 NOTE — PROGRESS NOTES
Problem: Falls - Risk of  Goal: *Absence of Falls  Document Jimmy Fall Risk and appropriate interventions in the flowsheet. Outcome: Progressing Towards Goal  Fall Risk Interventions:  Mobility Interventions: Bed/chair exit alarm         Medication Interventions: Assess postural VS orthostatic hypotension, Bed/chair exit alarm, Patient to call before getting OOB, Teach patient to arise slowly    Elimination Interventions: Bed/chair exit alarm, Call light in reach, Patient to call for help with toileting needs             Problem: Pain  Goal: *Control of Pain  Outcome: Progressing Towards Goal  Remains on fentanyl gtt while intubated on vent.

## 2019-03-15 NOTE — PROGRESS NOTES
[facility]       Renal Daily Progress Note:     Admission Date: 3/11/2019     Subjective:  - Remains off pressors. No evidence of PD catheter associated peritonitis     Review of Systems  A comprehensive review of systems was negative except for that written in the HPI.     Objective:     Visit Vitals  BP 94/72   Pulse 63   Temp 98.8 °F (37.1 °C)   Resp 16   Ht 5' 5\" (1.651 m)   Wt 64.4 kg (141 lb 15.6 oz)   SpO2 95%   BMI 23.63 kg/m²     Temp (24hrs), Av.2 °F (36.8 °C), Min:97.5 °F (36.4 °C), Max:98.8 °F (37.1 °C)        Intake/Output Summary (Last 24 hours) at 3/15/2019 1111  Last data filed at 3/15/2019 0900  Gross per 24 hour   Intake 2125.79 ml   Output 6000 ml   Net -3874.21 ml     Current Facility-Administered Medications   Medication Dose Route Frequency    pantoprazole (PROTONIX) tablet 40 mg  40 mg Oral ACB    sevelamer carbonate (RENVELA) tab 1,600 mg  1,600 mg Oral TID WITH MEALS    insulin lispro (HUMALOG) injection   SubCUTAneous AC&HS    glucose chewable tablet 16 g  4 Tab Oral PRN    dextrose (D50W) injection syrg 12.5-25 g  12.5-25 g IntraVENous PRN    glucagon (GLUCAGEN) injection 1 mg  1 mg IntraMUSCular PRN    amiodarone (CORDARONE) tablet 200 mg  200 mg Oral BID WITH MEALS    NOREPINephrine (LEVOPHED) 32 mg in dextrose 5% 250 mL infusion  2-16 mcg/min IntraVENous TITRATE    gentamicin (GARAMYCIN) 0.1 % cream   Topical QHS    heparin (porcine) injection 5,000 Units  5,000 Units SubCUTAneous Q8H    aspirin delayed-release tablet 81 mg  81 mg Oral DAILY    cinacalcet (SENSIPAR) tablet 30 mg  30 mg Oral DAILY WITH DINNER    ferric citrate (AURYXIA) tablet 210 mg  210 mg Oral TID WITH MEALS    senna-docusate (PERICOLACE) 8.6-50 mg per tablet 1 Tab  1 Tab Oral DAILY PRN    sodium chloride (NS) flush 5-40 mL  5-40 mL IntraVENous Q8H    sodium chloride (NS) flush 5-40 mL  5-40 mL IntraVENous PRN    ondansetron (ZOFRAN) injection 4 mg  4 mg IntraVENous Q4H PRN    peritoneal dialysis DEXTROSE 1.5% (2.5 mEq/L low calcium) solution 2,000 mL  2,000 mL IntraPERitoneal QID    hydrocortisone (ANUSOL-HC) 2.5 % rectal cream   PeriANAL QID       Physical Exam:  Visit Vitals  BP 94/72   Pulse 63   Temp 98.8 °F (37.1 °C)   Resp 16   Ht 5' 5\" (1.651 m)   Wt 64.4 kg (141 lb 15.6 oz)   SpO2 95%   BMI 23.63 kg/m²     General appearance: alert, cooperative, no distress, appears stated age  Head: Normocephalic, without obvious abnormality, atraumatic  Lungs: clear to auscultation bilaterally  Heart: regular rate and rhythm, S1, S2 normal, no murmur, click, rub or gallop  Abdomen: soft, non-tender. Bowel sounds normal. No masses,  PD catheter intact, no signs of infection    Extremities: extremities normal, atraumatic, no cyanosis or edema    Data Review:     LABS:  Recent Labs     03/15/19  0419 03/14/19  1258 03/14/19  0444 03/13/19  0246 03/12/19  1151   *  --  133* 132* 136   K 3.6  --  4.1 5.1 5.3*   CL 95*  --  95* 94* 99   CO2 28  --  24 19* 20*   BUN 98*  --  109* 116* 117*   CREA 17.90*  --  19.70* 21.20* 21.40*   CA 7.3*  --  7.7* 7.2* 7.3*   ALB 1.8*  --  2.2* 2.2* 2.2*   PHOS 6.5*  --  8.4* 8.3* 8.8*   MG 2.2 2.1  --   --  2.4     Recent Labs     03/15/19  0419 03/14/19  1258 03/13/19  0246   WBC 15.1* 17.4* 12.9*   HGB 10.9* 11.4* 11.0*   HCT 33.0* 34.6* 34.4*   * 114* 131*     No results for input(s): ROMAN, KU, CLU, CREAU in the last 72 hours.     No lab exists for component: PROU    Assessment:   Renal Specific Problems    ESRD on PD     Hyperphosphatemia     Hyperkalemia     Hyperphosphatemia     SVT       Plan:     Obtain/ Order: labs/cultures/radiology/procedures:        Therapeutic:    Continue with PD treatment four times daily     Continue Renvela to 1600 mg tid with meals     Continue Sensipar     Apply gentamicin cream daily to PD catheter exit site     Low K, phos and Na diet     Dose all meds to ESRD on PD       Maria Del Rosario Mcdonald MD

## 2019-03-16 LAB
ANION GAP SERPL CALC-SCNC: 12 MMOL/L (ref 5–15)
BASOPHILS # BLD: 0.1 K/UL (ref 0–0.1)
BASOPHILS NFR BLD: 0 % (ref 0–1)
BUN SERPL-MCNC: 88 MG/DL (ref 6–20)
BUN/CREAT SERPL: 5 (ref 12–20)
CALCIUM SERPL-MCNC: 7 MG/DL (ref 8.5–10.1)
CHLORIDE SERPL-SCNC: 95 MMOL/L (ref 97–108)
CO2 SERPL-SCNC: 28 MMOL/L (ref 21–32)
CREAT SERPL-MCNC: 16.1 MG/DL (ref 0.55–1.02)
DIFFERENTIAL METHOD BLD: ABNORMAL
EOSINOPHIL # BLD: 0.3 K/UL (ref 0–0.4)
EOSINOPHIL NFR BLD: 2 % (ref 0–7)
ERYTHROCYTE [DISTWIDTH] IN BLOOD BY AUTOMATED COUNT: 14.2 % (ref 11.5–14.5)
EST. AVERAGE GLUCOSE BLD GHB EST-MCNC: 108 MG/DL
GLUCOSE BLD STRIP.AUTO-MCNC: 121 MG/DL (ref 65–100)
GLUCOSE BLD STRIP.AUTO-MCNC: 128 MG/DL (ref 65–100)
GLUCOSE BLD STRIP.AUTO-MCNC: 92 MG/DL (ref 65–100)
GLUCOSE BLD STRIP.AUTO-MCNC: 98 MG/DL (ref 65–100)
GLUCOSE SERPL-MCNC: 94 MG/DL (ref 65–100)
HBA1C MFR BLD: 5.4 % (ref 4.2–6.3)
HCT VFR BLD AUTO: 33.5 % (ref 35–47)
HGB BLD-MCNC: 10.9 G/DL (ref 11.5–16)
IMM GRANULOCYTES # BLD AUTO: 0.1 K/UL (ref 0–0.04)
IMM GRANULOCYTES NFR BLD AUTO: 1 % (ref 0–0.5)
INR PPP: 1.2 (ref 0.9–1.1)
LYMPHOCYTES # BLD: 0.9 K/UL (ref 0.8–3.5)
LYMPHOCYTES NFR BLD: 6 % (ref 12–49)
MAGNESIUM SERPL-MCNC: 2.2 MG/DL (ref 1.6–2.4)
MCH RBC QN AUTO: 34 PG (ref 26–34)
MCHC RBC AUTO-ENTMCNC: 32.5 G/DL (ref 30–36.5)
MCV RBC AUTO: 104.4 FL (ref 80–99)
MONOCYTES # BLD: 0.5 K/UL (ref 0–1)
MONOCYTES NFR BLD: 3 % (ref 5–13)
NEUTS SEG # BLD: 12.2 K/UL (ref 1.8–8)
NEUTS SEG NFR BLD: 88 % (ref 32–75)
NRBC # BLD: 0 K/UL (ref 0–0.01)
NRBC BLD-RTO: 0 PER 100 WBC
PHOSPHATE SERPL-MCNC: 5.4 MG/DL (ref 2.6–4.7)
PLATELET # BLD AUTO: 123 K/UL (ref 150–400)
PMV BLD AUTO: 11.2 FL (ref 8.9–12.9)
POTASSIUM SERPL-SCNC: 3.3 MMOL/L (ref 3.5–5.1)
PROTHROMBIN TIME: 11.7 SEC (ref 9–11.1)
RBC # BLD AUTO: 3.21 M/UL (ref 3.8–5.2)
SERVICE CMNT-IMP: ABNORMAL
SERVICE CMNT-IMP: ABNORMAL
SERVICE CMNT-IMP: NORMAL
SERVICE CMNT-IMP: NORMAL
SODIUM SERPL-SCNC: 135 MMOL/L (ref 136–145)
WBC # BLD AUTO: 13.9 K/UL (ref 3.6–11)

## 2019-03-16 PROCEDURE — 74011250636 HC RX REV CODE- 250/636: Performed by: INTERNAL MEDICINE

## 2019-03-16 PROCEDURE — 84100 ASSAY OF PHOSPHORUS: CPT

## 2019-03-16 PROCEDURE — 65610000006 HC RM INTENSIVE CARE

## 2019-03-16 PROCEDURE — 83735 ASSAY OF MAGNESIUM: CPT

## 2019-03-16 PROCEDURE — 83036 HEMOGLOBIN GLYCOSYLATED A1C: CPT

## 2019-03-16 PROCEDURE — 90945 DIALYSIS ONE EVALUATION: CPT

## 2019-03-16 PROCEDURE — 80048 BASIC METABOLIC PNL TOTAL CA: CPT

## 2019-03-16 PROCEDURE — 74011250636 HC RX REV CODE- 250/636: Performed by: SPECIALIST

## 2019-03-16 PROCEDURE — 85025 COMPLETE CBC W/AUTO DIFF WBC: CPT

## 2019-03-16 PROCEDURE — 74011000258 HC RX REV CODE- 258: Performed by: SPECIALIST

## 2019-03-16 PROCEDURE — 74011250637 HC RX REV CODE- 250/637: Performed by: INTERNAL MEDICINE

## 2019-03-16 PROCEDURE — 74011250637 HC RX REV CODE- 250/637: Performed by: SPECIALIST

## 2019-03-16 PROCEDURE — 3E1M39Z IRRIGATION OF PERITONEAL CAVITY USING DIALYSATE, PERCUTANEOUS APPROACH: ICD-10-PCS | Performed by: INTERNAL MEDICINE

## 2019-03-16 PROCEDURE — 82962 GLUCOSE BLOOD TEST: CPT

## 2019-03-16 PROCEDURE — A4722 DIALYS SOL FLD VOL > 1999CC: HCPCS | Performed by: INTERNAL MEDICINE

## 2019-03-16 PROCEDURE — 36415 COLL VENOUS BLD VENIPUNCTURE: CPT

## 2019-03-16 PROCEDURE — 85610 PROTHROMBIN TIME: CPT

## 2019-03-16 PROCEDURE — 74011250637 HC RX REV CODE- 250/637: Performed by: NURSE PRACTITIONER

## 2019-03-16 RX ADMIN — AMIODARONE HYDROCHLORIDE 150 MG: 50 INJECTION, SOLUTION INTRAVENOUS at 09:16

## 2019-03-16 RX ADMIN — SEVELAMER CARBONATE 1600 MG: 800 TABLET, FILM COATED ORAL at 09:11

## 2019-03-16 RX ADMIN — SEVELAMER CARBONATE 1600 MG: 800 TABLET, FILM COATED ORAL at 11:50

## 2019-03-16 RX ADMIN — AMIODARONE HYDROCHLORIDE 1 MG/MIN: 50 INJECTION, SOLUTION INTRAVENOUS at 09:16

## 2019-03-16 RX ADMIN — Medication 10 ML: at 21:55

## 2019-03-16 RX ADMIN — PANTOPRAZOLE SODIUM 40 MG: 40 TABLET, DELAYED RELEASE ORAL at 08:08

## 2019-03-16 RX ADMIN — HEPARIN SODIUM 5000 UNITS: 5000 INJECTION INTRAVENOUS; SUBCUTANEOUS at 13:37

## 2019-03-16 RX ADMIN — GENTAMICIN SULFATE: 1 CREAM TOPICAL at 21:55

## 2019-03-16 RX ADMIN — SEVELAMER CARBONATE 1600 MG: 800 TABLET, FILM COATED ORAL at 16:55

## 2019-03-16 RX ADMIN — SODIUM CHLORIDE, SODIUM LACTATE, CALCIUM CHLORIDE, MAGNESIUM CHLORIDE AND DEXTROSE 2000 ML: 1.5; 538; 448; 18.3; 5.08 INJECTION, SOLUTION INTRAPERITONEAL at 22:11

## 2019-03-16 RX ADMIN — SODIUM CHLORIDE, SODIUM LACTATE, CALCIUM CHLORIDE, MAGNESIUM CHLORIDE AND DEXTROSE 2000 ML: 1.5; 538; 448; 18.3; 5.08 INJECTION, SOLUTION INTRAPERITONEAL at 13:36

## 2019-03-16 RX ADMIN — ASPIRIN 81 MG: 81 TABLET ORAL at 09:11

## 2019-03-16 RX ADMIN — FERRIC CITRATE 210 MG: 210 TABLET, COATED ORAL at 09:13

## 2019-03-16 RX ADMIN — HEPARIN SODIUM 5000 UNITS: 5000 INJECTION INTRAVENOUS; SUBCUTANEOUS at 05:59

## 2019-03-16 RX ADMIN — AMIODARONE HYDROCHLORIDE 200 MG: 200 TABLET ORAL at 16:55

## 2019-03-16 RX ADMIN — WARFARIN SODIUM 2.5 MG: 2 TABLET ORAL at 17:58

## 2019-03-16 RX ADMIN — AMIODARONE HYDROCHLORIDE 200 MG: 200 TABLET ORAL at 09:11

## 2019-03-16 RX ADMIN — Medication 10 ML: at 13:37

## 2019-03-16 RX ADMIN — HEPARIN SODIUM 5000 UNITS: 5000 INJECTION INTRAVENOUS; SUBCUTANEOUS at 21:54

## 2019-03-16 RX ADMIN — CINACALCET HYDROCHLORIDE 30 MG: 30 TABLET, COATED ORAL at 16:55

## 2019-03-16 RX ADMIN — SENNOSIDES AND DOCUSATE SODIUM 1 TABLET: 8.6; 5 TABLET ORAL at 22:03

## 2019-03-16 RX ADMIN — ONDANSETRON 4 MG: 2 INJECTION INTRAMUSCULAR; INTRAVENOUS at 22:03

## 2019-03-16 RX ADMIN — SODIUM CHLORIDE, SODIUM LACTATE, CALCIUM CHLORIDE, MAGNESIUM CHLORIDE AND DEXTROSE 2000 ML: 1.5; 538; 448; 18.3; 5.08 INJECTION, SOLUTION INTRAPERITONEAL at 18:00

## 2019-03-16 RX ADMIN — Medication 10 ML: at 06:00

## 2019-03-16 NOTE — PROGRESS NOTES
PULMONARY ASSOCIATES [facility]  Pulmonary, Critical Care, and Sleep Medicine    Name: Juventino Li MRN: [id number]   : 1960 Hospital: [facility]   Date: 3/16/2019 8:01 am           IMPRESSION:   · Acute hypoxemic respiratory failure  · Shock - off pressors 3/14. BP relatively soft. BP in 88/67  ? dry  · SVT, h/o afib: had HR 120s overnight with periods in the 60s  · Elevated troponin  · ams / metabolic encephalopathy  · H/o cardiomyopathy (EF21-25%) s/p ICD, mild-moderate AR  · ESRD on PD, h/o PCKD  · GERD  · hyperglycemia      RECOMMENDATIONS:   · Supplemental O2 as needed to keep sats > 90%  · Rhythm per cardiology  · PD per Renal  · Follow cultures  · continue SSI  · Continue ASA    DVT ppx: sq heparin  GI ppx: protonix     Subjective/History:   She reports that she feels good and wants to go home. Still having rhythm issues and IV amiodarone started by cardiology. BP upper 32P systolic. Has been getting PD  She denies any new issues    History:  Ms. Nathan Plummer is a 62yo female w/ history of cardiomyopathy (EF 21-25%) s/p ICD, afib/SVT, ESRD on PD, HTN, PCKD and GERD who presented to the ER on 3/11 w/ complaints of sore throat, shortness of breath, dry cough, n/v/d, dysarthria and memory loss (doesn't remember any events of Saturday and missed PD). She had issues with SVT. Seen by neuro. Head CT negative. EEG c/w toxi-metabolic encephalopathy. Transferred to the ICU overnight on 3/13 w/ hypotension requiring pressors.      *all cultures NGTD  *peritoneal fluid not c/w SBP  *RVP negative  ------------------------------------------------------------      Past Medical History:   Diagnosis Date    Anemia associated with chronic renal failure     Anuria     Arrhythmia     Paroxysmal a fib, paroxsymal atrial tach, SVT    Chronic kidney disease     ESRD- on PD    Chronic systolic heart failure ([address] 10/31/2012    Chronic tachycardia     GERD (gastroesophageal reflux disease)     Hx of non-ST elevation myocardial infarction (NSTEMI) 2011    Hypertension     Lipoma of right lower extremity 1980    Foot    Peritoneal dialysis catheter in place [facility]) 2014    Nightime exchanges    Polycystic kidney disease 2014      Past Surgical History:   Procedure Laterality Date    HX HEART CATHETERIZATION  12/2015    no interventions    HX PACEMAKER  12/29/2015    ICD    IR INSERT NON TUNL CVC OVER 5 YRS  3/13/2019      Prior to Admission medications    Medication Sig Start Date End Date Taking? Authorizing Provider   dilTIAZem CD (CARDIZEM CD) 120 mg ER capsule Take 1 Cap by mouth daily. 2/8/19  Yes Fran Bojorquez NP   aspirin delayed-release 81 mg tablet Take 81 mg by mouth daily. Yes Provider, Historical   spironolactone (ALDACTONE) 50 mg tablet Take 50 mg by mouth every morning. Yes Provider, Historical   potassium chloride (KLOR-CON M20) 20 mEq tablet Take 40 mEq by mouth every morning. Yes Provider, Historical   carvedilol (COREG) 25 mg tablet Take 1 Tab by mouth two (2) times daily (with meals). 9/18/18  Yes Candelaria Medina NP   omeprazole (PRILOSEC) 20 mg capsule Take 20 mg by mouth daily as needed (indigestion). Yes Provider, Historical   SENNA-DOCUSATE SODIUM PO Take 1 Tab by mouth daily as needed (constipation). Yes Provider, Historical   cinacalcet (SENSIPAR) 30 mg tablet Take 30 mg by mouth daily (with dinner). Yes Provider, Historical   ferric citrate (AURYXIA) 210 mg iron tablet Take 210 mg by mouth three (3) times daily (with meals). Yes Provider, Historical   gentamicin (GARAMYCIN) 0.1 % topical cream Apply  to affected area nightly.  7/12/14  Yes Provider, Historical     Current Facility-Administered Medications   Medication Dose Route Frequency    amiodarone (CORDARONE) 150 mg in dextrose 5% 100 mL bolus infusion  150 mg IntraVENous ONCE    amiodarone (CORDARONE) 375 mg in dextrose 5% 250 mL infusion  0.5-1 mg/min IntraVENous TITRATE    NOREPINephrine (LEVOPHED) 8 mg in dextrose 5% 250 mL infusion  2-16 mcg/min IntraVENous TITRATE    pantoprazole (PROTONIX) tablet 40 mg  40 mg Oral ACB    sevelamer carbonate (RENVELA) tab 1,600 mg  1,600 mg Oral TID WITH MEALS    insulin lispro (HUMALOG) injection   SubCUTAneous AC&HS    amiodarone (CORDARONE) tablet 200 mg  200 mg Oral BID WITH MEALS    gentamicin (GARAMYCIN) 0.1 % cream   Topical QHS    heparin (porcine) injection 5,000 Units  5,000 Units SubCUTAneous Q8H    aspirin delayed-release tablet 81 mg  81 mg Oral DAILY    cinacalcet (SENSIPAR) tablet 30 mg  30 mg Oral DAILY WITH DINNER    ferric citrate (AURYXIA) tablet 210 mg  210 mg Oral TID WITH MEALS    sodium chloride (NS) flush 5-40 mL  5-40 mL IntraVENous Q8H    peritoneal dialysis DEXTROSE 1.5% (2.5 mEq/L low calcium) solution 2,000 mL  2,000 mL IntraPERitoneal QID    hydrocortisone (ANUSOL-HC) 2.5 % rectal cream   PeriANAL QID     Allergies   Allergen Reactions    Iodinated Contrast- Oral And Iv Dye Angioedema     Cellulitis on side of face after test      Social History     Tobacco Use    Smoking status: Never Smoker    Smokeless tobacco: Never Used   Substance Use Topics    Alcohol use: No      Family History   Problem Relation Age of Onset    Diabetes Brother     Hypertension Brother     Hypertension Mother     Arthritis-osteo Mother     Hypertension Sister     Diabetes Sister     Kidney Disease Father         polycystic    Parkinson's Disease Father     Heart Disease Father     Hypertension Father     Hypertension Brother             Objective:   Vital Signs:    Visit Vitals  BP 95/65   Pulse (!) 123   Temp 99.3 °F (37.4 °C)   Resp 20   Ht 5' 5\" (1.651 m)   Wt 61.8 kg (136 lb 3.9 oz)   SpO2 95%   BMI 22.67 kg/m²       O2 Device: Nasal cannula   O2 Flow Rate (L/min): 1 l/min   Temp (24hrs), Av.6 °F (37 °C), Min:98.1 °F (36.7 °C), Max:99.3 °F (37.4 °C)       Intake/Output:   Last shift:      No intake/output data recorded. Last 3 shifts: 03/14 1901 - 03/16 0700  In: 7198 [P.O.:598]  Out: 41591     Intake/Output Summary (Last 24 hours) at 3/16/2019 0803  Last data filed at 3/16/2019 0634  Gross per 24 hour   Intake 5078 ml   Output 9400 ml   Net -4322 ml         Physical Exam:    General:  Alert, awake. Head:  Normocephalic, atraumatic   Eyes:  Conjunctiva clear, EOMI   Throat: Moist mucous membranes   Neck: Supple, symmetrical, trachea midline,   Lungs:   Diminished but clear   Chest wall:  .normal   Heart:  Regular rate and rhythm, S1, S2 normal, no murmur, click, rub or gallop. Mildy tachy   Abdomen:   Soft, non-tender, distended. Bowel sounds normal.   Extremities: Extremities normal, atraumatic, no cyanosis or edema. Pulses:  present   Skin: .  No rashes or lesions   Lymph nodes:  no nodes noted   Neurologic: Grossly nonfocal; Psych: normal affect       Data:     Recent Results (from the past 24 hour(s))   GLUCOSE, POC    Collection Time: 03/15/19  9:41 AM   Result Value Ref Range    Glucose (POC) 92 65 - 100 mg/dL    Performed by Ángel Grant    GLUCOSE, POC    Collection Time: 03/15/19 11:42 AM   Result Value Ref Range    Glucose (POC) 116 (H) 65 - 100 mg/dL    Performed by Ángel Grant    PROTHROMBIN TIME + INR    Collection Time: 03/15/19  4:34 PM   Result Value Ref Range    INR 1.2 (H) 0.9 - 1.1      Prothrombin time 11.9 (H) 9.0 - 11.1 sec   GLUCOSE, POC    Collection Time: 03/15/19  5:18 PM   Result Value Ref Range    Glucose (POC) 94 65 - 100 mg/dL    Performed by Ángel Grant    GLUCOSE, POC    Collection Time: 03/15/19  9:59 PM   Result Value Ref Range    Glucose (POC) 97 65 - 100 mg/dL    Performed by Cony Azul    MAGNESIUM    Collection Time: 03/16/19  2:56 AM   Result Value Ref Range    Magnesium 2.2 1.6 - 2.4 mg/dL   CBC WITH AUTOMATED DIFF    Collection Time: 03/16/19  2:56 AM   Result Value Ref Range    WBC 13.9 (H) 3.6 - 11.0 K/uL    RBC 3.21 (L) 3.80 - 5.20 M/uL    HGB 10.9 (L) 11.5 - 16.0 g/dL    HCT 33.5 (L) 35.0 - 47.0 %    .4 (H) 80.0 - 99.0 FL    MCH 34.0 26.0 - 34.0 PG    MCHC 32.5 30.0 - 36.5 g/dL    RDW 14.2 11.5 - 14.5 %    PLATELET 938 (L) 697 - 400 K/uL    MPV 11.2 8.9 - 12.9 FL    NRBC 0.0 0  WBC    ABSOLUTE NRBC 0.00 0.00 - 0.01 K/uL    NEUTROPHILS 88 (H) 32 - 75 %    LYMPHOCYTES 6 (L) 12 - 49 %    MONOCYTES 3 (L) 5 - 13 %    EOSINOPHILS 2 0 - 7 %    BASOPHILS 0 0 - 1 %    IMMATURE GRANULOCYTES 1 (H) 0.0 - 0.5 %    ABS. NEUTROPHILS 12.2 (H) 1.8 - 8.0 K/UL    ABS. LYMPHOCYTES 0.9 0.8 - 3.5 K/UL    ABS. MONOCYTES 0.5 0.0 - 1.0 K/UL    ABS. EOSINOPHILS 0.3 0.0 - 0.4 K/UL    ABS. BASOPHILS 0.1 0.0 - 0.1 K/UL    ABS. IMM. GRANS. 0.1 (H) 0.00 - 0.04 K/UL    DF AUTOMATED     PROTHROMBIN TIME + INR    Collection Time: 03/16/19  2:56 AM   Result Value Ref Range    INR 1.2 (H) 0.9 - 1.1      Prothrombin time 11.7 (H) 9.0 - 32.6 sec   METABOLIC PANEL, BASIC    Collection Time: 03/16/19  2:56 AM   Result Value Ref Range    Sodium 135 (L) 136 - 145 mmol/L    Potassium 3.3 (L) 3.5 - 5.1 mmol/L    Chloride 95 (L) 97 - 108 mmol/L    CO2 28 21 - 32 mmol/L    Anion gap 12 5 - 15 mmol/L    Glucose 94 65 - 100 mg/dL    BUN 88 (H) 6 - 20 MG/DL    Creatinine 16.10 (H) 0.55 - 1.02 MG/DL    BUN/Creatinine ratio 5 (L) 12 - 20      GFR est AA 3 (L) >60 ml/min/1.73m2    GFR est non-AA 2 (L) >60 ml/min/1.73m2    Calcium 7.0 (L) 8.5 - 10.1 MG/DL   PHOSPHORUS    Collection Time: 03/16/19  2:56 AM   Result Value Ref Range    Phosphorus 5.4 (H) 2.6 - 4.7 MG/DL   GLUCOSE, POC    Collection Time: 03/16/19  8:01 AM   Result Value Ref Range    Glucose (POC) 121 (H) 65 - 100 mg/dL    Performed by [facility]                  Imaging:  I have personally reviewed the patients radiographs and have reviewed the reports:  CXR w/ bilateral effusions. No obvious infiltrate.   No new films today (3/16)        Discussed with nursing and patient    Paula Brown MD, [facility]  [facility]

## 2019-03-16 NOTE — PROGRESS NOTES
Prosper Corona MD. [facility]              Patient: Jade Lowe  : 1960      Today's Date: 3/16/2019        CARDIOLOGY PROGRESS NOTE  S: Feels OK, but in SVT with rapid rate   O:   Physical Exam:  Visit Vitals  BP (!) 88/67   Pulse (!) 124   Temp 99.3 °F (37.4 °C)   Resp 14   Ht 5' 5\" (1.651 m)   Wt 136 lb 3.9 oz (61.8 kg)   SpO2 95%   BMI 22.67 kg/m²     Patient appears generally well, mood and affect are appropriate and pleasant. HEENT:  Hearing intact, non-icteric, normocephalic, atraumatic. Neck Exam: Supple   Lung Exam: Clear to auscultation, even breath sounds. Cardiac Exam: tachycardic with no murmur  Abdomen: Soft, non-tender,   Extremities: Moves all ext well. No lower extremity edema. Psych: Appropriate affect  Neuro - Grossly intact    Review of Symptoms:  Constitutional: Negative for fever   HEENT: Negative for vision changes. Respiratory: Negative for productive cough  Cardiovascular: Negative for syncope    Gastrointestinal: Negative for abdominal pain, melena  Genitourinary: Negative for dysuria  Skin: Negative for rash  Heme: No problems bleeding.   Neuro - no speech changes or focal weaknesses          LABS / OTHER STUDIES:     Recent Results (from the past 24 hour(s))   GLUCOSE, POC    Collection Time: 03/15/19  9:41 AM   Result Value Ref Range    Glucose (POC) 92 65 - 100 mg/dL    Performed by [email or web address]    GLUCOSE, POC    Collection Time: 03/15/19 11:42 AM   Result Value Ref Range    Glucose (POC) 116 (H) 65 - 100 mg/dL    Performed by [email or web address]    PROTHROMBIN TIME + INR    Collection Time: 03/15/19  4:34 PM   Result Value Ref Range    INR 1.2 (H) 0.9 - 1.1      Prothrombin time 11.9 (H) 9.0 - 11.1 sec   GLUCOSE, POC    Collection Time: 03/15/19  5:18 PM   Result Value Ref Range    Glucose (POC) 94 65 - 100 mg/dL    Performed by [email or web address]    GLUCOSE, POC    Collection Time: 03/15/19  9:59 PM   Result Value Ref Range    Glucose (POC) 97 65 - 100 mg/dL Performed by Irene Cr    MAGNESIUM    Collection Time: 03/16/19  2:56 AM   Result Value Ref Range    Magnesium 2.2 1.6 - 2.4 mg/dL   CBC WITH AUTOMATED DIFF    Collection Time: 03/16/19  2:56 AM   Result Value Ref Range    WBC 13.9 (H) 3.6 - 11.0 K/uL    RBC 3.21 (L) 3.80 - 5.20 M/uL    HGB 10.9 (L) 11.5 - 16.0 g/dL    HCT 33.5 (L) 35.0 - 47.0 %    .4 (H) 80.0 - 99.0 FL    MCH 34.0 26.0 - 34.0 PG    MCHC 32.5 30.0 - 36.5 g/dL    RDW 14.2 11.5 - 14.5 %    PLATELET 092 (L) 584 - 400 K/uL    MPV 11.2 8.9 - 12.9 FL    NRBC 0.0 0  WBC    ABSOLUTE NRBC 0.00 0.00 - 0.01 K/uL    NEUTROPHILS 88 (H) 32 - 75 %    LYMPHOCYTES 6 (L) 12 - 49 %    MONOCYTES 3 (L) 5 - 13 %    EOSINOPHILS 2 0 - 7 %    BASOPHILS 0 0 - 1 %    IMMATURE GRANULOCYTES 1 (H) 0.0 - 0.5 %    ABS. NEUTROPHILS 12.2 (H) 1.8 - 8.0 K/UL    ABS. LYMPHOCYTES 0.9 0.8 - 3.5 K/UL    ABS. MONOCYTES 0.5 0.0 - 1.0 K/UL    ABS. EOSINOPHILS 0.3 0.0 - 0.4 K/UL    ABS. BASOPHILS 0.1 0.0 - 0.1 K/UL    ABS. IMM.  GRANS. 0.1 (H) 0.00 - 0.04 K/UL    DF AUTOMATED     PROTHROMBIN TIME + INR    Collection Time: 03/16/19  2:56 AM   Result Value Ref Range    INR 1.2 (H) 0.9 - 1.1      Prothrombin time 11.7 (H) 9.0 - 47.1 sec   METABOLIC PANEL, BASIC    Collection Time: 03/16/19  2:56 AM   Result Value Ref Range    Sodium 135 (L) 136 - 145 mmol/L    Potassium 3.3 (L) 3.5 - 5.1 mmol/L    Chloride 95 (L) 97 - 108 mmol/L    CO2 28 21 - 32 mmol/L    Anion gap 12 5 - 15 mmol/L    Glucose 94 65 - 100 mg/dL    BUN 88 (H) 6 - 20 MG/DL    Creatinine 16.10 (H) 0.55 - 1.02 MG/DL    BUN/Creatinine ratio 5 (L) 12 - 20      GFR est AA 3 (L) >60 ml/min/1.73m2    GFR est non-AA 2 (L) >60 ml/min/1.73m2    Calcium 7.0 (L) 8.5 - 10.1 MG/DL   PHOSPHORUS    Collection Time: 03/16/19  2:56 AM   Result Value Ref Range    Phosphorus 5.4 (H) 2.6 - 4.7 MG/DL   GLUCOSE, POC    Collection Time: 03/16/19  8:01 AM   Result Value Ref Range    Glucose (POC) 121 (H) 65 - 100 mg/dL Performed by [facility] [employer] [facility]            ASSESSMENT AND PLAN:     Assessment and Plan:  1) SVT - back in SVT this AM ---> will resume Amio gtt until HR is better, then go back to PO Amio    2) PAF  - chronic OAC - will get back on [facility] once clear no invasive procedures needed     3) Hypotension   - is off of pressors but BP remains low     4) Chronic systolic HF  - volume status compensated  - her usual cardiomyopathy regimen held due to low BP     5) ESRD on PD        Yasmani Cuevas MD, [address], [address]  [address] [place] [address] Drive.  [address], [address] [place] [address] Drive  [address]  Ph: [phone]   Ph [phone]

## 2019-03-16 NOTE — PROGRESS NOTES
6957-Bedside report received from Tricia Roach RN. SBAR, Kardex, Procedure Summary, Intake/Output, MAR, Recent Results and Cardiac Rhythm SR to ST in the 120's were discussed. Pt observed resting in bed, without distress. Will assess. Ruthie San RN   2949-Dr. Pawel Huff on unit and updated;per MD will resume amiodarone drip until patient comes back out of SVT, then run for 2 hours and stop. Pt verbalizes understanding of the plan. Latrell PEDERSEN  6287-Pt cardiac rhythm now rate controlled; sinus arrhythmia. Will Stop drip around 1130. DYLAN Ann RN  0563-Dr. Domínguez Scripture paged and updated at this time; will hold off on this morning's peritoneal dialysis and resume this afternoon as patient clinically looks dry with hypotension and tachycardia resumption. Latrell PEDERSEN  4301-Bedside and Verbal shift change report given to SLAVA Sorenson (oncoming nurse) by DYLAN Ann RN (offgoing nurse). Report included the following information SBAR, Kardex, Procedure Summary, Intake/Output, MAR, Recent Results and Cardiac Rhythm Sinus Arrythmia.  Latrell PEDERSEN

## 2019-03-16 NOTE — PROGRESS NOTES
[facility] Dosing Services: 3/16/2019    Consult for Warfarin bridge with subcutaneous heparin by Pharmacy by VERONICA Wilkinson NP  Consult provided for this 60 y.o.  female , for indication of Atrial Fibrillation. Day of Therapy: 2  Dose to achieve an INR goal of 2-3    Warfarin new start- day #2. Lower starting dose 2/2 severe renal impairment, low body weight,  and chronic systolic CHF. Order entered for Warfarin 2.5 mg to be given today at 18:00. Significant drug interactions: Amiodarone; Heparin SC (as bridge therapy)  Previous dose given 3/15/19 2.5 mg   PT/INR Lab Results   Component Value Date/Time    INR 1.2 (H) 03/16/2019 02:56 AM      Platelets Lab Results   Component Value Date/Time    PLATELET 589 (L) 63/85/9285 02:56 AM      H/H Lab Results   Component Value Date/Time    HGB 10.9 (L) 03/16/2019 02:56 AM        Pharmacist will follow daily and will provide subsequent Warfarin dosing based on clinical status.   Oneyda Ngo, PHARMD, Pharmacist

## 2019-03-16 NOTE — PROGRESS NOTES
1900 Bedside and Verbal shift change report given to Cherise Gillespie (oncoming nurse) by [facility] (offgoing nurse). Report included the following information SBAR, Intake/Output, MAR, Recent Results and Cardiac Rhythm sinus arrythmia. Per offgoing nurse, trying to keep pt off pressors don't restart unless MAP less than 60.     2200 RN performed PD, new bag  wt 1600 kilograms input, 1900 kilograms output, dressing changed post peritoneal dialysis. 2315 Pt heart rate in the 120's, sinus tach, asymptomatic without complaint, will continue to monitor. 6089 spoke with Dr. Ashia Nassar to update on pt heart rate and blood pressure. No new orders given. will continue to monitor. 0300 pt remains asymptomatic, sinus tachy, soft BP with MAP of 73. Will continue to monitor. 3617 pt remains asymptomatic, sinus tachy, soft BP with MAP of 68. Will continue to monitor. 0700 Bedside and Verbal shift change report given to Yenni (oncoming nurse) by Cherise Gillespie (offgoing nurse). Report included the following information SBAR, ED Summary, Intake/Output, MAR, Recent Results and Cardiac Rhythm sinus tachy.

## 2019-03-16 NOTE — PROGRESS NOTES
Patient is hypotensive and tachycardic. It was felt she may be volume depleted. PD cycle will be skipped.   D/W RN

## 2019-03-16 NOTE — PROGRESS NOTES
Spiritual Care Assessment/Progress Note  [address]      NAME: Nohemy Raman      MRN: [id number]  AGE: 60 y.o. SEX: female  Faith Affiliation: Holiness   Language: English     3/16/2019     Total Time (in minutes): 15     Spiritual Assessment begun in [facility] INTENSIVE CARE through conversation with:         [x]Patient        [] Family    [] Friend(s)        Reason for Consult: Initial/Spiritual assessment, critical care     Spiritual beliefs: (Please include comment if needed)     [x] Identifies with a sherri tradition:         [] Supported by a sherri community:            [] Claims no spiritual orientation:           [] Seeking spiritual identity:                [] Adheres to an individual form of spirituality:           [] Not able to assess:                           Identified resources for coping:      [x] Prayer                               [] Music                  [] Guided Imagery     [x] Family/friends                 [] Pet visits     [] Devotional reading                         [] Unknown     [] Other                                            Interventions offered during this visit: (See comments for more details)    Patient Interventions: Initial/Spiritual assessment, Critical care, Prayer (assurance of)           Plan of Care:     [] Support spiritual and/or cultural needs    [] Support AMD and/or advance care planning process      [] Support grieving process   [] Coordinate Rites and/or Rituals    [] Coordination with community clergy   [] No spiritual needs identified at this time   [] Detailed Plan of Care below (See Comments)  [] Make referral to Music Therapy  [] Make referral to Pet Therapy     [] Make referral to Addiction services  [] Make referral to [facility]  [] Make referral to Spiritual Care Partner  [] No future visits requested        [x] Follow up visits as needed     Comments: This visit was to complete an initial spiritual care assessment.  Upon my arrival Ms Santos Brooks was  in bed and visiting with her mother. She expressed no spiritual needs or concerns. She did express some concern that her mother not spend to much time at the hospital. Affirmed her concern and desire for her mother to take care of herself. Other family members arrived to visit, I left to allow them time to visit. I advised Ms. Espinosa of [facility] care's continuing availability. Please page if needed/desired    Jenny Kong., [address] [place], [address] [place] [address].

## 2019-03-16 NOTE — PROGRESS NOTES
Marcial Alatorre [facility] 79  [address]  [phone]      Medical Progress Note      NAME: Javi Hamilton   :  1960  MRM:  [id number]    Date/Time: 3/16/2019         Subjective:     Chief Complaint:  Patient was seen and examined by me. Chart reviewed. Was hypotensive overnight. Still tachy this AM.  No chest pain. Has dyspnea with exertion       Objective:       Vitals:       Last 24hrs VS reviewed since prior progress note.  Most recent are:    Visit Vitals  BP (!) 88/67   Pulse (!) 124   Temp 99.3 °F (37.4 °C)   Resp 14   Ht 5' 5\" (1.651 m)   Wt 61.8 kg (136 lb 3.9 oz)   SpO2 95%   BMI 22.67 kg/m²     SpO2 Readings from Last 6 Encounters:   19 95%   19 99%   18 99%   18 100%   18 97%   18 97%    O2 Flow Rate (L/min): 1 l/min       Intake/Output Summary (Last 24 hours) at 3/16/2019 0816  Last data filed at 3/16/2019 3598  Gross per 24 hour   Intake 5078 ml   Output 9400 ml   Net -4322 ml        Exam:     Physical Exam:    Gen:  Frail, ill-appearing, NAD  HEENT:  Pink conjunctivae, PERRL, hearing intact to voice, moist mucous membranes  Neck:  Supple, without masses, thyroid non-tender  Resp:  No accessory muscle use, clear breath sounds without wheezes rales or rhonchi  Card:  Irregular, 2/6 murmurs, normal S1, S2 without thrills, trace edema  Abd:  Soft, non-tender, non-distended, normoactive bowel sounds are present  Musc:  No cyanosis or clubbing  Skin:  No rashes   Neuro:  Cranial nerves 3-12 are grossly intact, follows commands appropriately  Psych:  Good insight, oriented to person, place and time, alert    Medications Reviewed: (see below)    Lab Data Reviewed: (see below)    ______________________________________________________________________    Medications:     Current Facility-Administered Medications   Medication Dose Route Frequency    amiodarone (CORDARONE) 150 mg in dextrose 5% 100 mL bolus infusion 150 mg IntraVENous ONCE    amiodarone (CORDARONE) 375 mg in dextrose 5% 250 mL infusion  0.5-1 mg/min IntraVENous TITRATE    NOREPINephrine (LEVOPHED) 8 mg in dextrose 5% 250 mL infusion  2-16 mcg/min IntraVENous TITRATE    Warfarin - Pharmacist dosing   Other PRN    pantoprazole (PROTONIX) tablet 40 mg  40 mg Oral ACB    sevelamer carbonate (RENVELA) tab 1,600 mg  1,600 mg Oral TID WITH MEALS    insulin lispro (HUMALOG) injection   SubCUTAneous AC&HS    glucose chewable tablet 16 g  4 Tab Oral PRN    dextrose (D50W) injection syrg 12.5-25 g  12.5-25 g IntraVENous PRN    glucagon (GLUCAGEN) injection 1 mg  1 mg IntraMUSCular PRN    amiodarone (CORDARONE) tablet 200 mg  200 mg Oral BID WITH MEALS    gentamicin (GARAMYCIN) 0.1 % cream   Topical QHS    heparin (porcine) injection 5,000 Units  5,000 Units SubCUTAneous Q8H    aspirin delayed-release tablet 81 mg  81 mg Oral DAILY    cinacalcet (SENSIPAR) tablet 30 mg  30 mg Oral DAILY WITH DINNER    ferric citrate (AURYXIA) tablet 210 mg  210 mg Oral TID WITH MEALS    senna-docusate (PERICOLACE) 8.6-50 mg per tablet 1 Tab  1 Tab Oral DAILY PRN    sodium chloride (NS) flush 5-40 mL  5-40 mL IntraVENous Q8H    sodium chloride (NS) flush 5-40 mL  5-40 mL IntraVENous PRN    ondansetron (ZOFRAN) injection 4 mg  4 mg IntraVENous Q4H PRN    peritoneal dialysis DEXTROSE 1.5% (2.5 mEq/L low calcium) solution 2,000 mL  2,000 mL IntraPERitoneal QID    hydrocortisone (ANUSOL-HC) 2.5 % rectal cream   PeriANAL QID          Lab Review:     Recent Labs     03/16/19  0256 03/15/19  0419 03/14/19  1258   WBC 13.9* 15.1* 17.4*   HGB 10.9* 10.9* 11.4*   HCT 33.5* 33.0* 34.6*   * 110* 114*     Recent Labs     03/16/19  0256 03/15/19  1634 03/15/19  0419 03/14/19  1258 03/14/19  0444   *  --  134*  --  133*   K 3.3*  --  3.6  --  4.1   CL 95*  --  95*  --  95*   CO2 28  --  28  --  24   GLU 94  --  81  --  142*   BUN 88*  --  98*  --  109*   CREA 16.10*  -- 17.90*  --  19.70*   CA 7.0*  --  7.3*  --  7.7*   MG 2.2  --  2.2 2.1  --    PHOS 5.4*  --  6.5*  --  8.4*   ALB  --   --  1.8*  --  2.2*   INR 1.2* 1.2*  --   --   --      Lab Results   Component Value Date/Time    Glucose (POC) 121 (H) 03/16/2019 08:01 AM    Glucose (POC) 97 03/15/2019 09:59 PM    Glucose (POC) 94 03/15/2019 05:18 PM    Glucose (POC) 116 (H) 03/15/2019 11:42 AM    Glucose (POC) 92 03/15/2019 09:41 AM          Assessment / Plan: Active Problems:    61 yo hx of HTN, Pafib, NICM EF 20-25% s/p ICD, ESRD on PD, presented w/ AMS, SVT, cardiogenic shock, elevated Trop    1) Cardiogenic shock/acute on chronic systolic CHF: EF 50-69%, s/p ICD. BP still low. Weaning off levophed gtt. Will cont ICU monitoring    2) SVT/Pafib: rate still elevated. Cont Amio gtt, oral amio, coumadin. Not on metoprolol due to hypotension    3) Acute metabolic encephalopathy: now resolved. Likely from shock, uremia. Head CT unremarkable. EEG neg. Neuro was following    4) HTN: holding all meds    5) ESRD: on PD.   Management per Renal    Total time spent with patient: 30 [address] discussed with: Patient, nursing, intensivist     Discussed:  Care Plan    Prophylaxis:  Coumadin    Disposition:  [facility]/Rehab           ___________________________________________________    Attending Physician: Brendon Noe MD

## 2019-03-17 LAB
ANION GAP SERPL CALC-SCNC: 9 MMOL/L (ref 5–15)
BACTERIA SPEC CULT: NORMAL
BACTERIA SPEC CULT: NORMAL
BASOPHILS # BLD: 0 K/UL (ref 0–0.1)
BASOPHILS NFR BLD: 0 % (ref 0–1)
BUN SERPL-MCNC: 79 MG/DL (ref 6–20)
BUN/CREAT SERPL: 5 (ref 12–20)
CALCIUM SERPL-MCNC: 7 MG/DL (ref 8.5–10.1)
CHLORIDE SERPL-SCNC: 96 MMOL/L (ref 97–108)
CO2 SERPL-SCNC: 28 MMOL/L (ref 21–32)
CREAT SERPL-MCNC: 14.7 MG/DL (ref 0.55–1.02)
DIFFERENTIAL METHOD BLD: ABNORMAL
EOSINOPHIL # BLD: 0.3 K/UL (ref 0–0.4)
EOSINOPHIL NFR BLD: 2 % (ref 0–7)
ERYTHROCYTE [DISTWIDTH] IN BLOOD BY AUTOMATED COUNT: 14.6 % (ref 11.5–14.5)
GLUCOSE BLD STRIP.AUTO-MCNC: 101 MG/DL (ref 65–100)
GLUCOSE BLD STRIP.AUTO-MCNC: 128 MG/DL (ref 65–100)
GLUCOSE BLD STRIP.AUTO-MCNC: 95 MG/DL (ref 65–100)
GLUCOSE SERPL-MCNC: 96 MG/DL (ref 65–100)
HCT VFR BLD AUTO: 32.1 % (ref 35–47)
HGB BLD-MCNC: 10.4 G/DL (ref 11.5–16)
IMM GRANULOCYTES # BLD AUTO: 0.1 K/UL (ref 0–0.04)
IMM GRANULOCYTES NFR BLD AUTO: 1 % (ref 0–0.5)
INR PPP: 1.2 (ref 0.9–1.1)
LYMPHOCYTES # BLD: 0.9 K/UL (ref 0.8–3.5)
LYMPHOCYTES NFR BLD: 7 % (ref 12–49)
MAGNESIUM SERPL-MCNC: 2.2 MG/DL (ref 1.6–2.4)
MCH RBC QN AUTO: 34.1 PG (ref 26–34)
MCHC RBC AUTO-ENTMCNC: 32.4 G/DL (ref 30–36.5)
MCV RBC AUTO: 105.2 FL (ref 80–99)
MONOCYTES # BLD: 0.5 K/UL (ref 0–1)
MONOCYTES NFR BLD: 5 % (ref 5–13)
NEUTS SEG # BLD: 9.8 K/UL (ref 1.8–8)
NEUTS SEG NFR BLD: 85 % (ref 32–75)
NRBC # BLD: 0 K/UL (ref 0–0.01)
NRBC BLD-RTO: 0 PER 100 WBC
PHOSPHATE SERPL-MCNC: 4.6 MG/DL (ref 2.6–4.7)
PLATELET # BLD AUTO: 128 K/UL (ref 150–400)
PMV BLD AUTO: 10.6 FL (ref 8.9–12.9)
POTASSIUM SERPL-SCNC: 3.3 MMOL/L (ref 3.5–5.1)
PROTHROMBIN TIME: 11.6 SEC (ref 9–11.1)
RBC # BLD AUTO: 3.05 M/UL (ref 3.8–5.2)
SERVICE CMNT-IMP: ABNORMAL
SERVICE CMNT-IMP: ABNORMAL
SERVICE CMNT-IMP: NORMAL
SODIUM SERPL-SCNC: 133 MMOL/L (ref 136–145)
WBC # BLD AUTO: 11.6 K/UL (ref 3.6–11)

## 2019-03-17 PROCEDURE — 80048 BASIC METABOLIC PNL TOTAL CA: CPT

## 2019-03-17 PROCEDURE — 74011250637 HC RX REV CODE- 250/637: Performed by: INTERNAL MEDICINE

## 2019-03-17 PROCEDURE — 85610 PROTHROMBIN TIME: CPT

## 2019-03-17 PROCEDURE — 97530 THERAPEUTIC ACTIVITIES: CPT

## 2019-03-17 PROCEDURE — 74011000258 HC RX REV CODE- 258: Performed by: INTERNAL MEDICINE

## 2019-03-17 PROCEDURE — 83735 ASSAY OF MAGNESIUM: CPT

## 2019-03-17 PROCEDURE — 74011250637 HC RX REV CODE- 250/637: Performed by: NURSE PRACTITIONER

## 2019-03-17 PROCEDURE — 74011250636 HC RX REV CODE- 250/636: Performed by: INTERNAL MEDICINE

## 2019-03-17 PROCEDURE — 97165 OT EVAL LOW COMPLEX 30 MIN: CPT

## 2019-03-17 PROCEDURE — 85025 COMPLETE CBC W/AUTO DIFF WBC: CPT

## 2019-03-17 PROCEDURE — 84100 ASSAY OF PHOSPHORUS: CPT

## 2019-03-17 PROCEDURE — 74011250637 HC RX REV CODE- 250/637: Performed by: SPECIALIST

## 2019-03-17 PROCEDURE — 82962 GLUCOSE BLOOD TEST: CPT

## 2019-03-17 PROCEDURE — 74011000250 HC RX REV CODE- 250: Performed by: INTERNAL MEDICINE

## 2019-03-17 PROCEDURE — 65610000006 HC RM INTENSIVE CARE

## 2019-03-17 PROCEDURE — A4722 DIALYS SOL FLD VOL > 1999CC: HCPCS | Performed by: INTERNAL MEDICINE

## 2019-03-17 PROCEDURE — 74011250636 HC RX REV CODE- 250/636: Performed by: SPECIALIST

## 2019-03-17 PROCEDURE — 36415 COLL VENOUS BLD VENIPUNCTURE: CPT

## 2019-03-17 RX ORDER — POTASSIUM CHLORIDE 750 MG/1
40 TABLET, FILM COATED, EXTENDED RELEASE ORAL DAILY
Status: DISCONTINUED | OUTPATIENT
Start: 2019-03-18 | End: 2019-03-18

## 2019-03-17 RX ORDER — DILTIAZEM HYDROCHLORIDE 120 MG/1
120 CAPSULE, COATED, EXTENDED RELEASE ORAL DAILY
Status: DISCONTINUED | OUTPATIENT
Start: 2019-03-17 | End: 2019-03-19 | Stop reason: HOSPADM

## 2019-03-17 RX ADMIN — HEPARIN SODIUM 5000 UNITS: 5000 INJECTION INTRAVENOUS; SUBCUTANEOUS at 13:22

## 2019-03-17 RX ADMIN — SODIUM CHLORIDE, SODIUM LACTATE, CALCIUM CHLORIDE, MAGNESIUM CHLORIDE AND DEXTROSE 2000 ML: 1.5; 538; 448; 18.3; 5.08 INJECTION, SOLUTION INTRAPERITONEAL at 09:00

## 2019-03-17 RX ADMIN — ASPIRIN 81 MG: 81 TABLET ORAL at 08:27

## 2019-03-17 RX ADMIN — HYDROCORTISONE 2.5%: 25 CREAM TOPICAL at 17:40

## 2019-03-17 RX ADMIN — HEPARIN SODIUM 5000 UNITS: 5000 INJECTION INTRAVENOUS; SUBCUTANEOUS at 21:25

## 2019-03-17 RX ADMIN — ONDANSETRON 4 MG: 2 INJECTION INTRAMUSCULAR; INTRAVENOUS at 18:36

## 2019-03-17 RX ADMIN — HYDROCORTISONE 2.5%: 25 CREAM TOPICAL at 08:28

## 2019-03-17 RX ADMIN — SEVELAMER CARBONATE 1600 MG: 800 TABLET, FILM COATED ORAL at 12:08

## 2019-03-17 RX ADMIN — SEVELAMER CARBONATE 1600 MG: 800 TABLET, FILM COATED ORAL at 17:40

## 2019-03-17 RX ADMIN — SODIUM CHLORIDE, SODIUM LACTATE, CALCIUM CHLORIDE, MAGNESIUM CHLORIDE AND DEXTROSE 2000 ML: 1.5; 538; 448; 18.3; 5.08 INJECTION, SOLUTION INTRAPERITONEAL at 18:00

## 2019-03-17 RX ADMIN — AMIODARONE HYDROCHLORIDE 200 MG: 200 TABLET ORAL at 08:27

## 2019-03-17 RX ADMIN — Medication 10 ML: at 21:27

## 2019-03-17 RX ADMIN — DILTIAZEM HYDROCHLORIDE 120 MG: 120 CAPSULE, COATED, EXTENDED RELEASE ORAL at 18:36

## 2019-03-17 RX ADMIN — HYDROCORTISONE 2.5%: 25 CREAM TOPICAL at 12:11

## 2019-03-17 RX ADMIN — PANTOPRAZOLE SODIUM 40 MG: 40 TABLET, DELAYED RELEASE ORAL at 08:27

## 2019-03-17 RX ADMIN — Medication 20 ML: at 04:24

## 2019-03-17 RX ADMIN — CINACALCET HYDROCHLORIDE 30 MG: 30 TABLET, COATED ORAL at 17:40

## 2019-03-17 RX ADMIN — HEPARIN SODIUM 5000 UNITS: 5000 INJECTION INTRAVENOUS; SUBCUTANEOUS at 06:28

## 2019-03-17 RX ADMIN — WARFARIN SODIUM 2.5 MG: 2 TABLET ORAL at 17:40

## 2019-03-17 RX ADMIN — GENTAMICIN SULFATE: 1 CREAM TOPICAL at 21:25

## 2019-03-17 RX ADMIN — HYDROCORTISONE 2.5%: 25 CREAM TOPICAL at 21:26

## 2019-03-17 RX ADMIN — Medication 10 ML: at 13:24

## 2019-03-17 RX ADMIN — Medication 10 ML: at 06:28

## 2019-03-17 RX ADMIN — SENNOSIDES AND DOCUSATE SODIUM 1 TABLET: 8.6; 5 TABLET ORAL at 18:36

## 2019-03-17 RX ADMIN — SODIUM CHLORIDE, SODIUM LACTATE, CALCIUM CHLORIDE, MAGNESIUM CHLORIDE AND DEXTROSE 2000 ML: 1.5; 538; 448; 18.3; 5.08 INJECTION, SOLUTION INTRAPERITONEAL at 22:18

## 2019-03-17 RX ADMIN — AMIODARONE HYDROCHLORIDE 200 MG: 200 TABLET ORAL at 17:40

## 2019-03-17 RX ADMIN — SEVELAMER CARBONATE 1600 MG: 800 TABLET, FILM COATED ORAL at 08:27

## 2019-03-17 RX ADMIN — DILTIAZEM HYDROCHLORIDE 5 MG/HR: 5 INJECTION INTRAVENOUS at 13:22

## 2019-03-17 RX ADMIN — SODIUM CHLORIDE, SODIUM LACTATE, CALCIUM CHLORIDE, MAGNESIUM CHLORIDE AND DEXTROSE 2000 ML: 1.5; 538; 448; 18.3; 5.08 INJECTION, SOLUTION INTRAPERITONEAL at 13:00

## 2019-03-17 NOTE — PROGRESS NOTES
0700-Bedside shift change report given to Isaiah Wilkinson (oncoming nurse) by Dominique Wells (offgoing nurse). Report included the following information SBAR, Procedure Summary, Intake/Output, MAR, Recent Results, Cardiac Rhythm NSR/ST and Alarm Parameters . 0800-Patient A&O x3. No complaints. 0930-PD exchange done. 1200-Patient resting comfortably, No complaints. 1300-HR now sustained high 120's to 130's. Patient asymptomatic.  BRANDON [facility] paged, orders recieved. 1320-Cardizem drip started per orders. 1330-PD exchange completed, no difficulties. 1500-Patient asleep, mother at bedside. 1800-PD exchange performed. 1900-Bedside shift change report given to Anil Ramirez (oncoming nurse) by Perlita Henderson RN (offgoing nurse). Report included the following information SBAR, Procedure Summary, Intake/Output, MAR, Recent Results, Cardiac Rhythm NSR/ST and Alarm Parameters .

## 2019-03-17 NOTE — PROGRESS NOTES
[facility] Dosing Services: 3/17/2019  Consult for Warfarin dosing by Pharmacy per Jennifer Hill NP  Consult provided for this 61 yo female for indication of Atrial Fibrillation. Day of Therapy: 3 (new start)  Dose to achieve an INR goal of 2-3    Order entered for  Warfarin  2.5 (mg) ordered to be given today at 18:00. Significant drug interactions: Amiodarone, Heparin bridge, low body weight, chronic CHF, renal impairment  Previous dose given 2.5 mg   PT/INR Lab Results   Component Value Date/Time    INR 1.2 (H) 03/17/2019 04:26 AM      Platelets Lab Results   Component Value Date/Time    PLATELET 920 (L) 25/31/0429 04:26 AM      H/H Lab Results   Component Value Date/Time    HGB 10.4 (L) 03/17/2019 04:26 AM        Pharmacy to follow daily and will provide subsequent Warfarin dosing based on clinical status.   Lacie Diamond  Contact information [phone]

## 2019-03-17 NOTE — PROGRESS NOTES
[facility]  Pulmonary, Critical Care, and Sleep Medicine    Name: Saeed Fierro MRN: [id number]   : 1960 Hospital: [address] [facility]   Date: 3/17/2019 10:20 am           IMPRESSION:   · Acute hypoxemic respiratory failure  · Shock - off pressors 3/14. BP BP better  · SVT, h/o afib: back on PO amiodarone  · ams / metabolic encephalopathy: resolved  · H/o cardiomyopathy (EF21-25%) s/p ICD, mild-moderate AR  · ESRD on PD, h/o PCKD:  Had PD this morning  · GERD  · hyperglycemia      RECOMMENDATIONS:   · Supplemental O2 as needed to keep sats > 90%  · Rhythm per cardiology  · PD per Renal  · continue SSI  · Continue ASA    DVT ppx: sq heparin  GI ppx: protonix     Subjective/History:   She reports that she did a manual PD this AM.  She is back on PO meds for her hear rhythm. BP is better. Remains off pressors. She is eating well and denies any new issues. History:  Ms. Chucho Argueta is a 64yo female w/ history of cardiomyopathy (EF 21-25%) s/p ICD, afib/SVT, ESRD on PD, HTN, PCKD and GERD who presented to the ER on 3/11 w/ complaints of sore throat, shortness of breath, dry cough, n/v/d, dysarthria and memory loss (doesn't remember any events of Saturday and missed PD). She had issues with SVT. Seen by neuro. Head CT negative. EEG c/w toxi-metabolic encephalopathy. Transferred to the ICU overnight on 3/13 w/ hypotension requiring pressors.      *all cultures NGTD  *peritoneal fluid not c/w SBP  *RVP negative  ------------------------------------------------------------      Past Medical History:   Diagnosis Date    Anemia associated with chronic renal failure     Anuria     Arrhythmia     Paroxysmal a fib, paroxsymal atrial tach, SVT    Chronic kidney disease     ESRD- on PD    Chronic systolic heart failure ([address] 10/31/2012    Chronic tachycardia     GERD (gastroesophageal reflux disease)     Hx of non-ST elevation myocardial infarction (NSTEMI)     Hypertension     Lipoma of right lower extremity 1980    Foot    Peritoneal dialysis catheter in place [facility]) 2014    Nightime exchanges    Polycystic kidney disease 2014      Past Surgical History:   Procedure Laterality Date    HX HEART CATHETERIZATION  12/2015    no interventions    HX PACEMAKER  12/29/2015    ICD    IR INSERT NON TUNL CVC OVER 5 YRS  3/13/2019      Prior to Admission medications    Medication Sig Start Date End Date Taking? Authorizing Provider   dilTIAZem CD (CARDIZEM CD) 120 mg ER capsule Take 1 Cap by mouth daily. 2/8/19  Yes Robb Almanzar NP   aspirin delayed-release 81 mg tablet Take 81 mg by mouth daily. Yes Provider, Historical   spironolactone (ALDACTONE) 50 mg tablet Take 50 mg by mouth every morning. Yes Provider, Historical   potassium chloride (KLOR-CON M20) 20 mEq tablet Take 40 mEq by mouth every morning. Yes Provider, Historical   carvedilol (COREG) 25 mg tablet Take 1 Tab by mouth two (2) times daily (with meals). 9/18/18  Yes Arjun Machuca NP   omeprazole (PRILOSEC) 20 mg capsule Take 20 mg by mouth daily as needed (indigestion). Yes Provider, Historical   SENNA-DOCUSATE SODIUM PO Take 1 Tab by mouth daily as needed (constipation). Yes Provider, Historical   cinacalcet (SENSIPAR) 30 mg tablet Take 30 mg by mouth daily (with dinner). Yes Provider, Historical   ferric citrate (AURYXIA) 210 mg iron tablet Take 210 mg by mouth three (3) times daily (with meals). Yes Provider, Historical   gentamicin (GARAMYCIN) 0.1 % topical cream Apply  to affected area nightly.  7/12/14  Yes Provider, Historical     Current Facility-Administered Medications   Medication Dose Route Frequency    amiodarone (CORDARONE) 375 mg in dextrose 5% 250 mL infusion  0.5-1 mg/min IntraVENous TITRATE    pantoprazole (PROTONIX) tablet 40 mg  40 mg Oral ACB    sevelamer carbonate (RENVELA) tab 1,600 mg  1,600 mg Oral TID WITH MEALS    insulin lispro (HUMALOG) injection   SubCUTAneous AC&HS    amiodarone (CORDARONE) tablet 200 mg  200 mg Oral BID WITH MEALS    gentamicin (GARAMYCIN) 0.1 % cream   Topical QHS    heparin (porcine) injection 5,000 Units  5,000 Units SubCUTAneous Q8H    aspirin delayed-release tablet 81 mg  81 mg Oral DAILY    cinacalcet (SENSIPAR) tablet 30 mg  30 mg Oral DAILY WITH DINNER    sodium chloride (NS) flush 5-40 mL  5-40 mL IntraVENous Q8H    peritoneal dialysis DEXTROSE 1.5% (2.5 mEq/L low calcium) solution 2,000 mL  2,000 mL IntraPERitoneal QID    hydrocortisone (ANUSOL-HC) 2.5 % rectal cream   PeriANAL QID     Allergies   Allergen Reactions    Iodinated Contrast- Oral And Iv Dye Angioedema     Cellulitis on side of face after test      Social History     Tobacco Use    Smoking status: Never Smoker    Smokeless tobacco: Never Used   Substance Use Topics    Alcohol use: No      Family History   Problem Relation Age of Onset    Diabetes Brother     Hypertension Brother     Hypertension Mother     Arthritis-osteo Mother     Hypertension Sister     Diabetes Sister     Kidney Disease Father         polycystic    Parkinson's Disease Father     Heart Disease Father     Hypertension Father     Hypertension Brother             Objective:   Vital Signs:    Visit Vitals  /73   Pulse (!) 126   Temp 98.7 °F (37.1 °C)   Resp 17   Ht 5' 5\" (1.651 m)   Wt 64 kg (141 lb 1.5 oz)   SpO2 94%   BMI 23.48 kg/m²       O2 Device: Room air   O2 Flow Rate (L/min): 1 l/min   Temp (24hrs), Av.2 °F (36.8 °C), Min:98 °F (36.7 °C), Max:98.7 °F (37.1 °C)       Intake/Output:   Last shift:       07 -  190  In:    Out: 1700   Last 3 shifts: 03/15 1901 -  0700  In: 8438 [P.O.:838; I.V.:200]  Out: 7600     Intake/Output Summary (Last 24 hours) at 3/17/2019 1023  Last data filed at 3/17/2019 0924  Gross per 24 hour   Intake 8480 ml   Output 7400 ml   Net 1080 ml         Physical Exam:    General:  Alert, awake.    Head:  Normocephalic, atraumatic Eyes:  Conjunctiva clear, EOMI   Throat: Moist mucous membranes   Neck: Supple, symmetrical, trachea midline,   Lungs:   Diminished but clear   Chest wall:  .normal   Heart:  Regular rate and rhythm, S1, S2 normal, no murmur, click, rub or gallop. Mildy tachy   Abdomen:   Soft, non-tender, distended. Bowel sounds normal.   Extremities: Extremities normal, atraumatic, no cyanosis or edema. Pulses:  present   Skin: . No rashes or lesions   Lymph nodes:  no nodes noted   Neurologic: Grossly nonfocal; Psych: normal affect       Data:     Recent Results (from the past 24 hour(s))   GLUCOSE, POC    Collection Time: 03/16/19 11:50 AM   Result Value Ref Range    Glucose (POC) 98 65 - 100 mg/dL    Performed by Seth Hyman    GLUCOSE, POC    Collection Time: 03/16/19  4:13 PM   Result Value Ref Range    Glucose (POC) 128 (H) 65 - 100 mg/dL    Performed by [address] [place] [address] Street, POC    Collection Time: 03/16/19 10:00 PM   Result Value Ref Range    Glucose (POC) 92 65 - 100 mg/dL    Performed by Cony Azul    CBC WITH AUTOMATED DIFF    Collection Time: 03/17/19  4:26 AM   Result Value Ref Range    WBC 11.6 (H) 3.6 - 11.0 K/uL    RBC 3.05 (L) 3.80 - 5.20 M/uL    HGB 10.4 (L) 11.5 - 16.0 g/dL    HCT 32.1 (L) 35.0 - 47.0 %    .2 (H) 80.0 - 99.0 FL    MCH 34.1 (H) 26.0 - 34.0 PG    MCHC 32.4 30.0 - 36.5 g/dL    RDW 14.6 (H) 11.5 - 14.5 %    PLATELET 296 (L) 882 - 400 K/uL    MPV 10.6 8.9 - 12.9 FL    NRBC 0.0 0  WBC    ABSOLUTE NRBC 0.00 0.00 - 0.01 K/uL    NEUTROPHILS 85 (H) 32 - 75 %    LYMPHOCYTES 7 (L) 12 - 49 %    MONOCYTES 5 5 - 13 %    EOSINOPHILS 2 0 - 7 %    BASOPHILS 0 0 - 1 %    IMMATURE GRANULOCYTES 1 (H) 0.0 - 0.5 %    ABS. NEUTROPHILS 9.8 (H) 1.8 - 8.0 K/UL    ABS. LYMPHOCYTES 0.9 0.8 - 3.5 K/UL    ABS. MONOCYTES 0.5 0.0 - 1.0 K/UL    ABS. EOSINOPHILS 0.3 0.0 - 0.4 K/UL    ABS. BASOPHILS 0.0 0.0 - 0.1 K/UL    ABS. IMM.  GRANS. 0.1 (H) 0.00 - 0.04 K/UL    DF AUTOMATED     PROTHROMBIN TIME + INR    Collection Time: 03/17/19  4:26 AM   Result Value Ref Range    INR 1.2 (H) 0.9 - 1.1      Prothrombin time 11.6 (H) 9.0 - 11.1 sec   MAGNESIUM    Collection Time: 03/17/19  4:26 AM   Result Value Ref Range    Magnesium 2.2 1.6 - 2.4 mg/dL   METABOLIC PANEL, BASIC    Collection Time: 03/17/19  4:26 AM   Result Value Ref Range    Sodium 133 (L) 136 - 145 mmol/L    Potassium 3.3 (L) 3.5 - 5.1 mmol/L    Chloride 96 (L) 97 - 108 mmol/L    CO2 28 21 - 32 mmol/L    Anion gap 9 5 - 15 mmol/L    Glucose 96 65 - 100 mg/dL    BUN 79 (H) 6 - 20 MG/DL    Creatinine 14.70 (H) 0.55 - 1.02 MG/DL    BUN/Creatinine ratio 5 (L) 12 - 20      GFR est AA 3 (L) >60 ml/min/1.73m2    GFR est non-AA 3 (L) >60 ml/min/1.73m2    Calcium 7.0 (L) 8.5 - 10.1 MG/DL   PHOSPHORUS    Collection Time: 03/17/19  4:26 AM   Result Value Ref Range    Phosphorus 4.6 2.6 - 4.7 MG/DL                 Imaging:  I have personally reviewed the patients radiographs and have reviewed the reports:  CXR w/ bilateral effusions. No obvious infiltrate.   No new films today (3/17)        Discussed with nursing and patient    Saeed Levi MD, [facility]  [facility]

## 2019-03-17 NOTE — PROGRESS NOTES
Bedside and Verbal shift change report given to Dane Kelsey (oncoming nurse) by Avinash Rios (offgoing nurse). Report included the following information SBAR, ED Summary, Intake/Output, MAR and Cardiac Rhythm sinus tachy.

## 2019-03-17 NOTE — PROGRESS NOTES
SUBJECTIVE      Continues to exhibit SVT (short RP tachyycardia). Started on diltiazem gtt.        ACTIVE PROBLEM LIST     Patient Active Problem List    Diagnosis Date Noted    Cardiogenic shock ([facility] 03/13/2019    SVT (supraventricular tachycardia) (HCC) 03/11/2019    Acidosis 03/11/2019    GERD (gastroesophageal reflux disease) 03/11/2019    HTN (hypertension) 03/11/2019    Dysarthria 03/11/2019    Nausea & vomiting 03/11/2019    Diarrhea 36/04/1856    Systolic CHF, chronic (HCC) 08/25/2018    Bladder mass 08/24/2018    Gross hematuria 08/24/2018    Paroxysmal atrial fibrillation ([facility] 07/31/2018    ICD (implantable cardioverter-defibrillator) in place 01/26/2018    Paroxysmal atrial tachycardia (HCC) 04/17/2016    ESRD (end stage renal disease) ([facility] 12/11/2015    Hypokalemia 01/20/2015    Anemia 01/20/2015    Cardiomyopathy[facility]ischemic [facility] 10/19/2013    Polycystic kidney disease 10/18/2012    Essential hypertension, benign 10/18/2012          MEDICATIONS     Current Facility-Administered Medications   Medication Dose Route Frequency    Warfarin pharmacy to dose daily   Other Rx Dosing/Monitoring    warfarin (COUMADIN) tablet 2.5 mg  2.5 mg Oral QPM    dilTIAZem (CARDIZEM) 125 mg in dextrose 5% 125 mL infusion  0-15 mg/hr IntraVENous TITRATE    [START ON 3/18/2019] potassium chloride SR (KLOR-CON 10) tablet 40 mEq  40 mEq Oral DAILY    amiodarone (CORDARONE) 375 mg in dextrose 5% 250 mL infusion  0.5-1 mg/min IntraVENous TITRATE    pantoprazole (PROTONIX) tablet 40 mg  40 mg Oral ACB    sevelamer carbonate (RENVELA) tab 1,600 mg  1,600 mg Oral TID WITH MEALS    insulin lispro (HUMALOG) injection   SubCUTAneous AC&HS    glucose chewable tablet 16 g  4 Tab Oral PRN    dextrose (D50W) injection syrg 12.5-25 g  12.5-25 g IntraVENous PRN    glucagon (GLUCAGEN) injection 1 mg  1 mg IntraMUSCular PRN    amiodarone (CORDARONE) tablet 200 mg  200 mg Oral BID WITH MEALS    gentamicin (GARAMYCIN) 0.1 % cream   Topical QHS    heparin (porcine) injection 5,000 Units  5,000 Units SubCUTAneous Q8H    aspirin delayed-release tablet 81 mg  81 mg Oral DAILY    cinacalcet (SENSIPAR) tablet 30 mg  30 mg Oral DAILY WITH DINNER    senna-docusate (PERICOLACE) 8.6-50 mg per tablet 1 Tab  1 Tab Oral DAILY PRN    sodium chloride (NS) flush 5-40 mL  5-40 mL IntraVENous Q8H    sodium chloride (NS) flush 5-40 mL  5-40 mL IntraVENous PRN    ondansetron (ZOFRAN) injection 4 mg  4 mg IntraVENous Q4H PRN    peritoneal dialysis DEXTROSE 1.5% (2.5 mEq/L low calcium) solution 2,000 mL  2,000 mL IntraPERitoneal QID    hydrocortisone (ANUSOL-HC) 2.5 % rectal cream   PeriANAL QID          PAST MEDICAL HISTORY     Past Medical History:   Diagnosis Date    Anemia associated with chronic renal failure     Anuria 2014    Arrhythmia     Paroxysmal a fib, paroxsymal atrial tach, SVT    Chronic kidney disease     ESRD- on PD    Chronic systolic heart failure (HCC) 10/31/2012    Chronic tachycardia     GERD (gastroesophageal reflux disease)     Hx of non-ST elevation myocardial infarction (NSTEMI) 2011    Hypertension     Lipoma of right lower extremity 1980    Foot    Peritoneal dialysis catheter in place ([facility]) 2014    Nightime exchanges    Polycystic kidney disease 2014           PAST SURGICAL HISTORY     Past Surgical History:   Procedure Laterality Date    HX HEART CATHETERIZATION  12/2015    no interventions    HX PACEMAKER  12/29/2015    ICD    IR INSERT NON TUNL CVC OVER 5 YRS  3/13/2019          ALLERGIES     Allergies   Allergen Reactions    Iodinated Contrast- Oral And Iv Dye Angioedema     Cellulitis on side of face after test          FAMILY HISTORY     Family History   Problem Relation Age of Onset    Diabetes Brother     Hypertension Brother     Hypertension Mother     Arthritis-osteo Mother     Hypertension Sister     Diabetes Sister     Kidney Disease Father polycystic    Parkinson's Disease Father     Heart Disease Father     Hypertension Father     Hypertension Brother     negative for cardiac disease     SOCIAL HISTORY     Social History     Socioeconomic History    Marital status: SINGLE     Spouse name: Not on file    Number of children: Not on file    Years of education: Not on file    Highest education level: Not on file   Tobacco Use    Smoking status: Never Smoker    Smokeless tobacco: Never Used   Substance and Sexual Activity    Alcohol use: No    Drug use: No    Sexual activity: Not Currently     Partners: Male       MEDICATIONS     Current Facility-Administered Medications   Medication Dose Route Frequency    Warfarin pharmacy to dose daily   Other Rx Dosing/Monitoring    warfarin (COUMADIN) tablet 2.5 mg  2.5 mg Oral QPM    dilTIAZem (CARDIZEM) 125 mg in dextrose 5% 125 mL infusion  0-15 mg/hr IntraVENous TITRATE    [START ON 3/18/2019] potassium chloride SR (KLOR-CON 10) tablet 40 mEq  40 mEq Oral DAILY    amiodarone (CORDARONE) 375 mg in dextrose 5% 250 mL infusion  0.5-1 mg/min IntraVENous TITRATE    pantoprazole (PROTONIX) tablet 40 mg  40 mg Oral ACB    sevelamer carbonate (RENVELA) tab 1,600 mg  1,600 mg Oral TID WITH MEALS    insulin lispro (HUMALOG) injection   SubCUTAneous AC&HS    glucose chewable tablet 16 g  4 Tab Oral PRN    dextrose (D50W) injection syrg 12.5-25 g  12.5-25 g IntraVENous PRN    glucagon (GLUCAGEN) injection 1 mg  1 mg IntraMUSCular PRN    amiodarone (CORDARONE) tablet 200 mg  200 mg Oral BID WITH MEALS    gentamicin (GARAMYCIN) 0.1 % cream   Topical QHS    heparin (porcine) injection 5,000 Units  5,000 Units SubCUTAneous Q8H    aspirin delayed-release tablet 81 mg  81 mg Oral DAILY    cinacalcet (SENSIPAR) tablet 30 mg  30 mg Oral DAILY WITH DINNER    senna-docusate (PERICOLACE) 8.6-50 mg per tablet 1 Tab  1 Tab Oral DAILY PRN    sodium chloride (NS) flush 5-40 mL  5-40 mL IntraVENous Q8H    sodium chloride (NS) flush 5-40 mL  5-40 mL IntraVENous PRN    ondansetron (ZOFRAN) injection 4 mg  4 mg IntraVENous Q4H PRN    peritoneal dialysis DEXTROSE 1.5% (2.5 mEq/L low calcium) solution 2,000 mL  2,000 mL IntraPERitoneal QID    hydrocortisone (ANUSOL-HC) 2.5 % rectal cream   PeriANAL QID       I have reviewed the nurses notes, vitals, problem list, allergy list, medical history, family, social history and medications. REVIEW OF SYMPTOMS      General: Pt denies excessive weight gain or loss. Pt is able to conduct ADL's  HEENT: Denies blurred vision, headaches, hearing loss, epistaxis and difficulty swallowing. Respiratory: Denies cough, congestion, shortness of breath, HENDERSON, wheezing or stridor. Cardiovascular: Denies precordial pain, palpitations, edema or PND  Gastrointestinal: Denies poor appetite, indigestion, abdominal pain or blood in stool  Genitourinary: Denies hematuria, dysuria, increased urinary frequency  Musculoskeletal: Denies joint pain or swelling from muscles or joints  Neurologic: Denies tremor, paresthesias, headache, or sensory motor disturbance  Psychiatric: Denies confusion, insomnia, depression  Integumentray: Denies rash, itching or ulcers. Hematologic: Denies easy bruising, bleeding       PHYSICAL EXAMINATION      Vitals:    03/17/19 0800 03/17/19 1051 03/17/19 1059 03/17/19 1110   BP: 114/73  105/77    Pulse: (!) 126 (!) 117 (!) 125 (!) 135   Resp: 17      Temp:       SpO2:       Weight:       Height:         General: Well developed, in no acute distress. HEENT: No jaundice, oral mucosa moist, no oral ulcers  Neck: Supple, no stiffness, no lymphadenopathy, supple  Heart:  Normal S1/S2 negative S3 or S4. Regular, no murmur, gallop or rub, no jugular venous distention  Respiratory: Clear bilaterally x 4, no wheezing or rales  Abdomen:   Soft, non-tender, bowel sounds are active.   Extremities:  No edema, normal cap refill, no cyanosis.   Musculoskeletal: No clubbing, no deformities  Neuro: A&Ox3, speech clear, gait stable, cooperative, no focal neurologic deficits  Skin: Skin color is normal. No rashes or lesions. Non diaphoretic, moist.  Vascular: 2+ pulses symmetric in all extremities       DIAGNOSTIC DATA      TELEMETRY: short RP tachycardia       LABORATORY DATA      Lab Results   Component Value Date/Time    WBC 11.6 (H) 03/17/2019 04:26 AM    Hemoglobin (POC) 9.5 (L) 10/18/2013 03:35 PM    HGB 10.4 (L) 03/17/2019 04:26 AM    Hematocrit (POC) 28 (L) 10/18/2013 03:35 PM    HCT 32.1 (L) 03/17/2019 04:26 AM    PLATELET 938 (L) 22/49/8835 04:26 AM    .2 (H) 03/17/2019 04:26 AM      Lab Results   Component Value Date/Time    Sodium 133 (L) 03/17/2019 04:26 AM    Potassium 3.3 (L) 03/17/2019 04:26 AM    Chloride 96 (L) 03/17/2019 04:26 AM    CO2 28 03/17/2019 04:26 AM    Anion gap 9 03/17/2019 04:26 AM    Glucose 96 03/17/2019 04:26 AM    BUN 79 (H) 03/17/2019 04:26 AM    Creatinine 14.70 (H) 03/17/2019 04:26 AM    BUN/Creatinine ratio 5 (L) 03/17/2019 04:26 AM    GFR est AA 3 (L) 03/17/2019 04:26 AM    GFR est non-AA 3 (L) 03/17/2019 04:26 AM    Calcium 7.0 (L) 03/17/2019 04:26 AM    Bilirubin, total 1.7 (H) 03/11/2019 04:42 PM    AST (SGOT) 50 (H) 03/11/2019 04:42 PM    Alk. phosphatase 82 03/11/2019 04:42 PM    Protein, total 7.4 03/11/2019 04:42 PM    Albumin 1.8 (L) 03/15/2019 04:19 AM    Globulin 4.6 (H) 03/11/2019 04:42 PM    A-G Ratio 0.6 (L) 03/11/2019 04:42 PM    ALT (SGPT) 37 03/11/2019 04:42 PM           ASSESSMENT      1. SVT  2. ESRD  3. ICD         PLAN     Continue diltiazem drip but start oral diltiazem 120 mg daily to attempt wean. May require SVT ablation prior to DC if recurrent SVT observed.          Paola Paiz MD  Cardiac Electrophysiology / Cardiology    [address]  [address] [facility], [address] [place] [address] [place]  [phone] / [phone] Fax   [phone] / [phone] Fax

## 2019-03-17 NOTE — PROGRESS NOTES
Occupational Therapy EVALUATION/discharge  Patient: Danilo Mckenzie (14 y.o. female)  Date: 3/17/2019  Primary Diagnosis: SVT (supraventricular tachycardia) ([facility]) [I47.1]  SVT (supraventricular tachycardia) ([facility] [I47.1]       Precautions:  Fall    ASSESSMENT:     Chart reviewed and patient cleared by RN for therapy. Patient continues to present with elevated HR (117 bpm at rest, 125 bpm EOB and 135 bpm after ambulation in valente). PTA patient was independent in ADLS/mobility, living alone, and working full time. Currently patient requires CGA for transfers/ambulation without an AD and set-up/Mod I for ADLs. While ambulating patient needed vc to not look at her feet and to focus her gaze down the valente. At this time further skilled acute occupational therapy is not indicated as she is near baseline for ADLs. Patient was encouraged to continue to mobilize OOB with assistance of staff as able and to transfer to chair throughout the day. Discharge Recommendations: None  Further Equipment Recommendations for Discharge: none     SUBJECTIVE:   Patient stated Lynn Davis may take a few days off before going back to work.     OBJECTIVE DATA SUMMARY:   HISTORY:   Past Medical History:   Diagnosis Date    Anemia associated with chronic renal failure     Anuria 2014    Arrhythmia     Paroxysmal a fib, paroxsymal atrial tach, SVT    Chronic kidney disease     ESRD- on PD    Chronic systolic heart failure ([facility] 10/31/2012    Chronic tachycardia     GERD (gastroesophageal reflux disease)     Hx of non-ST elevation myocardial infarction (NSTEMI) 2011    Hypertension     Lipoma of right lower extremity 1980    Foot    Peritoneal dialysis catheter in place [facility]) 2014    Nightime exchanges    Polycystic kidney disease 2014     Past Surgical History:   Procedure Laterality Date    HX HEART CATHETERIZATION  12/2015    no interventions    HX PACEMAKER  12/29/2015    ICD    IR INSERT NON TUNL CVC OVER 5 YRS  3/13/2019 Prior Level of Function/Environment/Context:  Patient lives alone but has family around who can home. She works at an assistant (desk job) and was Independent in ADLS/transfers PTA. Expanded or extensive additional review of patient history:     Home Situation  Home Environment: Apartment(2nd story)  # Steps to Enter: 6  Rails to Enter: Yes  Hand Rails : Right  Wheelchair Ramp: No  One/Two Story Residence: One story  Living Alone: Yes  Support Systems: Family member(s)  Patient Expects to be Discharged to[de-identified] Apartment  Current DME Used/Available at Home: None  Tub or Shower Type: Tub/Shower combination    Hand dominance: Right    EXAMINATION OF PERFORMANCE DEFICITS:  Cognitive/Behavioral Status:     Orientation Level: Oriented X4           Safety/Judgement: Awareness of environment; Insight into deficits    Skin:  intact    Edema: some swelling noted in R hand. Patient states she has been icing it. OT recommended elevation as well and reviewed 6 pack hand exercises. Patient states that \"nurse must have struck a nerve\" when putting in IV. Hearing: Auditory  Auditory Impairment: None    Vision/Perceptual:                 Able to read time on numerical clock. Corrective Lenses: Glasses    Range of Motion:  WFL    Strength:  WFL    Coordination:     Fine Motor Skills-Upper: Left Intact; Right Intact    Gross Motor Skills-Upper: Left Intact; Right Intact    Tone & Sensation:  WNL        Functional Mobility and Transfers for ADLs:  Bed Mobility:  Supine to Sit: Modified independent  Sit to Supine: Modified independent    Transfers:  Sit to Stand: Supervision  Stand to Sit: Supervision  Bed to Chair: Contact guard assistance  Toilet Transfer : Contact guard assistance    ADL Assessment:  Feeding: Modified independent    Oral Facial Hygiene/Grooming: Setup    Bathing: Modified independent    Upper Body Dressing: Modified independent    Lower Body Dressing: Modified independent         ADL Intervention and task modifications: Toilet transfer and ADLS EOB performed. Patient ambulates with CGA for toilet transfer but otherwise can complete ADLS Mod I/setup. Cognitive Retraining  Safety/Judgement: Awareness of environment; Insight into deficits    Functional Measure:  Barthel Index:    Bathin  Bladder: 10  Bowels: 10  Groomin  Dressing: 10  Feeding: 10  Mobility: 10  Stairs: 5  Toilet Use: 10  Transfer (Bed to Chair and Back): 10  Total: 85/100        Percentage of impairment   0%   1-19%   20-39%   40-59%   60-79%   80-99%   100%   Barthel Score 0-100 100 99-80 79-60 59-40 20-39 1-19   0     The Barthel ADL Index: Guidelines  1. The index should be used as a record of what a patient does, not as a record of what a patient could do. 2. The main aim is to establish degree of independence from any help, physical or verbal, however minor and for whatever reason. 3. The need for supervision renders the patient not independent. 4. A patient's performance should be established using the best available evidence. Asking the patient, friends/relatives and nurses are the usual sources, but direct observation and common sense are also important. However direct testing is not needed. 5. Usually the patient's performance over the preceding 24-48 hours is important, but occasionally longer periods will be relevant. 6. Middle categories imply that the patient supplies over 50 per cent of the effort. 7. Use of aids to be independent is allowed. Karime Stovall., Barthel, D.W. (6841). Functional evaluation: the Barthel Index. [facility] (14)2. SELINA Allen, Ghada Gamboa., [facility], [address] [place] [address] (). Measuring the change indisability after inpatient rehabilitation; comparison of the responsiveness of the Barthel Index and Functional Jerauld Measure. Journal of Neurology, Neurosurgery, and Psychiatry, 66(4), 403-281. NICO Cruz, LARRY Viramontes, Laura Morris M.A. (2004.) Assessment of post-stroke quality of life in cost-effectiveness studies: The usefulness of the Barthel Index and the EuroQoL-5D. Quality of Life Research, 15, 181-81       Occupational Therapy Evaluation Charge Determination   History Examination Decision-Making   LOW Complexity : Brief history review  LOW Complexity : 1-3 performance deficits relating to physical, cognitive , or psychosocial skils that result in activity limitations and / or participation restrictions  LOW Complexity : No comorbidities that affect functional and no verbal or physical assistance needed to complete eval tasks       Based on the above components, the patient evaluation is determined to be of the following complexity level: LOW   Pain:  Pain Scale 1: Numeric (0 - 10)  Pain Intensity 1: 0              Activity Tolerance:   Good     Please refer to the flowsheet for vital signs taken during this treatment. After treatment:   []  Patient left in no apparent distress sitting up in chair  [x]  Patient left in no apparent distress in bed  [x]  Call bell left within reach  [x]  Nursing notified  []  Caregiver present  []  Bed alarm activated    COMMUNICATION/EDUCATION:   Communication/Collaboration:  [x]      Home safety education was provided and the patient/caregiver indicated understanding. [x]      Patient/family have participated as able and agree with findings and recommendations. []      Patient is unable to participate in plan of care at this time.   Findings and recommendations were discussed with: Registered Nurse    Jaron Augustine OT

## 2019-03-17 NOTE — PROGRESS NOTES
Name: Esther Pollock MRN: [id number]   : 1960 Hospital: [address] [facility]   Date: 3/17/2019        IMPRESSION:   · ESRD on PD. Progressing well  · Electrolytes with hypokalemia  · Hyperphosphatemia of CKD- now PO4 is at target. · Hypotension  · Anemia of ESRD      PLAN:   · Continue the PD with 1.5% dextrose. · Replace K  · Check iron profile. Subjective/Interval History:   I have reviewed the flowsheet and previous days notes. ROS:A comprehensive review of systems was negative. Objective:   Vital Signs:    Visit Vitals  /77 (BP Patient Position: Sitting)   Pulse (!) 135   Temp 98.7 °F (37.1 °C)   Resp 17   Ht 5' 5\" (1.651 m)   Wt 64 kg (141 lb 1.5 oz)   SpO2 94%   BMI 23.48 kg/m²       O2 Device: Room air   O2 Flow Rate (L/min): 1 l/min   Temp (24hrs), Av.2 °F (36.8 °C), Min:98 °F (36.7 °C), Max:98.7 °F (37.1 °C)       Intake/Output:   Last shift:       07 -  1900  In: 2000   Out: 1700   Last 3 shifts: 03/15 190 -  0700  In: 1136 [P.O.:838; I.V.:200]  Out: 7600     Intake/Output Summary (Last 24 hours) at 3/17/2019 1242  Last data filed at 3/17/2019 6037  Gross per 24 hour   Intake 8280 ml   Output 7400 ml   Net 880 ml        Physical Exam:  General:    Alert, cooperative, no distress, appears stated age. Head:   Normocephalic, without obvious abnormality, atraumatic. Eyes:   Conjunctivae/corneas clear. Nose:  Nares normal. No drainage or sinus tenderness. Neck:  Supple, symmetrical,  no adenopathy, thyroid: non tender    no carotid bruit and no JVD. Back:    Symmetric,  No CVA tenderness. Lungs:   Clear to auscultation bilaterally. No Wheezing or Rhonchi. No rales. Chest wall:  No tenderness or deformity. No Accessory muscle use. Heart:   Regular rhythm, tachycardic.,  no murmur, rub or gallop. Abdomen:   Soft, non-tender. Distended. Bowel sounds normal. No masses  Extremities: Extremities normal, atraumatic, No cyanosis. No edema.  No clubbing  Skin:     Texture, turgor normal. No rashes or lesions. Not Jaundiced  Psych:  Good insight. Not depressed. Not anxious or agitated. Neurologic: Normal strength, Alert and oriented X 3. DATA:  Labs:  Recent Labs     03/17/19  0426 03/16/19  0256 03/15/19  0419   * 135* 134*   K 3.3* 3.3* 3.6   CL 96* 95* 95*   CO2 28 28 28   BUN 79* 88* 98*   CREA 14.70* 16.10* 17.90*   CA 7.0* 7.0* 7.3*   ALB  --   --  1.8*   PHOS 4.6 5.4* 6.5*   MG 2.2 2.2 2.2     Recent Labs     03/17/19  0426 03/16/19  0256 03/15/19  0419   WBC 11.6* 13.9* 15.1*   HGB 10.4* 10.9* 10.9*   HCT 32.1* 33.5* 33.0*   * 123* 110*     No results for input(s): ROMAN, KU, CLU, CREAU in the last 72 hours.     No lab exists for component: PROU    Total time spent with patient:  35 minutes    [] Critical Care Provided    Care Plan discussed with:   Staff, Medical Team    Vel Das MD

## 2019-03-18 LAB
ANION GAP SERPL CALC-SCNC: 11 MMOL/L (ref 5–15)
BACTERIA SPEC CULT: NORMAL
BUN SERPL-MCNC: 68 MG/DL (ref 6–20)
BUN/CREAT SERPL: 5 (ref 12–20)
CALCIUM SERPL-MCNC: 7.2 MG/DL (ref 8.5–10.1)
CHLORIDE SERPL-SCNC: 97 MMOL/L (ref 97–108)
CO2 SERPL-SCNC: 29 MMOL/L (ref 21–32)
CREAT SERPL-MCNC: 13.4 MG/DL (ref 0.55–1.02)
GLUCOSE BLD STRIP.AUTO-MCNC: 105 MG/DL (ref 65–100)
GLUCOSE BLD STRIP.AUTO-MCNC: 113 MG/DL (ref 65–100)
GLUCOSE BLD STRIP.AUTO-MCNC: 130 MG/DL (ref 65–100)
GLUCOSE BLD STRIP.AUTO-MCNC: 82 MG/DL (ref 65–100)
GLUCOSE SERPL-MCNC: 123 MG/DL (ref 65–100)
GRAM STN SPEC: NORMAL
GRAM STN SPEC: NORMAL
INR PPP: 1.3 (ref 0.9–1.1)
IRON SATN MFR SERPL: 33 % (ref 20–50)
IRON SERPL-MCNC: 52 UG/DL (ref 35–150)
MAGNESIUM SERPL-MCNC: 2 MG/DL (ref 1.6–2.4)
PHOSPHATE SERPL-MCNC: 4.4 MG/DL (ref 2.6–4.7)
POTASSIUM SERPL-SCNC: 3 MMOL/L (ref 3.5–5.1)
PROTHROMBIN TIME: 12.9 SEC (ref 9–11.1)
SERVICE CMNT-IMP: ABNORMAL
SERVICE CMNT-IMP: NORMAL
SERVICE CMNT-IMP: NORMAL
SODIUM SERPL-SCNC: 137 MMOL/L (ref 136–145)
TIBC SERPL-MCNC: 160 UG/DL (ref 250–450)

## 2019-03-18 PROCEDURE — 74011250637 HC RX REV CODE- 250/637: Performed by: INTERNAL MEDICINE

## 2019-03-18 PROCEDURE — A4722 DIALYS SOL FLD VOL > 1999CC: HCPCS | Performed by: INTERNAL MEDICINE

## 2019-03-18 PROCEDURE — 65660000000 HC RM CCU STEPDOWN

## 2019-03-18 PROCEDURE — 97530 THERAPEUTIC ACTIVITIES: CPT

## 2019-03-18 PROCEDURE — 74011250636 HC RX REV CODE- 250/636: Performed by: SPECIALIST

## 2019-03-18 PROCEDURE — 36415 COLL VENOUS BLD VENIPUNCTURE: CPT

## 2019-03-18 PROCEDURE — 84100 ASSAY OF PHOSPHORUS: CPT

## 2019-03-18 PROCEDURE — 97116 GAIT TRAINING THERAPY: CPT

## 2019-03-18 PROCEDURE — 85610 PROTHROMBIN TIME: CPT

## 2019-03-18 PROCEDURE — 83540 ASSAY OF IRON: CPT

## 2019-03-18 PROCEDURE — 74011250636 HC RX REV CODE- 250/636: Performed by: INTERNAL MEDICINE

## 2019-03-18 PROCEDURE — 74011250637 HC RX REV CODE- 250/637: Performed by: NURSE PRACTITIONER

## 2019-03-18 PROCEDURE — 82962 GLUCOSE BLOOD TEST: CPT

## 2019-03-18 PROCEDURE — 74011250637 HC RX REV CODE- 250/637: Performed by: SPECIALIST

## 2019-03-18 PROCEDURE — 83735 ASSAY OF MAGNESIUM: CPT

## 2019-03-18 PROCEDURE — 80048 BASIC METABOLIC PNL TOTAL CA: CPT

## 2019-03-18 RX ORDER — ACETAMINOPHEN 325 MG/1
650 TABLET ORAL
Status: DISCONTINUED | OUTPATIENT
Start: 2019-03-18 | End: 2019-03-19 | Stop reason: HOSPADM

## 2019-03-18 RX ORDER — POTASSIUM CHLORIDE 750 MG/1
20 TABLET, FILM COATED, EXTENDED RELEASE ORAL 2 TIMES DAILY WITH MEALS
Status: DISCONTINUED | OUTPATIENT
Start: 2019-03-18 | End: 2019-03-19 | Stop reason: HOSPADM

## 2019-03-18 RX ORDER — LANOLIN ALCOHOL/MO/W.PET/CERES
3 CREAM (GRAM) TOPICAL
Status: DISCONTINUED | OUTPATIENT
Start: 2019-03-18 | End: 2019-03-19 | Stop reason: HOSPADM

## 2019-03-18 RX ADMIN — AMIODARONE HYDROCHLORIDE 200 MG: 200 TABLET ORAL at 08:57

## 2019-03-18 RX ADMIN — HEPARIN SODIUM 5000 UNITS: 5000 INJECTION INTRAVENOUS; SUBCUTANEOUS at 22:47

## 2019-03-18 RX ADMIN — MELATONIN TAB 3 MG 3 MG: 3 TAB at 00:44

## 2019-03-18 RX ADMIN — SEVELAMER CARBONATE 1600 MG: 800 TABLET, FILM COATED ORAL at 09:00

## 2019-03-18 RX ADMIN — ASPIRIN 81 MG: 81 TABLET ORAL at 09:01

## 2019-03-18 RX ADMIN — ACETAMINOPHEN 650 MG: 325 TABLET ORAL at 08:57

## 2019-03-18 RX ADMIN — HEPARIN SODIUM 5000 UNITS: 5000 INJECTION INTRAVENOUS; SUBCUTANEOUS at 14:09

## 2019-03-18 RX ADMIN — POTASSIUM CHLORIDE 20 MEQ: 750 TABLET, EXTENDED RELEASE ORAL at 17:27

## 2019-03-18 RX ADMIN — ONDANSETRON 4 MG: 2 INJECTION INTRAMUSCULAR; INTRAVENOUS at 15:33

## 2019-03-18 RX ADMIN — PANTOPRAZOLE SODIUM 40 MG: 40 TABLET, DELAYED RELEASE ORAL at 06:36

## 2019-03-18 RX ADMIN — WARFARIN SODIUM 3 MG: 2 TABLET ORAL at 17:27

## 2019-03-18 RX ADMIN — SODIUM CHLORIDE, SODIUM LACTATE, CALCIUM CHLORIDE, MAGNESIUM CHLORIDE AND DEXTROSE 2000 ML: 1.5; 538; 448; 18.3; 5.08 INJECTION, SOLUTION INTRAPERITONEAL at 18:36

## 2019-03-18 RX ADMIN — SODIUM CHLORIDE, SODIUM LACTATE, CALCIUM CHLORIDE, MAGNESIUM CHLORIDE AND DEXTROSE 2000 ML: 1.5; 538; 448; 18.3; 5.08 INJECTION, SOLUTION INTRAPERITONEAL at 22:41

## 2019-03-18 RX ADMIN — SENNOSIDES AND DOCUSATE SODIUM 1 TABLET: 8.6; 5 TABLET ORAL at 09:08

## 2019-03-18 RX ADMIN — SODIUM CHLORIDE, SODIUM LACTATE, CALCIUM CHLORIDE, MAGNESIUM CHLORIDE AND DEXTROSE 2000 ML: 1.5; 538; 448; 18.3; 5.08 INJECTION, SOLUTION INTRAPERITONEAL at 14:07

## 2019-03-18 RX ADMIN — SODIUM CHLORIDE, SODIUM LACTATE, CALCIUM CHLORIDE, MAGNESIUM CHLORIDE AND DEXTROSE 2000 ML: 1.5; 538; 448; 18.3; 5.08 INJECTION, SOLUTION INTRAPERITONEAL at 09:44

## 2019-03-18 RX ADMIN — Medication 10 ML: at 14:10

## 2019-03-18 RX ADMIN — ONDANSETRON 4 MG: 2 INJECTION INTRAMUSCULAR; INTRAVENOUS at 08:57

## 2019-03-18 RX ADMIN — AMIODARONE HYDROCHLORIDE 200 MG: 200 TABLET ORAL at 17:25

## 2019-03-18 RX ADMIN — SEVELAMER CARBONATE 1600 MG: 800 TABLET, FILM COATED ORAL at 14:09

## 2019-03-18 RX ADMIN — Medication 30 ML: at 22:48

## 2019-03-18 RX ADMIN — GENTAMICIN SULFATE: 1 CREAM TOPICAL at 22:53

## 2019-03-18 RX ADMIN — ONDANSETRON 4 MG: 2 INJECTION INTRAMUSCULAR; INTRAVENOUS at 05:16

## 2019-03-18 RX ADMIN — HEPARIN SODIUM 5000 UNITS: 5000 INJECTION INTRAVENOUS; SUBCUTANEOUS at 05:16

## 2019-03-18 RX ADMIN — POTASSIUM CHLORIDE 20 MEQ: 750 TABLET, EXTENDED RELEASE ORAL at 09:00

## 2019-03-18 RX ADMIN — CINACALCET HYDROCHLORIDE 30 MG: 30 TABLET, COATED ORAL at 17:25

## 2019-03-18 RX ADMIN — DILTIAZEM HYDROCHLORIDE 120 MG: 120 CAPSULE, COATED, EXTENDED RELEASE ORAL at 18:45

## 2019-03-18 RX ADMIN — Medication 10 ML: at 05:17

## 2019-03-18 NOTE — PROGRESS NOTES
Name: Santiago Reddy MRN: [id number]   : 1960 Hospital: [facility]   Date: 3/18/2019        IMPRESSION:   · ESRD on PD. Progressing well  · Electrolytes with hypokalemia  · Hyperphosphatemia of CKD- now PO4 is at target. · Hypotension- improved  · Anemia of ESRD      PLAN:   · Continue the PD with 1.5% dextrose. · Replace K but adjust dose to bid  · Check iron profile. Subjective/Interval History:   I have reviewed the flowsheet and previous days notes. ROS:dizziness yesterday while ambulating, related to recurrent SVT. Has nausea exacerbated by certain meds. No abd pain, CP or SOB. No edema    Objective:   Vital Signs:    Visit Vitals  /74 (BP 1 Location: Right arm, BP Patient Position: At rest)   Pulse 66   Temp 98.2 °F (36.8 °C)   Resp 17   Ht 5' 5\" (1.651 m)   Wt 68.1 kg (150 lb 2.1 oz)   SpO2 97%   BMI 24.98 kg/m²       O2 Device: Room air   O2 Flow Rate (L/min): 1 l/min   Temp (24hrs), Av.3 °F (36.8 °C), Min:98.2 °F (36.8 °C), Max:98.5 °F (36.9 °C)       Intake/Output:   Last shift:      No intake/output data recorded. Last 3 shifts:  1901 -  0700  In: 51877.7 [P.O.:1130; I.V.:30.7]  Out: 9200     Intake/Output Summary (Last 24 hours) at 3/18/2019 0800  Last data filed at 3/18/2019 0688  Gross per 24 hour   Intake 8680.67 ml   Output 7300 ml   Net 1380.67 ml        Physical Exam:  General:    Alert, cooperative, no distress, appears stated age. Head:   Normocephalic, without obvious abnormality, atraumatic. Eyes:   Conjunctivae/corneas clear. Neck:  Supple, symmetrical,  no adenopathy, thyroid: non tender    no carotid bruit and no JVD. Back:    Symmetric,  No CVA tenderness. Lungs:   Clear to auscultation bilaterally. No Wheezing or Rhonchi. No rales. Chest wall:  No tenderness or deformity. No Accessory muscle use. Heart:   Regular rhythm, with occas extrasystole. ,  no murmur, rub or gallop. Abdomen:   Soft, non-tender.  Distended with PD fluid. Bowel sounds normal. No masses  Extremities: Extremities normal, atraumatic, No cyanosis. No edema. No clubbing  Skin:     Texture, turgor normal. No rashes or lesions. Not Jaundiced  Psych:  Good insight. Not depressed. Not anxious or agitated. Neurologic: Normal strength, Alert and oriented X 3. DATA:  Labs:  Recent Labs     03/18/19  0023 03/17/19  0426 03/16/19  0256    133* 135*   K 3.0* 3.3* 3.3*   CL 97 96* 95*   CO2 29 28 28   BUN 68* 79* 88*   CREA 13.40* 14.70* 16.10*   CA 7.2* 7.0* 7.0*   PHOS 4.4 4.6 5.4*   MG 2.0 2.2 2.2     Recent Labs     03/17/19 0426 03/16/19  0256   WBC 11.6* 13.9*   HGB 10.4* 10.9*   HCT 32.1* 33.5*   * 123*     No results for input(s): ROMAN, KU, CLU, CREAU in the last 72 hours.     No lab exists for component: PROU    Total time spent with patient:  35 minutes    [] Critical Care Provided    Care Plan discussed with:      Bebeto Israel MD

## 2019-03-18 NOTE — PROGRESS NOTES
[facility]  Pulmonary, Critical Care, and Sleep Medicine    Name: Crissy Segundo MRN: [id number]   : 1960 Hospital: [facility][1]   Date: 3/18/2019 10:20 am           IMPRESSION:   · Acute hypoxemic respiratory failure  · Shock - off pressors 3/14. BP BP better  · SVT, h/o afib: back on PO amiodarone  · ams / metabolic encephalopathy: resolved  · H/o cardiomyopathy (EF21-25%) s/p ICD, mild-moderate AR  · ESRD on PD, h/o PCKD:  Had PD this morning  · GERD  · hyperglycemia      RECOMMENDATIONS:   · Supplemental O2 as needed to keep sats > 90%  · Rhythm per cardiology- PO amiodarone  · PD per Renal  · continue SSI  · Continue ASA  · Warfarin  · Cardiac CT today  · Central line to be removed before discharge. Pt is a very difficult stick. · Potential discharge to rehab    DVT ppx: on Warfarin  GI ppx: protonix     Subjective/History:       Overnight events:  Denies SOB. Some nausea from the multiple medications. Off of all pressors and amio gtt. History:  Ms. Izabella Gonzales is a 62yo female w/ history of cardiomyopathy (EF 21-25%) s/p ICD, afib/SVT, ESRD on PD, HTN, PCKD and GERD who presented to the ER on 3/11 w/ complaints of sore throat, shortness of breath, dry cough, n/v/d, dysarthria and memory loss (doesn't remember any events of Saturday and missed PD). She had issues with SVT.        *all cultures NGTD  *peritoneal fluid not c/w SBP  *RVP negative  ------------------------------------------------------------      Past Medical History:   Diagnosis Date    Anemia associated with chronic renal failure     Anuria     Arrhythmia     Paroxysmal a fib, paroxsymal atrial tach, SVT    Chronic kidney disease     ESRD- on PD    Chronic systolic heart failure ([facility] [address] 10/31/2012    Chronic tachycardia     GERD (gastroesophageal reflux disease)     Hx of non-ST elevation myocardial infarction (NSTEMI)     Hypertension     Lipoma of right lower extremity     Foot  Peritoneal dialysis catheter in place [facility]) 2014    Nightime exchanges    Polycystic kidney disease 2014      Past Surgical History:   Procedure Laterality Date    HX HEART CATHETERIZATION  12/2015    no interventions    HX PACEMAKER  12/29/2015    ICD    IR INSERT NON TUNL CVC OVER 5 YRS  3/13/2019      Prior to Admission medications    Medication Sig Start Date End Date Taking? Authorizing Provider   dilTIAZem CD (CARDIZEM CD) 120 mg ER capsule Take 1 Cap by mouth daily. 2/8/19  Yes Aung Breaux NP   aspirin delayed-release 81 mg tablet Take 81 mg by mouth daily. Yes Provider, Historical   spironolactone (ALDACTONE) 50 mg tablet Take 50 mg by mouth every morning. Yes Provider, Historical   potassium chloride (KLOR-CON M20) 20 mEq tablet Take 40 mEq by mouth every morning. Yes Provider, Historical   carvedilol (COREG) 25 mg tablet Take 1 Tab by mouth two (2) times daily (with meals). 9/18/18  Yes Chris Amaya NP   omeprazole (PRILOSEC) 20 mg capsule Take 20 mg by mouth daily as needed (indigestion). Yes Provider, Historical   SENNA-DOCUSATE SODIUM PO Take 1 Tab by mouth daily as needed (constipation). Yes Provider, Historical   cinacalcet (SENSIPAR) 30 mg tablet Take 30 mg by mouth daily (with dinner). Yes Provider, Historical   ferric citrate (AURYXIA) 210 mg iron tablet Take 210 mg by mouth three (3) times daily (with meals). Yes Provider, Historical   gentamicin (GARAMYCIN) 0.1 % topical cream Apply  to affected area nightly.  7/12/14  Yes Provider, Historical     Current Facility-Administered Medications   Medication Dose Route Frequency    potassium chloride SR (KLOR-CON 10) tablet 20 mEq  20 mEq Oral BID WITH MEALS    Warfarin pharmacy to dose daily   Other Rx Dosing/Monitoring    dilTIAZem (CARDIZEM) 125 mg in dextrose 5% 125 mL infusion  0-15 mg/hr IntraVENous TITRATE    dilTIAZem CD (CARDIZEM CD) capsule 120 mg  120 mg Oral DAILY    amiodarone (CORDARONE) 375 mg in dextrose 5% 250 mL infusion  0.5-1 mg/min IntraVENous TITRATE    pantoprazole (PROTONIX) tablet 40 mg  40 mg Oral ACB    sevelamer carbonate (RENVELA) tab 1,600 mg  1,600 mg Oral TID WITH MEALS    insulin lispro (HUMALOG) injection   SubCUTAneous AC&HS    amiodarone (CORDARONE) tablet 200 mg  200 mg Oral BID WITH MEALS    gentamicin (GARAMYCIN) 0.1 % cream   Topical QHS    heparin (porcine) injection 5,000 Units  5,000 Units SubCUTAneous Q8H    aspirin delayed-release tablet 81 mg  81 mg Oral DAILY    cinacalcet (SENSIPAR) tablet 30 mg  30 mg Oral DAILY WITH DINNER    sodium chloride (NS) flush 5-40 mL  5-40 mL IntraVENous Q8H    peritoneal dialysis DEXTROSE 1.5% (2.5 mEq/L low calcium) solution 2,000 mL  2,000 mL IntraPERitoneal QID    hydrocortisone (ANUSOL-HC) 2.5 % rectal cream   PeriANAL QID     Allergies   Allergen Reactions    Iodinated Contrast- Oral And Iv Dye Angioedema     Cellulitis on side of face after test      Social History     Tobacco Use    Smoking status: Never Smoker    Smokeless tobacco: Never Used   Substance Use Topics    Alcohol use: No      Family History   Problem Relation Age of Onset    Diabetes Brother     Hypertension Brother     Hypertension Mother     Arthritis-osteo Mother     Hypertension Sister     Diabetes Sister     Kidney Disease Father         polycystic    Parkinson's Disease Father     Heart Disease Father     Hypertension Father     Hypertension Brother             Objective:   Vital Signs:    Visit Vitals  /76   Pulse 67   Temp 98.2 °F (36.8 °C)   Resp 10   Ht 5' 5\" (1.651 m)   Wt 68.1 kg (150 lb 2.1 oz)   SpO2 97%   BMI 24.98 kg/m²       O2 Device: Room air   O2 Flow Rate (L/min): 1 l/min   Temp (24hrs), Av.3 °F (36.8 °C), Min:98.2 °F (36.8 °C), Max:98.5 °F (36.9 °C)       Intake/Output:   Last shift:      701 - 1900  In: 0 [P.O.:120]  Out:    Last 3 shifts: 1901 -  07  In: 42821.7 [P.O.:1130; I.V.:30.7]  Out: 9200     Intake/Output Summary (Last 24 hours) at 3/18/2019 1043  Last data filed at 3/18/2019 0956  Gross per 24 hour   Intake 8800.67 ml   Output 7800 ml   Net 1000.67 ml         Physical Exam:    General:  Alert, awake, on RA   Head:  Normocephalic, atraumatic   Eyes:  Conjunctiva clear, EOMI   Throat: Moist mucous membranes   Neck: Supple, symmetrical, trachea midline,   Lungs:   Diminished but clear   Chest wall:  .normal   Heart:  Regular rate and rhythm, S1, S2 normal, no murmur, click, rub or gallop. Mildy tachy   Abdomen:   Soft, non-tender, distended. Bowel sounds normal.   Extremities: Extremities normal, atraumatic, no cyanosis or edema. Pulses:  present   Skin: .  No rashes or lesions   Lymph nodes:  no nodes noted   Neurologic: Grossly nonfocal; Psych: normal affect       Data:     Recent Results (from the past 24 hour(s))   GLUCOSE, POC    Collection Time: 03/17/19 11:58 AM   Result Value Ref Range    Glucose (POC) 128 (H) 65 - 100 mg/dL    Performed by [address] [place] [address], POC    Collection Time: 03/17/19  5:33 PM   Result Value Ref Range    Glucose (POC) 95 65 - 100 mg/dL    Performed by [address] [place] [address], POC    Collection Time: 03/17/19  8:45 PM   Result Value Ref Range    Glucose (POC) 101 (H) 65 - 100 mg/dL    Performed by Tracie Navarro + INR    Collection Time: 03/18/19 12:23 AM   Result Value Ref Range    INR 1.3 (H) 0.9 - 1.1      Prothrombin time 12.9 (H) 9.0 - 24.1 sec   METABOLIC PANEL, BASIC    Collection Time: 03/18/19 12:23 AM   Result Value Ref Range    Sodium 137 136 - 145 mmol/L    Potassium 3.0 (L) 3.5 - 5.1 mmol/L    Chloride 97 97 - 108 mmol/L    CO2 29 21 - 32 mmol/L    Anion gap 11 5 - 15 mmol/L    Glucose 123 (H) 65 - 100 mg/dL    BUN 68 (H) 6 - 20 MG/DL    Creatinine 13.40 (H) 0.55 - 1.02 MG/DL    BUN/Creatinine ratio 5 (L) 12 - 20      GFR est AA 3 (L) >60 ml/min/1.73m2    GFR est non-AA 3 (L) >60 ml/min/1.73m2    Calcium 7.2 (L) 8.5 - 10.1 MG/DL   PHOSPHORUS    Collection Time: 03/18/19 12:23 AM   Result Value Ref Range    Phosphorus 4.4 2.6 - 4.7 MG/DL   MAGNESIUM    Collection Time: 03/18/19 12:23 AM   Result Value Ref Range    Magnesium 2.0 1.6 - 2.4 mg/dL   IRON PROFILE    Collection Time: 03/18/19 12:23 AM   Result Value Ref Range    Iron 52 35 - 150 ug/dL    TIBC 160 (L) 250 - 450 ug/dL    Iron % saturation 33 20 - 50 %   GLUCOSE, POC    Collection Time: 03/18/19  8:39 AM   Result Value Ref Range    Glucose (POC) 82 65 - 100 mg/dL    Performed by Jane Dejesus                  Imaging:  I have personally reviewed the patients radiographs and have reviewed the reports:  CXR w/ bilateral effusions. No obvious infiltrate.   No new films today (3/18)        Discussed with nursing and patient    Tee Jackson MD,  [facility]

## 2019-03-18 NOTE — PROGRESS NOTES
Marcial Alatorre [facility]  [address] [facility], [address] [place] [address]  [phone]      Medical Progress Note      NAME: Marion Mesa   :  1960  MRM:  [id number]    Date/Time: 3/18/2019         Subjective:     Chief Complaint:  Patient was seen and examined by me. Chart reviewed. Now back in NSR. Still with weakness       Objective:       Vitals:       Last 24hrs VS reviewed since prior progress note.  Most recent are:    Visit Vitals  /76   Pulse 67   Temp 98.2 °F (36.8 °C)   Resp 10   Ht 5' 5\" (1.651 m)   Wt 68.1 kg (150 lb 2.1 oz)   SpO2 97%   BMI 24.98 kg/m²     SpO2 Readings from Last 6 Encounters:   19 97%   19 99%   18 99%   18 100%   18 97%   18 97%    O2 Flow Rate (L/min): 1 l/min       Intake/Output Summary (Last 24 hours) at 3/18/2019 0836  Last data filed at 3/18/2019 8057  Gross per 24 hour   Intake 8680.67 ml   Output 7300 ml   Net 1380.67 ml        Exam:     Physical Exam:    Gen:  Frail, ill-appearing, NAD  HEENT:  Pink conjunctivae, PERRL, hearing intact to voice, moist mucous membranes  Neck:  Supple, without masses, thyroid non-tender  Resp:  No accessory muscle use, clear breath sounds without wheezes rales or rhonchi  Card:  2/6 murmurs, normal S1, S2 without thrills, trace edema  Abd:  Soft, non-tender, non-distended, normoactive bowel sounds are present  Musc:  No cyanosis or clubbing  Skin:  No rashes   Neuro:  Cranial nerves 3-12 are grossly intact, follows commands appropriately  Psych:  Good insight, oriented to person, place and time, alert    Medications Reviewed: (see below)    Lab Data Reviewed: (see below)    ______________________________________________________________________    Medications:     Current Facility-Administered Medications   Medication Dose Route Frequency    melatonin tablet 3 mg  3 mg Oral QHS PRN    potassium chloride SR (KLOR-CON 10) tablet 20 mEq  20 mEq Oral BID WITH MEALS    Warfarin pharmacy to dose daily   Other Rx Dosing/Monitoring    dilTIAZem (CARDIZEM) 125 mg in dextrose 5% 125 mL infusion  0-15 mg/hr IntraVENous TITRATE    dilTIAZem CD (CARDIZEM CD) capsule 120 mg  120 mg Oral DAILY    amiodarone (CORDARONE) 375 mg in dextrose 5% 250 mL infusion  0.5-1 mg/min IntraVENous TITRATE    pantoprazole (PROTONIX) tablet 40 mg  40 mg Oral ACB    sevelamer carbonate (RENVELA) tab 1,600 mg  1,600 mg Oral TID WITH MEALS    insulin lispro (HUMALOG) injection   SubCUTAneous AC&HS    glucose chewable tablet 16 g  4 Tab Oral PRN    dextrose (D50W) injection syrg 12.5-25 g  12.5-25 g IntraVENous PRN    glucagon (GLUCAGEN) injection 1 mg  1 mg IntraMUSCular PRN    amiodarone (CORDARONE) tablet 200 mg  200 mg Oral BID WITH MEALS    gentamicin (GARAMYCIN) 0.1 % cream   Topical QHS    heparin (porcine) injection 5,000 Units  5,000 Units SubCUTAneous Q8H    aspirin delayed-release tablet 81 mg  81 mg Oral DAILY    cinacalcet (SENSIPAR) tablet 30 mg  30 mg Oral DAILY WITH DINNER    senna-docusate (PERICOLACE) 8.6-50 mg per tablet 1 Tab  1 Tab Oral DAILY PRN    sodium chloride (NS) flush 5-40 mL  5-40 mL IntraVENous Q8H    sodium chloride (NS) flush 5-40 mL  5-40 mL IntraVENous PRN    ondansetron (ZOFRAN) injection 4 mg  4 mg IntraVENous Q4H PRN    peritoneal dialysis DEXTROSE 1.5% (2.5 mEq/L low calcium) solution 2,000 mL  2,000 mL IntraPERitoneal QID    hydrocortisone (ANUSOL-HC) 2.5 % rectal cream   PeriANAL QID          Lab Review:     Recent Labs     03/17/19  0426 03/16/19  0256   WBC 11.6* 13.9*   HGB 10.4* 10.9*   HCT 32.1* 33.5*   * 123*     Recent Labs     03/18/19  0023 03/17/19  0426 03/16/19  0256    133* 135*   K 3.0* 3.3* 3.3*   CL 97 96* 95*   CO2 29 28 28   * 96 94   BUN 68* 79* 88*   CREA 13.40* 14.70* 16.10*   CA 7.2* 7.0* 7.0*   MG 2.0 2.2 2.2   PHOS 4.4 4.6 5.4*   INR 1.3* 1.2* 1.2*     Lab Results   Component Value Date/Time    Glucose (POC) 101 (H) 03/17/2019 08:45 PM    Glucose (POC) 95 03/17/2019 05:33 PM    Glucose (POC) 128 (H) 03/17/2019 11:58 AM    Glucose (POC) 92 03/16/2019 10:00 PM    Glucose (POC) 128 (H) 03/16/2019 04:13 PM          Assessment / Plan: Active Problems:    59 yo hx of HTN, Pafib, NICM EF 20-25% s/p ICD, ESRD on PD, presented w/ AMS, SVT, cardiogenic shock, elevated Trop    1) Cardiogenic shock/acute on chronic systolic CHF: EF 28-79%, s/p ICD. Now resolved. Off levophed 03/15. Will cont to monitor closely    2) SVT/Pafib: now in NSR. Off amio gtt on 03/17. Cont oral amio, dilt, coumadin. Cards/EP following, consider ablation as an outpatient    3) Acute metabolic encephalopathy: now resolved. Likely from shock, uremia. Head CT unremarkable. EEG neg. Neuro was following    4) HTN: cont dilt    5) ESRD: on PD. Management per Renal    6) Hypokalemia: cont repletion    7) Debility: cont PT/OT.   CM working on rehab    Total time spent with patient: 20 895 [address] discussed with: Patient, nursing, cards, CM    Discussed:  Care Plan    Prophylaxis:  Coumadin    Disposition:  [facility]/Rehab           ___________________________________________________    Attending Physician: Alvin Segundo MD

## 2019-03-18 NOTE — PROGRESS NOTES
Cardiology Progress Note         NAME:  Isabel Anderson   :   1960   MRN:   [id number]     Assessment/Plan:   1). SVT - back in SR this am, IV amio and IV dilt are off. Cont po of both.      2) PAF  - pharmacy dosing warfarin (1.3)   - was not able to afford [address] ([facility]) PTA      3) Hypotension   - is off of pressors but BP remains lowish     4) Chronic systolic HF  - volume status compensated  - her usual cardiomyopathy regimen held due to low BP      5) ESRD on PD           Subjective:   Cardiac ROS: Patient denies any exertional chest pain, dyspnea, palpitations, syncope, orthopnea, edema or paroxysmal nocturnal dyspnea. Previous Cardiac Eval  ECHO: 10/14/12: EF 45% w/ posterior HK Grade 1 DD, LAE, mild MR, mild-mod TR RVSP 60 mmHG   Cath: 10/16/12: Normal cors. No AVG. Mild pulm HTN (). Low-normal filling pressures. Echo 10/19/13 - EF 25- 30%. Severe diffuse hypokinesis. Mildly increased wall thickness. Mild concentric hypertrophy. Doppler parameters were consistent with a reversible restrictive pattern, indicative of decreased left ventricular  diastolic compliance and/or increased left atrial pressure (grade 3 diastolic dysfunction). LA mildly dilated, mild MR, mild TR, mod PA HTN, small pericardial effusion circumferential to the heart, no evidence of hemodynamic compromise. Echo 11/2/15 - EF 25-30%, global HK, mild MR  Lexiscan cardiolite 11/24/15 - focal anteroapical ischemia    Cath 12/15/2015 - EDP 10, LV dilated, EF 20% global HK, normal cors with right dominance. Unable to cannulate femoral vein. 12/29/15-implantation of a single-chamber [address] per Dr. Blakely Said    Echo 17 - EF 20 %. Severe diffuse hypokinesis. Mild LAE. Mild MR. Mild Pulm HTN. Small pericardial effusion. No significant change when compared to study 2015. Echo 18- LVEF 28%, severe LAE, PA systolic 50 mmHg    LHC / RHC 2018:   No sig CAD, RA 10, RV 39/5, W 23, PA 35/20/29, PA Sat 47.1%, Ao Sat 83.2%, /7/24 Ao 119/79/90, CO F 3.94, CI F 2.4, CO T 2.75, CI T 1.68        Review of Systems: No nausea, indigestion, vomiting, pain, cough, sputum. No bleeding. Taking po. Appetite ok          Objective:     Visit Vitals  /69 (BP 1 Location: Right arm, BP Patient Position: At rest)   Pulse 66   Temp 98.2 °F (36.8 °C)   Resp 19   Ht 5' 5\" (1.651 m)   Wt 150 lb 2.1 oz (68.1 kg)   SpO2 92%   BMI 24.98 kg/m²    O2 Flow Rate (L/min): 1 l/min O2 Device: Room air    Temp (24hrs), Av.3 °F (36.8 °C), Min:98.2 °F (36.8 °C), Max:98.5 °F (36.9 °C)      No intake/output data recorded.  1901 -  0700  In: 80749.7 [P.O.:1130; I.V.:30.7]  Out: 9200   TELE: SR occ PVC     General: AAOx3 cooperative, no acute distress. HEENT: Atraumatic. Pink and moist.  Anicteric sclerae. Neck : Supple    Lungs: CTA bilaterally. No wheezing/rhonchi/rales. Heart: Regular rhythm, no murmur. No carotid bruits. Abdomen: PD catheter intact. Soft, non-distended, non-tender. + Bowel sounds. Extremities: No edema,  Neurologic: Grossly intact. Alert and oriented X 3. No acute neurological distress. Psych: Fair insight. Not anxious or agitated. Care Plan discussed with:    Comments   Patient x    Family      RN x    Care Manager                    Consultant:          Data Review:     No lab exists for component: ITNL   No results for input(s): CPK, CKMB, TROIQ in the last 72 hours.   Recent Labs     19  0023 19  0426 19  0256    133* 135*   K 3.0* 3.3* 3.3*   CL 97 96* 95*   CO2 29 28 28   BUN 68* 79* 88*   CREA 13.40* 14.70* 16.10*   * 96 94   PHOS 4.4 4.6 5.4*   MG 2.0 2.2 2.2   WBC  --  11.6* 13.9*   HGB  --  10.4* 10.9*   HCT  --  32.1* 33.5*   PLT  --  128* 123*     Recent Labs     19  0023 19  0426 19  0256   INR 1.3* 1.2* 1.2*   PTP 12.9* 11.6* 11.7*       Medications reviewed  Current Facility-Administered Medications   Medication Dose Route Frequency    melatonin tablet 3 mg  3 mg Oral QHS PRN    Warfarin pharmacy to dose daily   Other Rx Dosing/Monitoring    dilTIAZem (CARDIZEM) 125 mg in dextrose 5% 125 mL infusion  0-15 mg/hr IntraVENous TITRATE    potassium chloride SR (KLOR-CON 10) tablet 40 mEq  40 mEq Oral DAILY    dilTIAZem CD (CARDIZEM CD) capsule 120 mg  120 mg Oral DAILY    amiodarone (CORDARONE) 375 mg in dextrose 5% 250 mL infusion  0.5-1 mg/min IntraVENous TITRATE    pantoprazole (PROTONIX) tablet 40 mg  40 mg Oral ACB    sevelamer carbonate (RENVELA) tab 1,600 mg  1,600 mg Oral TID WITH MEALS    insulin lispro (HUMALOG) injection   SubCUTAneous AC&HS    glucose chewable tablet 16 g  4 Tab Oral PRN    dextrose (D50W) injection syrg 12.5-25 g  12.5-25 g IntraVENous PRN    glucagon (GLUCAGEN) injection 1 mg  1 mg IntraMUSCular PRN    amiodarone (CORDARONE) tablet 200 mg  200 mg Oral BID WITH MEALS    gentamicin (GARAMYCIN) 0.1 % cream   Topical QHS    heparin (porcine) injection 5,000 Units  5,000 Units SubCUTAneous Q8H    aspirin delayed-release tablet 81 mg  81 mg Oral DAILY    cinacalcet (SENSIPAR) tablet 30 mg  30 mg Oral DAILY WITH DINNER    senna-docusate (PERICOLACE) 8.6-50 mg per tablet 1 Tab  1 Tab Oral DAILY PRN    sodium chloride (NS) flush 5-40 mL  5-40 mL IntraVENous Q8H    sodium chloride (NS) flush 5-40 mL  5-40 mL IntraVENous PRN    ondansetron (ZOFRAN) injection 4 mg  4 mg IntraVENous Q4H PRN    peritoneal dialysis DEXTROSE 1.5% (2.5 mEq/L low calcium) solution 2,000 mL  2,000 mL IntraPERitoneal QID    hydrocortisone (ANUSOL-HC) 2.5 % rectal cream   PeriANAL QID         Lisa Gomez NP

## 2019-03-18 NOTE — PROGRESS NOTES
Problem: Mobility Impaired (Adult and Pediatric)  Goal: *Acute Goals and Plan of Care (Insert Text)  Physical Therapy Goals  Initiated 3/15/2019  1. Patient will move from supine to sit and sit to supine  in bed with modified independence within 7 day(s). 2.  Patient will transfer from bed to chair and chair to bed with modified independence using the least restrictive device within 7 day(s). 3.  Patient will perform sit to stand with modified independence within 7 day(s). 4.  Patient will ambulate with modified independence for 150 feet with the least restrictive device within 7 day(s). 5.  Patient will ascend/descend 6 stairs with 1 handrail(s) with modified independence within 7 day(s). physical Therapy TREATMENT  Patient: Tammi Simmons (26 y.o. female)  Date: 3/18/2019  Diagnosis: SVT (supraventricular tachycardia) (HCC) [I47.1]  SVT (supraventricular tachycardia) (HCC) [I47.1] <principal problem not specified>      Precautions: Fall  Chart, physical therapy assessment, plan of care and goals were reviewed. ASSESSMENT:  Pt received in bed, agreeable to participate with physical therapy.  at rest. Mod I to come to sitting EOB, reports mild dizziness and nausea with positional change. Sit<>stand with SBA without AD. Gait training x 50' with CGA, pt demonstrates slow, narrow ALFRED. no LOB. Pt returned to sitting EOB (+)emesis RN notified. 's-140's post activity. /87 Recovered to 120's once patient settled in bed. RN aware. She is below her baseline of independent with functional mobility and would benefit from rehab at discharge   Progression toward goals:  []    Improving appropriately and progressing toward goals  [x]    Improving slowly and progressing toward goals  []    Not making progress toward goals and plan of care will be adjusted     PLAN:  Patient continues to benefit from skilled intervention to address the above impairments.   Continue treatment per established plan of care.  Discharge Recommendations:  Inpatient Rehab  Further Equipment Recommendations for Discharge:  none     SUBJECTIVE:   Patient stated i am so weak.     OBJECTIVE DATA SUMMARY:   Critical Behavior:  Neurologic State: Alert  Orientation Level: Oriented X4  Cognition: Follows commands, Appropriate safety awareness, Appropriate decision making, Appropriate for age attention/concentration  Safety/Judgement: Awareness of environment, Insight into deficits  Functional Mobility Training:  Bed Mobility:     Supine to Sit: Modified independent  Sit to Supine: Stand-by assistance           Transfers:  Sit to Stand: Stand-by assistance  Stand to Sit: Stand-by assistance                             Balance:  Sitting: Intact  Standing: Intact; Without support  Ambulation/Gait Training:  Distance (ft): 50 Feet (ft)  Assistive Device: Gait belt  Ambulation - Level of Assistance: Contact guard assistance        Gait Abnormalities: Decreased step clearance        Base of Support: Narrowed                             Stairs:              Neuro Re-Education:    Therapeutic Exercises:     Pain:  Pain Scale 1: Numeric (0 - 10)  Pain Intensity 1: 0              Activity Tolerance:   fair  Please refer to the flowsheet for vital signs taken during this treatment.   After treatment:   []    Patient left in no apparent distress sitting up in chair  [x]    Patient left in no apparent distress in bed  [x]    Call bell left within reach  [x]    Nursing notified  []    Caregiver present  []    Bed alarm activated    COMMUNICATION/COLLABORATION:   The patients plan of care was discussed with: Registered Nurse    Faby Lockett   Time Calculation: 23 mins

## 2019-03-18 NOTE — PROGRESS NOTES
I talked with the liason from [address] [place] [address] for [facility] to make a determination about whether or not pt will be accepted to rehab,they are waiting I=on PT and OT notes to review today. Once the notes have been submitted by PT & Azalea Lentz will decide on whether pt meets criteria for inpt rehab. I updated pt.her nurse and attending. The number for the nurse liason is [phone].   Yamilex Bai

## 2019-03-18 NOTE — PROGRESS NOTES
[facility] Dosing Services: 3/18/2019    Consult for Warfarin dosing by Pharmacy per Edgardo Shaikh NP  Consult provided for this 61 yo female for indication of Atrial Fibrillation. Day of Therapy: 4 (new start)  Dose to achieve an INR goal of 2-3    INR 1.3 - very little change in INR over past few days. Will increase dose. Order entered for Warfarin 3 mg to be given today at 18:00. Significant drug interactions: Amiodarone, Heparin bridge, low body weight, chronic CHF, renal impairment  Previous dose given Warfarin 2.5 mg on 3/17/2019   PT/INR Lab Results   Component Value Date/Time    INR 1.3 (H) 03/18/2019 12:23 AM      Platelets Lab Results   Component Value Date/Time    PLATELET 418 (L) 28/30/8597 04:26 AM      H/H Lab Results   Component Value Date/Time    HGB 10.4 (L) 03/17/2019 04:26 AM      Pharmacist will follow daily and will provide subsequent Warfarin dosing based on clinical status.   Arabella Abraham, Pharmacist

## 2019-03-18 NOTE — PROGRESS NOTES
I met with pt as a follow-up visit to further discuss discharge needs. Pt is receptive to going to inpatient rehab. [facility] choice is [facility]. Choice letter signed and placed in chart to be scanned. Pt will need a work excuse for her job once she completes rehab. Second medicare letter reviewed with pt, signed and placed in the chart to be scanned. A copy of second medicare letter given to pt. I contacted the therapy department to request an early PT and OT session this am so [facility] will have updated therapy notes to submit to insurance. I contacted the liason with [facility] inform=ing her that pt is ready to come today and please commence insurance authorization. Clinicals faxed to [facility].     Loulou Oliver

## 2019-03-19 VITALS
DIASTOLIC BLOOD PRESSURE: 72 MMHG | HEART RATE: 66 BPM | RESPIRATION RATE: 16 BRPM | TEMPERATURE: 98.4 F | SYSTOLIC BLOOD PRESSURE: 122 MMHG | WEIGHT: 137.35 LBS | HEIGHT: 65 IN | OXYGEN SATURATION: 100 % | BODY MASS INDEX: 22.88 KG/M2

## 2019-03-19 LAB
ANION GAP SERPL CALC-SCNC: 9 MMOL/L (ref 5–15)
BUN SERPL-MCNC: 60 MG/DL (ref 6–20)
BUN/CREAT SERPL: 5 (ref 12–20)
CALCIUM SERPL-MCNC: 7.7 MG/DL (ref 8.5–10.1)
CHLORIDE SERPL-SCNC: 96 MMOL/L (ref 97–108)
CO2 SERPL-SCNC: 30 MMOL/L (ref 21–32)
CREAT SERPL-MCNC: 12.6 MG/DL (ref 0.55–1.02)
GLUCOSE BLD STRIP.AUTO-MCNC: 76 MG/DL (ref 65–100)
GLUCOSE BLD STRIP.AUTO-MCNC: 98 MG/DL (ref 65–100)
GLUCOSE SERPL-MCNC: 93 MG/DL (ref 65–100)
INR PPP: 1.3 (ref 0.9–1.1)
M PNEUMO IGG SER IA-ACNC: 488 U/ML (ref 0–99)
M PNEUMO IGM SER IA-ACNC: <770 U/ML (ref 0–769)
MAGNESIUM SERPL-MCNC: 2.2 MG/DL (ref 1.6–2.4)
PHOSPHATE SERPL-MCNC: 4.5 MG/DL (ref 2.6–4.7)
POTASSIUM SERPL-SCNC: 3.3 MMOL/L (ref 3.5–5.1)
PROTHROMBIN TIME: 13.6 SEC (ref 9–11.1)
SERVICE CMNT-IMP: NORMAL
SERVICE CMNT-IMP: NORMAL
SODIUM SERPL-SCNC: 135 MMOL/L (ref 136–145)

## 2019-03-19 PROCEDURE — 82962 GLUCOSE BLOOD TEST: CPT

## 2019-03-19 PROCEDURE — 74011250637 HC RX REV CODE- 250/637: Performed by: INTERNAL MEDICINE

## 2019-03-19 PROCEDURE — 80048 BASIC METABOLIC PNL TOTAL CA: CPT

## 2019-03-19 PROCEDURE — 83735 ASSAY OF MAGNESIUM: CPT

## 2019-03-19 PROCEDURE — 84100 ASSAY OF PHOSPHORUS: CPT

## 2019-03-19 PROCEDURE — 74011250636 HC RX REV CODE- 250/636: Performed by: SPECIALIST

## 2019-03-19 PROCEDURE — 36415 COLL VENOUS BLD VENIPUNCTURE: CPT

## 2019-03-19 PROCEDURE — 74011250637 HC RX REV CODE- 250/637: Performed by: SPECIALIST

## 2019-03-19 PROCEDURE — 85610 PROTHROMBIN TIME: CPT

## 2019-03-19 RX ORDER — WARFARIN 2 MG/1
4 TABLET ORAL ONCE
Status: DISCONTINUED | OUTPATIENT
Start: 2019-03-19 | End: 2019-03-19 | Stop reason: HOSPADM

## 2019-03-19 RX ORDER — AMIODARONE HYDROCHLORIDE 200 MG/1
200 TABLET ORAL 2 TIMES DAILY WITH MEALS
Qty: 60 TAB | Refills: 0 | Status: SHIPPED | OUTPATIENT
Start: 2019-03-19 | End: 2019-04-12

## 2019-03-19 RX ORDER — WARFARIN 3 MG/1
3 TABLET ORAL DAILY
Qty: 30 TAB | Refills: 0 | Status: SHIPPED | OUTPATIENT
Start: 2019-03-19 | End: 2019-05-10 | Stop reason: SDUPTHER

## 2019-03-19 RX ORDER — SEVELAMER CARBONATE 800 MG/1
1600 TABLET, FILM COATED ORAL
Qty: 90 TAB | Refills: 0 | Status: SHIPPED | OUTPATIENT
Start: 2019-03-19 | End: 2019-03-19

## 2019-03-19 RX ADMIN — Medication 30 ML: at 03:42

## 2019-03-19 RX ADMIN — Medication 20 ML: at 06:16

## 2019-03-19 RX ADMIN — SEVELAMER CARBONATE 1600 MG: 800 TABLET, FILM COATED ORAL at 08:39

## 2019-03-19 RX ADMIN — HEPARIN SODIUM 5000 UNITS: 5000 INJECTION INTRAVENOUS; SUBCUTANEOUS at 06:16

## 2019-03-19 RX ADMIN — POTASSIUM CHLORIDE 20 MEQ: 750 TABLET, EXTENDED RELEASE ORAL at 08:38

## 2019-03-19 RX ADMIN — PANTOPRAZOLE SODIUM 40 MG: 40 TABLET, DELAYED RELEASE ORAL at 08:38

## 2019-03-19 RX ADMIN — ASPIRIN 81 MG: 81 TABLET ORAL at 08:37

## 2019-03-19 RX ADMIN — AMIODARONE HYDROCHLORIDE 200 MG: 200 TABLET ORAL at 08:37

## 2019-03-19 NOTE — PROGRESS NOTES
Nutrition Assessment: 
 
RECOMMENDATIONS/INTERVENTION(S):  
1. Continue current diet. 2. Continie of Ensure Clear (0mg phos) 1/day to support intake. 3. Monitor intake, wt, renal labs. ASSESSMENT:  
3/19: Fair appetite and intake continues, pt states she has had some nausea and vomiting. Pt states eating most of meals but would  vomit 2-3 hours afterwards. RN documentation also endorses intake ~% Yesterday evening was last emesis episode. Had applesauce for breakfast this morning and no N/V thus far. She stated the GI distress was from new medications and admin times of meds. This has been revised and stated she is doing better. Pt tried the Ensure pudding stated it was ok. Never received Ensure Clear. I clarified with kitchen and order was not in their system, clarified order with call center. Potential discharge today. Wt relatively stable with fluid fluctuations. Lytes improved, K+3.3, phos 4.5. Creatine down to 12.6. Na 135. Meds: lispro, protonix, renvela, coumadin, 3/14: pt screened for ICU length of stay. Pt admitted for cardiogenic shock with acute metabolic encephalopathy. PMHX for ESRD on peritoneal dialysis. CHF with EF 25%, HTN. Reported N/V/D PTA. Pt stated she hadn't eaten in days. Decreased appetite continues, observed about 50% of meal tray consumed. At home pt states she doesn't cook and mostly eats out at restaurants, may take leftovers home for multiple meals. Weight has been relatively stable. BMI normal category. High potassium on admission that improved with dialysis, Phos continues to be elevated at 8.4. Na 133, Creatinine 19.4, BG controlled. Pt on 1.5% dextrose PD, 4 cycles, with 60-80% absorption provides 306-408 kcal/day. Discussed low phos nutrition supplements, pt willing to try Ensure Clear, berry flavor. Does not like jello. Diet Order: Cardiac, Other (comment)(Low phos)1000 mL FR 
% Eaten:   
Patient Vitals for the past 72 hrs: 
 % Diet Eaten 03/18/19 1200 50 % 03/18/19 0956 20 % 03/17/19 1300 80 % 03/17/19 0800 100 % 03/16/19 1301 100 % Pertinent Medications: [x] Reviewed Chemistries: [x] Reviewed Anthropometrics: Height: 5' 5\" (165.1 cm) Weight: 62.3 kg (137 lb 5.6 oz) IBW (%IBW):   ( ) UBW (%UBW):   (  %) BMI: Body mass index is 22.86 kg/m². This BMI is indicative of: 
 [] Underweight    [x] Normal    [] Overweight    []  Obesity    []  Extreme Obesity (BMI>40) Estimated Nutrition Needs (Based on): 1936 Kcals/day(35kcal/kg) , 72 g(-83(1.3-1.5g/kg actual bw)) Protein Carbohydrate: At Least 130 g/day  Fluids: 1000 ml(-1500 (anuric)) (1mL/kcal) Last BM: 3/17,formed       Bowel Sounds: active Skin:    [x] Intact   [] Incision  [] Breakdown   [] DTI   [] Tears/Excoriation/Abrasion  [x]Edema-1+ RUE [] Other: Wt Readings from Last 30 Encounters:  
03/19/19 62.3 kg (137 lb 5.6 oz) 02/18/19 58.2 kg (128 lb 3.2 oz) 02/08/19 56.6 kg (124 lb 12.8 oz) 12/17/18 56 kg (123 lb 6.4 oz) 12/03/18 56.5 kg (124 lb 9 oz)  
11/26/18 55.3 kg (121 lb 14.6 oz) 11/01/18 53.4 kg (117 lb 12.8 oz) 10/22/18 52.2 kg (115 lb) 10/22/18 52.3 kg (115 lb 4.8 oz) 10/16/18 52.3 kg (115 lb 4.8 oz) 10/11/18 52.4 kg (115 lb 9.6 oz) 09/17/18 54.4 kg (120 lb) 08/26/18 58.7 kg (129 lb 6.6 oz) 08/02/18 57.2 kg (126 lb 3.2 oz)  
07/27/18 55.8 kg (123 lb)  
01/26/18 64 kg (141 lb) 12/08/17 64 kg (141 lb 3.2 oz) 09/14/17 63.9 kg (140 lb 12.8 oz) 01/30/17 63.5 kg (140 lb) 11/09/16 63.5 kg (140 lb) 04/15/16 65.7 kg (144 lb 12.8 oz)  
03/23/16 66.3 kg (146 lb 3.2 oz) 02/10/16 65.9 kg (145 lb 3.2 oz) 01/05/16 66.3 kg (146 lb 3.2 oz) 12/30/15 68.1 kg (150 lb 2.1 oz) 12/23/15 66 kg (145 lb 6.4 oz) 12/15/15 65.3 kg (144 lb) 12/10/15 65.5 kg (144 lb 6.4 oz) 10/13/15 64.4 kg (142 lb) 07/06/15 64.4 kg (142 lb) NUTRITION DIAGNOSES:  
Problem:  Inadequate protein-energy intake Etiology: related to increased needs with peritoneal dialysis Signs/Symptoms: as evidenced by reported minimal to no intake x 2-3 days PTA. observed intake <EEN   
 
3/19: Nutrition dx improving with intake and improving GI status. NUTRITION INTERVENTIONS: 
Meals/Snacks: General/healthful diet   Supplements: Commercial supplement GOAL:  
Pt to meed >75% of estimated kcal/protein needs in 3-5 days Cultural, Tenriism, or Ethnic Dietary Needs: None EDUCATION & DISCHARGE NEEDS:  
 [x] None Identified 
 [] Identified and Education Provided/Documented 
 [] Identified and Pt declined/was not appropriate [x] Interdisciplinary Care Plan Reviewed/Documented  
 [x] Discharge Needs:  Low sodium, k+ and phos diet. [] No Nutrition Related Discharge Needs NUTRITION RISK:  
Pt Is At Nutrition Risk  [x] No Nutrition Risk Identified  [] PT SEEN FOR:  
 []  MD Consult: []Calorie Count []Diabetic Diet Education []Diet Education []Electrolyte Management []General Nutrition Management and Supplements []Management of Tube Feeding []TPN Recommendations []  RN Referral:  []MST score >=2 
   []Enteral/Parenteral Nutrition PTA []Pregnant: Gestational DM or Multigestation  
              [] Pressure Ulcer 
 
[]  Low BMI      []  Length of Stay       [] Dysphagia Diet         [] Ventilator [x]  Follow-up Previous Recommendations: 
 [x] Implemented          [] Not Implemented          [] Not Applicable Previous Goal: 
 [] Met              [x] Progressing Towards Goal              [] Not Progressing Towards Goal   [] Not Applicable [address] Pager [phone] Office [phone]

## 2019-03-19 NOTE — PROGRESS NOTES
AVS and discharge summary faxed via [employer] to [facility]. In addition,new home health orders for INR lab draws sent to [facility] for every Tuesday and Friday while receiving home health with plans for pt to start going to the coumadin clinic the first week of April. I also called [facility] directly to notify them regarding the additional order for INR checks every Tuesday and Friday until pt switches to the coumadin clinic.     Mike Rodríguez

## 2019-03-19 NOTE — PROGRESS NOTES
0845- BS 76, patient currently has cranberry juice to drink. Will follow up for recheck  0907 - Blood glucose recheck was 98 after 4 ounces of cranberry juice    0915 - Patient refusing her peritoneal dialysis exchange. States \"I would rather wait until I get home and do it on the machine.  \"

## 2019-03-19 NOTE — PROGRESS NOTES
I received notification from Daryn Rocha that pt is too high level for inpatient rehab. OT had identified no skilled needs. PT is recommending inpatient rehab but pt needs two skilled needs in order to be accepted @ inpatient rehab and per carina,pt was functioning @ the discharge level from inpatient rehab. I notified pt.'s attending. Home Health order received. Pt chose [facility]. Choice letter for [facility] completed and is in pt.'s chart for scanning. Second medicare letter was signed yesterday and placed in chart to be scanned. [facility] has been added to pt.'s AVS with notification that if their therapists assess pt and feel inpatient rehab is indicated to please call Zoey Bright @ [facility] @ [phone]. [facility] PCP is Dr Terese Slater. I notified Dr Terese Slater through IB regarding pt.'s discharge with home health. Pt will likely discharge home today. Family will transport or pt will arrange her own transportation.     Any Lord

## 2019-03-19 NOTE — PROGRESS NOTES
Discharge instructions reviewed with patient and two prescriptions provided. Central line removed, pressure held for 5 minutes with no evidence of bleeding. Tip of catheter intact. Dressing applied and patient instructed to lay flat for one hour. Patient awaiting her friend to transport her home.

## 2019-03-19 NOTE — PROGRESS NOTES
[address] [facility] March 19, 2019       RE: Nayan Lucas      To Whom It May Concern,    This is to certify that Nayan Lucas was under my care at [address] [place] [address] from 03/11 to 03/19. Please excuse her from work until Monday, April 1st    Please feel free to contact my office if you have any questions or concerns. Thank you for your assistance in this matter.       Sincerely,  Kim Aguirre MD   [phone]

## 2019-03-19 NOTE — PROGRESS NOTES
Name: Juventino Li MRN: [id number]   : 1960 [facility]   Date: 3/19/2019        IMPRESSION:   · ESRD on PD. Progressing well, using all 1.5%  · Electrolytes with hypokalemia- improved with supplementation  · Hyperphosphatemia of CKD- now PO4 is at target. · Hypotension- improved  · Anemia of ESRD with normal Fe sat      PLAN:   · Continue the PD with 1.5% dextrose. · Replace K but adjust dose to bid  · Once home we will use all 1.5% solutions on cycler until SBP > 130       Subjective/Interval History:   I have reviewed the flowsheet and previous days notes. ROS: transient dizziness yesterday while ambulating,  In sinus rhythm. No nausea today. No abd pain, CP or SOB. No edema    Objective:   Vital Signs:    Visit Vitals  /80 (BP 1 Location: Left arm, BP Patient Position: At rest)   Pulse 64   Temp 98 °F (36.7 °C)   Resp 15   Ht 5' 5\" (1.651 m)   Wt 62.3 kg (137 lb 5.6 oz)   SpO2 98%   BMI 22.86 kg/m²       O2 Device: Room air   O2 Flow Rate (L/min): 1 l/min   Temp (24hrs), Av.8 °F (36.6 °C), Min:97.4 °F (36.3 °C), Max:98 °F (36.7 °C)       Intake/Output:   Last shift:      No intake/output data recorded. Last 3 shifts:  1901 -  0700  In: 23040 [P.O.:520]  Out: 31318     Intake/Output Summary (Last 24 hours) at 3/19/2019 0813  Last data filed at 3/18/2019 2333  Gross per 24 hour   Intake 7970 ml   Output 8550 ml   Net -580 ml        Physical Exam:  General:    Alert, cooperative, no distress, appears stated age. Head:   Normocephalic, without obvious abnormality, atraumatic. Eyes:   Conjunctivae/corneas clear. Neck:  Supple, symmetrical,  no adenopathy, thyroid: non tender    no carotid bruit and no JVD. Back:    Symmetric,  No CVA tenderness. Lungs:   Clear to auscultation bilaterally. No Wheezing or Rhonchi. No rales. Chest wall:  No tenderness or deformity. No Accessory muscle use. Heart:   Regular rhythm, with occas extrasystole. , no murmur, rub or gallop. Abdomen:   Soft, non-tender. Distended with PD fluid. Bowel sounds normal. No masses  Extremities: Extremities normal, atraumatic, No cyanosis. No edema. No clubbing  Skin:     Texture, turgor normal. No rashes or lesions. Not Jaundiced  Psych:  Good insight. Not depressed. Not anxious or agitated. Neurologic: Normal strength, Alert and oriented X 3. DATA:  Labs:  Recent Labs     03/19/19  0340 03/18/19  0023 03/17/19  0426   * 137 133*   K 3.3* 3.0* 3.3*   CL 96* 97 96*   CO2 30 29 28   BUN 60* 68* 79*   CREA 12.60* 13.40* 14.70*   CA 7.7* 7.2* 7.0*   PHOS 4.5 4.4 4.6   MG 2.2 2.0 2.2     Recent Labs     03/17/19  0426   WBC 11.6*   HGB 10.4*   HCT 32.1*   *     No results for input(s): ROMAN, KU, CLU, CREAU in the last 72 hours.     No lab exists for component: PROU    Total time spent with patient:  35 minutes    [] Critical Care Provided    Care Plan discussed with:      Bebeto Israel MD

## 2019-03-19 NOTE — DISCHARGE INSTRUCTIONS
HOSPITALIST DISCHARGE INSTRUCTIONS  NAME: Nohemy Raman   :  1960   MRN:  [id number]     Date/Time:  3/19/2019 8:53 AM    ADMIT DATE: 3/11/2019     DISCHARGE DATE: 3/19/2019     ADMITTING DIAGNOSIS:  Cardiogenic shock, systolic CHF EF 82%, SVT, ESRD on peritoneal dialysis    DISCHARGE DIAGNOSIS:  same    MEDICATIONS:  See after visit summary       · It is important that you take the medication exactly as they are prescribed. · Keep your medication in the bottles provided by the pharmacist and keep a list of the medication names, dosages, and times to be taken in your wallet. · Do not take other medications without consulting your doctor     Pain Management: per above medications    What to do at Home    Recommended diet:  Renal Diet    Recommended activity: Activity as tolerated    1) Return to the hospital if you feel worse    2) If you experience any of the following symptoms then please call your primary care physician or return to the emergency room if you cannot get hold of your doctor:  Fever, chills, nausea, vomiting, diarrhea, change in mentation, falling, bleeding, shortness of breath, chest pain, severe headache, severe abdominal pain,     3) make sure you follow up with your doctor in 1-2 days for INR checks (coumadin level)    4) continue your peritoneal dialysis    Follow Up: Follow-up Information     Follow up With Specialties Details Why Contact Info    Buelah Cranker, MD [facility] Schedule an appointment as soon as possible for a visit in 1 week  [address] [facility] [facility] [address] [facility]  [phone]      Isaura García MD Cardiology Schedule an appointment as soon as possible for a visit in 2 weeks  [address] Kyara Cuevas MD Nephrology Schedule an appointment as soon as possible for a visit in 2 days  [address]  [phone]              Information obtained by :   I understand that if any problems occur once I am at home I am to contact my physician. I understand and acknowledge receipt of the instructions indicated above. [de-identified] or R.N.'s Signature                                                                  Date/Time                                                                                                                                              Patient or Representative Signature                                                          Date/Time      Patient Education        Supraventricular Tachycardia: Care Instructions  Your Care Instructions    Having supraventricular tachycardia (SVT) means that from time to time your heart beats abnormally fast. This fast rhythm is caused by changes in the electrical system of your heart. You may feel a fluttering in your chest (palpitations) and have a fast pulse. When your heart is beating fast, you may feel anxious and lightheaded, be short of breath, and feel discomfort in the chest.  Your doctor may prescribe medicines to help slow down your heartbeat. Your doctor may also suggest you try vagal maneuvers when having an episode of SVT. These are things, like bearing down, that might help slow your heart rate. Bearing down means that you try to breathe out with your stomach muscles but you don't let air out of your nose or mouth. Your doctor can show you how to do vagal maneuvers. He or she may suggest you lie down on your back to do them. In some cases, either cardioversion treatment or a procedure called catheter ablation is done to correct SVT. Your doctor may ask you to wear a small electronic device for 1 or 2 days to monitor your heart. It is called a Holter monitor. Follow-up care is a key part of your treatment and safety.  Be sure to make and go to all appointments, and call your doctor if you are having problems. It's also a good idea to know your test results and keep a list of the medicines you take. How can you care for yourself at home? · Be safe with medicines. Take your medicines exactly as prescribed. Call your doctor if you think you are having a problem with your medicine. You will get more details on the specific medicines your doctor prescribes. · If your doctor showed you how to do vagal maneuvers, try them when you have an episode. These maneuvers include bearing down or putting an ice-cold, wet towel on your face. · Monitor your condition by keeping a diary of your SVT episodes. Bring this to your doctor appointments. ? Write down how fast or slow your heart was beating. To count your heart rate:  § Gently place 2 fingers of your hand on the inside of your other wrist, below your thumb. § Count the beats for 30 seconds. § Then, double the result to get the number of beats per minute. ? Write down if your heart rhythm was regular or irregular. ? Write down the symptoms you had.  ? Write down the time of day your symptoms occurred. ? Write down how long your symptoms lasted. ? Write down what you were doing when your symptoms started. ? Write down what may have helped your symptoms go away. · If they trigger episodes, limit or avoid alcohol or drinks with caffeine. · Do not use over-the-counter decongestants, herbal remedies, diet pills, or \"pep\" pills, which often contain stimulants. · Do not use illegal drugs, such as cocaine, ecstasy, or methamphetamine, which can speed up your heart's rhythm. · Do not smoke. Smoking can make this condition worse. If you need help quitting, talk to your doctor about stop-smoking programs and medicines. These can increase your chances of quitting for good. · Be alert for new or worsening symptoms, such as shortness of breath, pounding of your heart, or unusual tiredness.  If new symptoms develop or your symptoms become worse, call your doctor. When should you call for help? Call 911 anytime you think you may need emergency care. For example, call if:    · You passed out (lost consciousness).     · You are short of breath.    Call your doctor now or seek immediate medical care if:    · You have a fast heartbeat.     · You are dizzy or lightheaded, or feel like you may faint.    Watch closely for changes in your health, and be sure to contact your doctor if:    · You do not get better as expected. Where can you learn more? Go to http://khoi-terry.info/. Enter G244 in the search box to learn more about \"Supraventricular Tachycardia: Care Instructions. \"  Current as of: July 22, 2018  Content Version: 11.9  © 9925-8122 [employer]. Care instructions adapted under license by [employer] (which disclaims liability or warranty for this information). If you have questions about a medical condition or this instruction, always ask your healthcare professional. [address] any warranty or liability for your use of this information.

## 2019-03-19 NOTE — PROGRESS NOTES
[facility] Pharmacy Dosing Services: 3/19/2019    Consult for Warfarin dosing by Pharmacy per Lea Peterson NP  Consult provided for this 61 yo female for indication of Atrial Fibrillation. Day of Therapy: 5 (new start)  Dose to achieve an INR goal of 2-3    INR 1.3 - very little change in INR over past few days. Will increase dose. Order entered for Warfarin 4 mg to be given today at 18:00. Significant drug interactions: Amiodarone, Heparin bridge, low body weight, chronic CHF, renal impairment  Previous dose given 3mg   PT/INR Lab Results   Component Value Date/Time    INR 1.3 (H) 03/19/2019 03:40 AM      Platelets Lab Results   Component Value Date/Time    PLATELET 102 (L) 42/38/6003 04:26 AM      H/H Lab Results   Component Value Date/Time    HGB 10.4 (L) 03/17/2019 04:26 AM      Pharmacist will follow daily and will provide subsequent Warfarin dosing based on clinical status. Ha Donald, Pharmacist

## 2019-03-19 NOTE — PROGRESS NOTES
Cardiology Progress Note -ICU        NAME:  Yao Green   :   1960   MRN:   [id number]     Assessment/Plan:   1). SVT - SR overnight and this am, IV amio and IV dilt are off. Cont po of both.      2) PAF  - pharmacy dosing warfarin (1.3)   - was not able to afford [address] [facility] ([facility]) PTA   - will have INR checks in our office starting  after [facility] finishes.    3) Hypotension   - is off of pressors but BP remains lowish     4) Chronic systolic HF  - volume status compensated  - her usual cardiomyopathy regimen held due to low BP      5) ESRD on PD    Case Management working on placement: IP Rehab/SNF vs. Home w [facility]. Will have pt follow-up with Dr Blanquita Garber and Dr. Becky Fregoso OP. Will see prn/pls call if needed. Subjective:   Cardiac ROS: Patient denies any exertional chest pain, dyspnea, palpitations, syncope, orthopnea, edema or paroxysmal nocturnal dyspnea. Previous Cardiac Eval  ECHO: 10/14/12: EF 45% w/ posterior HK Grade 1 DD, LAE, mild MR, mild-mod TR RVSP 60 mmHG   Cath: 10/16/12: Normal cors. No AVG. Mild pulm HTN (). Low-normal filling pressures. Echo 10/19/13 - EF 25- 30%. Severe diffuse hypokinesis. Mildly increased wall thickness. Mild concentric hypertrophy. Doppler parameters were consistent with a reversible restrictive pattern, indicative of decreased left ventricular  diastolic compliance and/or increased left atrial pressure (grade 3 diastolic dysfunction). LA mildly dilated, mild MR, mild TR, mod PA HTN, small pericardial effusion circumferential to the heart, no evidence of hemodynamic compromise. Echo 11/2/15 - EF 25-30%, global HK, mild MR  Lexiscan cardiolite 11/24/15 - focal anteroapical ischemia    Cath 12/15/2015 - EDP 10, LV dilated, EF 20% global HK, normal cors with right dominance. Unable to cannulate femoral vein. 12/29/15-implantation of a single-chamber [address] per Dr. Becky Fregoso    Echo 17 - EF 20 %.  Severe diffuse hypokinesis. Mild LAE. Mild MR. Mild Pulm HTN. Small pericardial effusion. No significant change when compared to study 2015. Echo 18- LVEF 28%, severe LAE, PA systolic 50 mmHg    LHC / RHC 2018: No sig CAD, RA 10, RV 39/5, W 23, PA 35/20/29, PA Sat 47.1%, Ao Sat 83.2%, /7/24 Ao 119/79/90, CO F 3.94, CI F 2.4, CO T 2.75, CI T 1.68        Review of Systems: No nausea, indigestion, vomiting, pain, cough, sputum. No bleeding. Taking po. Appetite ok. Objective:     Visit Vitals  /80 (BP 1 Location: Left arm, BP Patient Position: At rest)   Pulse 64   Temp 98 °F (36.7 °C)   Resp 15   Ht 5' 5\" (1.651 m)   Wt 137 lb 5.6 oz (62.3 kg)   SpO2 98%   BMI 22.86 kg/m²    O2 Flow Rate (L/min): 1 l/min O2 Device: Room air    Temp (24hrs), Av.8 °F (36.6 °C), Min:97.4 °F (36.3 °C), Max:98 °F (36.7 °C)      No intake/output data recorded.  1901 -  0700  In: 80411 [P.O.:520]  Out: 28138   TELE:  occ PVC     General: AAOx3 cooperative, no acute distress. HEENT: Atraumatic. Pink and moist.  Anicteric sclerae. Neck : Supple    Lungs: CTA bilaterally. No wheezing/rhonchi/rales. Heart: Regular rhythm, no murmur. No carotid bruits. Abdomen: PD catheter intact. Soft, non-distended, non-tender. + Bowel sounds. Extremities: No edema,  Neurologic: Grossly intact. Alert and oriented X 3. No acute neurological distress. Psych: Fair insight. Not anxious or agitated. Care Plan discussed with:    Comments   Patient x    Family      RN x    Care Manager x                   Consultant:          Data Review:     No lab exists for component: ITNL   No results for input(s): CPK, CKMB, TROIQ in the last 72 hours.   Recent Labs     19  0340 19  0023 19  0426   * 137 133*   K 3.3* 3.0* 3.3*   CL 96* 97 96*   CO2 30 29 28   BUN 60* 68* 79*   CREA 12.60* 13.40* 14.70*   GLU 93 123* 96   PHOS 4.5 4.4 4.6   MG 2.2 2.0 2.2   WBC  --   --  11.6*   HGB  -- --  10.4*   HCT  --   --  32.1*   PLT  --   --  128*     Recent Labs     03/19/19  0340 03/18/19  0023 03/17/19  0426   INR 1.3* 1.3* 1.2*   PTP 13.6* 12.9* 11.6*       Medications reviewed  Current Facility-Administered Medications   Medication Dose Route Frequency    melatonin tablet 3 mg  3 mg Oral QHS PRN    potassium chloride SR (KLOR-CON 10) tablet 20 mEq  20 mEq Oral BID WITH MEALS    acetaminophen (TYLENOL) tablet 650 mg  650 mg Oral Q6H PRN    Warfarin - Pharmacist dosing   Other Rx Dosing/Monitoring    dilTIAZem CD (CARDIZEM CD) capsule 120 mg  120 mg Oral DAILY    pantoprazole (PROTONIX) tablet 40 mg  40 mg Oral ACB    sevelamer carbonate (RENVELA) tab 1,600 mg  1,600 mg Oral TID WITH MEALS    insulin lispro (HUMALOG) injection   SubCUTAneous AC&HS    glucose chewable tablet 16 g  4 Tab Oral PRN    dextrose (D50W) injection syrg 12.5-25 g  12.5-25 g IntraVENous PRN    glucagon (GLUCAGEN) injection 1 mg  1 mg IntraMUSCular PRN    amiodarone (CORDARONE) tablet 200 mg  200 mg Oral BID WITH MEALS    gentamicin (GARAMYCIN) 0.1 % cream   Topical QHS    heparin (porcine) injection 5,000 Units  5,000 Units SubCUTAneous Q8H    aspirin delayed-release tablet 81 mg  81 mg Oral DAILY    cinacalcet (SENSIPAR) tablet 30 mg  30 mg Oral DAILY WITH DINNER    senna-docusate (PERICOLACE) 8.6-50 mg per tablet 1 Tab  1 Tab Oral DAILY PRN    sodium chloride (NS) flush 5-40 mL  5-40 mL IntraVENous Q8H    sodium chloride (NS) flush 5-40 mL  5-40 mL IntraVENous PRN    ondansetron (ZOFRAN) injection 4 mg  4 mg IntraVENous Q4H PRN    peritoneal dialysis DEXTROSE 1.5% (2.5 mEq/L low calcium) solution 2,000 mL  2,000 mL IntraPERitoneal QID    hydrocortisone (ANUSOL-HC) 2.5 % rectal cream   PeriANAL QID         YAYO Ricks Student

## 2019-03-19 NOTE — PROGRESS NOTES
1910 Bedside and Verbal shift change report given to Dong Aguilar (oncoming nurse) by Hailee (offgoing nurse). Report included the following information SBAR, Intake/Output, MAR and Recent Results. 2000 PD completed, already in process at shift change. 2333 PD performed, PD catheter dressing changed after exchange. 0715 Verbal shift change report given to Vikram Swain (oncoming nurse) by Dong Aguilar (offgoing nurse). Report included the following information SBAR, ED Summary, Intake/Output, MAR, Recent Results and Cardiac Rhythm sinus arrythmia.

## 2019-03-19 NOTE — DISCHARGE SUMMARY
Marcial Alatorre [address] [place] [address]  [address]  [phone]    Physician Discharge Summary     Patient ID:  Juventino Li  [id number]  60 y.o.  1960    Admit date: 3/11/2019    Discharge date and time: 3/19/2019    Admission Diagnoses: SVT (supraventricular tachycardia) (HCC) [I47.1]  SVT (supraventricular tachycardia) ([address] [I47.1]    Discharge Diagnoses:  Principal Diagnosis Cardiogenic shock ([address]                                            Principal Problem:    Cardiogenic shock ([address] (3/13/2019)    Active Problems:    Essential hypertension, benign (10/18/2012)      Cardiomyopathy, nonischemic ([address] (10/19/2013)      Overview: LVEF 25-30% by echo 10/19/2013 (previously 45%)      Anemia (1/20/2015)      ESRD (end stage renal disease) ([address] (12/11/2015)      ICD (implantable cardioverter-defibrillator) in place (1/26/2018)      Paroxysmal atrial fibrillation ([address] (3/07/1539)      Systolic CHF, chronic (HCC) (8/25/2018)      SVT (supraventricular tachycardia) ([address] (3/11/2019)      Acidosis (3/11/2019)      GERD (gastroesophageal reflux disease) (3/11/2019)      HTN (hypertension) (3/11/2019)      Dysarthria (3/11/2019)      Nausea & vomiting (3/11/2019)      Diarrhea (3/11/2019)           Hospital Course:     59 yo hx of HTN, Pafib, NICM EF 20-25% s/p ICD, ESRD on PD, presented w/ AMS, SVT, cardiogenic shock, elevated Trop     1) Cardiogenic shock/acute on chronic systolic CHF: EF 70-42%, s/p ICD. Now resolved. Off levophed 03/15. Patient will f/u with cardiology     2) SVT/Pafib: now in NSR. Off amio gtt on 03/17. Cont oral amio, dilt, coumadin. Cards/EP following, consider ablation as an outpatient. Patient will f/u with Dr. Cortez Ashraf. Patient will f/u in the coumadin clinic for INR checks     3) Acute metabolic encephalopathy: now resolved. Likely from shock, uremia. Head CT unremarkable. EEG neg. Neuro was following     4) HTN: cont dilt.   Off BB and aldactone due to hypotension     5) ESRD: on PD. Management per Renal     6) Hypokalemia: cont repletion     7) Debility: cont PT/OT. CM working on [facility]    PCP: Kiko Hutson MD     Consults: cardiology, renal, pulm    Significant Diagnostic Studies: none    Discharge Exam:  Physical Exam:    Gen:  Frail, ill-appearing, NAD  HEENT:  Pink conjunctivae, PERRL, hearing intact to voice, moist mucous membranes  Neck:  Supple, without masses, thyroid non-tender  Resp:  No accessory muscle use, clear breath sounds without wheezes rales or rhonchi  Card:  2/6 murmurs, normal S1, S2 without thrills, trace edema  Abd:  Soft, non-tender, non-distended, normoactive bowel sounds are present  Musc:  No cyanosis or clubbing  Skin:  No rashes   Neuro:  Cranial nerves 3-12 are grossly intact, follows commands appropriately  Psych:  Good insight, oriented to person, place and time, alert    Disposition: HH  Discharge Condition: Stable    Patient Instructions:   Current Discharge Medication List      START taking these medications    Details   amiodarone (CORDARONE) 200 mg tablet Take 1 Tab by mouth two (2) times daily (with meals). Qty: 60 Tab, Refills: 0             warfarin (COUMADIN) 3 mg tablet Take 1 Tab by mouth daily. Qty: 30 Tab, Refills: 0         CONTINUE these medications which have NOT CHANGED    Details   dilTIAZem CD (CARDIZEM CD) 120 mg ER capsule Take 1 Cap by mouth daily. Qty: 30 Cap, Refills: 3      aspirin delayed-release 81 mg tablet Take 81 mg by mouth daily. potassium chloride (KLOR-CON M20) 20 mEq tablet Take 40 mEq by mouth every morning. omeprazole (PRILOSEC) 20 mg capsule Take 20 mg by mouth daily as needed (indigestion). Associated Diagnoses: Cardiomyopathy, nonischemic ([address] Essential hypertension, benign; Chronic systolic heart failure ([address] ESRD on peritoneal dialysis ([facility]  Polycystic kidney disease; Paroxysmal atrial tachycardia (HCC)      SENNA-DOCUSATE SODIUM PO Take 1 Tab by mouth daily as needed (constipation). Associated Diagnoses: Cardiomyopathy, nonischemic ([address] Essential hypertension, benign; Chronic systolic heart failure ([address] ESRD on peritoneal dialysis ([address] Polycystic kidney disease; Paroxysmal atrial tachycardia (HCC)      cinacalcet (SENSIPAR) 30 mg tablet Take 30 mg by mouth daily (with dinner). Associated Diagnoses: Cardiomyopathy, nonischemic ([address] Essential hypertension, benign; Chronic systolic heart failure ([address] ESRD on peritoneal dialysis ([address] Polycystic kidney disease; Paroxysmal atrial tachycardia (HCC)      ferric citrate (AURYXIA) 210 mg iron tablet Take 210 mg by mouth three (3) times daily (with meals). Associated Diagnoses: Cardiomyopathy, nonischemic ([address] Essential hypertension, benign; Chronic systolic heart failure ([address] ESRD on peritoneal dialysis ([address] Polycystic kidney disease; Paroxysmal atrial tachycardia (HCC)      gentamicin (GARAMYCIN) 0.1 % topical cream Apply  to affected area nightly.          STOP taking these medications       spironolactone (ALDACTONE) 50 mg tablet Comments:   Reason for Stopping:         carvedilol (COREG) 25 mg tablet Comments:   Reason for Stopping:             Activity: Activity as tolerated  Diet: Renal Diet  Wound Care: None needed    Follow-up with  Follow-up Information     Follow up With Specialties Details Why Contact Info    Khai Blackwood MD Family Practice Schedule an appointment as soon as possible for a visit in 1 week  [address] [facility] [address] [place] [address]  [phone]      Ranjana Chadwick MD Cardiology Schedule an appointment as soon as possible for a visit in 2 weeks  [place] [place] [address] [phone]. Kyara Britton MD Nephrology Schedule an appointment as soon as possible for a visit in 2 days  [address] [address] [phone]            Follow-up tests/labs: per renal, cardiology    Signed:  Xenia Delvalle MD  3/19/2019  8:57 AM    I spent 35 min on discharge

## 2019-03-19 NOTE — PROGRESS NOTES
[facility] has accepted pt for SN,PT and OT. From a case management perspective,pt is okay for discharge home.     Tc Simpson

## 2019-03-20 LAB
BACTERIA SPEC CULT: NORMAL
SERVICE CMNT-IMP: NORMAL

## 2019-03-22 ENCOUNTER — TELEPHONE ANTICOAG (OUTPATIENT)
Dept: CARDIOLOGY CLINIC | Age: 59
End: 2019-03-22

## 2019-03-22 ENCOUNTER — TELEPHONE (OUTPATIENT)
Dept: CARDIOLOGY CLINIC | Age: 59
End: 2019-03-22

## 2019-03-22 DIAGNOSIS — Z79.01 CHRONIC ANTICOAGULATION: ICD-10-CM

## 2019-03-22 DIAGNOSIS — I48.0 PAROXYSMAL ATRIAL FIBRILLATION (HCC): ICD-10-CM

## 2019-03-22 DIAGNOSIS — I42.8 CARDIOMYOPATHY, NONISCHEMIC (HCC): Primary | ICD-10-CM

## 2019-03-22 DIAGNOSIS — R57.0 CARDIOGENIC SHOCK (HCC): ICD-10-CM

## 2019-03-22 NOTE — TELEPHONE ENCOUNTER
Alisa with [facility] calling to report the patient's INR at 1.9. She also stated that the patient took her coumadin 3mg this morning. Alisa would like a call at [phone].  Thanks

## 2019-03-26 ENCOUNTER — TELEPHONE ANTICOAG (OUTPATIENT)
Dept: CARDIOLOGY CLINIC | Age: 59
End: 2019-03-26

## 2019-03-26 LAB — INR, EXTERNAL: 1.6 (ref 2–3)

## 2019-03-26 NOTE — PROGRESS NOTES
[address] [facility], [address] [facility]    Office [phone]  Fax [phone]    History of Present Illness  Primus Danni is a 60 y.o. female. Follow up from hospitalization at [facility] 3/11-3/19/19 with hypotension coupled with SVT.      Problem List  Date Reviewed: 3/19/2019          Codes Class Noted    Cardiogenic shock ([facility] ICD-10-CM: R57.0  ICD-9-CM: 785.51  3/13/2019        SVT (supraventricular tachycardia) ([facility] ICD-10-CM: I47.1  ICD-9-CM: 427.89  3/11/2019        Acidosis ICD-10-CM: E87.2  ICD-9-CM: 276.2  3/11/2019        GERD (gastroesophageal reflux disease) ICD-10-CM: K21.9  ICD-9-CM: 530.81  3/11/2019        HTN (hypertension) ICD-10-CM: I10  ICD-9-CM: 401.9  3/11/2019        Dysarthria ICD-10-CM: R47.1  ICD-9-CM: 784.51  3/11/2019        Nausea & vomiting ICD-10-CM: R11.2  ICD-9-CM: 787.01  3/11/2019        Diarrhea ICD-10-CM: R19.7  ICD-9-CM: 787.91  3/57/8952        Systolic CHF, chronic (HCC) (Chronic) ICD-10-CM: I50.22  ICD-9-CM: 428.22, 428.0  8/25/2018        Bladder mass ICD-10-CM: N32.89  ICD-9-CM: 596.89  8/24/2018        Gross hematuria ICD-10-CM: R31.0  ICD-9-CM: 599.71  8/24/2018        Paroxysmal atrial fibrillation (HCC) (Chronic) ICD-10-CM: I48.0  ICD-9-CM: 427.31  7/31/2018        ICD (implantable cardioverter-defibrillator) in place (Chronic) ICD-10-CM: Z95.810  ICD-9-CM: V45.02  1/26/2018        Paroxysmal atrial tachycardia (HCC) ICD-10-CM: I47.1  ICD-9-CM: 427.0  4/17/2016        ESRD (end stage renal disease) ([facility] (Chronic) ICD-10-CM: N18.6  ICD-9-CM: 585.6  12/11/2015        Hypokalemia ICD-10-CM: E87.6  ICD-9-CM: 276.8  1/20/2015        Anemia (Chronic) ICD-10-CM: D64.9  ICD-9-CM: 285.9  1/20/2015        Cardiomyopathy, nonischemic (HCC) ICD-10-CM: I42.8  ICD-9-CM: 425.4  10/19/2013    Overview Signed 10/19/2013  1:36 PM by Lisa Faria MD     LVEF 25-30% by echo 10/19/2013 (previously 45%)             Polycystic kidney disease (Chronic) ICD-10-CM: Q61.3  ICD-9-CM: 753.12  10/18/2012        Essential hypertension, benign (Chronic) ICD-10-CM: I10  ICD-9-CM: 401.1  10/18/2012            Cardiac Testing  ECHO: 10/14/12: EF 45% w/ posterior HK Grade 1 DD, LAE, mild MR, mild-mod TR RVSP 60 mmHG   Cath: 10/16/12: Normal cors. No AVG. Mild pulm HTN (43/16/26). Low-normal filling pressures. Echo 10/19/13 - EF 25- 30%. Severe diffuse hypokinesis. Mildly increased wall thickness. Mild concentric hypertrophy. Doppler parameters were consistent with a reversible restrictive pattern, indicative of decreased left ventricular  diastolic compliance and/or increased left atrial pressure (grade 3 diastolic dysfunction). LA mildly dilated, mild MR, mild TR, mod PA HTN, small pericardial effusion circumferential to the heart, no evidence of hemodynamic compromise. Echo 11/2/15 - EF 25-30%, global HK, mild MR  Lexiscan cardiolite 11/24/15 - focal anteroapical ischemia    Cath 12/15/2015 - EDP 10, LV dilated, EF 20% global HK, normal cors with right dominance. Unable to cannulate femoral vein. 12/29/15-implantation of a single-chamber [address] per Dr. Cortez Ashraf    Echo 1/30/17 - EF 20 %. Severe diffuse hypokinesis. Mild LAE. Mild MR. Mild Pulm HTN. Small pericardial effusion. No significant change when compared to study 02-Nov-2015. Echo 1/26/18- LVEF 28%, severe LAE, PA systolic 50 mmHg    LHC / RHC 6/4/2018: No sig CAD, RA 10, RV 39/5, W 23, PA 35/20/29, PA Sat 47.1%, Ao Sat 83.2%, /7/24 Ao 119/79/90, CO F 3.94, CI F 2.4, CO T 2.75, CI T 1.68    Echo 3/12/19 - LVEF 21-25%    HPI  I did not see her in consult during her most recent hospitalization. She was seen by Dr. Cortez Ashraf due to short RP SVT. She was treated with IV dilt and amiodarone then transitioned to oral forms of both prior to discharge. Since then, Ms. Nathan Plummer reports HENDERSON and orthopnea. She has increased the number of pillows behind her head, with improvement.  She also increased her dialysate yesterday. Home health is coming to her apartment. Patient denies any exertional chest pain, palpitations, syncope, edema or paroxysmal nocturnal dyspnea. She has not had any bleeding on Warfarin. Her INR is followed by Coumadin clinic. She is an . She is interested in returning to work starting from next wednesday       Current Outpatient Medications on File Prior to Visit   Medication Sig Dispense Refill    amiodarone (CORDARONE) 200 mg tablet Take 1 Tab by mouth two (2) times daily (with meals). 60 Tab 0    warfarin (COUMADIN) 3 mg tablet Take 1 Tab by mouth daily. (Patient taking differently: Take 3 mg by mouth daily. Take 6 mg by mouth  on Tuesday and Thursday and 3 mg on other days. ) 30 Tab 0    dilTIAZem CD (CARDIZEM CD) 120 mg ER capsule Take 1 Cap by mouth daily. 30 Cap 3    aspirin delayed-release 81 mg tablet Take 81 mg by mouth daily.  potassium chloride (KLOR-CON M20) 20 mEq tablet Take 40 mEq by mouth every morning.  omeprazole (PRILOSEC) 20 mg capsule Take 20 mg by mouth daily as needed (indigestion).  SENNA-DOCUSATE SODIUM PO Take 1 Tab by mouth daily as needed (constipation).  cinacalcet (SENSIPAR) 30 mg tablet Take 30 mg by mouth daily (with dinner).  ferric citrate (AURYXIA) 210 mg iron tablet Take 210 mg by mouth three (3) times daily (with meals).  gentamicin (GARAMYCIN) 0.1 % topical cream Apply  to affected area nightly. No current facility-administered medications on file prior to visit.         Social History     Socioeconomic History    Marital status: SINGLE     Spouse name: Not on file    Number of children: Not on file    Years of education: Not on file    Highest education level: Not on file   Occupational History    Not on file   Social Needs    Financial resource strain: Not on file    Food insecurity:     Worry: Not on file     Inability: Not on file    Transportation needs: Medical: Not on file     Non-medical: Not on file   Tobacco Use    Smoking status: Never Smoker    Smokeless tobacco: Never Used   Substance and Sexual Activity    Alcohol use: No    Drug use: No    Sexual activity: Not Currently     Partners: Male   Lifestyle    Physical activity:     Days per week: Not on file     Minutes per session: Not on file    Stress: Not on file   Relationships    Social connections:     Talks on phone: Not on file     Gets together: Not on file     Attends Gnosticism service: Not on file     Active member of club or organization: Not on file     Attends meetings of clubs or organizations: Not on file     Relationship status: Not on file    Intimate partner violence:     Fear of current or ex partner: Not on file     Emotionally abused: Not on file     Physically abused: Not on file     Forced sexual activity: Not on file   Other Topics Concern    Not on file   Social History Narrative    Not on file     Review of Systems  Constitutional:  Negative for fever, chills and diaphoresis. Respiratory: Negative for hemoptysis. +HENDERSON, orthopnea  Cardiovascular:  Negative for chest pain and claudication. Gastrointestinal:  Negative for blood in stool and melena. Genitourinary:  Negative for dysuria, urgency and flank pain. Musculoskeletal:  Negative for back pain and joint pain. Skin:  Negative for rash. Neurological:  Negative for dizziness, weakness, seizures, loss of consciousness and headaches. Endo/Heme/Allergies:  Does not bruise/bleed easily. Psychiatric/Behavioral:  Negative for memory loss. The patient does not have insomnia.     Visit Vitals  /90 (BP 1 Location: Left arm, BP Patient Position: Sitting)   Pulse 79   Ht 5' 5\" (1.651 m)   Wt 123 lb 12.8 oz (56.2 kg)   SpO2 97%   BMI 20.60 kg/m²     Wt Readings from Last 3 Encounters:   03/28/19 123 lb 12.8 oz (56.2 kg)   03/19/19 137 lb 5.6 oz (62.3 kg)   02/18/19 128 lb 3.2 oz (58.2 kg)     Physical Exam  Vitals reviewed. Constitutional:  She is oriented to person, place and time. She appears well-nourished. HENT:  Head:  Normocephalic. Neck:  Neck supple. Normal JVP  Cardiovascular:  Normal rate, regular rhythm, normal heart sounds and intact distal pulses. Exam reveals no gallop. No murmur heard. Pulmonary/Chest:  A few crackles at the right base. Effort normal.   Abdominal:  abd soft  Bowel sounds normal.  Musculoskeletal:  no ankle edema bilaterally. Neurological:  Alert and oriented to person, place and time. Skin:  Skin is warm and dry. Psychiatric:  She has a normal mood and affect. Cardiographics  EKG 4/15/2016 - atrial tachycardia 120   Device interrogation 9/15/17 - No device therapies. No VT (since 2/2017). EKG 9/15/17 - SB, first degree AV block    EKG 9/18/18 - AF vs. Atrial tach/, occ. PVC's  Echo 3/12/19 - LVEF 21-25%  EKG 3/28/19 - SR 79, first degree AVB, , incomplete LBBB    ASSESSMENT and PLAN  Encounter Diagnoses   Name Primary?  Paroxysmal atrial fibrillation (HCC)     Cardiomyopathy, nonischemic (HCC)     Presence of automatic cardioverter/defibrillator (AICD)     Systolic CHF, chronic (HCC)     Essential hypertension, benign     SVT (supraventricular tachycardia) (HCC) Yes    ESRD (end stage renal disease) ([facility]     Chronic anticoagulation      Recent hospitalization witj SVT complicated by hypotension in part due to intravascular volume depletion. She is in SR on Amiodarone plus diltiazem without recurrent sxs of tachycardia. Ideally she would benefit from ablation. Will arrange follow up with Terese Horton in a few weeks to discuss further. Replace diltiazem with carvedilol 12.5 mg BID due to her HF. PAF without recurrent sxs. Continue Warfarin, recently started. She will be monitored by our Coumadin clinic. Eliquis was prohibitively expensive ($500/month)    NICM with severe LV dysfunction s/p ICD in the setting of HTN, ESRD on PD.  She has class 1-2 sxs on current medical therapy, augmented by PD. Low BPs preclude ACE/ARB at this time. Will replace dilt with Coreg as noted above. Prior to her hospitalization she was on Coreg 25 mg BID. Continue regular ICD checks    Written by Irena Le, as dictated by Dr. Carlota Urias.      Carlota Urias MD

## 2019-03-28 ENCOUNTER — OFFICE VISIT (OUTPATIENT)
Dept: CARDIOLOGY CLINIC | Age: 59
End: 2019-03-28

## 2019-03-28 VITALS
HEART RATE: 79 BPM | SYSTOLIC BLOOD PRESSURE: 110 MMHG | WEIGHT: 123.8 LBS | BODY MASS INDEX: 20.62 KG/M2 | HEIGHT: 65 IN | DIASTOLIC BLOOD PRESSURE: 90 MMHG | OXYGEN SATURATION: 97 %

## 2019-03-28 DIAGNOSIS — Z95.810 PRESENCE OF AUTOMATIC CARDIOVERTER/DEFIBRILLATOR (AICD): ICD-10-CM

## 2019-03-28 DIAGNOSIS — I10 ESSENTIAL HYPERTENSION, BENIGN: ICD-10-CM

## 2019-03-28 DIAGNOSIS — I50.22 SYSTOLIC CHF, CHRONIC (HCC): ICD-10-CM

## 2019-03-28 DIAGNOSIS — Z79.01 CHRONIC ANTICOAGULATION: ICD-10-CM

## 2019-03-28 DIAGNOSIS — N18.6 ESRD (END STAGE RENAL DISEASE) (HCC): Chronic | ICD-10-CM

## 2019-03-28 DIAGNOSIS — I42.8 CARDIOMYOPATHY, NONISCHEMIC (HCC): ICD-10-CM

## 2019-03-28 DIAGNOSIS — I47.1 SVT (SUPRAVENTRICULAR TACHYCARDIA) (HCC): Primary | ICD-10-CM

## 2019-03-28 DIAGNOSIS — I48.0 PAROXYSMAL ATRIAL FIBRILLATION (HCC): ICD-10-CM

## 2019-03-28 RX ORDER — CARVEDILOL 25 MG/1
12.5 TABLET ORAL 2 TIMES DAILY WITH MEALS
Qty: 60 TAB | Refills: 5 | Status: SHIPPED | OUTPATIENT
Start: 2019-03-28 | End: 2020-01-01

## 2019-03-28 NOTE — PROGRESS NOTES
Patient has been having shortness of breath and palpitations  only at night while laying down.   Patient has swelling in right hand       Visit Vitals  /90 (BP 1 Location: Left arm, BP Patient Position: Sitting)   Pulse 79   Ht 5' 5\" (1.651 m)   Wt 123 lb 12.8 oz (56.2 kg)   SpO2 97%   BMI 20.60 kg/m²

## 2019-03-28 NOTE — LETTER
3/28/2019 4:48 PM 
 
Ms. Marcial Hand [place] [address] [phone] To Whom It May Concern: She was seen in our office today for followup of recent hospitalization of her cardiac condition. Ms Gentry Dubin may resume her work duties without restriction, effective Wednesday April 3rd. Sincerely, Judith Forrester MD

## 2019-04-02 ENCOUNTER — CLINICAL SUPPORT (OUTPATIENT)
Dept: CARDIOLOGY CLINIC | Age: 59
End: 2019-04-02

## 2019-04-02 DIAGNOSIS — I48.0 PAROXYSMAL ATRIAL FIBRILLATION (HCC): ICD-10-CM

## 2019-04-02 DIAGNOSIS — Z79.01 CHRONIC ANTICOAGULATION: ICD-10-CM

## 2019-04-02 DIAGNOSIS — Z79.01 ANTICOAGULATED ON COUMADIN: Primary | ICD-10-CM

## 2019-04-02 LAB
INR BLD: 4.3
INR, EXTERNAL: 4.3 (ref 2–3)
PT POC: 46.5 SECONDS
VALID INTERNAL CONTROL?: YES

## 2019-04-08 ENCOUNTER — ANTI-COAG VISIT (OUTPATIENT)
Dept: CARDIOLOGY CLINIC | Age: 59
End: 2019-04-08

## 2019-04-08 DIAGNOSIS — Z79.01 CHRONIC ANTICOAGULATION: ICD-10-CM

## 2019-04-08 DIAGNOSIS — I48.0 PAROXYSMAL ATRIAL FIBRILLATION (HCC): Primary | ICD-10-CM

## 2019-04-08 LAB
INR BLD: 21.1
INR BLD: 23.1
INR, EXTERNAL: 1.8 (ref 2–3)
PT POC: 1.8 SECONDS
PT POC: 1.9 SECONDS
VALID INTERNAL CONTROL?: YES
VALID INTERNAL CONTROL?: YES

## 2019-04-10 ENCOUNTER — TELEPHONE (OUTPATIENT)
Dept: CARDIOLOGY CLINIC | Age: 59
End: 2019-04-10

## 2019-04-12 ENCOUNTER — OFFICE VISIT (OUTPATIENT)
Dept: CARDIOLOGY CLINIC | Age: 59
End: 2019-04-12

## 2019-04-12 VITALS
DIASTOLIC BLOOD PRESSURE: 86 MMHG | WEIGHT: 123 LBS | RESPIRATION RATE: 18 BRPM | OXYGEN SATURATION: 99 % | BODY MASS INDEX: 20.49 KG/M2 | HEIGHT: 65 IN | SYSTOLIC BLOOD PRESSURE: 122 MMHG | HEART RATE: 67 BPM

## 2019-04-12 DIAGNOSIS — Z79.01 CHRONIC ANTICOAGULATION: Primary | ICD-10-CM

## 2019-04-12 DIAGNOSIS — I48.0 PAF (PAROXYSMAL ATRIAL FIBRILLATION) (HCC): ICD-10-CM

## 2019-04-12 DIAGNOSIS — N18.6 ESRD ON PERITONEAL DIALYSIS (HCC): ICD-10-CM

## 2019-04-12 DIAGNOSIS — Z99.2 ESRD ON PERITONEAL DIALYSIS (HCC): ICD-10-CM

## 2019-04-12 DIAGNOSIS — I42.8 CARDIOMYOPATHY, NONISCHEMIC (HCC): ICD-10-CM

## 2019-04-12 DIAGNOSIS — N18.6 ESRD (END STAGE RENAL DISEASE) (HCC): ICD-10-CM

## 2019-04-12 DIAGNOSIS — I47.1 SVT (SUPRAVENTRICULAR TACHYCARDIA) (HCC): ICD-10-CM

## 2019-04-12 NOTE — PROGRESS NOTES
Room # 1370 [facility] Admission 3/11 - 3/19  Visit Vitals  /86 (BP 1 Location: Left arm, BP Patient Position: Sitting)   Pulse 67   Resp 18   Ht 5' 5\" (1.651 m)   Wt 123 lb (55.8 kg)   SpO2 99%   BMI 20.47 kg/m²

## 2019-04-12 NOTE — PROGRESS NOTES
HISTORY OF PRESENTING ILLNESS      Angela Lawson is a 60 y.o. female with HF, HTN, non ischemic CM, LVEF 25-30%, ESRD on peritoneal dialysis, NYHA class II-III, SVT who presents for follow up of her ICD and SVT. Since our last visit, patient was diagnosed with atrial fibrillation but was felt to be asymptomatic. She recently underwent cystoscopy/bilateral retrograde barbotage and was found to have a normal ureter/bladder appearance with polycystic kidneys. She is planned to start Eliquis but is waiting to hear nephrologist's input. EKG shows tachycardia, possible AF, 118 bpm. She denies cardiac complaints.         ACTIVE PROBLEM LIST     Patient Active Problem List    Diagnosis Date Noted    Chronic anticoagulation 03/28/2019    SVT (supraventricular tachycardia) (HCC) 03/11/2019    GERD (gastroesophageal reflux disease) 03/50/8450    Systolic CHF, chronic ([address] 08/25/2018    Paroxysmal atrial fibrillation ([address] 07/31/2018    ICD (implantable cardioverter-defibrillator) in place 01/26/2018    Paroxysmal atrial tachycardia ([address] 04/17/2016    ESRD (end stage renal disease) ([address] 12/11/2015    Anemia 01/20/2015    Cardiomyopathy, nonischemic ([address] 10/19/2013    Polycystic kidney disease 10/18/2012    Essential hypertension, benign 10/18/2012           PAST MEDICAL HISTORY     Past Medical History:   Diagnosis Date    Anemia associated with chronic renal failure     Anuria 2014    Arrhythmia     Paroxysmal a fib, paroxsymal atrial tach, SVT    Chronic kidney disease     ESRD- on PD    Chronic systolic heart failure ([address] 10/31/2012    Chronic tachycardia     GERD (gastroesophageal reflux disease)     Hx of non-ST elevation myocardial infarction (NSTEMI) 2011    Hypertension     Lipoma of right lower extremity 1980    Foot    Peritoneal dialysis catheter in place [facility]) 2014    Nightime exchanges    Polycystic kidney disease 2014           PAST SURGICAL HISTORY     Past Surgical History:   Procedure Laterality Date    HX HEART CATHETERIZATION  12/2015    no interventions    HX PACEMAKER  12/29/2015    ICD    IR INSERT NON TUNL CVC OVER 5 YRS  3/13/2019          ALLERGIES     Allergies   Allergen Reactions    Iodinated Contrast- Oral And Iv Dye Angioedema     Cellulitis on side of face after test          FAMILY HISTORY     Family History   Problem Relation Age of Onset    Diabetes Brother     Hypertension Brother     Hypertension Mother    [facility] Arthritis-osteo Mother     Hypertension Sister     Diabetes Sister     Kidney Disease Father         polycystic    Parkinson's Disease Father     Heart Disease Father     Hypertension Father     Hypertension Brother     negative for cardiac disease       SOCIAL HISTORY     Social History     Socioeconomic History    Marital status: SINGLE     Spouse name: Not on file    Number of children: Not on file    Years of education: Not on file    Highest education level: Not on file   Tobacco Use    Smoking status: Never Smoker    Smokeless tobacco: Never Used   Substance and Sexual Activity    Alcohol use: No    Drug use: No    Sexual activity: Not Currently     Partners: Male         MEDICATIONS     Current Outpatient Medications   Medication Sig    carvedilol (COREG) 25 mg tablet Take 0.5 Tabs by mouth two (2) times daily (with meals). Indications: chronic heart failure    amiodarone (CORDARONE) 200 mg tablet Take 1 Tab by mouth two (2) times daily (with meals).  warfarin (COUMADIN) 3 mg tablet Take 1 Tab by mouth daily. (Patient taking differently: Take 3 mg by mouth daily. Take 6 mg by mouth  on Tuesday and Thursday and 3 mg on other days.)    aspirin delayed-release 81 mg tablet Take 81 mg by mouth daily.  potassium chloride (KLOR-CON M20) 20 mEq tablet Take 40 mEq by mouth every morning.  omeprazole (PRILOSEC) 20 mg capsule Take 20 mg by mouth daily as needed (indigestion).     SENNA-DOCUSATE SODIUM PO Take 1 Tab by mouth daily as needed (constipation).  cinacalcet (SENSIPAR) 30 mg tablet Take 30 mg by mouth daily (with dinner).  ferric citrate (AURYXIA) 210 mg iron tablet Take 210 mg by mouth three (3) times daily (with meals).  gentamicin (GARAMYCIN) 0.1 % topical cream Apply  to affected area nightly. No current facility-administered medications for this visit. I have reviewed the nurses notes, vitals, problem list, allergy list, medical history, family, social history and medications. REVIEW OF SYMPTOMS      General: Pt denies excessive weight gain or loss. Pt is able to conduct ADL's  HEENT: Denies blurred vision, headaches, hearing loss, epistaxis and difficulty swallowing. Respiratory: Denies cough, congestion, shortness of breath, HENDERSON, wheezing or stridor. Cardiovascular: Denies precordial pain, palpitations, edema or PND  Gastrointestinal: Denies poor appetite, indigestion, abdominal pain or blood in stool  Genitourinary: Denies hematuria, dysuria, increased urinary frequency  Musculoskeletal: Denies joint pain or swelling from muscles or joints  Neurologic: Denies tremor, paresthesias, headache, or sensory motor disturbance  Psychiatric: Denies confusion, insomnia, depression  Integumentray: Denies rash, itching or ulcers. Hematologic: Denies easy bruising, bleeding       PHYSICAL EXAMINATION      There were no vitals filed for this visit. General: Well developed, in no acute distress. HEENT: No jaundice, oral mucosa moist, no oral ulcers  Neck: Supple, no stiffness, no lymphadenopathy, supple  Heart:  Normal S1/S2 negative S3 or S4. Regular, no murmur, gallop or rub, no jugular venous distention  Respiratory: Clear bilaterally x 4, no wheezing or rales  Abdomen:   Soft, non-tender, bowel sounds are active.   Extremities:  No edema, normal cap refill, no cyanosis.   Musculoskeletal: No clubbing, no deformities  Neuro: A&Ox3, speech clear, gait stable, cooperative, no focal neurologic deficits  Skin: Skin color is normal. No rashes or lesions. Non diaphoretic, moist.  Vascular: 2+ pulses symmetric in all extremities       DIAGNOSTIC DATA      EKG:        LABORATORY DATA      Lab Results   Component Value Date/Time    WBC 11.6 (H) 03/17/2019 04:26 AM    Hemoglobin (POC) 9.5 (L) 10/18/2013 03:35 PM    HGB 10.4 (L) 03/17/2019 04:26 AM    Hematocrit (POC) 28 (L) 10/18/2013 03:35 PM    HCT 32.1 (L) 03/17/2019 04:26 AM    PLATELET 164 (L) 82/70/4479 04:26 AM    .2 (H) 03/17/2019 04:26 AM      Lab Results   Component Value Date/Time    Sodium 135 (L) 03/19/2019 03:40 AM    Potassium 3.3 (L) 03/19/2019 03:40 AM    Chloride 96 (L) 03/19/2019 03:40 AM    CO2 30 03/19/2019 03:40 AM    Anion gap 9 03/19/2019 03:40 AM    Glucose 93 03/19/2019 03:40 AM    BUN 60 (H) 03/19/2019 03:40 AM    Creatinine 12.60 (H) 03/19/2019 03:40 AM    BUN/Creatinine ratio 5 (L) 03/19/2019 03:40 AM    GFR est AA 4 (L) 03/19/2019 03:40 AM    GFR est non-AA 3 (L) 03/19/2019 03:40 AM    Calcium 7.7 (L) 03/19/2019 03:40 AM    Bilirubin, total 1.7 (H) 03/11/2019 04:42 PM    AST (SGOT) 50 (H) 03/11/2019 04:42 PM    Alk. phosphatase 82 03/11/2019 04:42 PM    Protein, total 7.4 03/11/2019 04:42 PM    Albumin 1.8 (L) 03/15/2019 04:19 AM    Globulin 4.6 (H) 03/11/2019 04:42 PM    A-G Ratio 0.6 (L) 03/11/2019 04:42 PM    ALT (SGPT) 37 03/11/2019 04:42 PM           ASSESSMENT      1. Cardiomyopathy              A. Non ischemic                B. LV systolic              C. NYHA class III              D. Narrow QRS  2. Hypertension  3. End stage renal disease  4. Supraventricular tachycardia  5. ICD   6. Atrial fibrillation                                     A. Persistent                         B. Asymptomatic           PLAN     Continue monitoring in device clinic. Continue drug therapy. May consider ablative therapy.          Thank you, Charla John MD and Dr. Gerold Halsted for allowing me to participate in the care of this extraordinarily pleasant female. Please do not hesitate to contact me for further questions/concerns.          Jagjit Dempsey MD  Cardiac Electrophysiology / Cardiology    [facility]  [address] [facility], [facility], [place] [address]  Sanjiv Morley [place]  [phone] / [phone] Fax   [phone] / [phone] Fax

## 2019-04-23 ENCOUNTER — ANTI-COAG VISIT (OUTPATIENT)
Dept: CARDIOLOGY CLINIC | Age: 59
End: 2019-04-23

## 2019-04-23 DIAGNOSIS — Z79.01 CHRONIC ANTICOAGULATION: ICD-10-CM

## 2019-04-23 DIAGNOSIS — I48.0 PAROXYSMAL ATRIAL FIBRILLATION (HCC): Primary | ICD-10-CM

## 2019-04-23 LAB
INR BLD: 2.7
INR, EXTERNAL: 2.7 (ref 2–3)
PT POC: 32.5 SECONDS
VALID INTERNAL CONTROL?: YES

## 2019-05-21 ENCOUNTER — ANTI-COAG VISIT (OUTPATIENT)
Dept: CARDIOLOGY CLINIC | Age: 59
End: 2019-05-21

## 2019-05-21 DIAGNOSIS — Z79.01 CHRONIC ANTICOAGULATION: ICD-10-CM

## 2019-05-21 DIAGNOSIS — I48.0 PAROXYSMAL ATRIAL FIBRILLATION (HCC): Primary | ICD-10-CM

## 2019-05-21 LAB
INR BLD: 1.5
INR, EXTERNAL: 1.5 (ref 2–3)
PT POC: 18 SECONDS
VALID INTERNAL CONTROL?: YES

## 2019-06-04 ENCOUNTER — ANTI-COAG VISIT (OUTPATIENT)
Dept: CARDIOLOGY CLINIC | Age: 59
End: 2019-06-04

## 2019-06-04 DIAGNOSIS — Z79.01 CHRONIC ANTICOAGULATION: ICD-10-CM

## 2019-06-04 DIAGNOSIS — I48.0 PAROXYSMAL ATRIAL FIBRILLATION (HCC): Primary | ICD-10-CM

## 2019-06-04 LAB
INR BLD: 6.4
INR, EXTERNAL: 6.4 (ref 2–3)
PT POC: 76.4 SECONDS
VALID INTERNAL CONTROL?: YES

## 2019-06-07 ENCOUNTER — ANTI-COAG VISIT (OUTPATIENT)
Dept: CARDIOLOGY CLINIC | Age: 59
End: 2019-06-07

## 2019-06-07 DIAGNOSIS — I42.8 CARDIOMYOPATHY, NONISCHEMIC (HCC): ICD-10-CM

## 2019-06-07 DIAGNOSIS — Q61.3 POLYCYSTIC KIDNEY DISEASE: Primary | ICD-10-CM

## 2019-06-07 DIAGNOSIS — Z79.01 CHRONIC ANTICOAGULATION: ICD-10-CM

## 2019-06-07 DIAGNOSIS — I48.0 PAROXYSMAL ATRIAL FIBRILLATION (HCC): ICD-10-CM

## 2019-06-07 LAB
INR BLD: 2.7
INR, EXTERNAL: 2.7 (ref 2–3)
PT POC: 32.1 SECONDS
VALID INTERNAL CONTROL?: YES

## 2019-06-27 ENCOUNTER — ANTI-COAG VISIT (OUTPATIENT)
Dept: CARDIOLOGY CLINIC | Age: 59
End: 2019-06-27

## 2019-06-27 ENCOUNTER — OFFICE VISIT (OUTPATIENT)
Dept: CARDIOLOGY CLINIC | Age: 59
End: 2019-06-27

## 2019-06-27 VITALS
OXYGEN SATURATION: 97 % | SYSTOLIC BLOOD PRESSURE: 120 MMHG | HEART RATE: 71 BPM | WEIGHT: 132 LBS | DIASTOLIC BLOOD PRESSURE: 82 MMHG | BODY MASS INDEX: 21.99 KG/M2 | HEIGHT: 65 IN

## 2019-06-27 DIAGNOSIS — I47.1 SVT (SUPRAVENTRICULAR TACHYCARDIA) (HCC): ICD-10-CM

## 2019-06-27 DIAGNOSIS — I48.0 PAROXYSMAL ATRIAL FIBRILLATION (HCC): ICD-10-CM

## 2019-06-27 DIAGNOSIS — Q61.3 POLYCYSTIC KIDNEY DISEASE: Primary | ICD-10-CM

## 2019-06-27 DIAGNOSIS — I50.22 SYSTOLIC CHF, CHRONIC (HCC): ICD-10-CM

## 2019-06-27 DIAGNOSIS — N18.6 ESRD (END STAGE RENAL DISEASE) (HCC): ICD-10-CM

## 2019-06-27 DIAGNOSIS — I42.8 CARDIOMYOPATHY, NONISCHEMIC (HCC): ICD-10-CM

## 2019-06-27 DIAGNOSIS — I42.8 NICM (NONISCHEMIC CARDIOMYOPATHY) (HCC): Primary | ICD-10-CM

## 2019-06-27 DIAGNOSIS — Z95.810 ICD (IMPLANTABLE CARDIOVERTER-DEFIBRILLATOR) IN PLACE: ICD-10-CM

## 2019-06-27 DIAGNOSIS — Z79.01 CHRONIC ANTICOAGULATION: ICD-10-CM

## 2019-06-27 DIAGNOSIS — I10 ESSENTIAL HYPERTENSION, BENIGN: ICD-10-CM

## 2019-06-27 LAB
INR BLD: 4.2
INR, EXTERNAL: 4.2 (ref 2–3)
PT POC: 50.6 SECONDS
VALID INTERNAL CONTROL?: YES

## 2019-06-27 NOTE — LETTER
6/27/19 Patient: Jolly Macdonald YOB: 1960 Date of Visit: 6/27/2019 Carmelita King MD 
[address] [facility] [place] [address] VIA Facsimile: [phone] Dear Carmelita King MD, Thank you for referring Ms. Tyson Staley to [facility] for evaluation. My notes for this consultation are attached. If you have questions, please do not hesitate to call me. I look forward to following your patient along with you. Sincerely, Karen Mccabe MD

## 2019-06-27 NOTE — PROGRESS NOTES
Patient says she has shortness of breath Patient said that there was a couple of time she had palpitations Visit Vitals /82 (BP 1 Location: Left arm, BP Patient Position: Sitting) Pulse 71 Ht 5' 5\" (1.651 m) Wt 132 lb (59.9 kg) SpO2 97% BMI 21.97 kg/m²

## 2019-06-27 NOTE — PROGRESS NOTES
Braden Lucas MD [facility] [address] [place], [address] [facility] Office [phone] Fax [phone] Cell [phone] Milton Siegel is a 60 y.o. female. Last seen by me 3 months ago. DIAGNOSES Encounter Diagnoses ICD-10-CM ICD-9-CM 1. NICM (nonischemic cardiomyopathy) ([facility]) I42.8 425.4 2. ICD (implantable cardioverter-defibrillator) in place Z95.810 V45.02  
3. Essential hypertension, benign I10 401.1 4. ESRD (end stage renal disease) ([facility] N18.6 585.6 5. Paroxysmal atrial fibrillation ([facility]) I48.0 427.31  
6. SVT (supraventricular tachycardia) ([facility]) I47.1 427.89  
7. Systolic CHF, chronic ([facility]) I50.22 428.22  
  428.0 ASSESSMENT/PLAN 
 
NICM with severe LV dysfunction s/p ICD in the setting of HTN, complicated by ESRD on PD. She has noticed increased dyspnea with weight gain in the past month. Her weight is up 10 pounds from our scales. She has JVD by exam. I have asked her to touch base with PD nurse to intensify her dialysate. Continue Coreg 12.5 mg BID. She is not one ACEi/ARB due to lowish BP 
 
PAF without recurrent sxs. Continue Warfarin, recently started. She is monitored by our Coumadin clinic. Eliquis was prohibitively expensive ($500/month) Recent SVT complicated by hypotension 3/2019. She has had no sx recurrence, but there has been discussion of ablation. Will touch base with Heath Horton to discuss. If ablation necessary. ESRD on PD. S/p ICD for primary prevention. Continue regular ICD checks Follow-up and Dispositions · Return in about 3 months (around 9/27/2019). HPI Milton Siegel reports her SOB has come back again this past month, predominantly in the mornings. She says it abates after she gets moving, but walking and lifting sometimes triggers it She has increased in weight by 10 pound since last visit. Her appetite is good and she eats 3 meals a day. Patient denies any exertional chest pain, palpitations, syncope, edema or paroxysmal nocturnal dyspnea. She has not had any bleeding on Warfarin. Her INR is followed by Coumadin clinic. She is due for a device check in November or December. Her insurance denied Eliquis. She takes potassium daily. She works as an . Cardiac testing ECHO: 10/14/12: EF 45% w/ posterior HK Grade 1 DD, LAE, mild MR, mild-mod TR RVSP 60 mmHG Cath: 10/16/12: Normal cors. No AVG. Mild pulm HTN (43/16/26). Low-normal filling pressures. Echo 10/19/13 - EF 25- 30%. Severe diffuse hypokinesis. Mildly increased wall thickness. Mild concentric hypertrophy. Doppler parameters were consistent with a reversible restrictive pattern, indicative of decreased left ventricular 
diastolic compliance and/or increased left atrial pressure (grade 3 diastolic dysfunction). LA mildly dilated, mild MR, mild TR, mod PA HTN, small pericardial effusion circumferential to the heart, no evidence of hemodynamic compromise. Echo 11/2/15 - EF 25-30%, global HK, mild MR Lexiscan cardiolite 11/24/15 - focal anteroapical ischemia Cath 12/15/2015 - EDP 10, LV dilated, EF 20% global HK, normal cors with right dominance. Unable to cannulate femoral vein. 12/29/15-implantation of a single-chamber [address] per Dr. Heath Lomeli Echo 1/30/17 - EF 20 %. Severe diffuse hypokinesis. Mild LAE. Mild MR. Mild Pulm HTN. Small pericardial effusion. No significant change when compared to study 02-Nov-2015. Echo 1/26/18- LVEF 28%, severe LAE, PA systolic 50 mmHg [facility] / [address] 6/4/2018: No sig CAD, RA 10, RV 39/5, W 23, PA 35/20/29, PA Sat 47.1%, Ao Sat 83.2%, /7/24 Ao 119/79/90, CO F 3.94, CI F 2.4, CO T 2.75, CI T 1.68 Echo 3/12/19 - LVEF 21-25% Current Outpatient Medications on File Prior to Visit Medication Sig Dispense Refill  warfarin (COUMADIN) 3 mg tablet Take 1 Tab by mouth daily.  Take 6 mg by mouth  on Tuesday and Thursday and 3 mg on other days. 45 Tab 6  carvedilol (COREG) 25 mg tablet Take 0.5 Tabs by mouth two (2) times daily (with meals). Indications: chronic heart failure 60 Tab 5  potassium chloride (KLOR-CON M20) 20 mEq tablet Take 40 mEq by mouth every morning.  omeprazole (PRILOSEC) 20 mg capsule Take 20 mg by mouth daily as needed (indigestion).  SENNA-DOCUSATE SODIUM PO Take 1 Tab by mouth daily as needed (constipation).  cinacalcet (SENSIPAR) 30 mg tablet Take 30 mg by mouth daily (with dinner).  ferric citrate (AURYXIA) 210 mg iron tablet Take 210 mg by mouth three (3) times daily (with meals).  gentamicin (GARAMYCIN) 0.1 % topical cream Apply  to affected area nightly.  aspirin delayed-release 81 mg tablet Take 81 mg by mouth daily. No current facility-administered medications on file prior to visit. Social History Socioeconomic History  Marital status: SINGLE Spouse name: Not on file  Number of children: Not on file  Years of education: Not on file  Highest education level: Not on file Occupational History  Not on file Social Needs  Financial resource strain: Not on file  Food insecurity:  
  Worry: Not on file Inability: Not on file  Transportation needs:  
  Medical: Not on file Non-medical: Not on file Tobacco Use  Smoking status: Never Smoker  Smokeless tobacco: Never Used Substance and Sexual Activity  Alcohol use: No  
 Drug use: No  
 Sexual activity: Not Currently Partners: Male Lifestyle  Physical activity:  
  Days per week: Not on file Minutes per session: Not on file  Stress: Not on file Relationships  Social connections:  
  Talks on phone: Not on file Gets together: Not on file Attends Church service: Not on file Active member of club or organization: Not on file Attends meetings of clubs or organizations: Not on file Relationship status: Not on file  Intimate partner violence:  
  Fear of current or ex partner: Not on file Emotionally abused: Not on file Physically abused: Not on file Forced sexual activity: Not on file Other Topics Concern  Not on file Social History Narrative  Not on file Review of Systems Constitutional:  Negative for fever, chills and diaphoresis. Respiratory: Negative for hemoptysis. +HENDERSON, orthopnea Cardiovascular:  Negative for chest pain and claudication. Gastrointestinal:  Negative for blood in stool and melena. Genitourinary:  Negative for dysuria, urgency and flank pain. Musculoskeletal:  Negative for back pain and joint pain. Skin:  Negative for rash. Neurological:  Negative for dizziness, weakness, seizures, loss of consciousness and headaches. Endo/Heme/Allergies:  Does not bruise/bleed easily. Psychiatric/Behavioral:  Negative for memory loss. The patient does not have insomnia. Visit Vitals /82 (BP 1 Location: Left arm, BP Patient Position: Sitting) Pulse 71 Ht 5' 5\" (1.651 m) Wt 132 lb (59.9 kg) SpO2 97% BMI 21.97 kg/m² Wt Readings from Last 3 Encounters:  
06/27/19 132 lb (59.9 kg) 04/12/19 123 lb (55.8 kg) 03/28/19 123 lb 12.8 oz (56.2 kg) Physical Exam 
Vitals reviewed. Constitutional:  She is oriented to person, place and time. She appears well-nourished. HENT: 
Head:  Normocephalic. Neck:  Neck supple. Normal JVP Cardiovascular:  Normal rate, regular rhythm, normal heart sounds and intact distal pulses. Exam reveals no gallop. No murmur heard. Pulmonary/Chest:  A few crackles at the right base. Effort normal.  
Abdominal:  abd soft  Bowel sounds normal. 
Musculoskeletal:  no ankle edema bilaterally. Neurological:  Alert and oriented to person, place and time. Skin:  Skin is warm and dry. Psychiatric:  She has a normal mood and affect. Cardiographics EKG 4/15/2016 - atrial tachycardia 120 Device interrogation 9/15/17 - No device therapies. No VT (since 2/2017). EKG 9/15/17 - SB, first degree AV block  EKG 9/18/18 - AF vs. Atrial tach/, occ. PVC's Echo 3/12/19 - LVEF 21-25% EKG 3/28/19 - SR 79, first degree AVB, , incomplete LBBB Written by Aviva Boxer, as dictated by Thamas Dakin, M.D.   
 
Thamas Dakin, MD

## 2019-07-25 ENCOUNTER — ANTI-COAG VISIT (OUTPATIENT)
Dept: CARDIOLOGY CLINIC | Age: 59
End: 2019-07-25

## 2019-07-25 DIAGNOSIS — Q61.3 POLYCYSTIC KIDNEY DISEASE: Primary | ICD-10-CM

## 2019-07-25 DIAGNOSIS — Z79.01 CHRONIC ANTICOAGULATION: ICD-10-CM

## 2019-07-25 DIAGNOSIS — I42.8 NICM (NONISCHEMIC CARDIOMYOPATHY) (HCC): ICD-10-CM

## 2019-07-25 DIAGNOSIS — I48.0 PAROXYSMAL ATRIAL FIBRILLATION (HCC): ICD-10-CM

## 2019-07-25 LAB
INR BLD: 1.3
INR, EXTERNAL: 1.3 (ref 2–3)
PT POC: 15.9 SECONDS
VALID INTERNAL CONTROL?: YES

## 2019-08-01 ENCOUNTER — ANTI-COAG VISIT (OUTPATIENT)
Dept: CARDIOLOGY CLINIC | Age: 59
End: 2019-08-01

## 2019-08-01 DIAGNOSIS — I42.8 NICM (NONISCHEMIC CARDIOMYOPATHY) (HCC): ICD-10-CM

## 2019-08-01 DIAGNOSIS — I48.0 PAROXYSMAL ATRIAL FIBRILLATION (HCC): ICD-10-CM

## 2019-08-01 DIAGNOSIS — Q61.3 POLYCYSTIC KIDNEY DISEASE: Primary | ICD-10-CM

## 2019-08-01 DIAGNOSIS — Z79.01 CHRONIC ANTICOAGULATION: ICD-10-CM

## 2019-08-01 LAB
INR BLD: 4
INR, EXTERNAL: 4 (ref 2–3)
PT POC: 49.9 SECONDS
VALID INTERNAL CONTROL?: YES

## 2019-08-19 LAB — INR, EXTERNAL: 6 (ref 2–3)

## 2019-08-27 ENCOUNTER — TELEPHONE ANTICOAG (OUTPATIENT)
Dept: CARDIOLOGY CLINIC | Age: 59
End: 2019-08-27

## 2019-08-27 DIAGNOSIS — Q61.3 POLYCYSTIC KIDNEY DISEASE: Primary | ICD-10-CM

## 2019-08-27 DIAGNOSIS — I48.0 PAROXYSMAL ATRIAL FIBRILLATION (HCC): ICD-10-CM

## 2019-08-27 DIAGNOSIS — Z79.01 CHRONIC ANTICOAGULATION: ICD-10-CM

## 2019-08-27 DIAGNOSIS — I42.8 NICM (NONISCHEMIC CARDIOMYOPATHY) (HCC): ICD-10-CM

## 2019-08-27 LAB — INR, EXTERNAL: 6 (ref 2–3)

## 2019-08-28 ENCOUNTER — TELEPHONE ANTICOAG (OUTPATIENT)
Dept: CARDIOLOGY CLINIC | Age: 59
End: 2019-08-28

## 2019-08-28 DIAGNOSIS — I48.0 PAROXYSMAL ATRIAL FIBRILLATION (HCC): ICD-10-CM

## 2019-08-28 DIAGNOSIS — Q61.3 POLYCYSTIC KIDNEY DISEASE: Primary | ICD-10-CM

## 2019-08-28 DIAGNOSIS — Z79.01 CHRONIC ANTICOAGULATION: ICD-10-CM

## 2019-08-28 DIAGNOSIS — I42.8 NICM (NONISCHEMIC CARDIOMYOPATHY) (HCC): ICD-10-CM

## 2019-08-28 LAB — INR, EXTERNAL: 3.8 (ref 2–3)

## 2019-09-04 ENCOUNTER — OFFICE VISIT (OUTPATIENT)
Dept: CARDIOLOGY CLINIC | Age: 59
End: 2019-09-04

## 2019-09-04 DIAGNOSIS — Z95.810 CARDIAC DEFIBRILLATOR IN SITU: Primary | ICD-10-CM

## 2019-09-06 ENCOUNTER — OFFICE VISIT (OUTPATIENT)
Dept: CARDIOLOGY CLINIC | Age: 59
End: 2019-09-06

## 2019-09-06 VITALS
RESPIRATION RATE: 16 BRPM | HEIGHT: 65 IN | HEART RATE: 114 BPM | OXYGEN SATURATION: 100 % | BODY MASS INDEX: 22.09 KG/M2 | SYSTOLIC BLOOD PRESSURE: 104 MMHG | DIASTOLIC BLOOD PRESSURE: 70 MMHG | WEIGHT: 132.6 LBS

## 2019-09-06 DIAGNOSIS — N18.6 ESRD (END STAGE RENAL DISEASE) (HCC): ICD-10-CM

## 2019-09-06 DIAGNOSIS — I42.8 NICM (NONISCHEMIC CARDIOMYOPATHY) (HCC): ICD-10-CM

## 2019-09-06 DIAGNOSIS — I47.20 VENTRICULAR TACHYCARDIA: Primary | ICD-10-CM

## 2019-09-06 DIAGNOSIS — I47.1 SVT (SUPRAVENTRICULAR TACHYCARDIA) (HCC): ICD-10-CM

## 2019-09-06 DIAGNOSIS — Z95.810 CARDIAC DEFIBRILLATOR IN SITU: ICD-10-CM

## 2019-09-06 NOTE — PROGRESS NOTES
Room # 2    SOB with exertion not new or increased, palpitations   Felt one of the shocks it woke her up from a deep sleep    Visit Vitals  /70 (BP 1 Location: Left arm, BP Patient Position: Sitting)   Pulse (!) 114   Resp 16   Ht 5' 5\" (1.651 m)   Wt 132 lb 9.6 oz (60.1 kg)   SpO2 100%   BMI 22.07 kg/m²

## 2019-09-06 NOTE — PROGRESS NOTES
HISTORY OF PRESENTING ILLNESS      Carlos Garvin is a 60 y.o. female with hypertension, non-ischemic CM, LVEF 25-30%, ESRD on peritoneal dialysis, NYHA class II-III, SVT who presents for follow up of her ICD and SVT. Since our last visit, patient was diagnosed with atrial fibrillation but was felt to be asymptomatic. She recently underwent cystoscopy/bilateral retrograde barbotage and was found to have a normal ureter/bladder appearance with polycystic kidneys. She is on coumadin for CVA risk reduction. She presents today for follow up after receiving shock for a tachycardia with shock Egram somewhat similar to sinus. She denies cardiac complications.         ACTIVE PROBLEM LIST     Patient Active Problem List    Diagnosis Date Noted    Chronic anticoagulation 03/28/2019    SVT [facility]supraventricular tachycardia) ([facility]) 03/11/2019    GERD (gastroesophageal reflux disease) 32/30/8450    Systolic CHF, chronic ([facility]) 08/25/2018    Paroxysmal atrial fibrillation ([address] 07/31/2018    ICD (implantable cardioverter-defibrillator) in place 01/26/2018    Paroxysmal atrial tachycardia ([address] 04/17/2016    ESRD (end stage renal disease) ([address] 12/11/2015    Anemia 01/20/2015    NICM (nonischemic cardiomyopathy) ([address] 10/19/2013    Polycystic kidney disease 10/18/2012    Essential hypertension, benign 10/18/2012           PAST MEDICAL HISTORY     Past Medical History:   Diagnosis Date    Anemia associated with chronic renal failure     Anuria 2014    Arrhythmia     Paroxysmal a fib, paroxsymal atrial tach, SVT    Chronic kidney disease     ESRD- on PD    Chronic systolic heart failure ([address] 10/31/2012    Chronic tachycardia     GERD (gastroesophageal reflux disease)     Hx of non-ST elevation myocardial infarction (NSTEMI) 2011    Hypertension     Lipoma of right lower extremity 1980    Foot    Peritoneal dialysis catheter in place [facility]) 2014    Nightime exchanges    Polycystic kidney disease 2014 PAST SURGICAL HISTORY     Past Surgical History:   Procedure Laterality Date    HX HEART CATHETERIZATION  12/2015    no interventions    HX PACEMAKER  12/29/2015    ICD    IR INSERT NON TUNL CVC OVER 5 YRS  3/13/2019          ALLERGIES     Allergies   Allergen Reactions    Iodinated Contrast Media Angioedema     Cellulitis on side of face after test          FAMILY HISTORY     Family History   Problem Relation Age of Onset    Diabetes Brother     Hypertension Brother     Hypertension Mother    Nobles Arthritis-osteo Mother     Hypertension Sister     Diabetes Sister     Kidney Disease Father         polycystic    Parkinson's Disease Father     Heart Disease Father     Hypertension Father     Hypertension Brother     negative for cardiac disease       SOCIAL HISTORY     Social History     Socioeconomic History    Marital status: SINGLE     Spouse name: Not on file    Number of children: Not on file    Years of education: Not on file    Highest education level: Not on file   Tobacco Use    Smoking status: Never Smoker    Smokeless tobacco: Never Used   Substance and Sexual Activity    Alcohol use: No    Drug use: No    Sexual activity: Not Currently     Partners: Male         MEDICATIONS     Current Outpatient Medications   Medication Sig    warfarin (COUMADIN) 3 mg tablet Take 1 Tab by mouth daily. Take 6 mg by mouth  on Tuesday and Thursday and 3 mg on other days.  carvedilol (COREG) 25 mg tablet Take 0.5 Tabs by mouth two (2) times daily (with meals). Indications: chronic heart failure    aspirin delayed-release 81 mg tablet Take 81 mg by mouth daily.  potassium chloride (KLOR-CON M20) 20 mEq tablet Take 40 mEq by mouth every morning.  omeprazole (PRILOSEC) 20 mg capsule Take 20 mg by mouth daily as needed (indigestion).  SENNA-DOCUSATE SODIUM PO Take 1 Tab by mouth daily as needed (constipation).  cinacalcet (SENSIPAR) 30 mg tablet Take 30 mg by mouth daily (with dinner).  ferric citrate (AURYXIA) 210 mg iron tablet Take 210 mg by mouth three (3) times daily (with meals).  gentamicin (GARAMYCIN) 0.1 % topical cream Apply  to affected area nightly. No current facility-administered medications for this visit. I have reviewed the nurses notes, vitals, problem list, allergy list, medical history, family, social history and medications. REVIEW OF SYMPTOMS      General: Pt denies excessive weight gain or loss. Pt is able to conduct ADL's  HEENT: Denies blurred vision, headaches, hearing loss, epistaxis and difficulty swallowing. Respiratory: Denies cough, congestion, shortness of breath, HENDERSON, wheezing or stridor. Cardiovascular: Denies precordial pain, palpitations, edema or PND  Gastrointestinal: Denies poor appetite, indigestion, abdominal pain or blood in stool  Genitourinary: Denies hematuria, dysuria, increased urinary frequency  Musculoskeletal: Denies joint pain or swelling from muscles or joints  Neurologic: Denies tremor, paresthesias, headache, or sensory motor disturbance  Psychiatric: Denies confusion, insomnia, depression  Integumentray: Denies rash, itching or ulcers. Hematologic: Denies easy bruising, bleeding       PHYSICAL EXAMINATION      There were no vitals filed for this visit. General: Well developed, in no acute distress. HEENT: No jaundice, oral mucosa moist, no oral ulcers  Neck: Supple, no stiffness, no lymphadenopathy, supple  Heart:  Normal S1/S2 negative S3 or S4. Regular, no murmur, gallop or rub, no jugular venous distention  Respiratory: Clear bilaterally x 4, no wheezing or rales  Abdomen:   Soft, non-tender, bowel sounds are active.   Extremities:  No edema, normal cap refill, no cyanosis. Musculoskeletal: No clubbing, no deformities  Neuro: A&Ox3, speech clear, gait stable, cooperative, no focal neurologic deficits  Skin: Skin color is normal. No rashes or lesions.  Non diaphoretic, moist.  Vascular: 2+ pulses symmetric in all extremities       DIAGNOSTIC DATA      EKG:        LABORATORY DATA      Lab Results   Component Value Date/Time    WBC 11.6 (H) 03/17/2019 04:26 AM    Hemoglobin (POC) 9.5 (L) 10/18/2013 03:35 PM    HGB 10.4 (L) 03/17/2019 04:26 AM    Hematocrit (POC) 28 (L) 10/18/2013 03:35 PM    HCT 32.1 (L) 03/17/2019 04:26 AM    PLATELET 694 (L) 43/53/9249 04:26 AM    .2 (H) 03/17/2019 04:26 AM      Lab Results   Component Value Date/Time    Sodium 135 (L) 03/19/2019 03:40 AM    Potassium 3.3 (L) 03/19/2019 03:40 AM    Chloride 96 (L) 03/19/2019 03:40 AM    CO2 30 03/19/2019 03:40 AM    Anion gap 9 03/19/2019 03:40 AM    Glucose 93 03/19/2019 03:40 AM    BUN 60 (H) 03/19/2019 03:40 AM    Creatinine 12.60 (H) 03/19/2019 03:40 AM    BUN/Creatinine ratio 5 (L) 03/19/2019 03:40 AM    GFR est AA 4 (L) 03/19/2019 03:40 AM    GFR est non-AA 3 (L) 03/19/2019 03:40 AM    Calcium 7.7 (L) 03/19/2019 03:40 AM    Bilirubin, total 1.7 (H) 03/11/2019 04:42 PM    AST (SGOT) 50 (H) 03/11/2019 04:42 PM    Alk. phosphatase 82 03/11/2019 04:42 PM    Protein, total 7.4 03/11/2019 04:42 PM    Albumin 1.8 (L) 03/15/2019 04:19 AM    Globulin 4.6 (H) 03/11/2019 04:42 PM    A-G Ratio 0.6 (L) 03/11/2019 04:42 PM    ALT (SGPT) 37 03/11/2019 04:42 PM           ASSESSMENT      1. Cardiomyopathy              A. Non ischemic                B. LV systolic              C. NYHA class III              D. Narrow QRS  2. Hypertension  3. End stage renal disease  4. Supraventricular tachycardia  5. ICD   6. Atrial fibrillation                            PLAN     Will plan for EP study to attempt to induce SVT (40613/16785). If unable to induce SVT will attempt VT induction +/- VT ablation (00967/02107) at [facility]. MAC anesthesia assist.        FOLLOW-UP     Post ablation      Thank you, Jihan Bond MD and Dr. Kathleen Martini for allowing me to participate in the care of this extraordinarily pleasant female.  Please do not hesitate to contact me for further questions/concerns. Angel Holliday NP       Patient seen and examined by me with nurse practitioner. I personally performed all components of the history, physical, and medical decision making and agree with the assessment and plan with minor modifications as noted.      Ashok Crawley MD  Cardiac Electrophysiology / Cardiology    [facility]  [address], [address] [facility]    Rosemarie Chowdhurymoankit  [phone] / [phone] Fax   [phone] / [phone] Fax

## 2019-09-10 ENCOUNTER — TELEPHONE (OUTPATIENT)
Dept: CARDIOLOGY CLINIC | Age: 59
End: 2019-09-10

## 2019-09-10 NOTE — TELEPHONE ENCOUNTER
Spoke with patient.   Will plan for EP Study with possible SVT or VT Ablation at [facility] on October 1st.

## 2019-09-11 ENCOUNTER — TELEPHONE ANTICOAG (OUTPATIENT)
Dept: CARDIOLOGY CLINIC | Age: 59
End: 2019-09-11

## 2019-09-11 DIAGNOSIS — I42.8 NICM (NONISCHEMIC CARDIOMYOPATHY) (HCC): ICD-10-CM

## 2019-09-11 DIAGNOSIS — Z79.01 CHRONIC ANTICOAGULATION: ICD-10-CM

## 2019-09-11 DIAGNOSIS — I48.0 PAROXYSMAL ATRIAL FIBRILLATION (HCC): ICD-10-CM

## 2019-09-11 DIAGNOSIS — Q61.3 POLYCYSTIC KIDNEY DISEASE: Primary | ICD-10-CM

## 2019-09-11 LAB — INR, EXTERNAL: 3.2 (ref 2–3)

## 2019-09-24 ENCOUNTER — TELEPHONE (OUTPATIENT)
Dept: CARDIOLOGY CLINIC | Age: 59
End: 2019-09-24

## 2019-09-24 ENCOUNTER — HOSPITAL ENCOUNTER (OUTPATIENT)
Dept: LAB | Age: 59
Discharge: HOME | End: 2019-09-24
Payer: MEDICARE

## 2019-09-24 PROCEDURE — 83735 ASSAY OF MAGNESIUM: CPT

## 2019-09-24 PROCEDURE — 36415 COLL VENOUS BLD VENIPUNCTURE: CPT

## 2019-09-24 PROCEDURE — 80048 BASIC METABOLIC PNL TOTAL CA: CPT

## 2019-09-25 LAB
BUN SERPL-MCNC: 56 MG/DL (ref 6–24)
BUN/CREAT SERPL: 4 (ref 9–23)
CALCIUM SERPL-MCNC: 9.6 MG/DL (ref 8.7–10.2)
CHLORIDE SERPL-SCNC: 96 MMOL/L (ref 96–106)
CO2 SERPL-SCNC: 23 MMOL/L (ref 20–29)
CREAT SERPL-MCNC: 13.69 MG/DL (ref 0.57–1)
GLUCOSE SERPL-MCNC: 90 MG/DL (ref 65–99)
INTERPRETATION: NORMAL
MAGNESIUM SERPL-MCNC: 2.4 MG/DL (ref 1.6–2.3)
POTASSIUM SERPL-SCNC: 4.2 MMOL/L (ref 3.5–5.2)
SODIUM SERPL-SCNC: 141 MMOL/L (ref 134–144)

## 2019-09-25 NOTE — TELEPHONE ENCOUNTER
Spoke with patient. Reviewed pre-operative instructions and clarified procedure will be at [facility] not [facility] as stated in error in letter. Understanding expressed.

## 2019-09-25 NOTE — TELEPHONE ENCOUNTER
Returned call. Voicemail not set up, unable to leave message. Pre-Operative letter sent.   Need to review medication instructions prior to SVT/VT Ablation

## 2019-09-26 ENCOUNTER — TELEPHONE (OUTPATIENT)
Dept: CARDIOLOGY CLINIC | Age: 59
End: 2019-09-26

## 2019-09-26 NOTE — TELEPHONE ENCOUNTER
Pt states she has not been feeling well and not eating. INR 6. 5. Pt states she held coumadin dose last pm,and verbal order given for pt to hold coumadin dose  X 2,per . Pt for an Ablation on 10/01/19,per .

## 2019-09-27 RX ORDER — SODIUM CHLORIDE 0.9 % (FLUSH) 0.9 %
5-40 SYRINGE (ML) INJECTION EVERY 8 HOURS
Status: CANCELLED | OUTPATIENT
Start: 2019-09-27

## 2019-09-27 RX ORDER — SODIUM CHLORIDE 0.9 % (FLUSH) 0.9 %
5-40 SYRINGE (ML) INJECTION AS NEEDED
Status: CANCELLED | OUTPATIENT
Start: 2019-09-27

## 2019-10-01 ENCOUNTER — HOSPITAL ENCOUNTER (OUTPATIENT)
Age: 59
Setting detail: OBSERVATION
Discharge: HOME | End: 2019-10-02
Attending: INTERNAL MEDICINE | Admitting: INTERNAL MEDICINE
Payer: MEDICARE

## 2019-10-01 ENCOUNTER — ANESTHESIA EVENT (OUTPATIENT)
Dept: CARDIAC CATH/INVASIVE PROCEDURES | Age: 59
End: 2019-10-01
Payer: MEDICARE

## 2019-10-01 ENCOUNTER — ANESTHESIA (OUTPATIENT)
Dept: CARDIAC CATH/INVASIVE PROCEDURES | Age: 59
End: 2019-10-01
Payer: MEDICARE

## 2019-10-01 DIAGNOSIS — I47.1 PAROXYSMAL SUPRAVENTRICULAR TACHYCARDIA (HCC): ICD-10-CM

## 2019-10-01 LAB
ERYTHROCYTE [DISTWIDTH] IN BLOOD BY AUTOMATED COUNT: 13.5 % (ref 11.5–14.5)
HCT VFR BLD AUTO: 40.7 % (ref 35–47)
HGB BLD-MCNC: 12.4 G/DL (ref 11.5–16)
INR BLD: 2.1 (ref 0.9–1.2)
MCH RBC QN AUTO: 32.7 PG (ref 26–34)
MCHC RBC AUTO-ENTMCNC: 30.5 G/DL (ref 30–36.5)
MCV RBC AUTO: 107.4 FL (ref 80–99)
NRBC # BLD: 0 K/UL (ref 0–0.01)
NRBC BLD-RTO: 0 PER 100 WBC
PLATELET # BLD AUTO: 175 K/UL (ref 150–400)
PMV BLD AUTO: 9.7 FL (ref 8.9–12.9)
RBC # BLD AUTO: 3.79 M/UL (ref 3.8–5.2)
WBC # BLD AUTO: 10.9 K/UL (ref 3.6–11)

## 2019-10-01 PROCEDURE — 85027 COMPLETE CBC AUTOMATED: CPT

## 2019-10-01 PROCEDURE — 99218 HC RM OBSERVATION: CPT

## 2019-10-01 PROCEDURE — 93613 INTRACARDIAC EPHYS 3D MAPG: CPT | Performed by: INTERNAL MEDICINE

## 2019-10-01 PROCEDURE — 74011250636 HC RX REV CODE- 250/636: Performed by: NURSE ANESTHETIST, CERTIFIED REGISTERED

## 2019-10-01 PROCEDURE — 76060000035 HC ANESTHESIA 2 TO 2.5 HR: Performed by: INTERNAL MEDICINE

## 2019-10-01 PROCEDURE — 74011000250 HC RX REV CODE- 250: Performed by: NURSE ANESTHETIST, CERTIFIED REGISTERED

## 2019-10-01 PROCEDURE — 77030027107 HC PTCH EXT REF CARTO3 J&J -F: Performed by: INTERNAL MEDICINE

## 2019-10-01 PROCEDURE — C1730 CATH, EP, 19 OR FEW ELECT: HCPCS | Performed by: INTERNAL MEDICINE

## 2019-10-01 PROCEDURE — 85610 PROTHROMBIN TIME: CPT

## 2019-10-01 PROCEDURE — 36415 COLL VENOUS BLD VENIPUNCTURE: CPT

## 2019-10-01 PROCEDURE — 77030029065 HC DRSG HEMO QCLOT ZMED -B: Performed by: INTERNAL MEDICINE

## 2019-10-01 PROCEDURE — C1733 CATH, EP, OTHR THAN COOL-TIP: HCPCS | Performed by: INTERNAL MEDICINE

## 2019-10-01 PROCEDURE — C1893 INTRO/SHEATH, FIXED,NON-PEEL: HCPCS | Performed by: INTERNAL MEDICINE

## 2019-10-01 PROCEDURE — C1894 INTRO/SHEATH, NON-LASER: HCPCS | Performed by: INTERNAL MEDICINE

## 2019-10-01 PROCEDURE — C1732 CATH, EP, DIAG/ABL, 3D/VECT: HCPCS | Performed by: INTERNAL MEDICINE

## 2019-10-01 PROCEDURE — 93653 COMPRE EP EVAL TX SVT: CPT | Performed by: INTERNAL MEDICINE

## 2019-10-01 PROCEDURE — 93623 PRGRMD STIMJ&PACG IV RX NFS: CPT | Performed by: INTERNAL MEDICINE

## 2019-10-01 PROCEDURE — 77030039046 HC PAD DEFIB RADIOTRNSPNT CNMD -B: Performed by: INTERNAL MEDICINE

## 2019-10-01 RX ORDER — ONDANSETRON 2 MG/ML
4 INJECTION INTRAMUSCULAR; INTRAVENOUS
Status: DISCONTINUED | OUTPATIENT
Start: 2019-10-01 | End: 2019-10-02 | Stop reason: HOSPADM

## 2019-10-01 RX ORDER — DEXMEDETOMIDINE HYDROCHLORIDE 100 UG/ML
INJECTION, SOLUTION INTRAVENOUS AS NEEDED
Status: DISCONTINUED | OUTPATIENT
Start: 2019-10-01 | End: 2019-10-01 | Stop reason: HOSPADM

## 2019-10-01 RX ORDER — PROPOFOL 10 MG/ML
INJECTION, EMULSION INTRAVENOUS
Status: DISCONTINUED | OUTPATIENT
Start: 2019-10-01 | End: 2019-10-01 | Stop reason: HOSPADM

## 2019-10-01 RX ORDER — CEFAZOLIN SODIUM 1 G/3ML
INJECTION, POWDER, FOR SOLUTION INTRAMUSCULAR; INTRAVENOUS AS NEEDED
Status: DISCONTINUED | OUTPATIENT
Start: 2019-10-01 | End: 2019-10-01 | Stop reason: HOSPADM

## 2019-10-01 RX ORDER — SODIUM CHLORIDE 0.9 % (FLUSH) 0.9 %
5-40 SYRINGE (ML) INJECTION EVERY 8 HOURS
Status: DISCONTINUED | OUTPATIENT
Start: 2019-10-01 | End: 2019-10-02 | Stop reason: HOSPADM

## 2019-10-01 RX ORDER — PROPOFOL 10 MG/ML
INJECTION, EMULSION INTRAVENOUS AS NEEDED
Status: DISCONTINUED | OUTPATIENT
Start: 2019-10-01 | End: 2019-10-01 | Stop reason: HOSPADM

## 2019-10-01 RX ORDER — ACETAMINOPHEN 325 MG/1
650 TABLET ORAL
Status: DISCONTINUED | OUTPATIENT
Start: 2019-10-01 | End: 2019-10-02 | Stop reason: HOSPADM

## 2019-10-01 RX ORDER — ONDANSETRON 2 MG/ML
INJECTION INTRAMUSCULAR; INTRAVENOUS AS NEEDED
Status: DISCONTINUED | OUTPATIENT
Start: 2019-10-01 | End: 2019-10-01 | Stop reason: HOSPADM

## 2019-10-01 RX ORDER — SODIUM CHLORIDE 9 MG/ML
INJECTION, SOLUTION INTRAVENOUS
Status: DISCONTINUED | OUTPATIENT
Start: 2019-10-01 | End: 2019-10-01 | Stop reason: HOSPADM

## 2019-10-01 RX ORDER — SODIUM CHLORIDE 0.9 % (FLUSH) 0.9 %
5-40 SYRINGE (ML) INJECTION AS NEEDED
Status: DISCONTINUED | OUTPATIENT
Start: 2019-10-01 | End: 2019-10-02 | Stop reason: HOSPADM

## 2019-10-01 RX ORDER — SODIUM CHLORIDE 0.9 % (FLUSH) 0.9 %
5-40 SYRINGE (ML) INJECTION EVERY 8 HOURS
Status: DISCONTINUED | OUTPATIENT
Start: 2019-10-01 | End: 2019-10-01 | Stop reason: HOSPADM

## 2019-10-01 RX ORDER — HYDROCODONE BITARTRATE AND ACETAMINOPHEN 5; 325 MG/1; MG/1
1 TABLET ORAL
Status: DISCONTINUED | OUTPATIENT
Start: 2019-10-01 | End: 2019-10-02 | Stop reason: HOSPADM

## 2019-10-01 RX ORDER — DEXAMETHASONE SODIUM PHOSPHATE 4 MG/ML
INJECTION, SOLUTION INTRA-ARTICULAR; INTRALESIONAL; INTRAMUSCULAR; INTRAVENOUS; SOFT TISSUE AS NEEDED
Status: DISCONTINUED | OUTPATIENT
Start: 2019-10-01 | End: 2019-10-01 | Stop reason: HOSPADM

## 2019-10-01 RX ORDER — SODIUM CHLORIDE 0.9 % (FLUSH) 0.9 %
5-40 SYRINGE (ML) INJECTION AS NEEDED
Status: DISCONTINUED | OUTPATIENT
Start: 2019-10-01 | End: 2019-10-01 | Stop reason: HOSPADM

## 2019-10-01 RX ADMIN — DEXMEDETOMIDINE HYDROCHLORIDE 4 MCG: 100 INJECTION, SOLUTION, CONCENTRATE INTRAVENOUS at 15:27

## 2019-10-01 RX ADMIN — DEXMEDETOMIDINE HYDROCHLORIDE 4 MCG: 100 INJECTION, SOLUTION, CONCENTRATE INTRAVENOUS at 15:21

## 2019-10-01 RX ADMIN — PHENYLEPHRINE HYDROCHLORIDE 40 MCG/MIN: 10 INJECTION INTRAVENOUS at 15:35

## 2019-10-01 RX ADMIN — DEXMEDETOMIDINE HYDROCHLORIDE 4 MCG: 100 INJECTION, SOLUTION, CONCENTRATE INTRAVENOUS at 15:17

## 2019-10-01 RX ADMIN — PROPOFOL 20 MG: 10 INJECTION, EMULSION INTRAVENOUS at 15:29

## 2019-10-01 RX ADMIN — DEXMEDETOMIDINE HYDROCHLORIDE 4 MCG: 100 INJECTION, SOLUTION, CONCENTRATE INTRAVENOUS at 15:24

## 2019-10-01 RX ADMIN — ONDANSETRON HYDROCHLORIDE 4 MG: 2 INJECTION, SOLUTION INTRAMUSCULAR; INTRAVENOUS at 16:54

## 2019-10-01 RX ADMIN — SODIUM CHLORIDE: 900 INJECTION, SOLUTION INTRAVENOUS at 15:12

## 2019-10-01 RX ADMIN — PROPOFOL 20 MG: 10 INJECTION, EMULSION INTRAVENOUS at 15:27

## 2019-10-01 RX ADMIN — Medication 10 ML: at 23:05

## 2019-10-01 RX ADMIN — ISOPROTERENOL HYDROCHLORIDE 1 MCG: 0.2 INJECTION, SOLUTION INTRAMUSCULAR; INTRAVENOUS at 16:34

## 2019-10-01 RX ADMIN — PROPOFOL 50 MCG/KG/MIN: 10 INJECTION, EMULSION INTRAVENOUS at 15:12

## 2019-10-01 RX ADMIN — CEFAZOLIN 2 G: 330 INJECTION, POWDER, FOR SOLUTION INTRAMUSCULAR; INTRAVENOUS at 15:14

## 2019-10-01 RX ADMIN — PROPOFOL 20 MG: 10 INJECTION, EMULSION INTRAVENOUS at 15:14

## 2019-10-01 RX ADMIN — DEXAMETHASONE SODIUM PHOSPHATE 4 MG: 4 INJECTION, SOLUTION INTRAMUSCULAR; INTRAVENOUS at 15:10

## 2019-10-01 RX ADMIN — DEXMEDETOMIDINE HYDROCHLORIDE 4 MCG: 100 INJECTION, SOLUTION, CONCENTRATE INTRAVENOUS at 15:10

## 2019-10-01 NOTE — PROGRESS NOTES
TRANSFER - OUT REPORT: 
 
Verbal report given to Judy Martinez RN on Lavern Griffin being transferred to cath lab recovery for routine progression of care Report consisted of patients Situation, Background, Assessment and  
Recommendations(SBAR). Information from the following report(s) Procedure Summary was reviewed with the receiving nurse. Opportunity for questions and clarification was provided. Cardiac Cath Lab Procedure Area Arrival Note: 
 
Lavern Griffin arrived to Cardiac Cath Lab, Procedure Area. Patient identifiers verified with NAME and DATE OF BIRTH. Procedure verified with patient. Consent forms verified. Allergies verified. Patient informed of procedure and plan of care. Questions answered with review. Patient voiced understanding of procedure and plan of care. Patient on cardiac monitor, non-invasive blood pressure, SPO2 monitor. Airway management and monitoring to be performed by CRNA. Patient status doing well without problems. Patient is A&Ox 4. Patient reports no pain. Patient medicated during procedure with orders obtained and verified by Dr. Rhina Diego. Refer to patients Cardiac Cath Lab PROCEDURE REPORT for vital signs, assessment, status, and response during procedure, printed at end of case. Printed report on chart or scanned into chart.

## 2019-10-01 NOTE — ANESTHESIA PREPROCEDURE EVALUATION
Relevant Problems No relevant active problems Anesthetic History No history of anesthetic complications Review of Systems / Medical History Patient summary reviewed, nursing notes reviewed and pertinent labs reviewed Pulmonary Within defined limits Neuro/Psych Within defined limits Cardiovascular Within defined limits Hypertension CHF Dysrhythmias : SVT 
 
 
 
  
GI/Hepatic/Renal 
Within defined limits GERD Renal disease: dialysis and ESRD Endo/Other Within defined limits Other Findings Physical Exam 
 
Airway Mallampati: II 
TM Distance: > 6 cm Neck ROM: normal range of motion Mouth opening: Normal 
 
 Cardiovascular Regular rate and rhythm,  S1 and S2 normal,  no murmur, click, rub, or gallop Dental 
No notable dental hx Pulmonary Breath sounds clear to auscultation Abdominal 
GI exam deferred Other Findings Anesthetic Plan ASA: 3 Anesthesia type: MAC Anesthetic plan and risks discussed with: Patient

## 2019-10-01 NOTE — PROCEDURES
Cardiac Electrophysiology Report PATIENT INFORMATION Patient Name: Jeramy Leung  MRN: [id number]              Study Date: 10/1/2019 YOB: 1960   Age: 60 y.o. Gender: female Procedure:  Electrophysiology Study +/- Cardiac Ablation Referring Physician:  Kadeem Velasco MD and Dr. Rosa Maria Elizondo STAFF Duty Name Electrophysiologist Rene Murdock MD  
Monitor Anesthesia service Circulator Elmo Cox RN; Jean Rosen RN; SILVESTRE Curiel; Kirill Marshall RN  
 
 
PATIENT HISTORY Hetal Espinosa is a 60 y.o. female with hypertension, non-ischemic CM, LVEF 25-30%, ESRD on peritoneal dialysis, NYHA class II-III, SVT who had recurrent SVT including a tachycardia treated as VT by her ICD despite similar shock electrograms to sinus rhythm. She now presents for possible ablative therapy. PROCEDURE The patient was brought to the Cardiac Electrophysiology laboratory in a post-absorptive, fasting state. Informed consent was obtained. A peripheral IV was in place. Continuous electrocardiographic, blood pressure, O2 saturation and  CO2 monitoring was initiated. Self-adhesive cardioversion patches were positioned on the chest.  Conscious sedation was administered per protocol. The patient was then prepped and draped in the usual sterile fashion. ICD reprogramming was performed to turn off shock therapies. Both groins were infiltrated with a 50/50 mixture of Lidocaine (1%) and bupivicaine (0.5%). An 8F,  7F and two 6F sheaths were placed in the right femoral vein. Through these sheaths, 6F quadrapolar catheters were placed in the high right atrium, right ventricle and His positions, and a 7F octapolar catheter was placed in the coronary sinus for left atrial pacing/recording.   Programmed and incremental stimulation was performed from the RA, RV apex and RV outflow tract at twice the diastolic threshold. Once the tachycardia was induced, various maneuvers were performed to determine its mechanism. These included atrial extrastimuli, ventricular extrastimuli, atrial burst pacing, ventricular burst pacing, and para-Hissian pacing. Studies were performed at baseline and on cardio-active drugs. The chambers of interest were mapped to identify the abnormal tissue. A 7F, 4mm ablation catheter was positioned adjacent to the abnormal tissue and RF energy delivered until the abnormal tissue was eliminated. At the conclusion of the procedure, the catheters and sheaths were removed and hemostasis obtained by direct compression. ICD therapies were programmed back on. The patient was hemodynamically stable, tolerated the procedure well and was transferred in stable condition. There was minimal blood loss and no specimen were removed. CONDUCTION INTERVALS See attached report FINDINGS 1. The baseline ECG revealed narrow complex tachycardia 2. The intracardiac intervals were normal 
3. The patient was in a narrow complex tachycardia with  msec with wobble in the cycle length which made evaluation of response to ventricular overdrive pacing challenging. Isuprel infusion initiated to stabilize the tachycardia. A VAV response was observed in response to cessation of ventricular overdrive pacing. Dissociated A/V conduction was observed at times which excluded ORT. 4. Retrograde conduction at baseline was concentric and decremental.  
5. AV january function curves were attempted but reproducibly induced tachycardia. 6. During atrial overdrive pacing, the VApaced-VAtach was < 22 msec. Delivery of His-refractory PACs resulted in preexcitation of the His. 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   
7. A 4 mm, bi-directional, non-irrigated tip ablation catheter was then advanced through an SR0 sheath and positioned anterior and inferior to the coronary sinus ostium. Once adequate electrograms were identified, several applications of radiofrequency energy were delivered with resultant junctional beats evident during ablation. 8. Delivery of RF energy rendered the tachycardia no longer inducible on/off isoproteronol however a second tachycardia was observed with earliest atrial activation observed at CS 9,10. This tachycardia could not be sustained to allow mapping. RADIOLOGY SUMMARY Total  
Fluoro Time (minutes) 4.1 Dose Area Product (mGy) 99 CONCLUSIONS 1. Successful typical AVNRT ablation performed 2. Atrial tachycardia observed that could not be sustained for mapping 3. Continue current medical therapy; Follow up in 1 month in EP clinic. 4. Follow up with Enrike Bhat MD and Dr. Caroline Hameed as scheduled. Thank you, Enrike Bhat MD and Dr. Caroline Hameed for allowing me to participate in the care of this extraordinarily pleasant female. Francoise Simpson MD 
Cardiac Electrophysiology / Cardiology [address] [place] [address] [place] [address] [place] [address], [address] [place] [address], [address] 
[phone] / [phone] Fax    [phone] / [phone] Fax

## 2019-10-01 NOTE — ROUTINE PROCESS
Cardiac Cath Lab Recovery Arrival Note: 
 
 
Murali De arrived to Cardiac Cath Lab, Recovery Area. Staff introduced to patient. Patient identifiers verified with NAME and DATE OF BIRTH. Procedure verified with patient. Consent forms reviewed and signed by patient or authorized representative and verified. Allergies verified. Patient informed of procedure and plan of care. Questions answered with review. Patient prepped for procedure, per orders from physician, prior to arrival. 
 
Patient on cardiac monitor, non-invasive blood pressure, SPO2 monitor. Patient is A&Ox 4. Patient reports no complaints. Patient in stretcher, in low position, with side rails up, call bell within reach, patient instructed to call of assistance as needed. Patient prep in: [facility] Recovery Area, Bed# 10. Family in: waiting room. Prep by: TIM Torres

## 2019-10-01 NOTE — PROGRESS NOTES
Bedside and Verbal shift change report given to Guerita Seth RN (oncoming nurse) by SLAVA Vazquez (offgoing nurse).  Report included the following information SBAR, Kardex, Intake/Output, MAR and Recent Results.

## 2019-10-01 NOTE — H&P
HISTORY OF PRESENTING ILLNESS Vasu Conteh is a 60 y.o. female with hypertension, non-ischemic CM, LVEF 25-30%, ESRD on peritoneal dialysis, NYHA class II-III, SVT who presents for follow up of her ICD and SVT. Since our last visit, patient was diagnosed with atrial fibrillation but was felt to be asymptomatic. She recently underwent cystoscopy/bilateral retrograde barbotage and was found to have a normal ureter/bladder appearance with polycystic kidneys. She is on coumadin for CVA risk reduction. She presents today for follow up after receiving shock for a tachycardia with shock Egram somewhat similar to sinus. She denies cardiac complications.  
  
  
 ACTIVE PROBLEM LIST  
  
    
Patient Active Problem List  
  Diagnosis Date Noted  Chronic anticoagulation 03/28/2019  
 SVT (supraventricular tachycardia) ([address] 03/11/2019  GERD (gastroesophageal reflux disease) 03/11/2019  Systolic CHF, chronic ([address] 08/25/2018  Paroxysmal atrial fibrillation ([address] 07/31/2018  ICD (implantable cardioverter-defibrillator) in place 01/26/2018  Paroxysmal atrial tachycardia ([address] 04/17/2016  ESRD (end stage renal disease) ([address] 12/11/2015  Anemia 01/20/2015  NICM (nonischemic cardiomyopathy) ([address] 10/19/2013  Polycystic kidney disease 10/18/2012  Essential hypertension, benign 10/18/2012  
  
  
  
 PAST MEDICAL HISTORY  
  
    
Past Medical History:  
Diagnosis Date  Anemia associated with chronic renal failure    
 Anuria 2014  Arrhythmia    
  Paroxysmal a fib, paroxsymal atrial tach, SVT  Chronic kidney disease    
  ESRD- on PD  Chronic systolic heart failure ([address] 10/31/2012  Chronic tachycardia    
 GERD (gastroesophageal reflux disease)    
 Hx of non-ST elevation myocardial infarction (NSTEMI) 2011  Hypertension    
 Lipoma of right lower extremity 1980  
  Foot  Peritoneal dialysis catheter in place [facility]) 2014  
  Nightime exchanges  Polycystic kidney disease 2014  
  
  
  
 PAST SURGICAL HISTORY  
  
     
Past Surgical History:  
Procedure Laterality Date  HX HEART CATHETERIZATION   12/2015  
  no interventions  HX PACEMAKER   12/29/2015  
  ICD  IR INSERT NON TUNL CVC OVER 5 YRS   3/13/2019  
  
  
  
 ALLERGIES  
  
     
Allergies Allergen Reactions  Iodinated Contrast Media Angioedema  
    Cellulitis on side of face after test  
  
  
  
FAMILY HISTORY  
  
     
Family History Problem Relation Age of Onset  Diabetes Brother    
 Hypertension Brother    
 Hypertension Mother    
 Arthritis-osteo Mother    
 Hypertension Sister    
 Diabetes Sister    
 Kidney Disease Father    
      polycystic  Parkinson's Disease Father    
 Heart Disease Father    
 Hypertension Father    
 Hypertension Brother    
 negative for cardiac disease 
  
  
 SOCIAL HISTORY  
  
Social History  
  
  
     
Socioeconomic History  Marital status: SINGLE  
    Spouse name: Not on file  Number of children: Not on file  Years of education: Not on file  Highest education level: Not on file Tobacco Use  Smoking status: Never Smoker  Smokeless tobacco: Never Used Substance and Sexual Activity  Alcohol use: No  
 Drug use: No  
 Sexual activity: Not Currently  
    Partners: Male  
  
  
  
  
MEDICATIONS  
  
    
Current Outpatient Medications Medication Sig  warfarin (COUMADIN) 3 mg tablet Take 1 Tab by mouth daily. Take 6 mg by mouth  on Tuesday and Thursday and 3 mg on other days.  carvedilol (COREG) 25 mg tablet Take 0.5 Tabs by mouth two (2) times daily (with meals). Indications: chronic heart failure  aspirin delayed-release 81 mg tablet Take 81 mg by mouth daily.  potassium chloride (KLOR-CON M20) 20 mEq tablet Take 40 mEq by mouth every morning.  omeprazole (PRILOSEC) 20 mg capsule Take 20 mg by mouth daily as needed (indigestion).  SENNA-DOCUSATE SODIUM PO Take 1 Tab by mouth daily as needed (constipation).  cinacalcet (SENSIPAR) 30 mg tablet Take 30 mg by mouth daily (with dinner).  ferric citrate (AURYXIA) 210 mg iron tablet Take 210 mg by mouth three (3) times daily (with meals).  gentamicin (GARAMYCIN) 0.1 % topical cream Apply  to affected area nightly.  
  
No current facility-administered medications for this visit.   
  
  
I have reviewed the nurses notes, vitals, problem list, allergy list, medical history, family, social history and medications. 
  
  
 REVIEW OF SYMPTOMS General: Pt denies excessive weight gain or loss. Pt is able to conduct ADL's HEENT: Denies blurred vision, headaches, hearing loss, epistaxis and difficulty swallowing. Respiratory: Denies cough, congestion, shortness of breath, H[place], wheezing or stridor. Cardiovascular: Denies precordial pain, palpitations, edema or PND Gastrointestinal: Denies poor appetite, indigestion, abdominal pain or blood in stool Genitourinary: Denies hematuria, dysuria, increased urinary frequency Musculoskeletal: Denies joint pain or swelling from muscles or joints Neurologic: Denies tremor, paresthesias, headache, or sensory motor disturbance Psychiatric: Denies confusion, insomnia, depression Integumentray: Denies rash, itching or ulcers. Hematologic: Denies easy bruising, bleeding 
  
  
 PHYSICAL EXAMINATION There were no vitals filed for this visit. General: Well developed, in no acute distress. HEENT: No jaundice, oral mucosa moist, no oral ulcers Neck: Supple, no stiffness, no lymphadenopathy, supple Heart:  Normal S1/S2 negative S3 or S4. Regular, no murmur, gallop or rub, no jugular venous distention Respiratory: Clear bilaterally x 4, no wheezing or rales Abdomen:   Soft, non-tender, bowel sounds are active.  
Extremities:  No edema, normal cap refill, no cyanosis. Musculoskeletal: No clubbing, no deformities Neuro: A&Ox3, speech clear, gait stable, cooperative, no focal neurologic deficits Skin: Skin color is normal. No rashes or lesions. Non diaphoretic, moist. 
Vascular: 2+ pulses symmetric in all extremities 
  
  
 DIAGNOSTIC DATA EKG:  
  
  
 LABORATORY DATA Lab Results Component Value Date/Time  
  WBC 11.6 (H) 03/17/2019 04:26 AM  
  Hemoglobin (POC) 9.5 (L) 10/18/2013 03:35 PM  
  HGB 10.4 (L) 03/17/2019 04:26 AM  
  Hematocrit (POC) 28 (L) 10/18/2013 03:35 PM  
  HCT 32.1 (L) 03/17/2019 04:26 AM  
  PLATELET 476 (L) 72/67/5486 04:26 AM  
  .2 (H) 03/17/2019 04:26 AM  
  
     
Lab Results Component Value Date/Time  
  Sodium 135 (L) 03/19/2019 03:40 AM  
  Potassium 3.3 (L) 03/19/2019 03:40 AM  
  Chloride 96 (L) 03/19/2019 03:40 AM  
  CO2 30 03/19/2019 03:40 AM  
  Anion gap 9 03/19/2019 03:40 AM  
  Glucose 93 03/19/2019 03:40 AM  
  BUN 60 (H) 03/19/2019 03:40 AM  
  Creatinine 12.60 (H) 03/19/2019 03:40 AM  
  BUN/Creatinine ratio 5 (L) 03/19/2019 03:40 AM  
  GFR est AA 4 (L) 03/19/2019 03:40 AM  
  GFR est non-AA 3 (L) 03/19/2019 03:40 AM  
  Calcium 7.7 (L) 03/19/2019 03:40 AM  
  Bilirubin, total 1.7 (H) 03/11/2019 04:42 PM  
  AST (SGOT) 50 (H) 03/11/2019 04:42 PM  
  Alk. phosphatase 82 03/11/2019 04:42 PM  
  Protein, total 7.4 03/11/2019 04:42 PM  
  Albumin 1.8 (L) 03/15/2019 04:19 AM  
  Globulin 4.6 (H) 03/11/2019 04:42 PM  
  A-G Ratio 0.6 (L) 03/11/2019 04:42 PM  
  ALT (SGPT) 37 03/11/2019 04:42 PM  
  
  
  
 ASSESSMENT  
  
1. Cardiomyopathy 
            A. Non ischemic   
            B. LV systolic 
            C. NYHA class III 
            D. Narrow QRS 2. Hypertension 3. End stage renal disease 4. Supraventricular tachycardia 5. ICD  
6. Atrial fibrillation 
                     
  
 PLAN  
  
Will plan for EP study to attempt to induce SVT (88246/79728).  If unable to induce SVT will attempt VT induction +/- [facility] (36691/61164) [facility] MAC anesthesia assist.  
  
  
 
  
Robert Perez MD 
Cardiac Electrophysiology / Cardiology 
  
[address], [address] [place] [address], [address], [facility] 
[phone] / [phone] Fax                                    [phone] / [phone] Fax

## 2019-10-02 VITALS
OXYGEN SATURATION: 93 % | TEMPERATURE: 97.7 F | HEART RATE: 86 BPM | HEIGHT: 65 IN | BODY MASS INDEX: 21.85 KG/M2 | WEIGHT: 131.17 LBS | SYSTOLIC BLOOD PRESSURE: 122 MMHG | RESPIRATION RATE: 16 BRPM | DIASTOLIC BLOOD PRESSURE: 85 MMHG

## 2019-10-02 PROCEDURE — 99218 HC RM OBSERVATION: CPT

## 2019-10-02 PROCEDURE — 90471 IMMUNIZATION ADMIN: CPT

## 2019-10-02 PROCEDURE — 74011250636 HC RX REV CODE- 250/636: Performed by: INTERNAL MEDICINE

## 2019-10-02 PROCEDURE — 90472 IMMUNIZATION ADMIN EACH ADD: CPT

## 2019-10-02 PROCEDURE — 90686 IIV4 VACC NO PRSV 0.5 ML IM: CPT | Performed by: INTERNAL MEDICINE

## 2019-10-02 RX ADMIN — INFLUENZA VIRUS VACCINE 0.5 ML: 15; 15; 15; 15 SUSPENSION INTRAMUSCULAR at 07:45

## 2019-10-02 RX ADMIN — Medication 10 ML: at 07:04

## 2019-10-02 NOTE — PROGRESS NOTES
Pt continues to present with cool right extremity and unable to palpate pedal pulses. Pt has movement and is without pain. Right groin dressing is dry and intact. Dr Ramses Lorenzana [facility] was imformed of PT status. Johnnie Mckeon Discharge this pm at 2200 was discontinued with overnight observation. Will continue to monitor for any changes,pain or parasthesia.

## 2019-10-02 NOTE — DISCHARGE SUMMARY
Cardiology Discharge Summary Patient ID: 
Deondre Mathew [id number] 
18 y.o. 
1960 Admit Date: 10/1/2019 Discharge Date: 10/2/2019 Admitting Physician: Anais John MD  
 
Discharge Physician: Darshan Sol MD 
 
Admission Diagnoses:  
Paroxysmal supraventricular tachycardia ([facility] [I47.1] SVT (supraventricular tachycardia) ([facility]) [I47.1] Discharge Diagnoses: Active Problems: 
  SVT (supraventricular tachycardia) ([address] (3/11/2019) Discharge Condition: Good Cardiology Procedures this Admission:  Successful typical AVNRT ablation. Atrial tachycardia observed that could not be sustained for mapping. Consults: None Hospital Course: Martha Espinosa is a 60 y.o. female with hypertension, non-ischemic CM, LVEF 25-30%, ESRD on peritoneal dialysis, NYHA class II-III, SVT who had recurrent SVT including a tachycardia treated as VT by her ICD despite similar shock electrograms to sinus rhythm. She underwent typical AVNRT ablation. Atrial tachycardia observed but could not be sustained for mapping. Observed overnight. Nursing reported cool right foot overnight and initially unable to palpate pedal pulses. Later in the night pedal faint pedal pulse palpated and confirmed with doppler by nursing. By A.M, rt and left feet were warm and DP pulses palpable bilaterally. Ms. Nida Dutton had no chest pain or SOB on day of discharge. She was ambulatory. She will resume her peritoneal dialysis today. She will continue on coumadin. She was in NSR with PACs prior to discharge. No SVT noted on tele review. Visit Vitals /85 (BP 1 Location: Right arm, BP Patient Position: At rest) Pulse 86 Temp 97.7 °F (36.5 °C) Resp 16 Ht 5' 5\" (1.651 m) Wt 131 lb 2.8 oz (59.5 kg) SpO2 93% Breastfeeding? No  
BMI 21.83 kg/m² Physical Exam 
Abdomen: soft, non-tender. Bowel sounds normal. 
Extremities: no LE edema, 1-2+ DP bilaterally. Feet warm to touch.   Rt groin site with no bleeding or hematoma noted. Heart: Irregular rhythm, S1, S2 normal, no murmurs, clicks, rubs or gallops Lungs: clear to auscultation bilaterally Neck: supple, symmetrical, trachea midline, no JVD, Neurologic: Grossly normal 
 
 
Labs:  
Recent Labs 10/01/19 
1434 WBC 10.9 HGB 12.4 HCT 40.7  Recent Labs 10/01/19 
1400 INR 2.1* No results for input(s): TROIQ, CPK, CKMB in the last 72 hours. EKG:  
CXR:  
Other Diagnostic Tests:  
Device check:  
 
Disposition: home Patient Instructions:  
Current Discharge Medication List  
  
CONTINUE these medications which have NOT CHANGED Details  
aspirin delayed-release 81 mg tablet Take 81 mg by mouth daily. potassium chloride (KLOR-CON M20) 20 mEq tablet Take 40 mEq by mouth every morning. omeprazole (PRILOSEC) 20 mg capsule Take 20 mg by mouth daily as needed (indigestion). Associated Diagnoses: Cardiomyopathy, nonischemic ([address] Essential hypertension, benign; Chronic systolic heart failure ([address] ESRD on peritoneal dialysis ([address] Polycystic kidney disease; Paroxysmal atrial tachycardia (HCC)  
  
cinacalcet (SENSIPAR) 30 mg tablet Take 30 mg by mouth daily (with dinner). Associated Diagnoses: Cardiomyopathy, nonischemic ([address] Essential hypertension, benign; Chronic systolic heart failure ([address] ESRD on peritoneal dialysis ([address] Polycystic kidney disease; Paroxysmal atrial tachycardia ([address] ferric citrate (AURYXIA) 210 mg iron tablet Take 210 mg by mouth three (3) times daily (with meals). Associated Diagnoses: Cardiomyopathy, nonischemic ([address] Essential hypertension, benign; Chronic systolic heart failure ([address] ESRD on peritoneal dialysis ([address] Polycystic kidney disease; Paroxysmal atrial tachycardia (HCC)  
  
gentamicin (GARAMYCIN) 0.1 % topical cream Apply  to affected area nightly. warfarin (COUMADIN) 3 mg tablet Take 1 Tab by mouth daily.  Take 6 mg by mouth  on Tuesday and Thursday and 3 mg on other days. Qty: 45 Tab, Refills: 6  
  
carvedilol (COREG) 25 mg tablet Take 0.5 Tabs by mouth two (2) times daily (with meals). Indications: chronic heart failure Qty: 60 Tab, Refills: 5 SENNA-DOCUSATE SODIUM PO Take 1 Tab by mouth daily as needed (constipation). Associated Diagnoses: Cardiomyopathy, nonischemic ([address] Essential hypertension, benign; Chronic systolic heart failure ([address] ESRD on peritoneal dialysis ([address] Polycystic kidney disease; Paroxysmal atrial tachycardia ([address] Reference discharge instructions provided by nursing for diet and activity. Follow-up with Future Appointments Date Time Provider Bee Sandy 11/26/2019 10:30 AM [facility], [facility] check24 Signed: 
Norris Mckeon. MICHAEL Diaz Cardiology attending: seen and examined. Agree with assess and plan There was concern about right leg perfusion late last night.  Once she got up and moved around, perfusion improved and she has palpable pulse this am.

## 2019-10-02 NOTE — DISCHARGE INSTRUCTIONS
DISCHARGE SUMMARY from Nurse    PATIENT INSTRUCTIONS:    After general anesthesia or intravenous sedation, for 24 hours or while taking prescription Narcotics:  · Limit your activities  · Do not drive and operate hazardous machinery  · Do not make important personal or business decisions  · Do  not drink alcoholic beverages  · If you have not urinated within 8 hours after discharge, please contact your surgeon on call. Report the following to your surgeon:  · Excessive pain, swelling, redness or odor of or around the surgical area  · Temperature over 100.5  · Nausea and vomiting lasting longer than 4 hours or if unable to take medications  · Any signs of decreased circulation or nerve impairment to extremity: change in color, persistent  numbness, tingling, coldness or increase pain  · Any questions    What to do at Home:    *  Please give a list of your current medications to your Primary Care Provider. *  Please update this list whenever your medications are discontinued, doses are      changed, or new medications (including over-the-counter products) are added. *  Please carry medication information at all times in case of emergency situations. These are general instructions for a healthy lifestyle:    No smoking/ No tobacco products/ Avoid exposure to second hand smoke  Surgeon General's Warning:  Quitting smoking now greatly reduces serious risk to your health. Obesity, smoking, and sedentary lifestyle greatly increases your risk for illness    A healthy diet, regular physical exercise & weight monitoring are important for maintaining a healthy lifestyle    You may be retaining fluid if you have a history of heart failure or if you experience any of the following symptoms:  Weight gain of 3 pounds or more overnight or 5 pounds in a week, increased swelling in our hands or feet or shortness of breath while lying flat in bed.   Please call your doctor as soon as you notice any of these symptoms; do not wait until your next office visit. The discharge information has been reviewed with the patient. The patient verbalized understanding. Discharge medications reviewed with the patient and appropriate educational materials and side effects teaching were provided. [employer] Activation    Thank you for requesting access to Linkpass. Please follow the instructions below to securely access and download your online medical record. Linkpass allows you to send messages to your doctor, view your test results, renew your prescriptions, schedule appointments, and more. How Do I Sign Up? 1. In your internet browser, go to [email or web address]  2. Click on the First Time User? Click Here link in the Sign In box. You will be redirect to the New Member Sign Up page. 3. Enter your Linkpass Access Code exactly as it appears below. You will not need to use this code after youve completed the sign-up process. If you do not sign up before the expiration date, you must request a new code. Linkpass Access Code: Activation code not generated  Current Linkpass Status: Active (This is the date your Linkpass access code will )    4. Enter the last four digits of your Social Security Number (xxxx) and Date of Birth (mm/dd/yyyy) as indicated and click Submit. You will be taken to the next sign-up page. 5. Create a Linkpass ID. This will be your Linkpass login ID and cannot be changed, so think of one that is secure and easy to remember. 6. Create a Linkpass password. You can change your password at any time. 7. Enter your Password Reset Question and Answer. This can be used at a later time if you forget your password. 8. Enter your e-mail address. You will receive e-mail notification when new information is available in [address] 9. Click Sign Up. You can now view and download portions of your medical record.   10. Click the Download Summary menu link to download a portable copy of your medical information. Additional Information    If you have questions, please visit the Frequently Asked Questions section of the [employer] website at [email or web address] Remember, [email or web address] is NOT to be used for urgent needs. For medical emergencies, dial 911. Electrophysiology Study (EPS)/Cardiac Ablation   Discharge Instructions      You have just had a Cardiac Ablation for supraventricular tachycardia. There were catheters temporarily placed in your heart through a puncture in the Veins and/or Arteries in your groin. WHAT TO EXPECT      If you have had a Cardiac Ablation please be aware that you may experience mild chest pain that will resolve within 24 hours. If the Chest Pain begins radiating down your shoulders or arms, becomes severe or breathing becomes difficult, call 911 or go to the closest emergency room.  Mild to moderate, non-painful, bruising at the puncture site is not un-common, and will resolve in 7 - 10 days.  If a closure device was used to seal your artery or vein, a separate pamphlet will be given to you with these discharge instructions. It is very important that you review the information in the pamphlet for different restrictions and precautions.  You have a small gauze dressing applied to the puncture site in your groin. You may remove this the following morning. MEDICATIONS      Take only the medications prescribed to you at discharge. ACTIVITY      A responsible adult must take you home. Do not drive a car for 24 hours.  Rest quietly for the remainder of the day.  Do not lift anything greater than 10 pounds for 3 days.  Limit bending at the puncture site and use of stairs for at least 3 days.  You may remove the bandage and shower the morning after the procedure.   Do not take a bath for 3 days. SYMPTOMS THAT NEED TO BE REPORTED IMMEDIATELY      Bleeding at the puncture site. If there is bleeding, lie down and hold firm direct pressure for at least 5 minutes. If the bleeding does not stop, go to the closest emergency room, or call 911.  Temperature more than 100.5 F.   Redness or warmth at the puncture site.  Increasing pain, numbness, coolness or blue discoloration of the extremity where the puncture is located.  Pulsating mass at the puncture site.  A new lump at the puncture site, or increasing swelling at the site.  Bruising at the puncture site that enlarges or becomes painful (some bruising at the site is common and will go away in 7 - 10 days).  Rapid heart rate or palpitations.  Dizziness, lightheadedness, fainting.  REMEMBER: If you feel something is an emergency or cannot be handled over the phone, call 911 or go to the closest emergency room.       Marine Dutton in 1 month          Tez Blackwood MD  Cardiac Electrophysiology / Cardiology    [address]  [address] [place] [address], [facility], [address]  [phone] / [phone] Fax   [phone] / [phone] Fax

## 2019-10-02 NOTE — ADVANCED PRACTICE NURSE
10/2/2019 RE: Nora Judge To Whom it May Concern: This is to certify that Nora Judge may return to work on 10/4/2019. Please feel free to contact my office if you have any questions or concerns. Thank you for your assistance in this matter. Sincerely, Mike Moya. [facility] [address] [place], [address]

## 2019-10-02 NOTE — PROGRESS NOTES
AmandaRN received patient in patient's room. Right pedal pulses found by doppler. Patient slide over to bed from stretcher. Right groin site is without bleeding and no hematoma.

## 2019-10-02 NOTE — PROGRESS NOTES
Right lower extremity remains cooler than left. Pt denies pain or loss of sensation. DP and PT difficult to palpate. Able to ambulate to BR. Groin dressing dry and intact.

## 2019-10-03 NOTE — ANESTHESIA POSTPROCEDURE EVALUATION
Procedure(s): 
ABLATION SVT Ep 3d Mapping Drug Stimulation. MAC 
 
<BSHSIANPOST> No vitals data found for the desired time range.

## 2019-10-16 ENCOUNTER — DOCUMENTATION ONLY (OUTPATIENT)
Dept: CARDIOLOGY CLINIC | Age: 59
End: 2019-10-16

## 2019-10-16 DIAGNOSIS — R10.31 RIGHT INGUINAL PAIN: ICD-10-CM

## 2019-10-16 DIAGNOSIS — I48.0 PAROXYSMAL ATRIAL FIBRILLATION (HCC): Primary | ICD-10-CM

## 2019-10-28 ENCOUNTER — TELEPHONE ANTICOAG (OUTPATIENT)
Dept: CARDIOLOGY CLINIC | Age: 59
End: 2019-10-28

## 2019-10-28 DIAGNOSIS — I42.8 NICM (NONISCHEMIC CARDIOMYOPATHY) (HCC): ICD-10-CM

## 2019-10-28 DIAGNOSIS — Q61.3 POLYCYSTIC KIDNEY DISEASE: Primary | ICD-10-CM

## 2019-10-28 DIAGNOSIS — Z79.01 CHRONIC ANTICOAGULATION: ICD-10-CM

## 2019-10-28 DIAGNOSIS — I48.0 PAROXYSMAL ATRIAL FIBRILLATION (HCC): ICD-10-CM

## 2019-10-28 LAB — INR, EXTERNAL: 3 (ref 2–3)

## 2019-10-30 ENCOUNTER — OFFICE VISIT (OUTPATIENT)
Dept: CARDIOLOGY CLINIC | Age: 59
End: 2019-10-30

## 2019-10-30 ENCOUNTER — CLINICAL SUPPORT (OUTPATIENT)
Dept: CARDIOLOGY CLINIC | Age: 59
End: 2019-10-30

## 2019-10-30 VITALS
HEIGHT: 65 IN | OXYGEN SATURATION: 100 % | BODY MASS INDEX: 22.33 KG/M2 | DIASTOLIC BLOOD PRESSURE: 86 MMHG | WEIGHT: 134 LBS | RESPIRATION RATE: 16 BRPM | HEART RATE: 65 BPM | SYSTOLIC BLOOD PRESSURE: 124 MMHG

## 2019-10-30 DIAGNOSIS — I10 ESSENTIAL HYPERTENSION, BENIGN: ICD-10-CM

## 2019-10-30 DIAGNOSIS — Z95.810 CARDIAC DEFIBRILLATOR IN SITU: Primary | ICD-10-CM

## 2019-10-30 DIAGNOSIS — I47.1 SVT (SUPRAVENTRICULAR TACHYCARDIA) (HCC): ICD-10-CM

## 2019-10-30 DIAGNOSIS — N18.6 ESRD ON PERITONEAL DIALYSIS (HCC): ICD-10-CM

## 2019-10-30 DIAGNOSIS — I42.8 NICM (NONISCHEMIC CARDIOMYOPATHY) (HCC): ICD-10-CM

## 2019-10-30 DIAGNOSIS — Z99.2 ESRD ON PERITONEAL DIALYSIS (HCC): ICD-10-CM

## 2019-10-30 DIAGNOSIS — I48.0 PAROXYSMAL ATRIAL FIBRILLATION (HCC): ICD-10-CM

## 2019-10-30 NOTE — PROGRESS NOTES
Room # 3  Device Checked Today  Feels much better since ablation  No Cardiac Complaints  Visit Vitals  /86 (BP 1 Location: Left arm, BP Patient Position: Sitting)   Pulse 65   Resp 16   Ht 5' 5\" (1.651 m)   Wt 134 lb (60.8 kg)   SpO2 100%   BMI 22.30 kg/m²

## 2019-10-30 NOTE — PROGRESS NOTES
HISTORY OF PRESENTING ILLNESS      Nikky Cesar is a 60 y.o. female with hypertension, non-ischemic CM, LVEF 25-30%, ESRD on peritoneal dialysis, NYHA class II-III, SVT who had recurrent SVT including a tachycardia treated as VT by her ICD despite similar shock electrograms to sinus rhythm. 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 No recurrence of sustained arrhythmias on her device interrogation.         ACTIVE PROBLEM LIST     Patient Active Problem List    Diagnosis Date Noted    Chronic anticoagulation 03/28/2019    SVT (supraventricular tachycardia) (HCC) 03/11/2019    GERD (gastroesophageal reflux disease) 78/30/8502    Systolic CHF, chronic ([facility]) 08/25/2018    Paroxysmal atrial fibrillation ([address] 07/31/2018    ICD (implantable cardioverter-defibrillator) in place 01/26/2018    Paroxysmal atrial tachycardia ([address] 04/17/2016    ESRD (end stage renal disease) ([facility] 12/11/2015    Anemia 01/20/2015    NICM (nonischemic cardiomyopathy) ([facility] 10/19/2013    Polycystic kidney disease 10/18/2012    Essential hypertension, benign 10/18/2012           PAST MEDICAL HISTORY     Past Medical History:   Diagnosis Date    Anemia associated with chronic renal failure     Anuria 2014    Arrhythmia     Paroxysmal a fib, paroxsymal atrial tach, SVT    Chronic kidney disease     ESRD- on PD    Chronic systolic heart failure ([address] 10/31/2012    Chronic tachycardia     GERD (gastroesophageal reflux disease)     Hx of non-ST elevation myocardial infarction (NSTEMI) 2011    Hypertension     Lipoma of right lower extremity 1980    Foot    Peritoneal dialysis catheter in place [facility]) 2014    Nightime exchanges    Polycystic kidney disease 2014           PAST SURGICAL HISTORY     Past Surgical History:   Procedure Laterality Date    HX HEART CATHETERIZATION  12/2015    no interventions    HX PACEMAKER  12/29/2015    ICD    IR INSERT NON TUNL CVC OVER 5 YRS  3/13/2019    ND EPHYS EVAL W/ABLATION SUPRAVENT ARRHYTHMIA N/A 10/1/2019    ABLATION SVT performed by Dong Elliott MD at [address] [facility] Dr CATH LAB    ND INTRACARDIAC ELECTROPHYSIOLOGIC 3D [address] [facility] N/A 10/1/2019    Ep 3d Mapping performed by Dong Elliott MD at [address] [facility]  CATH LAB    STIM/PACING HEART POST IV DRUG INFU N/A 10/1/2019    Drug Stimulation performed by Dong Elliott MD at [address] [facility]  CATH LAB          ALLERGIES     Allergies   Allergen Reactions    Iodinated Contrast Media Angioedema     Cellulitis on side of face after test          FAMILY HISTORY     Family History   Problem Relation Age of Onset    Diabetes Brother     Hypertension Brother     Hypertension Mother     Arthritis-osteo Mother     Hypertension Sister     Diabetes Sister     Kidney Disease Father         polycystic    Parkinson's Disease Father     Heart Disease Father     Hypertension Father     Hypertension Brother     negative for cardiac disease       SOCIAL HISTORY     Social History     Socioeconomic History    Marital status: SINGLE     Spouse name: Not on file    Number of children: Not on file    Years of education: Not on file    Highest education level: Not on file   Tobacco Use    Smoking status: Never Smoker    Smokeless tobacco: Never Used   Substance and Sexual Activity    Alcohol use: No    Drug use: No    Sexual activity: Not Currently     Partners: Male         MEDICATIONS     Current Outpatient Medications   Medication Sig    warfarin (COUMADIN) 3 mg tablet Take 1 Tab by mouth daily. Take 6 mg by mouth  on Tuesday and Thursday and 3 mg on other days.  carvedilol (COREG) 25 mg tablet Take 0.5 Tabs by mouth two (2) times daily (with meals). Indications: chronic heart failure    aspirin delayed-release 81 mg tablet Take 81 mg by mouth daily.  potassium chloride (KLOR-CON M20) 20 mEq tablet Take 40 mEq by mouth every morning.  omeprazole (PRILOSEC) 20 mg capsule Take 20 mg by mouth daily as needed (indigestion).  SENNA-DOCUSATE SODIUM PO Take 1 Tab by mouth daily as needed (constipation).  cinacalcet (SENSIPAR) 30 mg tablet Take 30 mg by mouth daily (with dinner).  ferric citrate (AURYXIA) 210 mg iron tablet Take 210 mg by mouth three (3) times daily (with meals).  gentamicin (GARAMYCIN) 0.1 % topical cream Apply  to affected area nightly. No current facility-administered medications for this visit. I have reviewed the nurses notes, vitals, problem list, allergy list, medical history, family, social history and medications. REVIEW OF SYMPTOMS      General: Pt denies excessive weight gain or loss. Pt is able to conduct ADL's  HEENT: Denies blurred vision, headaches, hearing loss, epistaxis and difficulty swallowing. Respiratory: Denies cough, congestion, shortness of breath, HENDERSON, wheezing or stridor. Cardiovascular: Denies precordial pain, palpitations, edema or PND  Gastrointestinal: Denies poor appetite, indigestion, abdominal pain or blood in stool  Genitourinary: Denies hematuria, dysuria, increased urinary frequency  Musculoskeletal: Denies joint pain or swelling from muscles or joints  Neurologic: Denies tremor, paresthesias, headache, or sensory motor disturbance  Psychiatric: Denies confusion, insomnia, depression  Integumentray: Denies rash, itching or ulcers. Hematologic: Denies easy bruising, bleeding       PHYSICAL EXAMINATION      There were no vitals filed for this visit. General: Well developed, in no acute distress. HEENT: No jaundice, oral mucosa moist, no oral ulcers  Neck: Supple, no stiffness, no lymphadenopathy, supple  Heart:  Normal S1/S2 negative S3 or S4.  Regular, no murmur, gallop or rub, no jugular venous distention  Respiratory: Clear bilaterally x 4, no wheezing or rales  Abdomen:   Soft, non-tender, bowel sounds are active.   Extremities:  No edema, normal cap refill, no cyanosis. Musculoskeletal: No clubbing, no deformities  Neuro: A&Ox3, speech clear, gait stable, cooperative, no focal neurologic deficits  Skin: Skin color is normal. No rashes or lesions. Non diaphoretic, moist.  Vascular: 2+ pulses symmetric in all extremities       DIAGNOSTIC DATA      EKG:        LABORATORY DATA      Lab Results   Component Value Date/Time    WBC 10.9 10/01/2019 02:34 PM    Hemoglobin (POC) 9.5 (L) 10/18/2013 03:35 PM    HGB 12.4 10/01/2019 02:34 PM    Hematocrit (POC) 28 (L) 10/18/2013 03:35 PM    HCT 40.7 10/01/2019 02:34 PM    PLATELET 615 26/15/5111 02:34 PM    .4 (H) 10/01/2019 02:34 PM      Lab Results   Component Value Date/Time    Sodium 141 09/24/2019 11:54 AM    Potassium 4.2 09/24/2019 11:54 AM    Chloride 96 09/24/2019 11:54 AM    CO2 23 09/24/2019 11:54 AM    Anion gap 9 03/19/2019 03:40 AM    Glucose 90 09/24/2019 11:54 AM    BUN 56 (H) 09/24/2019 11:54 AM    Creatinine 13.69 (H) 09/24/2019 11:54 AM    BUN/Creatinine ratio 4 (L) 09/24/2019 11:54 AM    GFR est AA 3 (L) 09/24/2019 11:54 AM    GFR est non-AA 3 (L) 09/24/2019 11:54 AM    Calcium 9.6 09/24/2019 11:54 AM    Bilirubin, total 1.7 (H) 03/11/2019 04:42 PM    AST (SGOT) 50 (H) 03/11/2019 04:42 PM    Alk. phosphatase 82 03/11/2019 04:42 PM    Protein, total 7.4 03/11/2019 04:42 PM    Albumin 1.8 (L) 03/15/2019 04:19 AM    Globulin 4.6 (H) 03/11/2019 04:42 PM    A-G Ratio 0.6 (L) 03/11/2019 04:42 PM    ALT (SGPT) 37 03/11/2019 04:42 PM           ASSESSMENT      1. Cardiomyopathy              A. Non ischemic                B. LV systolic              C. NYHA class III              D. Narrow QRS  2. Hypertension  3. End stage renal disease  4. Supraventricular tachycardia   A. Slow pathway ablation  5.  ICD   6. Atrial fibrillation       PLAN     Continue monitoring in device clinic       FOLLOW-UP     1 year      Thank you, Jocelyne Estrada MD and Dr. Travis Cat for allowing me to participate in the care of this extraordinarily pleasant female. Please do not hesitate to contact me for further questions/concerns.          JASON ROUSSEAU MD  Cardiac Electrophysiology / Cardiology    [address]  [address] [facility], [facility], [place] [address]  [facility]    [facility]  [phone] / [phone] Fax   [phone] / [phone] Fax

## 2019-11-21 ENCOUNTER — TELEPHONE ANTICOAG (OUTPATIENT)
Dept: CARDIOLOGY CLINIC | Age: 59
End: 2019-11-21

## 2019-11-21 DIAGNOSIS — Z79.01 CHRONIC ANTICOAGULATION: ICD-10-CM

## 2019-11-21 DIAGNOSIS — I42.8 NICM (NONISCHEMIC CARDIOMYOPATHY) (HCC): ICD-10-CM

## 2019-11-21 DIAGNOSIS — I48.0 PAROXYSMAL ATRIAL FIBRILLATION (HCC): ICD-10-CM

## 2019-11-21 DIAGNOSIS — Q61.3 POLYCYSTIC KIDNEY DISEASE: Primary | ICD-10-CM

## 2019-11-21 LAB — INR, EXTERNAL: 3.3 (ref 2–3)

## 2019-12-27 ENCOUNTER — DOCUMENTATION ONLY (OUTPATIENT)
Dept: CARDIOLOGY CLINIC | Age: 59
End: 2019-12-27

## 2020-01-01 ENCOUNTER — OFFICE VISIT (OUTPATIENT)
Dept: CARDIOLOGY CLINIC | Age: 60
End: 2020-01-01

## 2020-01-01 ENCOUNTER — APPOINTMENT (OUTPATIENT)
Dept: GENERAL RADIOLOGY | Age: 60
DRG: 871 | End: 2020-01-01
Attending: EMERGENCY MEDICINE
Payer: MEDICARE

## 2020-01-01 ENCOUNTER — TELEPHONE (OUTPATIENT)
Dept: CARDIOLOGY CLINIC | Age: 60
End: 2020-01-01

## 2020-01-01 ENCOUNTER — TELEPHONE ANTICOAG (OUTPATIENT)
Dept: CARDIOLOGY CLINIC | Age: 60
End: 2020-01-01

## 2020-01-01 ENCOUNTER — VIRTUAL VISIT (OUTPATIENT)
Dept: CARDIOLOGY CLINIC | Age: 60
End: 2020-01-01

## 2020-01-01 ENCOUNTER — OFFICE VISIT (OUTPATIENT)
Dept: CARDIOLOGY CLINIC | Age: 60
End: 2020-01-01
Payer: MEDICARE

## 2020-01-01 ENCOUNTER — DOCUMENTATION ONLY (OUTPATIENT)
Dept: CARDIOLOGY CLINIC | Age: 60
End: 2020-01-01

## 2020-01-01 ENCOUNTER — ANTI-COAG VISIT (OUTPATIENT)
Dept: CARDIOLOGY CLINIC | Age: 60
End: 2020-01-01

## 2020-01-01 ENCOUNTER — HOSPITAL ENCOUNTER (INPATIENT)
Age: 60
LOS: 7 days | DRG: 871 | End: 2021-01-05
Attending: EMERGENCY MEDICINE | Admitting: STUDENT IN AN ORGANIZED HEALTH CARE EDUCATION/TRAINING PROGRAM
Payer: MEDICARE

## 2020-01-01 ENCOUNTER — CLINICAL SUPPORT (OUTPATIENT)
Dept: CARDIOLOGY CLINIC | Age: 60
End: 2020-01-01
Payer: MEDICARE

## 2020-01-01 ENCOUNTER — APPOINTMENT (OUTPATIENT)
Dept: CARDIOLOGY CLINIC | Age: 60
End: 2020-01-01
Payer: MEDICARE

## 2020-01-01 ENCOUNTER — DOCUMENTATION ONLY (OUTPATIENT)
Dept: CARDIAC CATH/INVASIVE PROCEDURES | Age: 60
End: 2020-01-01

## 2020-01-01 ENCOUNTER — APPOINTMENT (OUTPATIENT)
Dept: GENERAL RADIOLOGY | Age: 60
End: 2020-01-01
Attending: NURSE PRACTITIONER
Payer: MEDICARE

## 2020-01-01 ENCOUNTER — APPOINTMENT (OUTPATIENT)
Dept: CT IMAGING | Age: 60
DRG: 871 | End: 2020-01-01
Attending: HOSPITALIST
Payer: MEDICARE

## 2020-01-01 ENCOUNTER — HOSPITAL ENCOUNTER (EMERGENCY)
Age: 60
Discharge: HOME | End: 2020-10-29
Attending: EMERGENCY MEDICINE
Payer: MEDICARE

## 2020-01-01 ENCOUNTER — HOSPITAL ENCOUNTER (OUTPATIENT)
Age: 60
Setting detail: OUTPATIENT SURGERY
End: 2020-01-01
Attending: INTERNAL MEDICINE | Admitting: INTERNAL MEDICINE

## 2020-01-01 ENCOUNTER — ANTI-COAG VISIT (OUTPATIENT)
Dept: CARDIOLOGY CLINIC | Age: 60
End: 2020-01-01
Payer: MEDICARE

## 2020-01-01 VITALS
RESPIRATION RATE: 16 BRPM | OXYGEN SATURATION: 99 % | DIASTOLIC BLOOD PRESSURE: 80 MMHG | HEIGHT: 65 IN | BODY MASS INDEX: 21.66 KG/M2 | HEART RATE: 80 BPM | WEIGHT: 130 LBS | SYSTOLIC BLOOD PRESSURE: 122 MMHG

## 2020-01-01 VITALS
OXYGEN SATURATION: 94 % | HEIGHT: 65 IN | HEART RATE: 92 BPM | BODY MASS INDEX: 21.59 KG/M2 | DIASTOLIC BLOOD PRESSURE: 64 MMHG | WEIGHT: 129.6 LBS | RESPIRATION RATE: 16 BRPM | SYSTOLIC BLOOD PRESSURE: 90 MMHG

## 2020-01-01 VITALS
BODY MASS INDEX: 22.89 KG/M2 | OXYGEN SATURATION: 96 % | WEIGHT: 137.4 LBS | SYSTOLIC BLOOD PRESSURE: 100 MMHG | HEIGHT: 65 IN | HEART RATE: 56 BPM | DIASTOLIC BLOOD PRESSURE: 62 MMHG

## 2020-01-01 VITALS
SYSTOLIC BLOOD PRESSURE: 70 MMHG | OXYGEN SATURATION: 94 % | BODY MASS INDEX: 22.33 KG/M2 | HEART RATE: 75 BPM | DIASTOLIC BLOOD PRESSURE: 30 MMHG | HEIGHT: 65 IN | WEIGHT: 134 LBS

## 2020-01-01 VITALS
WEIGHT: 129 LBS | HEART RATE: 70 BPM | RESPIRATION RATE: 18 BRPM | HEIGHT: 65 IN | DIASTOLIC BLOOD PRESSURE: 77 MMHG | TEMPERATURE: 96.9 F | BODY MASS INDEX: 21.49 KG/M2 | SYSTOLIC BLOOD PRESSURE: 102 MMHG | OXYGEN SATURATION: 96 %

## 2020-01-01 DIAGNOSIS — Z95.810 CARDIAC DEFIBRILLATOR IN SITU: Primary | ICD-10-CM

## 2020-01-01 DIAGNOSIS — Z99.2 ESRD ON PERITONEAL DIALYSIS (HCC): ICD-10-CM

## 2020-01-01 DIAGNOSIS — Q61.3 POLYCYSTIC KIDNEY DISEASE: Primary | ICD-10-CM

## 2020-01-01 DIAGNOSIS — Z79.01 CHRONIC ANTICOAGULATION: ICD-10-CM

## 2020-01-01 DIAGNOSIS — I47.1 SVT (SUPRAVENTRICULAR TACHYCARDIA) (HCC): ICD-10-CM

## 2020-01-01 DIAGNOSIS — Q61.3 POLYCYSTIC KIDNEY DISEASE: ICD-10-CM

## 2020-01-01 DIAGNOSIS — Z95.810 CARDIAC DEFIBRILLATOR IN SITU: ICD-10-CM

## 2020-01-01 DIAGNOSIS — I42.8 NICM (NONISCHEMIC CARDIOMYOPATHY) (HCC): ICD-10-CM

## 2020-01-01 DIAGNOSIS — A41.9 SEVERE SEPSIS (HCC): Primary | ICD-10-CM

## 2020-01-01 DIAGNOSIS — I48.0 PAROXYSMAL ATRIAL FIBRILLATION (HCC): ICD-10-CM

## 2020-01-01 DIAGNOSIS — I47.1 NODAL PAROXYSMAL TACHYCARDIA (HCC): ICD-10-CM

## 2020-01-01 DIAGNOSIS — I42.8 CARDIOMYOPATHY, NONISCHEMIC (HCC): ICD-10-CM

## 2020-01-01 DIAGNOSIS — I10 ESSENTIAL HYPERTENSION, BENIGN: Chronic | ICD-10-CM

## 2020-01-01 DIAGNOSIS — I42.8 NICM (NONISCHEMIC CARDIOMYOPATHY) (HCC): Primary | ICD-10-CM

## 2020-01-01 DIAGNOSIS — I42.8 CARDIOMYOPATHY, NONISCHEMIC (HCC): Primary | ICD-10-CM

## 2020-01-01 DIAGNOSIS — Z95.810 PRESENCE OF AUTOMATIC CARDIOVERTER/DEFIBRILLATOR (AICD): Primary | ICD-10-CM

## 2020-01-01 DIAGNOSIS — U07.1 COVID-19 VIRUS INFECTION: ICD-10-CM

## 2020-01-01 DIAGNOSIS — I47.20 VENTRICULAR TACHYCARDIA: Primary | ICD-10-CM

## 2020-01-01 DIAGNOSIS — I50.22 SYSTOLIC CHF, CHRONIC (HCC): Primary | ICD-10-CM

## 2020-01-01 DIAGNOSIS — N18.6 ESRD ON PERITONEAL DIALYSIS (HCC): ICD-10-CM

## 2020-01-01 DIAGNOSIS — Z71.89 GOALS OF CARE, COUNSELING/DISCUSSION: ICD-10-CM

## 2020-01-01 DIAGNOSIS — Z45.02 ICD (IMPLANTABLE CARDIOVERTER-DEFIBRILLATOR) DISCHARGE: ICD-10-CM

## 2020-01-01 DIAGNOSIS — Z45.02 DEFIBRILLATOR DISCHARGE: ICD-10-CM

## 2020-01-01 DIAGNOSIS — Z99.2 ESRD ON HEMODIALYSIS (HCC): ICD-10-CM

## 2020-01-01 DIAGNOSIS — E87.6 HYPOKALEMIA: ICD-10-CM

## 2020-01-01 DIAGNOSIS — Z91.14 NONCOMPLIANCE WITH MEDICATIONS: ICD-10-CM

## 2020-01-01 DIAGNOSIS — I48.0 PAROXYSMAL ATRIAL FIBRILLATION (HCC): Primary | ICD-10-CM

## 2020-01-01 DIAGNOSIS — I10 ESSENTIAL HYPERTENSION, BENIGN: ICD-10-CM

## 2020-01-01 DIAGNOSIS — I50.22 SYSTOLIC CHF, CHRONIC (HCC): Chronic | ICD-10-CM

## 2020-01-01 DIAGNOSIS — I50.22 CHRONIC SYSTOLIC HEART FAILURE (HCC): ICD-10-CM

## 2020-01-01 DIAGNOSIS — R06.02 SHORTNESS OF BREATH: ICD-10-CM

## 2020-01-01 DIAGNOSIS — R65.20 SEVERE SEPSIS (HCC): Primary | ICD-10-CM

## 2020-01-01 DIAGNOSIS — I48.0 PAROXYSMAL ATRIAL FIBRILLATION (HCC): Chronic | ICD-10-CM

## 2020-01-01 DIAGNOSIS — N18.6 ESRD (END STAGE RENAL DISEASE) (HCC): Chronic | ICD-10-CM

## 2020-01-01 DIAGNOSIS — I47.20 VT (VENTRICULAR TACHYCARDIA): Primary | ICD-10-CM

## 2020-01-01 DIAGNOSIS — I47.1 PAROXYSMAL ATRIAL TACHYCARDIA (HCC): ICD-10-CM

## 2020-01-01 DIAGNOSIS — Z95.810 AUTOMATIC IMPLANTABLE CARDIAC DEFIBRILLATOR IN SITU: Primary | ICD-10-CM

## 2020-01-01 DIAGNOSIS — R41.0 CONFUSION: ICD-10-CM

## 2020-01-01 DIAGNOSIS — R77.8 ELEVATED TROPONIN: ICD-10-CM

## 2020-01-01 DIAGNOSIS — Z95.810 ICD (IMPLANTABLE CARDIOVERTER-DEFIBRILLATOR) IN PLACE: ICD-10-CM

## 2020-01-01 DIAGNOSIS — I50.22 SYSTOLIC CHF, CHRONIC (HCC): ICD-10-CM

## 2020-01-01 DIAGNOSIS — J96.21 ACUTE ON CHRONIC RESPIRATORY FAILURE WITH HYPOXIA (HCC): ICD-10-CM

## 2020-01-01 DIAGNOSIS — J18.9 PNEUMONIA DUE TO INFECTIOUS ORGANISM, UNSPECIFIED LATERALITY, UNSPECIFIED PART OF LUNG: ICD-10-CM

## 2020-01-01 DIAGNOSIS — Z95.810 ICD (IMPLANTABLE CARDIOVERTER-DEFIBRILLATOR) IN PLACE: Chronic | ICD-10-CM

## 2020-01-01 DIAGNOSIS — N18.6 ESRD ON HEMODIALYSIS (HCC): ICD-10-CM

## 2020-01-01 DIAGNOSIS — I42.9 CARDIOMYOPATHY, UNSPECIFIED TYPE (HCC): Primary | ICD-10-CM

## 2020-01-01 DIAGNOSIS — N18.6 ESRD (END STAGE RENAL DISEASE) (HCC): ICD-10-CM

## 2020-01-01 LAB
ALBUMIN SERPL-MCNC: 2.2 G/DL (ref 3.5–5)
ALBUMIN SERPL-MCNC: 2.3 G/DL (ref 3.5–5)
ALBUMIN SERPL-MCNC: 3.4 G/DL (ref 3.5–5)
ALBUMIN/GLOB SERPL: 0.4 {RATIO} (ref 1.1–2.2)
ALBUMIN/GLOB SERPL: 0.4 {RATIO} (ref 1.1–2.2)
ALBUMIN/GLOB SERPL: 0.7 {RATIO} (ref 1.1–2.2)
ALP SERPL-CCNC: 127 U/L (ref 45–117)
ALP SERPL-CCNC: 222 U/L (ref 45–117)
ALP SERPL-CCNC: 268 U/L (ref 45–117)
ALT SERPL-CCNC: 114 U/L (ref 12–78)
ALT SERPL-CCNC: 149 U/L (ref 12–78)
ALT SERPL-CCNC: 20 U/L (ref 12–78)
AMMONIA PLAS-SCNC: 29 UMOL/L
ANION GAP SERPL CALC-SCNC: 12 MMOL/L (ref 5–15)
ANION GAP SERPL CALC-SCNC: 13 MMOL/L (ref 5–15)
ANION GAP SERPL CALC-SCNC: 7 MMOL/L (ref 5–15)
ANION GAP SERPL CALC-SCNC: 9 MMOL/L (ref 5–15)
ARTERIAL PATENCY WRIST A: YES
AST SERPL-CCNC: 24 U/L (ref 15–37)
AST SERPL-CCNC: 317 U/L (ref 15–37)
AST SERPL-CCNC: 391 U/L (ref 15–37)
ATRIAL RATE: 78 BPM
ATRIAL RATE: 83 BPM
B PERT DNA SPEC QL NAA+PROBE: NOT DETECTED
BASE DEFICIT BLD-SCNC: 2 MMOL/L
BASE DEFICIT BLD-SCNC: 6 MMOL/L
BASE EXCESS BLD CALC-SCNC: 3 MMOL/L
BASOPHILS # BLD: 0 K/UL (ref 0–0.1)
BASOPHILS # BLD: 0.1 K/UL (ref 0–0.1)
BASOPHILS # BLD: 0.1 K/UL (ref 0–0.1)
BASOPHILS NFR BLD: 0 % (ref 0–1)
BASOPHILS NFR BLD: 1 % (ref 0–1)
BASOPHILS NFR BLD: 1 % (ref 0–1)
BDY SITE: ABNORMAL
BILIRUB SERPL-MCNC: 0.5 MG/DL (ref 0.2–1)
BILIRUB SERPL-MCNC: 0.7 MG/DL (ref 0.2–1)
BILIRUB SERPL-MCNC: 1 MG/DL (ref 0.2–1)
BNP SERPL-MCNC: ABNORMAL PG/ML
BNP SERPL-MCNC: ABNORMAL PG/ML
BORDETELLA PARAPERTUSSIS PCR, BORPAR: NOT DETECTED
BUN SERPL-MCNC: 14 MG/DL (ref 6–20)
BUN SERPL-MCNC: 29 MG/DL (ref 6–20)
BUN SERPL-MCNC: 35 MG/DL (ref 6–20)
BUN SERPL-MCNC: 35 MG/DL (ref 6–20)
BUN SERPL-MCNC: 39 MG/DL (ref 6–20)
BUN SERPL-MCNC: 43 MG/DL (ref 6–20)
BUN/CREAT SERPL: 3 (ref 12–20)
BUN/CREAT SERPL: 5 (ref 12–20)
BUN/CREAT SERPL: 6 (ref 12–20)
BUN/CREAT SERPL: 7 (ref 12–20)
C PNEUM DNA SPEC QL NAA+PROBE: NOT DETECTED
CA-I BLD-SCNC: 0.97 MMOL/L (ref 1.12–1.32)
CA-I BLD-SCNC: 1.01 MMOL/L (ref 1.12–1.32)
CA-I BLD-SCNC: 1.02 MMOL/L (ref 1.12–1.32)
CALCIUM SERPL-MCNC: 6.6 MG/DL (ref 8.5–10.1)
CALCIUM SERPL-MCNC: 7.1 MG/DL (ref 8.5–10.1)
CALCIUM SERPL-MCNC: 7.8 MG/DL (ref 8.5–10.1)
CALCIUM SERPL-MCNC: 7.9 MG/DL (ref 8.5–10.1)
CALCIUM SERPL-MCNC: 8.7 MG/DL (ref 8.5–10.1)
CALCIUM SERPL-MCNC: 9.5 MG/DL (ref 8.5–10.1)
CALCULATED P AXIS, ECG09: 116 DEGREES
CALCULATED P AXIS, ECG09: 69 DEGREES
CALCULATED R AXIS, ECG10: -38 DEGREES
CALCULATED R AXIS, ECG10: 83 DEGREES
CALCULATED T AXIS, ECG11: 79 DEGREES
CALCULATED T AXIS, ECG11: 91 DEGREES
CHLORIDE SERPL-SCNC: 100 MMOL/L (ref 97–108)
CHLORIDE SERPL-SCNC: 101 MMOL/L (ref 97–108)
CHLORIDE SERPL-SCNC: 102 MMOL/L (ref 97–108)
CHLORIDE SERPL-SCNC: 103 MMOL/L (ref 97–108)
CHLORIDE SERPL-SCNC: 104 MMOL/L (ref 97–108)
CHLORIDE SERPL-SCNC: 108 MMOL/L (ref 97–108)
CK SERPL-CCNC: 58 U/L (ref 26–192)
CO2 SERPL-SCNC: 18 MMOL/L (ref 21–32)
CO2 SERPL-SCNC: 21 MMOL/L (ref 21–32)
CO2 SERPL-SCNC: 22 MMOL/L (ref 21–32)
CO2 SERPL-SCNC: 22 MMOL/L (ref 21–32)
CO2 SERPL-SCNC: 28 MMOL/L (ref 21–32)
CO2 SERPL-SCNC: 29 MMOL/L (ref 21–32)
COMMENT, HOLDF: NORMAL
COMMENT, HOLDF: NORMAL
COVID-19 RAPID TEST, COVR: DETECTED
CREAT SERPL-MCNC: 13.2 MG/DL (ref 0.55–1.02)
CREAT SERPL-MCNC: 2.1 MG/DL (ref 0.55–1.02)
CREAT SERPL-MCNC: 4.79 MG/DL (ref 0.55–1.02)
CREAT SERPL-MCNC: 7.13 MG/DL (ref 0.55–1.02)
CREAT SERPL-MCNC: 7.51 MG/DL (ref 0.55–1.02)
CREAT SERPL-MCNC: 7.54 MG/DL (ref 0.55–1.02)
CRP SERPL-MCNC: 13.5 MG/DL (ref 0–0.6)
D DIMER PPP FEU-MCNC: 9.73 MG/L FEU (ref 0–0.65)
DIAGNOSIS, 93000: NORMAL
DIAGNOSIS, 93000: NORMAL
DIFFERENTIAL METHOD BLD: ABNORMAL
EOSINOPHIL # BLD: 0 K/UL (ref 0–0.4)
EOSINOPHIL # BLD: 0.1 K/UL (ref 0–0.4)
EOSINOPHIL NFR BLD: 0 % (ref 0–7)
EOSINOPHIL NFR BLD: 1 % (ref 0–7)
ERYTHROCYTE [DISTWIDTH] IN BLOOD BY AUTOMATED COUNT: 14.5 % (ref 11.5–14.5)
ERYTHROCYTE [DISTWIDTH] IN BLOOD BY AUTOMATED COUNT: 19.2 % (ref 11.5–14.5)
ERYTHROCYTE [DISTWIDTH] IN BLOOD BY AUTOMATED COUNT: 19.2 % (ref 11.5–14.5)
ERYTHROCYTE [DISTWIDTH] IN BLOOD BY AUTOMATED COUNT: 19.4 % (ref 11.5–14.5)
ERYTHROCYTE [DISTWIDTH] IN BLOOD BY AUTOMATED COUNT: 19.7 % (ref 11.5–14.5)
FLUAV H1 2009 PAND RNA SPEC QL NAA+PROBE: NOT DETECTED
FLUAV H1 RNA SPEC QL NAA+PROBE: NOT DETECTED
FLUAV H3 RNA SPEC QL NAA+PROBE: NOT DETECTED
FLUAV SUBTYP SPEC NAA+PROBE: NOT DETECTED
FLUBV RNA SPEC QL NAA+PROBE: NOT DETECTED
GAS FLOW.O2 O2 DELIVERY SYS: ABNORMAL L/MIN
GAS FLOW.O2 SETTING OXYMISER: 2 L/M
GLOBULIN SER CALC-MCNC: 4.8 G/DL (ref 2–4)
GLOBULIN SER CALC-MCNC: 5 G/DL (ref 2–4)
GLOBULIN SER CALC-MCNC: 5.3 G/DL (ref 2–4)
GLUCOSE BLD STRIP.AUTO-MCNC: 64 MG/DL (ref 65–100)
GLUCOSE BLD STRIP.AUTO-MCNC: 75 MG/DL (ref 65–100)
GLUCOSE BLD STRIP.AUTO-MCNC: 94 MG/DL (ref 65–100)
GLUCOSE SERPL-MCNC: 101 MG/DL (ref 65–100)
GLUCOSE SERPL-MCNC: 106 MG/DL (ref 65–100)
GLUCOSE SERPL-MCNC: 61 MG/DL (ref 65–100)
GLUCOSE SERPL-MCNC: 68 MG/DL (ref 65–100)
GLUCOSE SERPL-MCNC: 86 MG/DL (ref 65–100)
GLUCOSE SERPL-MCNC: 87 MG/DL (ref 65–100)
HADV DNA SPEC QL NAA+PROBE: NOT DETECTED
HCO3 BLD-SCNC: 17.7 MMOL/L (ref 22–26)
HCO3 BLD-SCNC: 22 MMOL/L (ref 22–26)
HCO3 BLD-SCNC: 25.8 MMOL/L (ref 22–26)
HCOV 229E RNA SPEC QL NAA+PROBE: NOT DETECTED
HCOV HKU1 RNA SPEC QL NAA+PROBE: NOT DETECTED
HCOV NL63 RNA SPEC QL NAA+PROBE: NOT DETECTED
HCOV OC43 RNA SPEC QL NAA+PROBE: NOT DETECTED
HCT VFR BLD AUTO: 23.8 % (ref 35–47)
HCT VFR BLD AUTO: 24.5 % (ref 35–47)
HCT VFR BLD AUTO: 24.8 % (ref 35–47)
HCT VFR BLD AUTO: 25 % (ref 35–47)
HCT VFR BLD AUTO: 25.2 % (ref 35–47)
HCT VFR BLD AUTO: 25.4 % (ref 35–47)
HCT VFR BLD AUTO: 25.6 % (ref 35–47)
HCT VFR BLD AUTO: 27.2 % (ref 35–47)
HCT VFR BLD AUTO: 28.4 % (ref 35–47)
HCT VFR BLD AUTO: 51.7 % (ref 35–47)
HGB BLD-MCNC: 16.3 G/DL (ref 11.5–16)
HGB BLD-MCNC: 7.1 G/DL (ref 11.5–16)
HGB BLD-MCNC: 7.4 G/DL (ref 11.5–16)
HGB BLD-MCNC: 7.5 G/DL (ref 11.5–16)
HGB BLD-MCNC: 7.6 G/DL (ref 11.5–16)
HGB BLD-MCNC: 7.6 G/DL (ref 11.5–16)
HGB BLD-MCNC: 7.8 G/DL (ref 11.5–16)
HGB BLD-MCNC: 7.9 G/DL (ref 11.5–16)
HGB BLD-MCNC: 8.2 G/DL (ref 11.5–16)
HGB BLD-MCNC: 8.4 G/DL (ref 11.5–16)
HMPV RNA SPEC QL NAA+PROBE: NOT DETECTED
HPIV1 RNA SPEC QL NAA+PROBE: NOT DETECTED
HPIV2 RNA SPEC QL NAA+PROBE: NOT DETECTED
HPIV3 RNA SPEC QL NAA+PROBE: NOT DETECTED
HPIV4 RNA SPEC QL NAA+PROBE: NOT DETECTED
IMM GRANULOCYTES # BLD AUTO: 0 K/UL
IMM GRANULOCYTES # BLD AUTO: 0 K/UL (ref 0–0.04)
IMM GRANULOCYTES # BLD AUTO: 0.1 K/UL (ref 0–0.04)
IMM GRANULOCYTES # BLD AUTO: 0.1 K/UL (ref 0–0.04)
IMM GRANULOCYTES # BLD AUTO: 0.3 K/UL (ref 0–0.04)
IMM GRANULOCYTES NFR BLD AUTO: 0 %
IMM GRANULOCYTES NFR BLD AUTO: 1 % (ref 0–0.5)
IMM GRANULOCYTES NFR BLD AUTO: 2 % (ref 0–0.5)
IMM GRANULOCYTES NFR BLD AUTO: 2 % (ref 0–0.5)
IMM GRANULOCYTES NFR BLD AUTO: 3 % (ref 0–0.5)
INR BLD: 1.4
INR BLD: 2.4
INR PPP: 3.3 (ref 0.9–1.1)
INR PPP: 3.4 (ref 0.9–1.1)
INR PPP: 3.4 (ref 0.9–1.1)
INR PPP: 3.8 (ref 0.9–1.1)
INR PPP: 4.6 (ref 0.9–1.1)
INR, EXTERNAL: 1.4 (ref 2–3)
INR, EXTERNAL: 1.7 (ref 2–3)
INR, EXTERNAL: 2.2 (ref 2–3)
INR, EXTERNAL: 2.4 (ref 2–3)
INR, EXTERNAL: 2.8 (ref 2–3)
INR, EXTERNAL: 3.3 (ref 2–3)
IRON SATN MFR SERPL: 22 % (ref 20–50)
IRON SERPL-MCNC: 33 UG/DL (ref 35–150)
LACTATE SERPL-SCNC: 1.9 MMOL/L (ref 0.4–2)
LACTATE SERPL-SCNC: 4.3 MMOL/L (ref 0.4–2)
LYMPHOCYTES # BLD: 0.3 K/UL (ref 0.8–3.5)
LYMPHOCYTES # BLD: 0.5 K/UL (ref 0.8–3.5)
LYMPHOCYTES # BLD: 0.6 K/UL (ref 0.8–3.5)
LYMPHOCYTES # BLD: 0.7 K/UL (ref 0.8–3.5)
LYMPHOCYTES # BLD: 1.2 K/UL (ref 0.8–3.5)
LYMPHOCYTES NFR BLD: 12 % (ref 12–49)
LYMPHOCYTES NFR BLD: 14 % (ref 12–49)
LYMPHOCYTES NFR BLD: 4 % (ref 12–49)
LYMPHOCYTES NFR BLD: 9 % (ref 12–49)
LYMPHOCYTES NFR BLD: 9 % (ref 12–49)
M PNEUMO DNA SPEC QL NAA+PROBE: NOT DETECTED
MAGNESIUM SERPL-MCNC: 2 MG/DL (ref 1.6–2.4)
MAGNESIUM SERPL-MCNC: 2.2 MG/DL (ref 1.6–2.4)
MAGNESIUM SERPL-MCNC: 2.5 MG/DL (ref 1.6–2.4)
MCH RBC QN AUTO: 28.9 PG (ref 26–34)
MCH RBC QN AUTO: 28.9 PG (ref 26–34)
MCH RBC QN AUTO: 29.4 PG (ref 26–34)
MCH RBC QN AUTO: 29.5 PG (ref 26–34)
MCH RBC QN AUTO: 30.8 PG (ref 26–34)
MCHC RBC AUTO-ENTMCNC: 29.6 G/DL (ref 30–36.5)
MCHC RBC AUTO-ENTMCNC: 29.8 G/DL (ref 30–36.5)
MCHC RBC AUTO-ENTMCNC: 30.1 G/DL (ref 30–36.5)
MCHC RBC AUTO-ENTMCNC: 30.4 G/DL (ref 30–36.5)
MCHC RBC AUTO-ENTMCNC: 31.5 G/DL (ref 30–36.5)
MCV RBC AUTO: 95.1 FL (ref 80–99)
MCV RBC AUTO: 97.5 FL (ref 80–99)
MCV RBC AUTO: 97.6 FL (ref 80–99)
MCV RBC AUTO: 97.7 FL (ref 80–99)
MCV RBC AUTO: 99.2 FL (ref 80–99)
MONOCYTES # BLD: 0.2 K/UL (ref 0–1)
MONOCYTES # BLD: 0.2 K/UL (ref 0–1)
MONOCYTES # BLD: 0.3 K/UL (ref 0–1)
MONOCYTES # BLD: 0.4 K/UL (ref 0–1)
MONOCYTES # BLD: 0.5 K/UL (ref 0–1)
MONOCYTES NFR BLD: 3 % (ref 5–13)
MONOCYTES NFR BLD: 5 % (ref 5–13)
MONOCYTES NFR BLD: 6 % (ref 5–13)
NEUTS BAND NFR BLD MANUAL: 3 % (ref 0–6)
NEUTS SEG # BLD: 4.4 K/UL (ref 1.8–8)
NEUTS SEG # BLD: 5 K/UL (ref 1.8–8)
NEUTS SEG # BLD: 5.9 K/UL (ref 1.8–8)
NEUTS SEG # BLD: 6.6 K/UL (ref 1.8–8)
NEUTS SEG # BLD: 7.4 K/UL (ref 1.8–8)
NEUTS SEG NFR BLD: 77 % (ref 32–75)
NEUTS SEG NFR BLD: 82 % (ref 32–75)
NEUTS SEG NFR BLD: 83 % (ref 32–75)
NEUTS SEG NFR BLD: 86 % (ref 32–75)
NEUTS SEG NFR BLD: 90 % (ref 32–75)
NRBC # BLD: 0 K/UL (ref 0–0.01)
NRBC # BLD: 0.02 K/UL (ref 0–0.01)
NRBC # BLD: 0.03 K/UL (ref 0–0.01)
NRBC # BLD: 0.06 K/UL (ref 0–0.01)
NRBC # BLD: 0.09 K/UL (ref 0–0.01)
NRBC BLD-RTO: 0 PER 100 WBC
NRBC BLD-RTO: 0.3 PER 100 WBC
NRBC BLD-RTO: 0.6 PER 100 WBC
NRBC BLD-RTO: 0.7 PER 100 WBC
NRBC BLD-RTO: 1.3 PER 100 WBC
O2/TOTAL GAS SETTING VFR VENT: 45 %
P-R INTERVAL, ECG05: 234 MS
P-R INTERVAL, ECG05: 282 MS
PCO2 BLD: 25.6 MMHG (ref 35–45)
PCO2 BLD: 30.4 MMHG (ref 35–45)
PCO2 BLD: 30.7 MMHG (ref 35–45)
PEEP RESPIRATORY: 5 CMH2O
PH BLD: 7.45 [PH] (ref 7.35–7.45)
PH BLD: 7.47 [PH] (ref 7.35–7.45)
PH BLD: 7.53 [PH] (ref 7.35–7.45)
PHOSPHATE SERPL-MCNC: 4.5 MG/DL (ref 2.6–4.7)
PIP ISTAT,IPIP: 11
PLATELET # BLD AUTO: 139 K/UL (ref 150–400)
PLATELET # BLD AUTO: 143 K/UL (ref 150–400)
PLATELET # BLD AUTO: 151 K/UL (ref 150–400)
PLATELET # BLD AUTO: 186 K/UL (ref 150–400)
PLATELET # BLD AUTO: 302 K/UL (ref 150–400)
PLATELET COMMENTS,PCOM: ABNORMAL
PMV BLD AUTO: 10 FL (ref 8.9–12.9)
PMV BLD AUTO: 10.7 FL (ref 8.9–12.9)
PMV BLD AUTO: 10.8 FL (ref 8.9–12.9)
PMV BLD AUTO: 10.8 FL (ref 8.9–12.9)
PMV BLD AUTO: 11.2 FL (ref 8.9–12.9)
PO2 BLD: 42 MMHG (ref 80–100)
PO2 BLD: 61 MMHG (ref 80–100)
PO2 BLD: 91 MMHG (ref 80–100)
POTASSIUM SERPL-SCNC: 3 MMOL/L (ref 3.5–5.1)
POTASSIUM SERPL-SCNC: 3 MMOL/L (ref 3.5–5.1)
POTASSIUM SERPL-SCNC: 4.2 MMOL/L (ref 3.5–5.1)
POTASSIUM SERPL-SCNC: 5.5 MMOL/L (ref 3.5–5.1)
POTASSIUM SERPL-SCNC: 5.6 MMOL/L (ref 3.5–5.1)
POTASSIUM SERPL-SCNC: 6.3 MMOL/L (ref 3.5–5.1)
PRESSURE SUPPORT SETTING VENT: 5 CMH2O
PROCALCITONIN SERPL-MCNC: 3.68 NG/ML
PROT SERPL-MCNC: 7.2 G/DL (ref 6.4–8.2)
PROT SERPL-MCNC: 7.6 G/DL (ref 6.4–8.2)
PROT SERPL-MCNC: 8.2 G/DL (ref 6.4–8.2)
PROTHROMBIN TIME: 32.3 SEC (ref 9–11.1)
PROTHROMBIN TIME: 33.2 SEC (ref 9–11.1)
PROTHROMBIN TIME: 33.7 SEC (ref 9–11.1)
PROTHROMBIN TIME: 37.7 SEC (ref 9–11.1)
PROTHROMBIN TIME: 45 SEC (ref 9–11.1)
PT POC: 17.2 SECONDS
PT POC: 29.4 SECONDS
Q-T INTERVAL, ECG07: 418 MS
Q-T INTERVAL, ECG07: 450 MS
QRS DURATION, ECG06: 124 MS
QRS DURATION, ECG06: 154 MS
QTC CALCULATION (BEZET), ECG08: 491 MS
QTC CALCULATION (BEZET), ECG08: 513 MS
RBC # BLD AUTO: 2.54 M/UL (ref 3.8–5.2)
RBC # BLD AUTO: 2.63 M/UL (ref 3.8–5.2)
RBC # BLD AUTO: 2.79 M/UL (ref 3.8–5.2)
RBC # BLD AUTO: 2.91 M/UL (ref 3.8–5.2)
RBC # BLD AUTO: 5.29 M/UL (ref 3.8–5.2)
RBC MORPH BLD: ABNORMAL
RSV RNA SPEC QL NAA+PROBE: NOT DETECTED
RV+EV RNA SPEC QL NAA+PROBE: NOT DETECTED
SAMPLES BEING HELD,HOLD: NORMAL
SAMPLES BEING HELD,HOLD: NORMAL
SAO2 % BLD: 84 % (ref 92–97)
SAO2 % BLD: 93 % (ref 92–97)
SAO2 % BLD: 98 % (ref 92–97)
SERVICE CMNT-IMP: ABNORMAL
SERVICE CMNT-IMP: NORMAL
SODIUM SERPL-SCNC: 133 MMOL/L (ref 136–145)
SODIUM SERPL-SCNC: 135 MMOL/L (ref 136–145)
SODIUM SERPL-SCNC: 137 MMOL/L (ref 136–145)
SODIUM SERPL-SCNC: 144 MMOL/L (ref 136–145)
SOURCE, COVRS: ABNORMAL
SPECIMEN SOURCE, FCOV2M: ABNORMAL
SPECIMEN TYPE, XMCV1T: ABNORMAL
SPECIMEN TYPE: ABNORMAL
TIBC SERPL-MCNC: 149 UG/DL (ref 250–450)
TROPONIN I SERPL-MCNC: 0.83 NG/ML
TROPONIN I SERPL-MCNC: 0.89 NG/ML
TROPONIN I SERPL-MCNC: 1.6 NG/ML
VALID INTERNAL CONTROL?: YES
VALID INTERNAL CONTROL?: YES
VENTRICULAR RATE, ECG03: 78 BPM
VENTRICULAR RATE, ECG03: 83 BPM
WBC # BLD AUTO: 5.2 K/UL (ref 3.6–11)
WBC # BLD AUTO: 6 K/UL (ref 3.6–11)
WBC # BLD AUTO: 7 K/UL (ref 3.6–11)
WBC # BLD AUTO: 8.3 K/UL (ref 3.6–11)
WBC # BLD AUTO: 8.5 K/UL (ref 3.6–11)

## 2020-01-01 PROCEDURE — G8420 CALC BMI NORM PARAMETERS: HCPCS | Performed by: INTERNAL MEDICINE

## 2020-01-01 PROCEDURE — 74011250636 HC RX REV CODE- 250/636: Performed by: STUDENT IN AN ORGANIZED HEALTH CARE EDUCATION/TRAINING PROGRAM

## 2020-01-01 PROCEDURE — 94660 CPAP INITIATION&MGMT: CPT

## 2020-01-01 PROCEDURE — 94762 N-INVAS EAR/PLS OXIMTRY CONT: CPT

## 2020-01-01 PROCEDURE — 77010033678 HC OXYGEN DAILY

## 2020-01-01 PROCEDURE — 36415 COLL VENOUS BLD VENIPUNCTURE: CPT

## 2020-01-01 PROCEDURE — 97530 THERAPEUTIC ACTIVITIES: CPT

## 2020-01-01 PROCEDURE — 85014 HEMATOCRIT: CPT

## 2020-01-01 PROCEDURE — 87635 SARS-COV-2 COVID-19 AMP PRB: CPT

## 2020-01-01 PROCEDURE — 85610 PROTHROMBIN TIME: CPT

## 2020-01-01 PROCEDURE — G8427 DOCREV CUR MEDS BY ELIG CLIN: HCPCS | Performed by: INTERNAL MEDICINE

## 2020-01-01 PROCEDURE — 85025 COMPLETE CBC W/AUTO DIFF WBC: CPT

## 2020-01-01 PROCEDURE — 85379 FIBRIN DEGRADATION QUANT: CPT

## 2020-01-01 PROCEDURE — 96361 HYDRATE IV INFUSION ADD-ON: CPT

## 2020-01-01 PROCEDURE — 74011000258 HC RX REV CODE- 258: Performed by: EMERGENCY MEDICINE

## 2020-01-01 PROCEDURE — 93005 ELECTROCARDIOGRAM TRACING: CPT

## 2020-01-01 PROCEDURE — 82803 BLOOD GASES ANY COMBINATION: CPT

## 2020-01-01 PROCEDURE — 90935 HEMODIALYSIS ONE EVALUATION: CPT

## 2020-01-01 PROCEDURE — 74011250636 HC RX REV CODE- 250/636: Performed by: INTERNAL MEDICINE

## 2020-01-01 PROCEDURE — 94640 AIRWAY INHALATION TREATMENT: CPT

## 2020-01-01 PROCEDURE — 97535 SELF CARE MNGMENT TRAINING: CPT

## 2020-01-01 PROCEDURE — 99222 1ST HOSP IP/OBS MODERATE 55: CPT | Performed by: SPECIALIST

## 2020-01-01 PROCEDURE — G8432 DEP SCR NOT DOC, RNG: HCPCS | Performed by: INTERNAL MEDICINE

## 2020-01-01 PROCEDURE — 96365 THER/PROPH/DIAG IV INF INIT: CPT

## 2020-01-01 PROCEDURE — 36600 WITHDRAWAL OF ARTERIAL BLOOD: CPT

## 2020-01-01 PROCEDURE — 74011250637 HC RX REV CODE- 250/637: Performed by: FAMILY MEDICINE

## 2020-01-01 PROCEDURE — 74011250636 HC RX REV CODE- 250/636: Performed by: FAMILY MEDICINE

## 2020-01-01 PROCEDURE — 70450 CT HEAD/BRAIN W/O DYE: CPT

## 2020-01-01 PROCEDURE — 84145 PROCALCITONIN (PCT): CPT

## 2020-01-01 PROCEDURE — 74011250637 HC RX REV CODE- 250/637: Performed by: STUDENT IN AN ORGANIZED HEALTH CARE EDUCATION/TRAINING PROGRAM

## 2020-01-01 PROCEDURE — 83735 ASSAY OF MAGNESIUM: CPT

## 2020-01-01 PROCEDURE — 0100U RESPIRATORY PANEL,PCR,NASOPHARYNGEAL: CPT

## 2020-01-01 PROCEDURE — 84484 ASSAY OF TROPONIN QUANT: CPT

## 2020-01-01 PROCEDURE — 85610 PROTHROMBIN TIME: CPT | Performed by: SPECIALIST

## 2020-01-01 PROCEDURE — 97161 PT EVAL LOW COMPLEX 20 MIN: CPT

## 2020-01-01 PROCEDURE — 99285 EMERGENCY DEPT VISIT HI MDM: CPT

## 2020-01-01 PROCEDURE — 82962 GLUCOSE BLOOD TEST: CPT

## 2020-01-01 PROCEDURE — 93288 INTERROG EVL PM/LDLS PM IP: CPT

## 2020-01-01 PROCEDURE — 99222 1ST HOSP IP/OBS MODERATE 55: CPT | Performed by: PHYSICAL MEDICINE & REHABILITATION

## 2020-01-01 PROCEDURE — 5A09357 ASSISTANCE WITH RESPIRATORY VENTILATION, LESS THAN 24 CONSECUTIVE HOURS, CONTINUOUS POSITIVE AIRWAY PRESSURE: ICD-10-PCS | Performed by: EMERGENCY MEDICINE

## 2020-01-01 PROCEDURE — 82550 ASSAY OF CK (CPK): CPT

## 2020-01-01 PROCEDURE — 80053 COMPREHEN METABOLIC PANEL: CPT

## 2020-01-01 PROCEDURE — 71046 X-RAY EXAM CHEST 2 VIEWS: CPT

## 2020-01-01 PROCEDURE — 84100 ASSAY OF PHOSPHORUS: CPT

## 2020-01-01 PROCEDURE — 5A1D70Z PERFORMANCE OF URINARY FILTRATION, INTERMITTENT, LESS THAN 6 HOURS PER DAY: ICD-10-PCS | Performed by: STUDENT IN AN ORGANIZED HEALTH CARE EDUCATION/TRAINING PROGRAM

## 2020-01-01 PROCEDURE — 94664 DEMO&/EVAL PT USE INHALER: CPT

## 2020-01-01 PROCEDURE — 74011000258 HC RX REV CODE- 258: Performed by: STUDENT IN AN ORGANIZED HEALTH CARE EDUCATION/TRAINING PROGRAM

## 2020-01-01 PROCEDURE — 86140 C-REACTIVE PROTEIN: CPT

## 2020-01-01 PROCEDURE — 71045 X-RAY EXAM CHEST 1 VIEW: CPT

## 2020-01-01 PROCEDURE — 74011250637 HC RX REV CODE- 250/637: Performed by: EMERGENCY MEDICINE

## 2020-01-01 PROCEDURE — 65660000001 HC RM ICU INTERMED STEPDOWN

## 2020-01-01 PROCEDURE — 82140 ASSAY OF AMMONIA: CPT

## 2020-01-01 PROCEDURE — 80048 BASIC METABOLIC PNL TOTAL CA: CPT

## 2020-01-01 PROCEDURE — G0463 HOSPITAL OUTPT CLINIC VISIT: HCPCS | Performed by: INTERNAL MEDICINE

## 2020-01-01 PROCEDURE — G9231 DOC ESRD DIA TRANS PREG: HCPCS | Performed by: INTERNAL MEDICINE

## 2020-01-01 PROCEDURE — 99215 OFFICE O/P EST HI 40 MIN: CPT | Performed by: INTERNAL MEDICINE

## 2020-01-01 PROCEDURE — 83540 ASSAY OF IRON: CPT

## 2020-01-01 PROCEDURE — 93283 PRGRMG EVAL IMPLANTABLE DFB: CPT | Performed by: INTERNAL MEDICINE

## 2020-01-01 PROCEDURE — 83880 ASSAY OF NATRIURETIC PEPTIDE: CPT

## 2020-01-01 PROCEDURE — 97165 OT EVAL LOW COMPLEX 30 MIN: CPT

## 2020-01-01 PROCEDURE — 83605 ASSAY OF LACTIC ACID: CPT

## 2020-01-01 PROCEDURE — P9047 ALBUMIN (HUMAN), 25%, 50ML: HCPCS | Performed by: STUDENT IN AN ORGANIZED HEALTH CARE EDUCATION/TRAINING PROGRAM

## 2020-01-01 PROCEDURE — 85018 HEMOGLOBIN: CPT

## 2020-01-01 PROCEDURE — 93283 PRGRMG EVAL IMPLANTABLE DFB: CPT

## 2020-01-01 PROCEDURE — 3017F COLORECTAL CA SCREEN DOC REV: CPT | Performed by: INTERNAL MEDICINE

## 2020-01-01 PROCEDURE — 65270000029 HC RM PRIVATE

## 2020-01-01 PROCEDURE — 74011250636 HC RX REV CODE- 250/636: Performed by: EMERGENCY MEDICINE

## 2020-01-01 PROCEDURE — 87040 BLOOD CULTURE FOR BACTERIA: CPT

## 2020-01-01 RX ORDER — POLYETHYLENE GLYCOL 3350 17 G/17G
17 POWDER, FOR SOLUTION ORAL DAILY PRN
Status: DISCONTINUED | OUTPATIENT
Start: 2020-01-01 | End: 2021-01-06 | Stop reason: HOSPADM

## 2020-01-01 RX ORDER — MEXILETINE HYDROCHLORIDE 150 MG/1
150 CAPSULE ORAL 3 TIMES DAILY
COMMUNITY

## 2020-01-01 RX ORDER — CHOLECALCIFEROL TAB 125 MCG (5000 UNIT) 125 MCG
10000 TAB ORAL
COMMUNITY

## 2020-01-01 RX ORDER — CINACALCET 30 MG/1
30 TABLET, FILM COATED ORAL DAILY
Status: DISCONTINUED | OUTPATIENT
Start: 2020-01-01 | End: 2021-01-06 | Stop reason: HOSPADM

## 2020-01-01 RX ORDER — ALBUMIN HUMAN 250 G/1000ML
25 SOLUTION INTRAVENOUS ONCE
Status: COMPLETED | OUTPATIENT
Start: 2020-01-01 | End: 2020-01-01

## 2020-01-01 RX ORDER — POLYETHYLENE GLYCOL 3350 17 G/17G
17 POWDER, FOR SOLUTION ORAL
COMMUNITY

## 2020-01-01 RX ORDER — FAMOTIDINE 20 MG/1
40 TABLET, FILM COATED ORAL
Status: DISCONTINUED | OUTPATIENT
Start: 2020-01-01 | End: 2020-01-01 | Stop reason: ALTCHOICE

## 2020-01-01 RX ORDER — POLYETHYLENE GLYCOL 3350 17 G/17G
17 POWDER, FOR SOLUTION ORAL
Status: DISCONTINUED | OUTPATIENT
Start: 2020-01-01 | End: 2020-01-01 | Stop reason: SDUPTHER

## 2020-01-01 RX ORDER — SODIUM CHLORIDE 0.9 % (FLUSH) 0.9 %
5-40 SYRINGE (ML) INJECTION EVERY 8 HOURS
Status: DISCONTINUED | OUTPATIENT
Start: 2020-01-01 | End: 2021-01-06 | Stop reason: HOSPADM

## 2020-01-01 RX ORDER — PANTOPRAZOLE SODIUM 20 MG/1
20 TABLET, DELAYED RELEASE ORAL
Status: DISCONTINUED | OUTPATIENT
Start: 2020-01-01 | End: 2021-01-01

## 2020-01-01 RX ORDER — AMIODARONE HYDROCHLORIDE 200 MG/1
200 TABLET ORAL DAILY
COMMUNITY
End: 2020-01-01 | Stop reason: SDUPTHER

## 2020-01-01 RX ORDER — AMIODARONE HYDROCHLORIDE 200 MG/1
200 TABLET ORAL DAILY
Qty: 30 TAB | Refills: 3 | Status: SHIPPED | OUTPATIENT
Start: 2020-01-01 | End: 2020-01-01

## 2020-01-01 RX ORDER — ALBUTEROL SULFATE 90 UG/1
2 AEROSOL, METERED RESPIRATORY (INHALATION)
Status: DISCONTINUED | OUTPATIENT
Start: 2020-01-01 | End: 2021-01-06 | Stop reason: HOSPADM

## 2020-01-01 RX ORDER — WARFARIN 1 MG/1
1 TABLET ORAL EVERY EVENING
COMMUNITY

## 2020-01-01 RX ORDER — AMIODARONE HYDROCHLORIDE 200 MG/1
200 TABLET ORAL
Status: COMPLETED | OUTPATIENT
Start: 2020-01-01 | End: 2020-01-01

## 2020-01-01 RX ORDER — SENNOSIDES 8.6 MG/1
1 TABLET ORAL
COMMUNITY

## 2020-01-01 RX ORDER — MELATONIN
10000
Status: DISCONTINUED | OUTPATIENT
Start: 2020-01-01 | End: 2021-01-06 | Stop reason: HOSPADM

## 2020-01-01 RX ORDER — ERGOCALCIFEROL 1.25 MG/1
50000 CAPSULE ORAL ONCE
Status: COMPLETED | OUTPATIENT
Start: 2020-01-01 | End: 2020-01-01

## 2020-01-01 RX ORDER — DEXTROSE 50 % IN WATER (D50W) INTRAVENOUS SYRINGE
12.5-25 AS NEEDED
Status: DISCONTINUED | OUTPATIENT
Start: 2020-01-01 | End: 2021-01-06 | Stop reason: HOSPADM

## 2020-01-01 RX ORDER — ACETAMINOPHEN 325 MG/1
650 TABLET ORAL
Status: DISCONTINUED | OUTPATIENT
Start: 2020-01-01 | End: 2021-01-06 | Stop reason: HOSPADM

## 2020-01-01 RX ORDER — ASPIRIN 81 MG/1
81 TABLET ORAL DAILY
Status: DISCONTINUED | OUTPATIENT
Start: 2020-01-01 | End: 2021-01-01 | Stop reason: ALTCHOICE

## 2020-01-01 RX ORDER — ZINC SULFATE 50(220)MG
1 CAPSULE ORAL DAILY
Status: DISCONTINUED | OUTPATIENT
Start: 2020-01-01 | End: 2021-01-06 | Stop reason: HOSPADM

## 2020-01-01 RX ORDER — WARFARIN 3 MG/1
TABLET ORAL
Qty: 45 TAB | Refills: 0 | Status: SHIPPED | OUTPATIENT
Start: 2020-01-01 | End: 2020-01-01

## 2020-01-01 RX ORDER — OMEPRAZOLE 20 MG/1
20 CAPSULE, DELAYED RELEASE ORAL
COMMUNITY

## 2020-01-01 RX ORDER — AMIODARONE HYDROCHLORIDE 200 MG/1
200 TABLET ORAL DAILY
COMMUNITY

## 2020-01-01 RX ORDER — SODIUM CHLORIDE 0.9 % (FLUSH) 0.9 %
5-40 SYRINGE (ML) INJECTION EVERY 8 HOURS
Status: CANCELLED | OUTPATIENT
Start: 2020-01-01

## 2020-01-01 RX ORDER — SODIUM CHLORIDE 0.9 % (FLUSH) 0.9 %
5-40 SYRINGE (ML) INJECTION AS NEEDED
Status: CANCELLED | OUTPATIENT
Start: 2020-01-01

## 2020-01-01 RX ORDER — CARVEDILOL 6.25 MG/1
TABLET ORAL
Qty: 60 TAB | Refills: 3 | Status: SHIPPED | OUTPATIENT
Start: 2020-01-01 | End: 2020-01-01

## 2020-01-01 RX ORDER — VITAMIN E 1000 UNIT
1000 CAPSULE ORAL 2 TIMES DAILY
COMMUNITY

## 2020-01-01 RX ORDER — LOPERAMIDE HYDROCHLORIDE 2 MG/1
2 CAPSULE ORAL
COMMUNITY

## 2020-01-01 RX ORDER — LOPERAMIDE HYDROCHLORIDE 2 MG/1
2 CAPSULE ORAL
Status: DISCONTINUED | OUTPATIENT
Start: 2020-01-01 | End: 2021-01-06 | Stop reason: HOSPADM

## 2020-01-01 RX ORDER — MAGNESIUM SULFATE 100 %
4 CRYSTALS MISCELLANEOUS AS NEEDED
Status: DISCONTINUED | OUTPATIENT
Start: 2020-01-01 | End: 2021-01-06 | Stop reason: HOSPADM

## 2020-01-01 RX ORDER — AMIODARONE HYDROCHLORIDE 200 MG/1
200 TABLET ORAL DAILY
Status: DISCONTINUED | OUTPATIENT
Start: 2020-01-01 | End: 2021-01-06 | Stop reason: HOSPADM

## 2020-01-01 RX ORDER — VIT B COMP NO.3/FOLIC/C/BIOTIN 1 MG-60 MG
1 TABLET ORAL DAILY
COMMUNITY

## 2020-01-01 RX ORDER — MEXILETINE HYDROCHLORIDE 150 MG/1
150 CAPSULE ORAL 3 TIMES DAILY
Status: DISCONTINUED | OUTPATIENT
Start: 2020-01-01 | End: 2021-01-06 | Stop reason: HOSPADM

## 2020-01-01 RX ORDER — ASCORBIC ACID 500 MG
1000 TABLET ORAL 2 TIMES DAILY
Status: DISCONTINUED | OUTPATIENT
Start: 2020-01-01 | End: 2020-01-01 | Stop reason: DRUGHIGH

## 2020-01-01 RX ORDER — ACETAMINOPHEN 650 MG/1
650 SUPPOSITORY RECTAL
Status: DISCONTINUED | OUTPATIENT
Start: 2020-01-01 | End: 2021-01-06 | Stop reason: HOSPADM

## 2020-01-01 RX ORDER — SENNOSIDES 8.6 MG/1
1 TABLET ORAL
Status: DISCONTINUED | OUTPATIENT
Start: 2020-01-01 | End: 2021-01-06 | Stop reason: HOSPADM

## 2020-01-01 RX ORDER — PANTOPRAZOLE SODIUM 20 MG/1
20 TABLET, DELAYED RELEASE ORAL
Status: DISCONTINUED | OUTPATIENT
Start: 2020-01-01 | End: 2020-01-01

## 2020-01-01 RX ORDER — ZINC SULFATE 50(220)MG
2 CAPSULE ORAL DAILY
Status: DISCONTINUED | OUTPATIENT
Start: 2020-01-01 | End: 2020-01-01

## 2020-01-01 RX ORDER — LANOLIN ALCOHOL/MO/W.PET/CERES
9 CREAM (GRAM) TOPICAL
Status: DISCONTINUED | OUTPATIENT
Start: 2020-01-01 | End: 2021-01-01

## 2020-01-01 RX ORDER — SODIUM CHLORIDE 0.9 % (FLUSH) 0.9 %
5-40 SYRINGE (ML) INJECTION AS NEEDED
Status: DISCONTINUED | OUTPATIENT
Start: 2020-01-01 | End: 2021-01-06 | Stop reason: HOSPADM

## 2020-01-01 RX ORDER — POTASSIUM CHLORIDE 750 MG/1
40 TABLET, FILM COATED, EXTENDED RELEASE ORAL DAILY
Status: DISPENSED | OUTPATIENT
Start: 2020-01-01 | End: 2021-01-01

## 2020-01-01 RX ORDER — POTASSIUM CHLORIDE 750 MG/1
60 TABLET, FILM COATED, EXTENDED RELEASE ORAL
Status: COMPLETED | OUTPATIENT
Start: 2020-01-01 | End: 2020-01-01

## 2020-01-01 RX ORDER — FLUDROCORTISONE ACETATE 0.1 MG/1
0.1 TABLET ORAL DAILY
COMMUNITY
End: 2020-01-01 | Stop reason: SDUPTHER

## 2020-01-01 RX ORDER — ASCORBIC ACID 500 MG
500 TABLET ORAL EVERY 6 HOURS
Status: DISCONTINUED | OUTPATIENT
Start: 2020-01-01 | End: 2021-01-06 | Stop reason: HOSPADM

## 2020-01-01 RX ORDER — FLUDROCORTISONE ACETATE 0.1 MG/1
0.1 TABLET ORAL DAILY
Qty: 30 TAB | Refills: 3 | Status: SHIPPED | OUTPATIENT
Start: 2020-01-01 | End: 2020-01-01

## 2020-01-01 RX ORDER — LEVOFLOXACIN 5 MG/ML
750 INJECTION, SOLUTION INTRAVENOUS ONCE
Status: COMPLETED | OUTPATIENT
Start: 2020-01-01 | End: 2020-01-01

## 2020-01-01 RX ORDER — FLUDROCORTISONE ACETATE 0.1 MG/1
0.1 TABLET ORAL DAILY
Status: DISCONTINUED | OUTPATIENT
Start: 2020-01-01 | End: 2020-01-01 | Stop reason: ALTCHOICE

## 2020-01-01 RX ADMIN — EPOETIN ALFA-EPBX 10000 UNITS: 10000 INJECTION, SOLUTION INTRAVENOUS; SUBCUTANEOUS at 23:11

## 2020-01-01 RX ADMIN — DOXYCYCLINE 100 MG: 100 INJECTION, POWDER, LYOPHILIZED, FOR SOLUTION INTRAVENOUS at 13:13

## 2020-01-01 RX ADMIN — AMIODARONE HYDROCHLORIDE 200 MG: 200 TABLET ORAL at 09:53

## 2020-01-01 RX ADMIN — Medication 10 ML: at 06:00

## 2020-01-01 RX ADMIN — Medication 1 CAPSULE: at 09:53

## 2020-01-01 RX ADMIN — Medication 10 TABLET: at 09:52

## 2020-01-01 RX ADMIN — POTASSIUM CHLORIDE 60 MEQ: 750 TABLET, FILM COATED, EXTENDED RELEASE ORAL at 18:32

## 2020-01-01 RX ADMIN — DEXAMETHASONE 6 MG: 4 TABLET ORAL at 09:52

## 2020-01-01 RX ADMIN — ALBUTEROL SULFATE 2 PUFF: 90 AEROSOL, METERED RESPIRATORY (INHALATION) at 08:04

## 2020-01-01 RX ADMIN — Medication 10 ML: at 21:57

## 2020-01-01 RX ADMIN — LEVOFLOXACIN 750 MG: 5 INJECTION, SOLUTION INTRAVENOUS at 22:39

## 2020-01-01 RX ADMIN — OXYCODONE HYDROCHLORIDE AND ACETAMINOPHEN 500 MG: 500 TABLET ORAL at 18:08

## 2020-01-01 RX ADMIN — POLYETHYLENE GLYCOL 3350 17 G: 17 POWDER, FOR SOLUTION ORAL at 12:05

## 2020-01-01 RX ADMIN — ALBUTEROL SULFATE 2 PUFF: 90 AEROSOL, METERED RESPIRATORY (INHALATION) at 23:41

## 2020-01-01 RX ADMIN — ALBUTEROL SULFATE: 90 AEROSOL, METERED RESPIRATORY (INHALATION) at 18:22

## 2020-01-01 RX ADMIN — ALBUTEROL SULFATE 2 PUFF: 90 AEROSOL, METERED RESPIRATORY (INHALATION) at 06:23

## 2020-01-01 RX ADMIN — ALBUTEROL SULFATE 2 PUFF: 90 AEROSOL, METERED RESPIRATORY (INHALATION) at 17:15

## 2020-01-01 RX ADMIN — Medication 10 ML: at 16:16

## 2020-01-01 RX ADMIN — Medication 9 MG: at 22:04

## 2020-01-01 RX ADMIN — ZINC SULFATE 220 MG (50 MG) CAPSULE 1 CAPSULE: CAPSULE at 09:53

## 2020-01-01 RX ADMIN — MEXILETINE HYDROCHLORIDE 150 MG: 150 CAPSULE ORAL at 12:06

## 2020-01-01 RX ADMIN — MEXILETINE HYDROCHLORIDE 150 MG: 150 CAPSULE ORAL at 21:45

## 2020-01-01 RX ADMIN — OXYCODONE HYDROCHLORIDE AND ACETAMINOPHEN 500 MG: 500 TABLET ORAL at 18:03

## 2020-01-01 RX ADMIN — OXYCODONE HYDROCHLORIDE AND ACETAMINOPHEN 500 MG: 500 TABLET ORAL at 00:42

## 2020-01-01 RX ADMIN — ALBUTEROL SULFATE 2 PUFF: 90 AEROSOL, METERED RESPIRATORY (INHALATION) at 04:01

## 2020-01-01 RX ADMIN — ALBUTEROL SULFATE 2 PUFF: 90 AEROSOL, METERED RESPIRATORY (INHALATION) at 10:45

## 2020-01-01 RX ADMIN — Medication 10 ML: at 22:00

## 2020-01-01 RX ADMIN — OXYCODONE HYDROCHLORIDE AND ACETAMINOPHEN 500 MG: 500 TABLET ORAL at 06:10

## 2020-01-01 RX ADMIN — DOXYCYCLINE 100 MG: 100 INJECTION, POWDER, LYOPHILIZED, FOR SOLUTION INTRAVENOUS at 21:52

## 2020-01-01 RX ADMIN — ZINC SULFATE 220 MG (50 MG) CAPSULE 2 CAPSULE: CAPSULE at 06:10

## 2020-01-01 RX ADMIN — FAMOTIDINE 40 MG: 20 TABLET, FILM COATED ORAL at 06:10

## 2020-01-01 RX ADMIN — DOXYCYCLINE 100 MG: 100 INJECTION, POWDER, LYOPHILIZED, FOR SOLUTION INTRAVENOUS at 20:33

## 2020-01-01 RX ADMIN — OXYCODONE HYDROCHLORIDE AND ACETAMINOPHEN 500 MG: 500 TABLET ORAL at 12:05

## 2020-01-01 RX ADMIN — ALBUTEROL SULFATE 2 PUFF: 90 AEROSOL, METERED RESPIRATORY (INHALATION) at 20:11

## 2020-01-01 RX ADMIN — ASPIRIN 81 MG: 81 TABLET, COATED ORAL at 09:52

## 2020-01-01 RX ADMIN — ALBUTEROL SULFATE 2 PUFF: 90 AEROSOL, METERED RESPIRATORY (INHALATION) at 19:54

## 2020-01-01 RX ADMIN — MEXILETINE HYDROCHLORIDE 150 MG: 150 CAPSULE ORAL at 00:42

## 2020-01-01 RX ADMIN — Medication 10 ML: at 06:10

## 2020-01-01 RX ADMIN — DOXYCYCLINE 100 MG: 100 INJECTION, POWDER, LYOPHILIZED, FOR SOLUTION INTRAVENOUS at 21:46

## 2020-01-01 RX ADMIN — SODIUM CHLORIDE 1000 ML: 9 INJECTION, SOLUTION INTRAVENOUS at 22:38

## 2020-01-01 RX ADMIN — ALBUTEROL SULFATE 2 PUFF: 90 AEROSOL, METERED RESPIRATORY (INHALATION) at 03:39

## 2020-01-01 RX ADMIN — PANTOPRAZOLE SODIUM 20 MG: 20 TABLET, DELAYED RELEASE ORAL at 07:06

## 2020-01-01 RX ADMIN — CEFEPIME HYDROCHLORIDE 1 G: 1 INJECTION, POWDER, FOR SOLUTION INTRAMUSCULAR; INTRAVENOUS at 22:52

## 2020-01-01 RX ADMIN — Medication 10 ML: at 07:07

## 2020-01-01 RX ADMIN — ERGOCALCIFEROL 50000 UNITS: 1.25 CAPSULE ORAL at 06:09

## 2020-01-01 RX ADMIN — ALBUTEROL SULFATE 2 PUFF: 90 AEROSOL, METERED RESPIRATORY (INHALATION) at 11:33

## 2020-01-01 RX ADMIN — CEFEPIME HYDROCHLORIDE 2 G: 2 INJECTION, POWDER, FOR SOLUTION INTRAVENOUS at 22:42

## 2020-01-01 RX ADMIN — FERRIC CITRATE 210 MG: 210 TABLET, COATED ORAL at 11:54

## 2020-01-01 RX ADMIN — Medication 10 ML: at 18:03

## 2020-01-01 RX ADMIN — ALBUTEROL SULFATE 2 PUFF: 90 AEROSOL, METERED RESPIRATORY (INHALATION) at 18:06

## 2020-01-01 RX ADMIN — ALBUMIN (HUMAN) 25 G: 0.25 INJECTION, SOLUTION INTRAVENOUS at 06:08

## 2020-01-01 RX ADMIN — ALBUTEROL SULFATE 2 PUFF: 90 AEROSOL, METERED RESPIRATORY (INHALATION) at 00:30

## 2020-01-01 RX ADMIN — OXYCODONE HYDROCHLORIDE AND ACETAMINOPHEN 500 MG: 500 TABLET ORAL at 07:06

## 2020-01-01 RX ADMIN — CEFEPIME HYDROCHLORIDE 1 G: 1 INJECTION, POWDER, FOR SOLUTION INTRAMUSCULAR; INTRAVENOUS at 23:12

## 2020-01-01 RX ADMIN — CEFEPIME HYDROCHLORIDE 1 G: 1 INJECTION, POWDER, FOR SOLUTION INTRAMUSCULAR; INTRAVENOUS at 23:13

## 2020-01-01 RX ADMIN — DEXAMETHASONE SODIUM PHOSPHATE 15 MG: 4 INJECTION, SOLUTION INTRAMUSCULAR; INTRAVENOUS at 06:12

## 2020-01-01 RX ADMIN — OXYCODONE HYDROCHLORIDE AND ACETAMINOPHEN 500 MG: 500 TABLET ORAL at 11:54

## 2020-01-01 RX ADMIN — MEXILETINE HYDROCHLORIDE 150 MG: 150 CAPSULE ORAL at 18:03

## 2020-01-01 RX ADMIN — AMIODARONE HYDROCHLORIDE 200 MG: 200 TABLET ORAL at 18:32

## 2020-01-01 RX ADMIN — CINACALCET HYDROCHLORIDE 30 MG: 30 TABLET, FILM COATED ORAL at 12:06

## 2020-01-01 RX ADMIN — FLUDROCORTISONE ACETATE 0.1 MG: 0.1 TABLET ORAL at 11:54

## 2020-01-01 RX ADMIN — DOXYCYCLINE 100 MG: 100 INJECTION, POWDER, LYOPHILIZED, FOR SOLUTION INTRAVENOUS at 12:03

## 2020-03-04 NOTE — PROGRESS NOTES
Pt called x 2 and message left for a return call or to check INR and send it in. I reminded pt that it was her request to have the convenience of checking INR at home,since she could not come the hours that the clinic was open.

## 2020-04-01 NOTE — PROGRESS NOTES
Called placed this am for pt to send in INR reading. Pt states she was waiting for her strips to arrive.

## 2020-04-13 NOTE — PROGRESS NOTES
ICD remote alert for multiple ICD shocks for VT. Patient was asymptomatic but had been off of her medications following recent hospitalization for diarrhea. Will arrange clinic visit.        Luna Sutton MD

## 2020-04-14 NOTE — PROGRESS NOTES
VIRTUAL VISIT DOCUMENTATION Pursuant to the emergency declaration under the [facility] waiver authority and the [employer] Act, this Virtual  Visit was conducted, with patient's consent, to reduce the patient's risk of exposure to COVID-19 and provide continuity of care for an established patient. Services were provided through a video synchronous discussion virtually to substitute for in-person clinic visit. HISTORY OF PRESENTING ILLNESS Ney Kee is a 60 y.o. female who was seen by synchronous (real-time) audio-video technology on 4/14/2020 with hypertension, non-ischemic CM, LVEF 25-30%, ESRD on peritoneal dialysis, NYHA class II-III, SVT who had recurrent SVT including a tachycardia treated as VT by her ICD despite similar shock electrograms to sinus rhythm. 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 She had recently been hospitalized with diarrhea and had been advised to hold her coreg. She was discharged on 4/8/20 and her VT episode was the following day with a recurrence a few days later. She has been experiencing fatigue/lightheadness and is also seeing GI for her nausea/diarrhea which is recurring. Denies chest pain, edema, syncope. She reports having bloodwork checked today during dialysis. ACTIVE PROBLEM LIST Patient Active Problem List  
 Diagnosis Date Noted  Chronic anticoagulation 03/28/2019  
 SVT (supraventricular tachycardia) ([address] 03/11/2019  GERD (gastroesophageal reflux disease) 03/11/2019  Systolic CHF, chronic ([address] 08/25/2018  Paroxysmal atrial fibrillation ([address] 07/31/2018  ICD (implantable cardioverter-defibrillator) in place 01/26/2018  Paroxysmal atrial tachycardia ([facility] 04/17/2016  ESRD (end stage renal disease) [facility] 12/11/2015  Anemia 01/20/2015  NICM (nonischemic cardiomyopathy) ([facility] 10/19/2013  Polycystic kidney disease 10/18/2012  Essential hypertension, benign 10/18/2012 PAST MEDICAL HISTORY Past Medical History:  
Diagnosis Date  Anemia associated with chronic renal failure  Anuria 2014  Arrhythmia Paroxysmal a fib, paroxsymal atrial tach, SVT  Chronic kidney disease ESRD- on PD  Chronic systolic heart failure ([facility] 10/31/2012  Chronic tachycardia  GERD (gastroesophageal reflux disease)  Hx of non-ST elevation myocardial infarction (NSTEMI) 2011  Hypertension  Lipoma of right lower extremity 1980 Foot  Peritoneal dialysis catheter in place [facility]) 2014 Nightime exchanges  Polycystic kidney disease 2014 PAST SURGICAL HISTORY Past Surgical History:  
Procedure Laterality Date  HX HEART CATHETERIZATION  12/2015  
 no interventions  HX PACEMAKER  12/29/2015 ICD  IR INSERT NON TUNL CVC OVER 5 YRS  3/13/2019  MS EPHYS EVAL W/ABLATION SUPRAVENT ARRHYTHMIA N/A 10/1/2019 ABLATION SVT performed by Shay Chong MD at [address] [facility] [address] CATH LAB  MS INTRACARDIAC ELECTROPHYSIOLOGIC 3D MAPPING N/A 10/1/2019 Ep 3d Mapping [facility] CATH LAB  STIM/PACING HEART POST IV DRUG INFU N/A 10/1/2019 Drug Stimulation [facility] CATH LAB ALLERGIES Allergies Allergen Reactions  Iodinated Contrast Media Angioedema Cellulitis on side of face after test  
  
 
 
FAMILY HISTORY Family History Problem Relation Age of Onset  Diabetes Brother  Hypertension Brother  Hypertension Mother [address] [facility] Arthritis-osteo Mother  Hypertension Sister  Diabetes Sister  Kidney Disease Father   
     polycystic  Parkinson's Disease Father  Heart Disease Father  Hypertension Father  Hypertension Brother   
 negative for cardiac disease SOCIAL HISTORY Social History Socioeconomic History  Marital status: SINGLE Spouse name: Not on file  Number of children: Not on file  Years of education: Not on file  Highest education level: Not on file Tobacco Use  Smoking status: Never Smoker  Smokeless tobacco: Never Used Substance and Sexual Activity  Alcohol use: No  
 Drug use: No  
 Sexual activity: Not Currently Partners: Male MEDICATIONS Current Outpatient Medications Medication Sig  warfarin (COUMADIN) 3 mg tablet Take 1 Tab by mouth daily. Take 6 mg by mouth  on Tuesday and Thursday and 3 mg on other days.  carvedilol (COREG) 25 mg tablet Take 0.5 Tabs by mouth two (2) times daily (with meals). Indications: chronic heart failure  aspirin delayed-release 81 mg tablet Take 81 mg by mouth daily.  potassium chloride (KLOR-CON M20) 20 mEq tablet Take 40 mEq by mouth every morning.  omeprazole (PRILOSEC) 20 mg capsule Take 20 mg by mouth daily as needed (indigestion).  SENNA-DOCUSATE SODIUM PO Take 1 Tab by mouth daily as needed (constipation).  cinacalcet (SENSIPAR) 30 mg tablet Take 30 mg by mouth daily (with dinner).  ferric citrate (AURYXIA) 210 mg iron tablet Take 210 mg by mouth three (3) times daily (with meals).  gentamicin (GARAMYCIN) 0.1 % topical cream Apply  to affected area nightly. No current facility-administered medications for this visit. I have reviewed the nurses notes, vitals, problem list, allergy list, medical history, family, social history and medications. REVIEW OF SYMPTOMS General: Pt denies excessive weight gain or loss. Pt is able to conduct ADL's HEENT: Denies blurred vision, headaches, hearing loss, epistaxis and difficulty swallowing.  
Respiratory: Denies cough, congestion, shortness of breath, HENDERSON, wheezing or stridor. Cardiovascular: Denies precordial pain, palpitations, edema or PND Gastrointestinal: Denies poor appetite, indigestion, abdominal pain or blood in stool Genitourinary: Denies hematuria, dysuria, increased urinary frequency Musculoskeletal: Denies joint pain or swelling from muscles or joints Neurologic: Denies tremor, paresthesias, headache, or sensory motor disturbance Psychiatric: Denies confusion, insomnia, depression Integumentray: Denies rash, itching or ulcers. Hematologic: Denies easy bruising, bleeding PHYSICAL EXAMINATION Due to this being a TeleHealth evaluation, many elements of the physical examination are unable to be assessed. General: Well developed, in no acute distress, cooperative and alert HEENT: Pupils equal/round. No marked JVD visible on video. Respiratory: No audible wheezing, no signs of respiratory distress, lips non cyanotic Extremities:  No edema Neuro: A&Ox3, speech clear, no facial droop, answering questions appropriately Skin: Skin color is normal. No rashes or lesions. Non diaphoretic on visible skin during exam 
 
 
 DIAGNOSTIC DATA EKG:  
 
 
 LABORATORY DATA Lab Results Component Value Date/Time WBC 10.9 10/01/2019 02:34 PM  
 Hemoglobin (POC) 9.5 (L) 10/18/2013 03:35 PM  
 HGB 12.4 10/01/2019 02:34 PM  
 Hematocrit (POC) 28 (L) 10/18/2013 03:35 PM  
 HCT 40.7 10/01/2019 02:34 PM  
 PLATELET 393 22/43/1604 02:34 PM  
 .4 (H) 10/01/2019 02:34 PM  
  
Lab Results Component Value Date/Time  Sodium 141 09/24/2019 11:54 AM  
 Potassium 4.2 09/24/2019 11:54 AM  
 Chloride 96 09/24/2019 11:54 AM  
 CO2 23 09/24/2019 11:54 AM  
 Anion gap 9 03/19/2019 03:40 AM  
 Glucose 90 09/24/2019 11:54 AM  
 BUN 56 (H) 09/24/2019 11:54 AM  
 Creatinine 13.69 (H) 09/24/2019 11:54 AM  
 BUN/Creatinine ratio 4 (L) 09/24/2019 11:54 AM  
 GFR est AA 3 (L) 09/24/2019 11:54 AM  
 GFR est non-AA 3 (L) 09/24/2019 11:54 AM  
 Calcium 9.6 09/24/2019 11:54 AM  
 Bilirubin, total 1.7 (H) 03/11/2019 04:42 PM  
 AST (SGOT) 50 (H) 03/11/2019 04:42 PM  
 Alk. phosphatase 82 03/11/2019 04:42 PM  
 Protein, total 7.4 03/11/2019 04:42 PM  
 Albumin 1.8 (L) 03/15/2019 04:19 AM  
 Globulin 4.6 (H) 03/11/2019 04:42 PM  
 A-G Ratio 0.6 (L) 03/11/2019 04:42 PM  
 ALT (SGPT) 37 03/11/2019 04:42 PM  
  
 
 
 ASSESSMENT 1. Cardiomyopathy A. Non ischemic B. LV systolic C. NYHA class III 
            D. Narrow QRS 2. Hypertension 3. End stage renal disease 4. Supraventricular tachycardia A. Slow pathway ablation 5. ICD 6. Atrial fibrillation 7. Ventricular tachycardia ICD-10-CM ICD-9-CM 1. Ventricular tachycardia (HCC) I47.2 427.1 2. SVT (supraventricular tachycardia) ([facility]) I47.1 427.89   
3. Cardiac defibrillator in situ Z95.810 V45.02   
4. NICM (nonischemic cardiomyopathy) ([facility]) I42.8 425.4 5. Polycystic kidney disease Q61.3 753.12   
6. ESRD on peritoneal dialysis ([facility] N18.6 585.6 Z99.2 V45.11   
7. Essential hypertension, benign I10 401.1 No orders of the defined types were placed in this encounter. PLAN Continue beta blockers for now. Encouraged her to proceed with her GI evaluation as maintaining her electrolytes at normal levels will be critical. If she continues to have recurrent tachycardia, may have to consider ablative options given her age, iodine allergy (though this may not preclude a short term course of amiodarone), renal dysfunction. We discussed the expected course, resolution and complications of the diagnosis(es) in detail. Medication risks, benefits, costs, interactions, and alternatives were discussed as indicated. I advised her to contact the office if her condition worsens, changes or fails to improve as anticipated. She expressed understanding with the diagnosis(es) and plan  FOLLOW-UP  
 
 1 month with device interrogation prior to visit Patient was made aware and verbalized understanding that an appointment will be scheduled for them for a virtual visit and/or office visit within the above time frame. Patient understanding his/her responsibility to call and change time/date if he/she so chooses. Thank you, Michael Harris MD and Dr. Irena Franklin for allowing me to participate in the care of this extraordinarily pleasant female. Please do not hesitate to contact me for further questions/concerns. Maria Fernanda Morillo MD 
Cardiac Electrophysiology / Cardiology [address] [place] [address], [address]    [address] [facility] 
[phone] / [phone] Fax  [phone] / [phone] Fax Greater than 25 minutes was spent in direct video patient care, planning and chart review. This visit was conducted using [employer] telemedicine services.

## 2020-05-18 NOTE — PROGRESS NOTES
VIRTUAL VISIT DOCUMENTATION Pursuant to the emergency declaration under the [facility], [facility] waiver authority and the [employer] Act, this Virtual  Visit was conducted, with patient's consent, to reduce the patient's risk of exposure to COVID-19 and provide continuity of care for an established patient. Services were provided through a video synchronous discussion virtually to substitute for in-person clinic visit. HISTORY OF PRESENTING ILLNESS Brett Hill is a 60 y.o. female who was seen by synchronous (real-time) audio-video technology on 5/18/2020 with hypertension, non-ischemic CM, LVEF 25-30%, ESRD on peritoneal dialysis, NYHA class II-III, SVT who had recurrent SVT including a tachycardia treated as VT by her ICD despite similar shock electrograms to sinus rhythm. 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 She had recently been hospitalized with diarrhea and had been advised to hold her coreg. She was discharged on 4/8/20 and her VT episode was the following day with a recurrence a few days later. She had been experiencing fatigue/lightheadness and was also seeing GI for her nausea/diarrhea which was recurring. Denied chest pain, edema, syncope. We continued beta blockers and encouraged her to proceed with her GI evaluation as maintaining her electrolytes at normal levels would be critical. If she continued to have recurrent tachycardia, we planned to consider ablative options given her age, iodine allergy (though this may not preclude a short term course of amiodarone), renal dysfunction. Interrogation of her ICD fails to reveal recurrent episodes of tachycardia.   She reports she was found to have food poisoning and her diarrhea has now resolved. ACTIVE PROBLEM LIST Patient Active Problem List  
 Diagnosis Date Noted  Chronic anticoagulation 03/28/2019  
 SVT (supraventricular tachycardia) ([address] 03/11/2019  GERD (gastroesophageal reflux disease) 03/11/2019  Systolic CHF, chronic ([address] 08/25/2018  Paroxysmal atrial fibrillation ([address] 07/31/2018  ICD (implantable cardioverter-defibrillator) in place 01/26/2018  Paroxysmal atrial tachycardia ([address] 04/17/2016  ESRD (end stage renal disease) ([address] 12/11/2015  Anemia 01/20/2015  NICM (nonischemic cardiomyopathy) ([facility] 10/19/2013  Polycystic kidney disease 10/18/2012  Essential hypertension, benign 10/18/2012 PAST MEDICAL HISTORY Past Medical History:  
Diagnosis Date  Anemia associated with chronic renal failure  Anuria 2014  Arrhythmia Paroxysmal a fib, paroxsymal atrial tach, SVT  Chronic kidney disease ESRD- on PD  Chronic systolic heart failure ([address] 10/31/2012  Chronic tachycardia  GERD (gastroesophageal reflux disease)  Hx of non-ST elevation myocardial infarction (NSTEMI) 2011  Hypertension  Lipoma of right lower extremity 1980 Foot  Peritoneal dialysis catheter in place [facility]) 2014 Nightime exchanges  Polycystic kidney disease 2014 PAST SURGICAL HISTORY Past Surgical History:  
Procedure Laterality Date  HX HEART CATHETERIZATION  12/2015  
 no interventions  HX PACEMAKER  12/29/2015 ICD  IR INSERT NON TUNL CVC OVER 5 YRS  3/13/2019  [place] EPHYS EVAL W/ABLATION SUPRAVENT ARRHYTHMIA N/A 10/1/2019 ABLATION SVT performed by Ayaka Ricketts MD at [address] [place] [address], [facility] [address] CATH LAB  [place] INTRACARDIAC ELECTROPHYSIOLOGIC 3D MAPPING N/A 10/1/2019 Ep 3d Mapping performed by Ayaka Ricketts MD at [address] [place] [address], [facility] CATH LAB  STIM/PACING HEART POST IV DRUG INFU N/A 10/1/2019 Drug Stimulation performed by Alberto Arvizu MD at [address], [facility] [address] [place] [facility] ALLERGIES Allergies Allergen Reactions  Iodinated Contrast Media Angioedema Cellulitis on side of face after test  
  
 
 
FAMILY HISTORY Family History Problem Relation Age of Onset  Diabetes Brother  Hypertension Brother  Hypertension Mother [address] [facility] Arthritis-osteo Mother  Hypertension Sister  Diabetes Sister  Kidney Disease Father   
     polycystic  Parkinson's Disease Father  Heart Disease Father  Hypertension Father  Hypertension Brother   
 negative for cardiac disease SOCIAL HISTORY Social History Socioeconomic History  Marital status: SINGLE Spouse name: Not on file  Number of children: Not on file  Years of education: Not on file  Highest education level: Not on file Tobacco Use  Smoking status: Never Smoker  Smokeless tobacco: Never Used Substance and Sexual Activity  Alcohol use: No  
 Drug use: No  
 Sexual activity: Not Currently Partners: Male MEDICATIONS Current Outpatient Medications Medication Sig  warfarin (COUMADIN) 3 mg tablet Take 1 Tab by mouth daily. Take 6 mg by mouth  on Tuesday and Thursday and 3 mg on other days.  carvedilol (COREG) 25 mg tablet Take 0.5 Tabs by mouth two (2) times daily (with meals). Indications: chronic heart failure  aspirin delayed-release 81 mg tablet Take 81 mg by mouth daily.  potassium chloride (KLOR-CON M20) 20 mEq tablet Take 40 mEq by mouth every morning.  omeprazole (PRILOSEC) 20 mg capsule Take 20 mg by mouth daily as needed (indigestion).  SENNA-DOCUSATE SODIUM PO Take 1 Tab by mouth daily as needed (constipation).  cinacalcet (SENSIPAR) 30 mg tablet Take 30 mg by mouth daily (with dinner).  ferric citrate (AURYXIA) 210 mg iron tablet Take 210 mg by mouth three (3) times daily (with meals).  gentamicin (GARAMYCIN) 0.1 % topical cream Apply  to affected area nightly. No current facility-administered medications for this visit. I have reviewed the nurses notes, vitals, problem list, allergy list, medical history, family, social history and medications. REVIEW OF SYMPTOMS General: Pt denies excessive weight gain or loss. Pt is able to conduct ADL's HEENT: Denies blurred vision, headaches, hearing loss, epistaxis and difficulty swallowing. Respiratory: Denies cough, congestion, shortness of breath, HENDERSON, wheezing or stridor. Cardiovascular: Denies precordial pain, palpitations, edema or PND Gastrointestinal: Denies poor appetite, indigestion, abdominal pain or blood in stool Genitourinary: Denies hematuria, dysuria, increased urinary frequency Musculoskeletal: Denies joint pain or swelling from muscles or joints Neurologic: Denies tremor, paresthesias, headache, or sensory motor disturbance Psychiatric: Denies confusion, insomnia, depression Integumentray: Denies rash, itching or ulcers. Hematologic: Denies easy bruising, bleeding PHYSICAL EXAMINATION Due to this being a TeleHealth evaluation, many elements of the physical examination are unable to be assessed. General: Well developed, in no acute distress, cooperative and alert HEENT: Pupils equal/round. No marked JVD visible on video. Respiratory: No audible wheezing, no signs of respiratory distress, lips non cyanotic Extremities:  No edema Neuro: A&Ox3, speech clear, no facial droop, answering questions appropriately Skin: Skin color is normal. No rashes or lesions. Non diaphoretic on visible skin during exam 
 
 
 DIAGNOSTIC DATA EKG:  
 
 
 LABORATORY DATA Lab Results Component Value Date/Time  WBC 10.9 10/01/2019 02:34 PM  
 Hemoglobin (POC) 9.5 (L) 10/18/2013 03:35 PM  
 HGB 12.4 10/01/2019 02:34 PM  
 Hematocrit (POC) 28 (L) 10/18/2013 03:35 PM  
 HCT 40.7 10/01/2019 02:34 PM  
 PLATELET 914 59/87/7463 02:34 PM  
 .4 (H) 10/01/2019 02:34 PM  
  
Lab Results Component Value Date/Time Sodium 141 09/24/2019 11:54 AM  
 Potassium 4.2 09/24/2019 11:54 AM  
 Chloride 96 09/24/2019 11:54 AM  
 CO2 23 09/24/2019 11:54 AM  
 Anion gap 9 03/19/2019 03:40 AM  
 Glucose 90 09/24/2019 11:54 AM  
 BUN 56 (H) 09/24/2019 11:54 AM  
 Creatinine 13.69 (H) 09/24/2019 11:54 AM  
 BUN/Creatinine ratio 4 (L) 09/24/2019 11:54 AM  
 GFR est AA 3 (L) 09/24/2019 11:54 AM  
 GFR est non-AA 3 (L) 09/24/2019 11:54 AM  
 Calcium 9.6 09/24/2019 11:54 AM  
 Bilirubin, total 1.7 (H) 03/11/2019 04:42 PM  
 AST (SGOT) 50 (H) 03/11/2019 04:42 PM  
 Alk. phosphatase 82 03/11/2019 04:42 PM  
 Protein, total 7.4 03/11/2019 04:42 PM  
 Albumin 1.8 (L) 03/15/2019 04:19 AM  
 Globulin 4.6 (H) 03/11/2019 04:42 PM  
 A-G Ratio 0.6 (L) 03/11/2019 04:42 PM  
 ALT (SGPT) 37 03/11/2019 04:42 PM  
  
 
 
 ASSESSMENT 1. Cardiomyopathy A. Non ischemic B. LV systolic C. NYHA class III 
            D. Narrow QRS 2. Hypertension 3. End stage renal disease 4. Supraventricular tachycardia A. Slow pathway ablation 5. ICD 6. Atrial fibrillation 7. Ventricular tachycardia ICD-10-CM ICD-9-CM 1. Ventricular tachycardia (HCC) I47.2 427.1 2. SVT (supraventricular tachycardia) (HCC) I47.1 427.89   
3. ESRD on peritoneal dialysis ([facility] N18.6 585.6 Z99.2 V45.11   
4. Essential hypertension, benign I10 401.1 5. NICM (nonischemic cardiomyopathy) (HCC) I42.8 425.4 6. Polycystic kidney disease Q61.3 753.12   
7. Chronic anticoagulation Z79.01 V58.61   
8. Cardiac defibrillator in situ Z95.810 V45.02 No orders of the defined types were placed in this encounter. PLAN Maintain hydration. Defer adjustments in drug therapy/ablation at this time as long she does not exhibit recurrence. Continue monitoring in device clinic for recurrence. We discussed the expected course, resolution and complications of the diagnosis(es) in detail. Medication risks, benefits, costs, interactions, and alternatives were discussed as indicated. I advised her to contact the office if her condition worsens, changes or fails to improve as anticipated. She expressed understanding with the diagnosis(es) and plan FOLLOW-UP  
 
6 months Patient was made aware and verbalized understanding that an appointment will be scheduled for them for a virtual visit and/or office visit within the above time frame. Patient understanding his/her responsibility to call and change time/date if he/she so chooses. Thank you, Yoni Tobias MD and Dr. Liliane Bravo for allowing me to participate in the care of this extraordinarily pleasant female. Please do not hesitate to contact me for further questions/concerns. José Miguel Tavera MD 
Cardiac Electrophysiology / Cardiology [address] [place] [address], [address] 
[phone] / [phone] Fax  [phone] / [phone] Fax Greater than 25 minutes was spent in direct video patient care, planning and chart review. This visit was conducted using [employer] telemedicine services.

## 2020-07-15 NOTE — TELEPHONE ENCOUNTER
Third unsuccessful attempt to reach pt re shock, Dr. Mamadou Childs notified. Left message for pt to return call.

## 2020-07-17 NOTE — PROGRESS NOTES
Pt returned call. Pt c/o palpitations and lightheadedness before shocks occurred. Dr. Jer Rodarte notified.

## 2020-07-17 NOTE — PROGRESS NOTES
Visit Vitals  /62 (BP 1 Location: Left arm, BP Patient Position: Sitting)   Pulse (!) 56   Ht 5' 5\" (1.651 m)   Wt 137 lb 6.4 oz (62.3 kg)   SpO2 96%   BMI 22.86 kg/m²         Hard to hear bp    INR 2.4    Chest pain: no  Shortness of breath: no  Edema: no  Palpitations: yes, thinks it getting worse. Per patient her ICD went off twice a week.    Dizziness: no, yes lightheadedness    New diagnosis/Surgeries: no    ER/Hospitalizations: no

## 2020-07-17 NOTE — PROGRESS NOTES
HISTORY OF PRESENTING ILLNESS      Erick Lloyd is a 60 y.o. female with hypertension, non-ischemic CM, LVEF 25-30%, ESRD on peritoneal dialysis, NYHA class II-III, SVT who had recurrent SVT including a tachycardia treated as VT by her ICD despite similar shock electrograms to sinus rhythm. 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 She had recurrence of tachycardia however this occurred while dealing with severe diarrhea/gastroenteritis. She presents now after sustaining several recurrent ICD shocks. Unclear whether the underlying mechanism is atrial or ventricular in origin based on FF electrograms. She is on coreg 12.5 mg bid but her SBP of 100 mm Hg precludes escalation of dosing.         PAST MEDICAL HISTORY     Past Medical History:   Diagnosis Date    Anemia associated with chronic renal failure     Anuria 2014    Arrhythmia     Paroxysmal a fib, paroxsymal atrial tach, SVT    Chronic kidney disease     ESRD- on PD    Chronic systolic heart failure ([facility] 10/31/2012    Chronic tachycardia     GERD (gastroesophageal reflux disease)     Hx of non-ST elevation myocardial infarction (NSTEMI) 2011    Hypertension     Lipoma of right lower extremity 1980    Foot    Peritoneal dialysis catheter in place [facility]) 2014    Nightime exchanges    Polycystic kidney disease 2014           PAST SURGICAL HISTORY     Past Surgical History:   Procedure Laterality Date    HX HEART CATHETERIZATION  12/2015    no interventions    HX PACEMAKER  12/29/2015    ICD    IR INSERT NON TUNL CVC OVER 5 YRS  3/13/2019    [place] EPHYS EVAL W/ABLATION SUPRAVENT ARRHYTHMIA N/A 10/1/2019    [facility] C[facility]    [place] INTRACARDIAC ELECTROPHYSIOLOGIC 3D MAPPING N/A 10/1/2019    Ep 3d Mapping performed by Pretty Lewis MD at [facility] CATH LAB    STIM/PACING HEART POST IV DRUG INFU N/A 10/1/2019    Drug Stimulation performed by Heike Parra MD at [address], [facility] [address] [facility] [place] [facility]          ALLERGIES     Allergies   Allergen Reactions    Iodinated Contrast Media Angioedema     Cellulitis on side of face after test          FAMILY HISTORY     Family History   Problem Relation Age of Onset    Diabetes Brother     Hypertension Brother     Hypertension Mother    [place]anna[place] Arthritis-osteo Mother     Hypertension Sister     Diabetes Sister     Kidney Disease Father         polycystic    Parkinson's Disease Father     Heart Disease Father     Hypertension Father     Hypertension Brother     negative for cardiac disease       SOCIAL HISTORY     Social History     Socioeconomic History    Marital status: SINGLE     Spouse name: Not on file    Number of children: Not on file    Years of education: Not on file    Highest education level: Not on file   Tobacco Use    Smoking status: Never Smoker    Smokeless tobacco: Never Used   Substance and Sexual Activity    Alcohol use: Yes     Comment: rare    Drug use: No    Sexual activity: Not Currently     Partners: Male         MEDICATIONS     Current Outpatient Medications   Medication Sig    warfarin (COUMADIN) 3 mg tablet TAKE 1 TABLET BY MOUTH EVERY DAY EXCEPT TAKE 2 TABLETS( 6 MG) ON TUESDAY AND THURDAY    potassium chloride (K-DUR, KLOR-CON) 20 mEq tablet TAKE 2 TABLETS BY MOUTH DAILY    carvediloL (COREG) 25 mg tablet TAKE ONE-HALF TABLET BY MOUTH TWICE DAILY WITH MEALS    aspirin delayed-release 81 mg tablet Take 81 mg by mouth daily.  omeprazole (PRILOSEC) 20 mg capsule Take 20 mg by mouth daily as needed (indigestion).  SENNA-DOCUSATE SODIUM PO Take 1 Tab by mouth daily as needed (constipation).  cinacalcet (SENSIPAR) 30 mg tablet Take 30 mg by mouth daily (with dinner).  ferric citrate (AURYXIA) 210 mg iron tablet Take 210 mg by mouth three (3) times daily (with meals).  gentamicin (GARAMYCIN) 0.1 % topical cream Apply  to affected area nightly.      No current facility-administered medications for this visit. I have reviewed the nurses notes, vitals, problem list, allergy list, medical history, family, social history and medications. REVIEW OF SYMPTOMS      General: Pt denies excessive weight gain or loss. Pt is able to conduct ADL's  HEENT: Denies blurred vision, headaches, hearing loss, epistaxis and difficulty swallowing. Respiratory: Denies cough, congestion, shortness of breath, HENDERSON, wheezing or stridor. Cardiovascular: Denies precordial pain, palpitations, edema or PND  Gastrointestinal: Denies poor appetite, indigestion, abdominal pain or blood in stool  Genitourinary: Denies hematuria, dysuria, increased urinary frequency  Musculoskeletal: Denies joint pain or swelling from muscles or joints  Neurologic: Denies tremor, paresthesias, headache, or sensory motor disturbance  Psychiatric: Denies confusion, insomnia, depression  Integumentray: Denies rash, itching or ulcers. Hematologic: Denies easy bruising, bleeding       PHYSICAL EXAMINATION      Vitals: see vitals section  General: Well developed, in no acute distress. HEENT: No jaundice, oral mucosa moist, no oral ulcers  Neck: Supple, no stiffness, no lymphadenopathy, supple  Heart:  Normal S1/S2 negative S3 or S4. Regular, no murmur, gallop or rub, no jugular venous distention  Respiratory: Clear bilaterally x 4, no wheezing or rales  Abdomen:   Soft, non-tender, bowel sounds are active. Extremities:  No edema, normal cap refill, no cyanosis. Musculoskeletal: No clubbing, no deformities  Neuro: A&Ox3, speech clear, gait stable, cooperative, no focal neurologic deficits  Skin: Skin color is normal. No rashes or lesions.  Non diaphoretic, moist.  Vascular: 2+ pulses symmetric in all extremities       DIAGNOSTIC DATA      EKG:        LABORATORY DATA      Lab Results   Component Value Date/Time    WBC 10.9 10/01/2019 02:34 PM    Hemoglobin (POC) 9.5 (L) 10/18/2013 03:35 PM    HGB 12.4 10/01/2019 02:34 PM    Hematocrit (POC) 28 (L) 10/18/2013 03:35 PM    HCT 40.7 10/01/2019 02:34 PM    PLATELET 787 66/33/7658 02:34 PM    .4 (H) 10/01/2019 02:34 PM      Lab Results   Component Value Date/Time    Sodium 141 09/24/2019 11:54 AM    Potassium 4.2 09/24/2019 11:54 AM    Chloride 96 09/24/2019 11:54 AM    CO2 23 09/24/2019 11:54 AM    Anion gap 9 03/19/2019 03:40 AM    Glucose 90 09/24/2019 11:54 AM    BUN 56 (H) 09/24/2019 11:54 AM    Creatinine 13.69 (H) 09/24/2019 11:54 AM    BUN/Creatinine ratio 4 (L) 09/24/2019 11:54 AM    GFR est AA 3 (L) 09/24/2019 11:54 AM    GFR est non-AA 3 (L) 09/24/2019 11:54 AM    Calcium 9.6 09/24/2019 11:54 AM    Bilirubin, total 1.7 (H) 03/11/2019 04:42 PM    Alk. phosphatase 82 03/11/2019 04:42 PM    Protein, total 7.4 03/11/2019 04:42 PM    Albumin 1.8 (L) 03/15/2019 04:19 AM    Globulin 4.6 (H) 03/11/2019 04:42 PM    A-G Ratio 0.6 (L) 03/11/2019 04:42 PM    ALT (SGPT) 37 03/11/2019 04:42 PM           ASSESSMENT      1. Cardiomyopathy              A. Non ischemic                B. LV systolic              C. NYHA class III              D. Narrow QRS  2. Hypertension  3. End stage renal disease  4. Supraventricular tachycardia              A. Slow pathway ablation  5. ICD    A. [employer]  6. Atrial fibrillation  7. Ventricular tachycardia         PLAN     Plan for EPS to evaluate for atrial vs ventricular arrhythmia at [facility]. Plan for procedure with MAC anesthesia. Hold anticoagulation 2 days prior. Will need ICE available for possible [employer]ART[employer] Sound. Hold coreg 2 days prior. ICD-10-CM ICD-9-CM    1. Cardiomyopathy, nonischemic (HCC)  I42.8 425.4    2. Essential hypertension, benign  I10 401.1    3. Chronic systolic heart failure (HCC)  I50.22 428.22    4. ESRD on peritoneal dialysis ([facility] [address]  N18.6 585.6     Z99.2 V45.11    5. Polycystic kidney disease  Q61.3 753.12    6. Paroxysmal atrial tachycardia (HCC)  I47.1 427.0    7.  SVT (supraventricular tachycardia) ([facility]  I47.1 427.89    8. Cardiac defibrillator in situ  Z95.810 V45.02      No orders of the defined types were placed in this encounter. FOLLOW-UP     Post procedure      Thank you, Miguel Angel Anaya MD and Dr. Mauricio Del Toro for allowing me to participate in the care of this extraordinarily pleasant female. Please do not hesitate to contact me for further questions/concerns.          Jaron Bray MD  Cardiac Electrophysiology / Cardiology    [address]  [address] [place] [address], [address] [place] [address]  [phone] / [phone] Fax   [phone] / [phone] Fax

## 2020-07-17 NOTE — Clinical Note
Please schedule for VT ablation at [facility] [place].  Will need to be an AM case where she is the only case in the morninig please

## 2020-07-22 NOTE — PROGRESS NOTES
[address]  [address],  [address]  [place], [address] [facility]    Office [phone]  Fax [phone]  Cell [phone]       Nora Judge is a 60 y.o. female. Last seen by me 6/27/19. DIAGNOSES  Encounter Diagnoses     ICD-10-CM ICD-9-CM   1. Systolic CHF, chronic (HCC)  I50.22 428.22     428.0   2. Paroxysmal atrial fibrillation (HCC)  I48.0 427.31   3. NICM (nonischemic cardiomyopathy) ([facility])  I42.8 425.4   4. SVT (supraventricular tachycardia) ([facility])  I47.1 427.89   5. ESRD on peritoneal dialysis ([facility] [address]  N18.6 585.6    Z99.2 V45.11   6. ICD (implantable cardioverter-defibrillator) in place  Z95.810 V45.02   7. Chronic anticoagulation  Z79.01 V58.61   8. ICD (implantable cardioverter-defibrillator) discharge  Z45.02 V71.89       ASSESSMENT/PLAN    NICM with severe LV dysfunction, HFrEF class 2-3. Last echo March 2019 w/ EF 20-25%. Her volume is regulated by PD. Optimal medical therapy limited by low BP. Her BP has been low for the past 3 days w/ intermittent dizziness. Her last dose of Coreg was last night.   -Hold Coreg for the next 3 doses. Resume at 0.56 mg BID if systolic BP greater than 750  -No ACEi/ARB due to low BP  -Update echo near future     Recurrent ICD shocks of atrial vs. ventriclar origin. S/p SVT ablation Oct. 2019. EPS being scheduled by Dr. Sandi Booker. She may require amiodarone in the interim if she needs to completely stop Coreg. Will discuss with Dr Sandi Booker    PAF no sxs recurrence. - Continue Warfarin monitored by our Coumadin clinic- target INR 2-3)  - S/p ICD for primary prevention. Continue regular ICD checks    ESRD on PD. She has a hx of polycystic kidney disease. I will f/u with her by phone tmw to review her sxs status. Follow-up and Dispositions           HPI    She underwent AVNRT ablation Oct. 2019 by Dr. Sandi Booker. She now has recurrent ICD shocks x 2. It is unclear whether this was atrial or ventricular in origin.  EPS is being scheduled at [facility] to sort out. Nora Judge reports recurrent ICD shocks (twice) and lightheadedness over the last few weeks when she gets up after sitting down. She reports HENDERSON w/ mild-moderate activity such as walking from the parking lot in the heat. Patient denies any exertional chest pain, palpitations, syncope, edema or paroxysmal nocturnal dyspnea. She reports a stable pattern of low BPs at home w/ some mild nausea yesterday. She has not had any bleeding on Warfarin. Her INR is followed by Coumadin clinic. Of note, she works as an . Cardiac testing  ECHO: 10/14/12: EF 45% w/ posterior HK Grade 1 DD, LAE, mild MR, mild-mod TR RVSP 60 mmHG   Cath: 10/16/12: Normal cors. No AVG. Mild pulm HTN (43/16/26). Low-normal filling pressures. Echo 10/19/13 - EF 25- 30%. Severe diffuse hypokinesis. Mildly increased wall thickness. Mild concentric hypertrophy. Doppler parameters were consistent with a reversible restrictive pattern, indicative of decreased left ventricular  diastolic compliance and/or increased left atrial pressure (grade 3 diastolic dysfunction). LA mildly dilated, mild MR, mild TR, mod PA HTN, small pericardial effusion circumferential to the heart, no evidence of hemodynamic compromise. Echo 11/2/15 - EF 25-30%, global HK, mild MR  Lexiscan cardiolite 11/24/15 - focal anteroapical ischemia    Cath 12/15/2015 - EDP 10, LV dilated, EF 20% global HK, normal cors with right dominance. Unable to cannulate femoral vein. 12/29/15-implantation of a single-chamber [address] per Dr. Sandi Booker    Echo 1/30/17 - EF 20 %. Severe diffuse hypokinesis. Mild LAE. Mild MR. Mild Pulm HTN. Small pericardial effusion. No significant change when compared to study 02-Nov-2015. Echo 1/26/18- LVEF 28%, severe LAE, PA systolic 50 mmHg    LHC / RHC 6/4/2018:  No sig CAD, RA 10, RV 39/5, W 23, PA 35/20/29, PA Sat 47.1%, Ao Sat 83.2%, /7/24 Ao 119/79/90, CO F 3.94, CI F 2.4, CO T 2.75, CI T 1.68    Echo 3/12/19 - LVEF 21-25%    10/1/2019:Typical AVNRT ablation performed, Atrial tachycardia observed that could not be sustained for mapping per Dr. Kavya Wren      Current Outpatient Medications on File Prior to Visit   Medication Sig Dispense Refill    warfarin (COUMADIN) 3 mg tablet TAKE 1 TABLET BY MOUTH EVERY DAY EXCEPT TAKE 2 TABLETS( 6 MG) ON TUESDAY AND THURDAY 45 Tab 0    potassium chloride (K-DUR, KLOR-CON) 20 mEq tablet TAKE 2 TABLETS BY MOUTH DAILY 60 Tab 0    aspirin delayed-release 81 mg tablet Take 81 mg by mouth daily.  omeprazole (PRILOSEC) 20 mg capsule Take 20 mg by mouth daily as needed (indigestion).  SENNA-DOCUSATE SODIUM PO Take 1 Tab by mouth daily as needed (constipation).  cinacalcet (SENSIPAR) 30 mg tablet Take 30 mg by mouth daily (with dinner).  ferric citrate (AURYXIA) 210 mg iron tablet Take 210 mg by mouth three (3) times daily (with meals).  gentamicin (GARAMYCIN) 0.1 % topical cream Apply  to affected area nightly. No current facility-administered medications on file prior to visit.         Social History     Socioeconomic History    Marital status: SINGLE     Spouse name: Not on file    Number of children: Not on file    Years of education: Not on file    Highest education level: Not on file   Occupational History    Not on file   Social Needs    Financial resource strain: Not on file    Food insecurity     Worry: Not on file     Inability: Not on file    Transportation needs     Medical: Not on file     Non-medical: Not on file   Tobacco Use    Smoking status: Never Smoker    Smokeless tobacco: Never Used   Substance and Sexual Activity    Alcohol use: Yes     Comment: rare    Drug use: No    Sexual activity: Not Currently     Partners: Male   Lifestyle    Physical activity     Days per week: Not on file     Minutes per session: Not on file    Stress: Not on file   Relationships    Social connections Talks on phone: Not on file     Gets together: Not on file     Attends [employer] service: Not on file     Active member of club or organization: Not on file     Attends meetings of clubs or organizations: Not on file     Relationship status: Not on file    Intimate partner violence     Fear of current or ex partner: Not on file     Emotionally abused: Not on file     Physically abused: Not on file     Forced sexual activity: Not on file   Other Topics Concern    Not on file   Social History Narrative    Not on file     Review of Systems  Constitutional:  Negative for fever, chills and diaphoresis. Respiratory: Negative for hemoptysis. +HENDERSON  Cardiovascular:  Negative for chest pain and claudication. Gastrointestinal:  Negative for blood in stool and melena. Genitourinary:  Negative for dysuria, urgency and flank pain. Musculoskeletal:  Negative for back pain and joint pain. Skin:  Negative for rash. Neurological:  Negative for dizziness, weakness, seizures, loss of consciousness and headaches. Endo/Heme/Allergies:  Does not bruise/bleed easily. Psychiatric/Behavioral:  Negative for memory loss. The patient does not have insomnia. Visit Vitals  BP (!) 70/30 (BP 1 Location: Left arm, BP Patient Position: Sitting)   Pulse 75   Ht 5' 5\" (1.651 m)   Wt 134 lb (60.8 kg)   SpO2 94%   BMI 22.30 kg/m²     Wt Readings from Last 3 Encounters:   07/23/20 134 lb (60.8 kg)   07/17/20 137 lb 6.4 oz (62.3 kg)   10/30/19 134 lb (60.8 kg)     Physical Exam  Vitals reviewed. Constitutional:  She is oriented to person, place and time. She appears well-nourished. HENT:  Head:  Normocephalic. Neck:  Neck supple. Normal JVP  Cardiovascular:  Normal rate, regular rhythm, normal heart sounds and intact distal pulses. Exam reveals no gallop. No murmur heard. Pulmonary/Chest:  A few crackles at the right base.  Effort normal.   Abdominal:  abd soft  Bowel sounds normal.  Musculoskeletal:  no ankle edema bilaterally. Neurological:  Alert and oriented to person, place and time. Skin:  Skin is warm and dry. Psychiatric:  She has a normal mood and affect. Cardiographics  EKG 4/15/2016 - atrial tachycardia 120   Device interrogation 9/15/17 - No device therapies. No VT (since 2/2017). EKG 9/15/17 - SB, first degree AV block    EKG 9/18/18 - AF vs. Atrial tach/, occ. PVC's  Echo 3/12/19 - LVEF 21-25%  EKG 3/28/19 - SR 79, first degree AVB, , incomplete LBBB  EKG 7/23/20- SR, , LBBB, isolated PVC      Written by Tanmay Mills, as dictated by Ann-Marie Dupont M.D.      Ann-Marie Dupont MD

## 2020-07-23 NOTE — PATIENT INSTRUCTIONS
- Once your blood pressure has raised and you have called us, please start Coreg 6.25 mg twice daily.

## 2020-07-23 NOTE — LETTER
7/23/20 Patient: Tanya Perez YOB: 1960 Date of Visit: 7/23/2020 Slick Taylor MD 
[address] [facility] [address] [address] VIA Facsimile: [phone] Dear Slick Taylor MD, Thank you for referring Ms. Miya Modi to [facility] for evaluation. My notes for this consultation are attached. If you have questions, please do not hesitate to call me. I look forward to following your patient along with you. Sincerely, Elda Guan MD

## 2020-07-23 NOTE — PROGRESS NOTES
Jailene Barton is a 60 y.o. female    Visit Vitals  BP (!) 70/30 (BP 1 Location: Left arm, BP Patient Position: Sitting)   Pulse 75   Ht 5' 5\" (1.651 m)   Wt 134 lb (60.8 kg)   SpO2 94%   BMI 22.30 kg/m²       Chief Complaint   Patient presents with    Cardiomyopathy    Hypertension    CHF    Irregular Heart Beat     afib       Chest pain no  SOB yes  Dizziness yes    Recent hospital visit no  Refills yes,  Carvedilol needs refill

## 2020-07-24 NOTE — TELEPHONE ENCOUNTER
Patient is calling to report her blood pressure for last night and this morning. Last night's was 78/56 HR 88 and this morning's was 88/61 HR 74. Please advise.     Phone #: [phone]  Thanks

## 2020-07-24 NOTE — TELEPHONE ENCOUNTER
No Coreg today, resume tomorrow at 2.41 mg bid if systolic BP at least 90.  Awaiting scheduling of EPS by Dr Karma Flowers

## 2020-07-24 NOTE — TELEPHONE ENCOUNTER
PTIDX2 verified BP's and HR per PSR. Patient stated feeling better today, less cramping and lightheadedness.       advise

## 2020-07-29 NOTE — LETTER
7/29/2020 4:34 PM 
 
Ms. Rumalda Hatchet [address] [facility] [phone] You are scheduled for a Ventricular Tachycardia Ablation at [address] on Thursday, September 10, 2020. Please arrive by 08:00 am and park in the Cell Phone Lot located on the [employer] enclosed map) and call Hospital Registration at [phone]. A registration associate will tell you when you can proceed to the main entrance of the hospital. 
 
Do not eat or drink anything after midnight the night before your procedure. You will need a . You may stay overnight, please be prepared to stay if necessary. You may take your normal medications with a sip of water the morning of your procedure except for the following:  
Hold Carvedilol (Coreg) beginning on Tuesday, September 8, 2020. Blood thinner instructions: Hold Warfarin (Coumadin) beginning on Tuesday, September 8, 2020. [facility] is requesting that you have COVID-19 testing 3-4 days prior to your procedure. Enclosed are a list of [address] Testing Locations which offer drive-up [id number] testing. As a patient scheduled for a procedure with Dr. Mamadou Childs, this test will be free of charge if performed at one of these locations. No appointment is necessary. Please have your COVID-19 test performed on Sunday, September 6, 2020. Lab instructions: Please have labs drawn 3-7 days prior to scheduled procedure at any [facility] location. Fasting is not required. If not done at a [facility] location, have labs drawn 5-7 days prior. Have results faxed to [phone]. Please call our office if you have any questions at [phone]. Please call the office if you develop any type of illness prior to your procedure. Sincerely, MD Jodi Dixon, RN

## 2020-08-26 NOTE — PROGRESS NOTES
Received: 5 days ago   Message Contents   MD Carole Timmons, ERYN; Vanesa Johansen, MICHAEL; Chelsea Atkins               For 9/10/20, Pierre Dye can be removed from the schedulde (she underwent ablation while hospitalized at [place] [address]). Please keep that day limited to just the one case in the AM for now.       Notified [facility] EP lab to cancel procedure per Dr. Amanda Collins request.

## 2020-09-09 NOTE — PROGRESS NOTES
VIRTUAL VISIT DOCUMENTATION Pursuant to the emergency declaration under the [facility], [facility] waiver authority and the [employer] Act, this Virtual  Visit was conducted, with patient's consent, to reduce the patient's risk of exposure to COVID-19 and provide continuity of care for an established patient. Services were provided through a video synchronous discussion virtually to substitute for in-person clinic visit. CHIEF COMPLAINT Murali De is a 60 y.o. female who was seen by synchronous (real-time) audio-video technology on 9/10/2020. Last seen by me 2 months ago in clinic. ASSESSMENT  
  
  ICD-10-CM ICD-9-CM 1. NICM (nonischemic cardiomyopathy) ([facility])  I42.8 425.4 2. Systolic CHF, chronic (HCC)  I50.22 428.22   
  428.0 3. Paroxysmal atrial fibrillation (HCC)  I48.0 427.31   
4. Essential hypertension, benign  I10 401.1 5. SVT (supraventricular tachycardia) ([facility])  I47.1 427.89   
6. ICD (implantable cardioverter-defibrillator) in place  Z95.810 V45.02   
7. Chronic anticoagulation  Z79.01 V58.61   
8. ESRD (end stage renal disease) ([facility]  N18.6 585.6 PLAN Recent hospitalization at [address] [place] [address] w/ peritonitis, subsequent AF ablation by EP. No details available. She is feeing better overall. Will arrange for expedited EP f/u with Dr. Elliot Green and have their team access her records from [address] [place] [address]. NICM with severe LV dysfunction, HFrEF class 2-3. Last echo March 2019 w/ EF 20-25%. Her volume is regulated by PD. Optimal medical therapy limited by low BP.  
- Continue Coreg 6.25 BID 
- No ACEi/ARB due to low BP 
- Track down recent echo from [address] [place] [address] 
  
ESRD on PD. She has a hx of polycystic kidney disease. We discussed the expected course, resolution and complications of the diagnosis(es) in detail.   Medication risks, benefits, costs, interactions, and alternatives were discussed as indicated. I advised her to contact the office if her condition worsens, changes or fails to improve as anticipated. She expressed understanding with the diagnosis(es) and plan HISTORY OF PRESENTING ILLNESS Sunil Dorman is a 60 y.o. female reports feeling better since her AF ablation w/ no recurrent ICD shocks. She reports a stable pattern of HENDERSON w/ moderate activity such as going up and down the stairs. Patient denies any exertional chest pain, syncope, edema or paroxysmal nocturnal dyspnea.  
  
She reports a recent 3 week hospitalization at [address] [place] [address] at the end of August w/ peritonitis. While she was in the hospital, she underwent AF ablation her second week of hospitalization by a cardiologist there- unclear who. She states that she still takes amiodarone.  
  
She has not had any bleeding on Warfarin. Her INR is followed by Coumadin clinic.  
  
Of note, she works as an . ACTIVE PROBLEM LIST Patient Active Problem List  
 Diagnosis Date Noted  Chronic anticoagulation 03/28/2019  
 SVT (supraventricular tachycardia) ([address] 03/11/2019  GERD (gastroesophageal reflux disease) 03/11/2019  Systolic CHF, chronic ([address] 08/25/2018  Paroxysmal atrial fibrillation ([address] 07/31/2018  ICD (implantable cardioverter-defibrillator) in place 01/26/2018  Paroxysmal atrial tachycardia ([address] 04/17/2016  ESRD (end stage renal disease) ([address] 12/11/2015  Anemia 01/20/2015  NICM (nonischemic cardiomyopathy) ([address] 10/19/2013  Polycystic kidney disease 10/18/2012  Essential hypertension, benign 10/18/2012 PAST MEDICAL HISTORY Past Medical History:  
Diagnosis Date  Anemia associated with chronic renal failure  Anuria 2014  Arrhythmia Paroxysmal a fib, paroxsymal atrial tach, SVT  Chronic kidney disease ESRD- on PD  Chronic systolic heart failure ([address] 10/31/2012  Chronic tachycardia  GERD (gastroesophageal reflux disease)  Hx of non-ST elevation myocardial infarction (NSTEMI) 2011  Hypertension  Lipoma of right lower extremity 1980 Foot  Peritoneal dialysis catheter in place [facility]) 2014 [employer] exchanges  Polycystic kidney disease 2014 PAST SURGICAL HISTORY Past Surgical History:  
Procedure Laterality Date  HX HEART CATHETERIZATION  12/2015  
 no interventions  HX PACEMAKER  12/29/2015 ICD  IR INSERT NON TUNL CVC OVER 5 YRS  3/13/2019  [place] EPHYS EVAL W/ABLATION SUPRAVENT ARRHYTHMIA N/A 10/1/2019 ABLATION SVT [facility] CATH LAB  [place] INTRACARDIAC ELECTROPHYSIOLOGIC 3D MAPPING N/A 10/1/2019 Ep 3d Mapping performed by Piyush Olivas MD at [facility] CATH LAB  STIM/PACING HEART POST IV DRUG INFU N/A 10/1/2019 Drug Stimulation performed [facility] [address] CATH LAB ALLERGIES Allergies Allergen Reactions  Iodinated Contrast Media Angioedema Cellulitis on side of face after test  
  
 
 
FAMILY HISTORY Family History Problem Relation Age of Onset  Diabetes Brother  Hypertension Brother  Hypertension Mother [facility] Arthritis-osteo Mother  Hypertension Sister  Diabetes Sister  Kidney Disease Father   
     polycystic  Parkinson's Disease Father  Heart Disease Father  Hypertension Father  Hypertension Brother   
 negative for cardiac disease SOCIAL HISTORY Social History Socioeconomic History  Marital status: SINGLE Spouse name: Not on file  Number of children: Not on file  Years of education: Not on file  Highest education level: Not on file Tobacco Use  Smoking status: Never Smoker  Smokeless tobacco: Never Used Substance and Sexual Activity  Alcohol use: Yes Comment: rare  Drug use: No  
 Sexual activity: Not Currently Partners: Male MEDICATIONS Current Outpatient Medications Medication Sig  
 amiodarone (CORDARONE) 200 mg tablet Take 400 mg by mouth two (2) times a day. For 14 days  fludrocortisone (FLORINEF) 0.1 mg tablet Take 0.1 mg by mouth daily.  warfarin (COUMADIN) 3 mg tablet TAKE 1 TABLET BY MOUTH EVERY DAY EXCEPT TAKE 2 TABLETS( 6 MG) ON TUESDAY AND THURDAY  potassium chloride (K-DUR, KLOR-CON) 20 mEq tablet TAKE 2 TABLETS BY MOUTH DAILY  aspirin delayed-release 81 mg tablet Take 81 mg by mouth daily.  omeprazole (PRILOSEC) 20 mg capsule Take 20 mg by mouth daily as needed (indigestion).  SENNA-DOCUSATE SODIUM PO Take 1 Tab by mouth daily as needed (constipation).  cinacalcet (SENSIPAR) 30 mg tablet Take 30 mg by mouth daily (with dinner).  ferric citrate (AURYXIA) 210 mg iron tablet Take 210 mg by mouth three (3) times daily (with meals).  gentamicin (GARAMYCIN) 0.1 % topical cream Apply  to affected area nightly.  carvediloL (COREG) 6.25 mg tablet TAKE ONE TABLET BY MOUTH TWICE DAILY WITH MEALS No current facility-administered medications for this visit. I have reviewed the nurses notes, vitals, problem list, allergy list, medical history, family, social history and medications. REVIEW OF SYMPTOMS General: Pt denies excessive weight gain or loss. Pt is able to conduct ADL's HEENT: Denies blurred vision, headaches, hearing loss, epistaxis and difficulty swallowing. Respiratory: Denies cough, congestion, shortness of breath, wheezing or stridor. +HENDERSON, 
Cardiovascular: Denies precordial pain, palpitations, edema or PND Gastrointestinal: Denies poor appetite, indigestion, abdominal pain or blood in stool Genitourinary: Denies hematuria, dysuria, increased urinary frequency Musculoskeletal: Denies joint pain or swelling from muscles or joints Neurologic: Denies tremor, paresthesias, headache, or sensory motor disturbance Psychiatric: Denies confusion, insomnia, depression Integumentray: Denies rash, itching or ulcers. Hematologic: Denies easy bruising, bleeding PHYSICAL EXAMINATION Due to this being a TeleHealth evaluation, many elements of the physical examination are unable to be assessed. General: Well developed, in no acute distress, cooperative and alert HEENT: Pupils equal/round. No marked JVD visible on video. Respiratory: No audible wheezing, no signs of respiratory distress, lips non cyanotic Extremities:  No edema Neuro: A&Ox3, speech clear, no facial droop, answering questions appropriately Skin: Skin color is normal. No rashes or lesions. Non diaphoretic on visible skin during exam 
 
 
 DIAGNOSTIC DATA  
  
ECHO: 10/14/12: EF 45% w/ posterior HK Grade 1 DD, LAE, mild MR, mild-mod TR RVSP 60 mmHG Cath: 10/16/12: Normal cors. No AVG. Mild pulm HTN (43/16/26). Low-normal filling pressures. Echo 10/19/13 - EF 25- 30%. Severe diffuse hypokinesis. Mildly increased wall thickness. Mild concentric hypertrophy. Doppler parameters were consistent with a reversible restrictive pattern, indicative of decreased left ventricular 
diastolic compliance and/or increased left atrial pressure (grade 3 diastolic dysfunction). LA mildly dilated, mild MR, mild TR, mod PA HTN, small pericardial effusion circumferential to the heart, no evidence of hemodynamic compromise. Echo 11/2/15 - EF 25-30%, global HK, mild MR Lexiscan cardiolite 11/24/15 - focal anteroapical ischemia Cath 12/15/2015 - EDP 10, LV dilated, EF 20% global HK, normal cors with right dominance. Unable to cannulate femoral vein. 12/29/15-implantation of a single-chamber [address] per Dr. Mamadou Childs Echo 1/30/17 - EF 20 %. Severe diffuse hypokinesis. Mild LAE. Mild MR. Mild Pulm HTN. Small pericardial effusion. No significant change when compared to study 02-Nov-2015. Echo 1/26/18- LVEF 28%, severe LAE, PA systolic 50 mmHg [facility] / [address] 6/4/2018: No sig CAD, RA 10, RV 39/5, W 23, PA 35/20/29, PA Sat 47.1%, Ao Sat 83.2%, /7/24 Ao 119/79/90, CO F 3.94, CI F 2.4, CO T 2.75, CI T 1.68 Echo 3/12/19 - LVEF 21-25% 10/1/2019:Typical AVNRT ablation performed, Atrial tachycardia observed that could not be sustained for mapping per Dr. Shaista John LABORATORY DATA Lab Results Component Value Date/Time WBC 10.9 10/01/2019 02:34 PM  
 Hemoglobin (POC) 9.5 (L) 10/18/2013 03:35 PM  
 HGB 12.4 10/01/2019 02:34 PM  
 Hematocrit (POC) 28 (L) 10/18/2013 03:35 PM  
 HCT 40.7 10/01/2019 02:34 PM  
 PLATELET 479 32/44/0415 02:34 PM  
 .4 (H) 10/01/2019 02:34 PM  
  
Lab Results Component Value Date/Time Sodium 141 09/24/2019 11:54 AM  
 Potassium 4.2 09/24/2019 11:54 AM  
 Chloride 96 09/24/2019 11:54 AM  
 CO2 23 09/24/2019 11:54 AM  
 Anion gap 9 03/19/2019 03:40 AM  
 Glucose 90 09/24/2019 11:54 AM  
 BUN 56 (H) 09/24/2019 11:54 AM  
 Creatinine 13.69 (H) 09/24/2019 11:54 AM  
 BUN/Creatinine ratio 4 (L) 09/24/2019 11:54 AM  
 GFR est AA 3 (L) 09/24/2019 11:54 AM  
 GFR est non-AA 3 (L) 09/24/2019 11:54 AM  
 Calcium 9.6 09/24/2019 11:54 AM  
 Bilirubin, total 1.7 (H) 03/11/2019 04:42 PM  
 Alk. phosphatase 82 03/11/2019 04:42 PM  
 Protein, total 7.4 03/11/2019 04:42 PM  
 Albumin 1.8 (L) 03/15/2019 04:19 AM  
 Globulin 4.6 (H) 03/11/2019 04:42 PM  
 A-G Ratio 0.6 (L) 03/11/2019 04:42 PM  
 ALT (SGPT) 37 03/11/2019 04:42 PM  
  
 
 
 
 FOLLOW-UP Patient was made aware and verbalized understanding that an appointment will be scheduled for them for a virtual visit and/or office visit within the above time frame. Patient understanding his/her responsibility to call and change time/date if he/she so chooses. Thank you, Kaushal Guajardo MD for allowing me to participate in the care of Thermocydney Course. Please do not hesitate to contact me for further questions/concerns. Written by Anabel Woods, as dictated by Kervin Gee M.D. [facility], [address] [place] [address], [address] Mitra Morley [address] [place] [address], [facility] 
[phone] / [phone] Fax  [phone] / [phone] Fax Greater than 20 minutes was spent in direct video patient care, planning and chart review. This visit was conducted using [employer] telemedicine services.

## 2020-09-10 NOTE — PROGRESS NOTES
Recent hospitalization at [facility] for 3 weeks for kidney infection. Requested records

## 2020-09-14 NOTE — TELEPHONE ENCOUNTER
----- Message from Nain Oseguera NP sent at 9/14/2020 11:27 AM EDT -----  Records from [facility] Admission - 8/30/20 to 9/2/20 with recurrent VT. They did not include a report for an Echo. They reference that her EF was 20-25%, but no actual report provided. There are rhythm strips from episodes of VT, which I will put on Dr. Leandro thakur for his upcoming evaluation.

## 2020-09-21 NOTE — TELEPHONE ENCOUNTER
Chikis Romero from [facility] is requesting to speak with the nurse regarding this mutual patient. Please advise.     Phone: [phone] ext [phone]

## 2020-09-21 NOTE — TELEPHONE ENCOUNTER
Cornelius Ward from [facility] stated patient complaining of dizziness.     Noted low BP for  recent hospitalization  Will call patient to follow up

## 2020-09-23 NOTE — TELEPHONE ENCOUNTER
Mirta Grace stated continues to have dizziness and will see  next week. Stated BP normal at this time.

## 2020-09-29 NOTE — PROGRESS NOTES
HISTORY OF PRESENTING ILLNESS Deondre Mathew is a 60 y.o. female with hypertension, non-ischemic CM, LVEF 25-30%, ESRD on peritoneal dialysis, NYHA class II-III, SVT who had recurrent SVT including a tachycardia treated as VT by her ICD despite similar shock electrograms to sinus rhythm. 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 She had VT subsequently and was hospitalized at [facility] with peritonitis where she had recurrent VT for which she underwent VT ablation. Interrogation demonstrates 2 episodes of NSVT lasting 17-20 seconds on 9/26/20. She reports occasional lightheadedness upon standing. PAST MEDICAL HISTORY Past Medical History:  
Diagnosis Date  Anemia associated with chronic renal failure  Anuria 2014  Arrhythmia Paroxysmal a fib, paroxsymal atrial tach, SVT  Chronic kidney disease ESRD- on PD  Chronic systolic heart failure ([facility] 10/31/2012  Chronic tachycardia  GERD (gastroesophageal reflux disease)  Hx of non-ST elevation myocardial infarction (NSTEMI) 2011  Hypertension  Lipoma of right lower extremity 1980 Foot  Peritoneal dialysis catheter in place [facility]) 2014 Nightime exchanges  Polycystic kidney disease 2014 PAST SURGICAL HISTORY Past Surgical History:  
Procedure Laterality Date  HX HEART CATHETERIZATION  12/2015  
 no interventions  HX PACEMAKER  12/29/2015 ICD  IR INSERT NON TUNL CVC OVER 5 YRS  3/13/2019  [place] EPHYS EVAL W/ABLATION SUPRAVENT ARRHYTHMIA N/A 10/1/2019 ABLATION SVT performed [facility] [place] CATH LAB  NH INTRACARDIAC ELECTROPHYSIOLOGIC 3D MAPPING N/A 10/1/2019 Ep 3d Mapping performed by iWlder Willis MD at [facility] [place] [address], [address] CATH LAB  STIM/PACING HEART POST IV DRUG INFU N/A 10/1/2019  Drug Stimulation performed by Wilder Willis MD at [facility] [place] [address] CATH LAB  
 
 
 
 ALLERGIES Allergies Allergen Reactions  Iodinated Contrast Media Angioedema Cellulitis on side of face after test  
  
 
 
FAMILY HISTORY Family History Problem Relation Age of Onset  Diabetes Brother  Hypertension Brother  Hypertension Mother Valri Haja Arthritis-osteo Mother  Hypertension Sister  Diabetes Sister  Kidney Disease Father   
     polycystic  Parkinson's Disease Father  Heart Disease Father  Hypertension Father  Hypertension Brother   
 negative for cardiac disease SOCIAL HISTORY Social History Socioeconomic History  Marital status: SINGLE Spouse name: Not on file  Number of children: Not on file  Years of education: Not on file  Highest education level: Not on file Tobacco Use  Smoking status: Never Smoker  Smokeless tobacco: Never Used Substance and Sexual Activity  Alcohol use: Not Currently Comment: rare  Drug use: No  
 Sexual activity: Not Currently Partners: Male MEDICATIONS Current Outpatient Medications Medication Sig  
 amiodarone (CORDARONE) 200 mg tablet Take 200 mg by mouth daily. For 14 days  fludrocortisone (FLORINEF) 0.1 mg tablet Take 0.1 mg by mouth daily.  warfarin (COUMADIN) 3 mg tablet TAKE 1 TABLET BY MOUTH EVERY DAY EXCEPT TAKE 2 TABLETS( 6 MG) ON TUESDAY AND THURDAY  potassium chloride (K-DUR, KLOR-CON) 20 mEq tablet TAKE 2 TABLETS BY MOUTH DAILY  aspirin delayed-release 81 mg tablet Take 81 mg by mouth daily.  omeprazole (PRILOSEC) 20 mg capsule Take 20 mg by mouth daily as needed (indigestion).  SENNA-DOCUSATE SODIUM PO Take 1 Tab by mouth daily as needed (constipation).  cinacalcet (SENSIPAR) 30 mg tablet Take 30 mg by mouth daily (with dinner).  ferric citrate (AURYXIA) 210 mg iron tablet Take 210 mg by mouth three (3) times daily (with meals).  gentamicin (GARAMYCIN) 0.1 % topical cream Apply  to affected area nightly.  carvediloL (COREG) 6.25 mg tablet TAKE ONE TABLET BY MOUTH TWICE DAILY WITH MEALS No current facility-administered medications for this visit. I have reviewed the nurses notes, vitals, problem list, allergy list, medical history, family, social history and medications. REVIEW OF SYMPTOMS General: Pt denies excessive weight gain or loss. Pt is able to conduct ADL's HEENT: Denies blurred vision, headaches, hearing loss, epistaxis and difficulty swallowing. Respiratory: Denies cough, congestion, shortness of breath, HENDERSON, wheezing or stridor. Cardiovascular: Denies precordial pain, palpitations, edema or PND Gastrointestinal: Denies poor appetite, indigestion, abdominal pain or blood in stool Genitourinary: Denies hematuria, dysuria, increased urinary frequency Musculoskeletal: Denies joint pain or swelling from muscles or joints Neurologic: Denies tremor, paresthesias, headache, or sensory motor disturbance Psychiatric: Denies confusion, insomnia, depression Integumentray: Denies rash, itching or ulcers. Hematologic: Denies easy bruising, bleeding PHYSICAL EXAMINATION Vitals: see vitals section General: Well developed, in no acute distress. HEENT: No jaundice, oral mucosa moist, no oral ulcers Neck: Supple, no stiffness, no lymphadenopathy, supple Heart:  Normal S1/S2 negative S3 or S4. Regular, no murmur, gallop or rub, no jugular venous distention Respiratory: Clear bilaterally x 4, no wheezing or rales Abdomen:   Soft, non-tender, bowel sounds are active. Extremities:  No edema, normal cap refill, no cyanosis. Musculoskeletal: No clubbing, no deformities Neuro: A&Ox3, speech clear, gait stable, cooperative, no focal neurologic deficits Skin: Skin color is normal. No rashes or lesions. Non diaphoretic, moist. 
Vascular: 2+ pulses symmetric in all extremities DIAGNOSTIC DATA EKG:  
 
 
 LABORATORY DATA Lab Results Component Value Date/Time WBC 10.9 10/01/2019 02:34 PM  
 Hemoglobin (POC) 9.5 (L) 10/18/2013 03:35 PM  
 HGB 12.4 10/01/2019 02:34 PM  
 Hematocrit (POC) 28 (L) 10/18/2013 03:35 PM  
 HCT 40.7 10/01/2019 02:34 PM  
 PLATELET 668 60/08/7479 02:34 PM  
 .4 (H) 10/01/2019 02:34 PM  
  
Lab Results Component Value Date/Time Sodium 141 09/24/2019 11:54 AM  
 Potassium 4.2 09/24/2019 11:54 AM  
 Chloride 96 09/24/2019 11:54 AM  
 CO2 23 09/24/2019 11:54 AM  
 Anion gap 9 03/19/2019 03:40 AM  
 Glucose 90 09/24/2019 11:54 AM  
 BUN 56 (H) 09/24/2019 11:54 AM  
 Creatinine 13.69 (H) 09/24/2019 11:54 AM  
 BUN/Creatinine ratio 4 (L) 09/24/2019 11:54 AM  
 GFR est AA 3 (L) 09/24/2019 11:54 AM  
 GFR est non-AA 3 (L) 09/24/2019 11:54 AM  
 Calcium 9.6 09/24/2019 11:54 AM  
 Bilirubin, total 1.7 (H) 03/11/2019 04:42 PM  
 Alk. phosphatase 82 03/11/2019 04:42 PM  
 Protein, total 7.4 03/11/2019 04:42 PM  
 Albumin 1.8 (L) 03/15/2019 04:19 AM  
 Globulin 4.6 (H) 03/11/2019 04:42 PM  
 A-G Ratio 0.6 (L) 03/11/2019 04:42 PM  
 ALT (SGPT) 37 03/11/2019 04:42 PM  
  
 
 
 ASSESSMENT 1. Cardiomyopathy A. Non ischemic B. LV systolic C. NYHA class III 
            D. Narrow QRS 2. Hypertension 3. End stage renal disease 4. Supraventricular tachycardia A. Slow pathway ablation 5. ICD A. [employer] 6. Atrial fibrillation 7. Ventricular tachycardia PLAN Patient will maintain a diary of when she experiences lightheadedness so that we may correlate whether these are occurring at times of her nonsustained ventricular tachycardia. Continue monitoring for progression of these episodes in which case may consider adjustment in her drug therapy. ICD-10-CM ICD-9-CM 1. NICM (nonischemic cardiomyopathy) ([facility])  I42.8 425.4 2. Systolic CHF, chronic ([facility])  I50.22 428.22   
  428.0 3.  Paroxysmal atrial fibrillation ([facility])  I48.0 427.31   
 4. Essential hypertension, benign  I10 401.1 5. SVT (supraventricular tachycardia) (HCC)  I47.1 427.89   
6. ICD (implantable cardioverter-defibrillator) in place  Z95.810 V45.02   
7. ESRD (end stage renal disease) ([facility]  N18.6 585.6 No orders of the defined types were placed in this encounter. FOLLOW-UP  
 
1  Month Thank you, aKrthikeyan Palma MD and Dr. Candy Leavitt  for allowing me to participate in the care of this extraordinarily pleasant female. Please do not hesitate to contact me for further questions/concerns. Jaron Marks MD 
Cardiac Electrophysiology / Cardiology [address] [place] [address], [address] 
[phone] / [phone] Fax   [phone] / [phone] Fax

## 2020-09-30 NOTE — PROGRESS NOTES
ROOM # 3 [facility] in August (records available) Random lightheadedness, dizziness and off balance 2-3 times per week, palpitations with the dizzness, SOB Extended / Orthostatic Vitals: 
Patient Position 2: Supine BP 2: 122/80 Pulse 2: 80 Patient Position 3: Standing BP 3: 124/80 Pulse 3: 80 Patient Position 4: Standing BP 4: 118/82 Pulse 4: 84

## 2020-10-28 NOTE — TELEPHONE ENCOUNTER
called to assess for pt. symptoms for multiple episodes of VT with ATP & failed ATP resulting in 1 shock. Pt. States she felt flushed/palpitations prior to shock but immediately felt better post ICD shock. Pt. Admits to missing Sunday's medicines and routinely misses potassium supplement. Orders place for labwork. Pt. Going to [facility] at [facility] as it's close to her work. Pt also admits to being under a lot of stress from having an elderly mother living with her (80 yrs old). Instructed pt. To report to emergency room should she receive 2 subsequent shocks as it could be life threatening.     She expresses understanding but admits to not preferring this as she usually ends up admitted to the hospital.

## 2020-10-29 NOTE — ED NOTES
Spoke to Sissy at [address] [place] [address] to have interrogation information sent via email.

## 2020-10-29 NOTE — ED NOTES
[employer] to have information sent via email, told to wait a few more minutes the fax had just gone through

## 2020-10-29 NOTE — ED NOTES
Spoke to Denise Ragsdale at [address] [place] [address] to have results from interrogation refaxed. Correct fax number verified.

## 2020-10-29 NOTE — ED NOTES
Spoke to ramon at [address] [place] [address] to have information from interrogation re faxed again

## 2020-10-29 NOTE — ED TRIAGE NOTES
Patients defibrillator went off twice today at 0830 and 1330. Her cardiologist said to go to the ER if it goes off twice in one day. Before it went off the patient had chest tightness and felt flushed. Denies any other symptoms at this time. EKG done prior to triage.

## 2020-10-29 NOTE — ED PROVIDER NOTES
78-year-old female with history of paroxysmal atrial fibrillation, SVT, CHF, HTN, HLD peritoneal dialysis, with defibrillator in place presents to the emergency department stating that on Monday and Tuesday she had some nausea and vomiting intermittently in the mornings that she states is not necessarily unusual for her but states that she was able to still eat and drink normally. She states that she followed up recently with her nephrologist and was found that her potassium was low and was recommended to take her oral potassium supplementation as directed. She states \"I am going to admit I usually take it probably like half of the time, usually in the morning but I oftentimes forget my evening dose. \"  She states that yesterday she felt her defibrillator shocked her once and today has had 2 shocks of her defibrillator, once this morning and once this afternoon. She states that prior to the onset of the shock she feels palpitation and chest tightness type symptoms and feels a sudden sharp pain in her chest that shoots her arm up uncontrollably for 10 she feels better. On arrival to the ED she denies any chest pain, shortness of breath, recent fever, chills, or any other medical concerns. She called her cardiology office and was advised to present to the emergency department for further evaluation given the fact that her defibrillator has reportedly discharged twice today. Pacemaker Problem Associated symptoms include nausea, palpitations, shortness of breath and vomiting. Pertinent negatives include no abdominal pain, no back pain, no cough, no fever, no headaches, no numbness and no weakness. Past Medical History:  
Diagnosis Date  Anemia associated with chronic renal failure  Anuria 2014  Arrhythmia Paroxysmal a fib, paroxsymal atrial tach, SVT  Chronic kidney disease ESRD- on PD  Chronic systolic heart failure ([facility] [address] 10/31/2012  Chronic tachycardia  GERD (gastroesophageal reflux disease)  Hx of non-ST elevation myocardial infarction (NSTEMI) 2011  Hypertension  Lipoma of right lower extremity 1980 Foot  Peritoneal dialysis catheter in place [facility]) 2014 Nightime exchanges  Polycystic kidney disease 2014 Past Surgical History:  
Procedure Laterality Date  HX HEART CATHETERIZATION  12/2015  
 no interventions  HX PACEMAKER  12/29/2015 ICD  IR INSERT NON TUNL CVC OVER 5 YRS  3/13/2019  AR EPHYS EVAL W/ABLATION SUPRAVENT ARRHYTHMIA N/A 10/1/2019 ABLATION SVT performed by Ihsan Yoo MD at [address], [facility] CATH LAB  AR INTRACARDIAC ELECTROPHYSIOLOGIC 3D MAPPING N/A 10/1/2019 Ep 3d Mapping performed by Ihsan Yoo MD at [address] CATH LAB  STIM/PACING HEART POST IV DRUG INFU N/A 10/1/2019 Drug Stimulation performed by Ihsan Yoo MD at [address] [facility] CATH LAB Family History:  
Problem Relation Age of Onset  Diabetes Brother  Hypertension Brother  Hypertension Mother Madlyn Guard Arthritis-osteo Mother  Hypertension Sister  Diabetes Sister  Kidney Disease Father   
     polycystic  Parkinson's Disease Father  Heart Disease Father  Hypertension Father  Hypertension Brother Social History Socioeconomic History  Marital status: SINGLE Spouse name: Not on file  Number of children: Not on file  Years of education: Not on file  Highest education level: Not on file Occupational History  Not on file Social Needs  Financial resource strain: Not on file  Food insecurity Worry: Not on file Inability: Not on file  Transportation needs Medical: Not on file Non-medical: Not on file Tobacco Use  Smoking status: Never Smoker  Smokeless tobacco: Never Used Substance and Sexual Activity  Alcohol use: Not Currently Comment: rare  Drug use: No  
 Sexual activity: Not Currently Partners: Male Lifestyle  Physical activity Days per week: Not on file Minutes per session: Not on file  Stress: Not on file Relationships  Social connections Talks on phone: Not on file Gets together: Not on file Attends Buddhist service: Not on file Active member of club or organization: Not on file Attends meetings of clubs or organizations: Not on file Relationship status: Not on file  Intimate partner violence Fear of current or ex partner: Not on file Emotionally abused: Not on file Physically abused: Not on file Forced sexual activity: Not on file Other Topics Concern  Not on file Social History Narrative  Not on file ALLERGIES: Iodinated contrast media Review of Systems Constitutional: Positive for activity change (Defibrillator discharge x2 today). Negative for appetite change, chills and fever. HENT: Negative for congestion, rhinorrhea, sinus pressure, sneezing and sore throat. Eyes: Negative for photophobia and visual disturbance. Respiratory: Positive for chest tightness and shortness of breath. Negative for cough. Cardiovascular: Positive for palpitations. Negative for chest pain. Gastrointestinal: Positive for nausea and vomiting. Negative for abdominal pain, blood in stool, constipation and diarrhea. Genitourinary: Negative for difficulty urinating, dysuria, flank pain, frequency, hematuria, menstrual problem, urgency, vaginal bleeding and vaginal discharge. Musculoskeletal: Negative for arthralgias, back pain, myalgias and neck pain. Skin: Negative for rash and wound. Neurological: Negative for syncope, weakness, numbness and headaches. Psychiatric/Behavioral: Negative for self-injury and suicidal ideas. All other systems reviewed and are negative. Vitals:  
 10/29/20 1401 BP: (!) 99/58 Pulse: 72 Resp: 16 Temp: 96.9 °F (36.1 °C) SpO2: 99% Weight: 58.5 kg (129 lb) Height: 5' 5\" (1.651 m) Physical Exam 
Vitals signs and nursing note reviewed. Constitutional:   
   General: She is not in acute distress. Appearance: Normal appearance. She is well-developed. She is not diaphoretic. HENT:  
   Head: Normocephalic and atraumatic. Nose: Nose normal.  
Eyes:  
   Extraocular Movements: Extraocular movements intact. Conjunctiva/sclera: Conjunctivae normal.  
   Pupils: Pupils are equal, round, and reactive to light. Neck: Musculoskeletal: Neck supple. Cardiovascular:  
   Rate and Rhythm: Normal rate and regular rhythm. Heart sounds: Normal heart sounds. Pulmonary:  
   Effort: Pulmonary effort is normal.  
   Breath sounds: Normal breath sounds. Abdominal:  
   General: There is no distension. Palpations: Abdomen is soft. Tenderness: There is no abdominal tenderness. Musculoskeletal:     
   General: No tenderness. Skin: 
   General: Skin is warm and dry. Neurological:  
   General: No focal deficit present. Mental Status: She is alert and oriented to person, place, and time. Cranial Nerves: No cranial nerve deficit. Sensory: No sensory deficit. Motor: No weakness. Coordination: Coordination normal.  
 
  
 
MDM 
 79-year-old female presents with 2 episodes of discharge of her ICD. Now appears well in no distress. Is afebrile with vital signs stable. Labs returned showing hypokalemia with K of 3.0, BUN 43, creatinine 13.2, similar to prior measures, troponin 1.6, also similar to prior measures and BNP 01628. INR 3.3 CXR viewed by myself and read by radiology showing no acute abnormalities. Her pacemaker was interrogated and she was found to have multiple episodes of V. tach today precipitating her shocks. Procedures 1357 EKG shows normal sinus rhythm with a rate of 78 bpm with first-degree AV block and occasional PVCs with a rate of 78 bpm, left axis deviation, intraventricular block but no acute ST or T wave abnormalities suggestive of ischemia.  
 
5:10pm discussed case with Dr. Alannah Lafleur, cardiology who recommends getting the ICD report from [address] [place] [address] and will further eval awaiting their report. Rec'd giving 60meq of po K and will further d/c with Dr. Alannah Lafleur when it arrives 6:05pm gerardo case again with Dr. Alannah Lafleur, who recommends restarting amiodarone 200 mg that she took for 14 days earlier this month and providing dose in the ED. Also recommends encouraging p.o. potassium repletion at home with rx'd K. He feels that she is stable for discharge at this time. She will be followed up closely in clinic in the next couple of days for further evaluation.

## 2020-11-04 NOTE — PROGRESS NOTES
Patient continues to have ICD shocks for VT despite undergoing VT ablation at [facility] recently. Referral to [facility] for possible epicardial ablation attempt.        Munir Haney MD

## 2020-11-04 NOTE — TELEPHONE ENCOUNTER
Received remote alert this am that pt received a shock yesterday 11/3 at 8:35am.  Pt called & r/s her appt with Dr Amelia iMtchell less than 30 mins later that was for today 11/4. New appt is on 11/16 & she was told by DILEEP Ferrari that she should keep her appt for today as it is very important. L/m for pt to call back to find out what she was doing, how she was feeling & if she is taking her Amiodarone & Potassium like she was told to restart when she went to ER on 10/29/20.     Copy to Dr Mago Shepard, MICHAEL.

## 2020-11-16 NOTE — PROGRESS NOTES
ROOM # 4  [facility] ED this past Saturday for c/o right foot pain and swelling  Palpitations and dizziness  Needs Coumadin check  Has not taken any medications this am    Visit Vitals  BP 90/64 (BP 1 Location: Left arm, BP Patient Position: Sitting)   Pulse 92   Resp 16   Ht 5' 5\" (1.651 m)   Wt 129 lb 9.6 oz (58.8 kg)   SpO2 94%   BMI 21.57 kg/m²

## 2020-11-16 NOTE — PROGRESS NOTES
HISTORY OF PRESENTING ILLNESS      Neela Pappas is a 60 y.o. female  with hypertension, non-ischemic CM, LVEF 25-30%, ESRD on peritoneal dialysis, NYHA class II-III, SVT s/p slow pathway ablation, VT s/p recent VT ablation ([facility]). ICD interrogation today reveals recurrent VT for which ATP was delivered. Her ICD coil exhibited an alert which tech services reported indicates VT shock therapies are     ncluding a tachycardia treated as VT by her ICD despite similar shock electrograms to sinus rhythm. 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 She had VT subsequently and was hospitalized at [facility] with peritonitis where she had recurrent VT for which she underwent VT ablation. She was last seen in 9/2020 however, since then continues to have ICD shocks for VT despite undergoing VT ablation at [facility] recently and was referred to [facility] for possible epicardial ablation attempt. Her ICD coil has an elevated coil impedance that once reviewed by tech services is felt to be unreliable for delivering ICD therapy.         PAST MEDICAL HISTORY     Past Medical History:   Diagnosis Date    Anemia associated with chronic renal failure     Anuria 2014    Arrhythmia     Paroxysmal a fib, paroxsymal atrial tach, SVT    Chronic kidney disease     ESRD- on PD    Chronic systolic heart failure ([address] 10/31/2012    Chronic tachycardia     GERD (gastroesophageal reflux disease)     Hx of non-ST elevation myocardial infarction (NSTEMI) 2011    Hypertension     Lipoma of right lower extremity 1980    Foot    Peritoneal dialysis catheter in place [facility]) 2014    Nightime exchanges    Polycystic kidney disease 2014           PAST SURGICAL HISTORY     Past Surgical History:   Procedure Laterality Date    HX HEART CATHETERIZATION  12/2015    no interventions    HX PACEMAKER  12/29/2015    ICD    IR INSERT NON TUNL CVC OVER 5 YRS  3/13/2019  AK EPHYS EVAL W/ABLATION SUPRAVENT ARRHYTHMIA N/A 10/1/2019    ABLATION SVT performed by Rasta Escobedo MD at [address], [facility]/s  CATH LAB    AK INTRACARDIAC ELECTROPHYSIOLOGIC 3D [facility] N/A 10/1/2019    Ep 3d Mapping performed by Rasta Escobedo MD at [address], [facility]  CATH LAB    STIM/PACING HEART POST IV DRUG INFU N/A 10/1/2019    Drug Stimulation performed by Rasta Escobedo MD at [address], [facility] CATH LAB          ALLERGIES     Allergies   Allergen Reactions    Iodinated Contrast Media Angioedema     Cellulitis on side of face after test          FAMILY HISTORY     Family History   Problem Relation Age of Onset    Diabetes Brother     Hypertension Brother     Hypertension Mother     Arthritis-osteo Mother     Hypertension Sister     Diabetes Sister     Kidney Disease Father         polycystic    Parkinson's Disease Father     Heart Disease Father     Hypertension Father     Hypertension Brother     negative for cardiac disease       SOCIAL HISTORY     Social History     Socioeconomic History    Marital status: SINGLE     Spouse name: Not on file    Number of children: Not on file    Years of education: Not on file    Highest education level: Not on file   Tobacco Use    Smoking status: Never Smoker    Smokeless tobacco: Never Used   Substance and Sexual Activity    Alcohol use: Not Currently     Comment: rare    Drug use: No    Sexual activity: Not Currently     Partners: Male         MEDICATIONS     Current Outpatient Medications   Medication Sig    amiodarone (CORDARONE) 200 mg tablet Take 1 Tab by mouth daily for 30 days. For 14 days    fludrocortisone (FLORINEF) 0.1 mg tablet Take 1 Tab by mouth daily.  warfarin (COUMADIN) 3 mg tablet TAKE 1 TABLET BY MOUTH EVERY DAY EXCEPT ON TUESDAYS& THURSDAYS, TAKE 2 TABLETS BY MOUTH ON TUESDAYS& THURSDAYS    potassium chloride (K-DUR, KLOR-CON) 20 mEq tablet TAKE 2 TABLETS BY MOUTH DAILY    aspirin delayed-release 81 mg tablet Take 81 mg by mouth daily.  omeprazole (PRILOSEC) 20 mg capsule Take 20 mg by mouth daily as needed (indigestion).  SENNA-DOCUSATE SODIUM PO Take 1 Tab by mouth daily as needed (constipation).  cinacalcet (SENSIPAR) 30 mg tablet Take 30 mg by mouth daily (with dinner).  ferric citrate (AURYXIA) 210 mg iron tablet Take 210 mg by mouth three (3) times daily (with meals).  gentamicin (GARAMYCIN) 0.1 % topical cream Apply  to affected area nightly.  carvediloL (COREG) 6.25 mg tablet TAKE ONE TABLET BY MOUTH TWICE DAILY WITH MEALS     No current facility-administered medications for this visit. I have reviewed the nurses notes, vitals, problem list, allergy list, medical history, family, social history and medications. REVIEW OF SYMPTOMS      General: Pt denies excessive weight gain or loss. Pt is able to conduct ADL's  HEENT: Denies blurred vision, headaches, hearing loss, epistaxis and difficulty swallowing. Respiratory: Denies cough, congestion, shortness of breath, HENDERSON, wheezing or stridor. Cardiovascular: Denies precordial pain, palpitations, edema or PND  Gastrointestinal: Denies poor appetite, indigestion, abdominal pain or blood in stool  Genitourinary: Denies hematuria, dysuria, increased urinary frequency  Musculoskeletal: Denies joint pain or swelling from muscles or joints  Neurologic: Denies tremor, paresthesias, headache, or sensory motor disturbance  Psychiatric: Denies confusion, insomnia, depression  Integumentray: Denies rash, itching or ulcers. Hematologic: Denies easy bruising, bleeding       PHYSICAL EXAMINATION      Vitals: see vitals section  General: Well developed, in no acute distress. HEENT: No jaundice, oral mucosa moist, no oral ulcers  Neck: Supple, no stiffness, no lymphadenopathy, supple  Heart:  Normal S1/S2 negative S3 or S4.  Regular, no murmur, gallop or rub, no jugular venous distention  Respiratory: Clear bilaterally x 4, no wheezing or rales  Abdomen:   Soft, non-tender, bowel sounds are active. Extremities:  No edema, normal cap refill, no cyanosis. Musculoskeletal: No clubbing, no deformities  Neuro: A&Ox3, speech clear, gait stable, cooperative, no focal neurologic deficits  Skin: Skin color is normal. No rashes or lesions. Non diaphoretic, moist.  Vascular: 2+ pulses symmetric in all extremities       DIAGNOSTIC DATA      EKG:        LABORATORY DATA      Lab Results   Component Value Date/Time    WBC 8.5 10/29/2020 03:55 PM    Hemoglobin (POC) 9.5 (L) 10/18/2013 03:35 PM    HGB 16.3 (H) 10/29/2020 03:55 PM    Hematocrit (POC) 28 (L) 10/18/2013 03:35 PM    HCT 51.7 (H) 10/29/2020 03:55 PM    PLATELET 352 21/45/3991 03:55 PM    MCV 97.7 10/29/2020 03:55 PM      Lab Results   Component Value Date/Time    Sodium 144 10/29/2020 03:55 PM    Potassium 3.0 (L) 10/29/2020 03:55 PM    Chloride 102 10/29/2020 03:55 PM    CO2 29 10/29/2020 03:55 PM    Anion gap 13 10/29/2020 03:55 PM    Glucose 87 10/29/2020 03:55 PM    BUN 43 (H) 10/29/2020 03:55 PM    Creatinine 13.20 (H) 10/29/2020 03:55 PM    BUN/Creatinine ratio 3 (L) 10/29/2020 03:55 PM    GFR est AA 3 (L) 10/29/2020 03:55 PM    GFR est non-AA 3 (L) 10/29/2020 03:55 PM    Calcium 9.5 10/29/2020 03:55 PM    Bilirubin, total 0.5 10/29/2020 03:55 PM    Alk. phosphatase 127 (H) 10/29/2020 03:55 PM    Protein, total 8.2 10/29/2020 03:55 PM    Albumin 3.4 (L) 10/29/2020 03:55 PM    Globulin 4.8 (H) 10/29/2020 03:55 PM    A-G Ratio 0.7 (L) 10/29/2020 03:55 PM    ALT (SGPT) 20 10/29/2020 03:55 PM           ASSESSMENT      1. Cardiomyopathy              A. Non ischemic                B. LV systolic              C. NYHA class III              D. Narrow QRS  2. Hypertension  3. End stage renal disease  4. Supraventricular tachycardia              A. Slow pathway ablation  5. ICD               A. [employer]tronic  6. Atrial fibrillation  7. Ventricular tachycardia        PLAN     Place LifeVest. Referral to [facility] for possible epi/endo ablation for VT. ICD-10-CM ICD-9-CM    1. Automatic implantable cardiac defibrillator in situ  Z95.810 V45.02    2. Paroxysmal atrial fibrillation (HCC)  I48.0 427.31    3. ICD (implantable cardioverter-defibrillator) in place  Z95.810 V45.02    4. SVT (supraventricular tachycardia) (HCC)  I47.1 427.89    5. Essential hypertension, benign  I10 401.1      No orders of the defined types were placed in this encounter. FOLLOW-UP     After VT Ablation      Thank you, Nay Perez MD and Dr. Niraj Strickland for allowing me to participate in the care of this extraordinarily pleasant female. Please do not hesitate to contact me for further questions/concerns.          Elysia Chang MD  Cardiac Electrophysiology / Cardiology    [address]  [address] [place] [address], [address] [place] [address]  [phone] / [phone] Fax   [phone] / [phone] Fax

## 2020-11-18 NOTE — PROGRESS NOTES
Plan to have patient come into office to have ICD therapies turned off due to coil lead impedance and unreliability of shock therapies, and to be fitted with a Lifevest at that time while she waits for [facility] referral for epi/endocardial ablation. Have attempted to call patient x3 this morning without ability to leave a VM. Will continue to reach out, and arrange for this appt with [facility] and [facility] asap. Lifevest order has been placed.     Ginny Torres NP

## 2020-11-20 NOTE — TELEPHONE ENCOUNTER
Received call from [facility] with [phone] regarding setting her up for the fitting as she was approved. They have tried multi times with no success. She asked if we had any other phone # to call. Informed her only the emergency contact which she also has. She will inform us if she speaks to pt & is able to get pt into the office to fit & turn off shocking therapies.

## 2020-11-20 NOTE — PROGRESS NOTES
Patient is in ICU at [facility] for infected PD catheter, planned for removal and start hemodialysis. Informed her that we will have Lifevest come fit her inpatient, and have [facility] turn off therapies due to elevated impedance in coil lead. Referral to Dr. Cale Lopez.

## 2020-12-07 NOTE — TELEPHONE ENCOUNTER
Patients sister is sayraing on behalf of the patient. The patient was seen at [address] [place] [address] yesterday and was taken off of her coumadin as she has misplaced her machine to monitor it and is unable to check her levels. Patients sister would like to know if she can have a prescription to get a new one as well as speak with someone to get instructions with moving forward with her coumadin. Please advise.     Phone: [phone] or [phone]

## 2020-12-29 PROBLEM — J96.91 RESPIRATORY FAILURE WITH HYPOXIA (HCC): Status: ACTIVE | Noted: 2020-01-01

## 2020-12-29 NOTE — ED NOTES
I've evaluated the patient. She was evaluated by the ICU does not feel patient needs to be in the unit. Patient is resting comfortably on BiPAP in no distress talking full sentences. We are waiting for the dialysis team.  I will speak to the hospitalist about going ahead and putting in orders now.

## 2020-12-29 NOTE — PROCEDURES
[address] Dialysis Team [facility]  [phone] Vitals   Pre   Post   Assessment   Pre   Post    
Temp  97.6  97.6 LOC  A&O x4 A&O x4  
HR   74 76 Lungs   Bi-pap  bi-pap B/P  117/83 107/84 Cardiac   regular  regular Resp   20 18 Skin   Dry, cool  dry, cool Pain level  0 0 Edema  none 
 
 none Orders: Duration:   Start:    0600 End:    0900 Total:   3hrs Dialyzer:   Dialyzer/Set Up Inspection: Michel Robert (12/29/20 0600) K Bath:   Dialysate K (mEq/L): 2 (12/29/20 0600) Ca Bath:   Dialysate CA (mEq/L): 2.5 (12/29/20 0600) Na/Bicarb:   Dialysate NA (mEq/L): 138 (12/29/20 0600) Target Fluid Removal:   Goal/Amount of Fluid to Remove (mL): 2500 mL (12/29/20 0600) Access Type & Location:   Right CVC, dressing intact, Each catheter limb disinfected per p&p, caps removed, hubs disinfected per p&p. 
+aspir/NS flushes Labs Obtained/Reviewed Critical Results Called   Date when labs were drawn- 
Hgb-   
HGB Date Value Ref Range Status 12/29/2020 7.5 (L) 11.5 - 16.0 g/dL Final  
 
K-   
Potassium Date Value Ref Range Status 12/29/2020 5.5 (H) 3.5 - 5.1 mmol/L Final  
 
Ca-  
Calcium Date Value Ref Range Status 12/29/2020 7.1 (L) 8.5 - 10.1 MG/DL Final  
 
Bun-  
BUN Date Value Ref Range Status 12/29/2020 39 (H) 6 - 20 MG/DL Final  
 
Creat-  
Creatinine Date Value Ref Range Status 12/29/2020 7.54 (H) 0.55 - 1.02 MG/DL Final  
 
  
Medications/ Blood Products Given Name   Dose   Route and Time Albumin 25% 25g  HD 0600 Blood Volume Processed (BVP):    69L Net Fluid Removed:  2500cc Comments Time Out Done: 1216 Primary Nurse Rpt Pre: Marty Mc RN 
Primary Nurse Rpt Post: Jose Almaguer RN 
Pt Education: access care, procedure Care Plan: continue HD tx as per MD order Tx Summary: Tolerated tx well, Each dialysis catheter limb disinfected per p&p, blood returned per p&p, each dialysis hub disinfected per p&p, and caps applied. Dressing changed. Admiting Diagnosis: 
Pt's previous clinic- 
Consent signed - Informed Consent Verified: Yes (12/29/20 0600) Hepatitis Status-  Hep B Ag negative, immune 12/11/20 Machine #- Machine Number: [id number] (12/29/20 0600) Telemetry status- monitored at the bedside

## 2020-12-29 NOTE — PROGRESS NOTES
Problem: Mobility Impaired (Adult and Pediatric) Goal: *Acute Goals and Plan of Care (Insert Text) Description: FUNCTIONAL STATUS PRIOR TO ADMISSION: Patient was independent and active without use of DME. Pt was ambulating short distance x RW with therapy in rehab. HOME SUPPORT PRIOR TO ADMISSION: The patient lived with 81 yo mother, pt assists mother with IADLs. Pt admitted from [facility]. Physical Therapy Goals Initiated 12/29/2020 1. Patient will move from supine to sit and sit to supine , scoot up and down, and roll side to side in bed with modified independence within 7 day(s). 2.  Patient will transfer from bed to chair and chair to bed with modified independence using the least restrictive device within 7 day(s). 3.  Patient will perform sit to stand with modified independence within 7 day(s). 4.  Patient will ambulate with modified independence for 300 feet with the least restrictive device within 7 day(s). 5.  Patient will ascend/descend 10 stairs with one handrail(s) with modified independence within 7 day(s). Outcome: Not Met PHYSICAL THERAPY EVALUATION Patient: Hunter Chamberlain (66 y.o. female) Date: 12/29/2020 Primary Diagnosis: Respiratory failure with hypoxia ([facility] [address] [J96.91] Precautions:   Fall, Other (comment)(droplet plus) ASSESSMENT Based on the objective data described below, the patient presents with decreased activity tolerance, balance deficits, and weakness. Pt admitted from rehab working with therapy on ambulation. Pt presents with LifeVest with plans for ablation. Pt received in bed, RN present. Pt tolerated sitting EOB for approx 5-8 minutes presenting with 2 episodes of posterior trunk leaning requiring min A. Pt able to stand from EOB and sidestep with min A noting narrow ALFRED and continued posterior trunk leaning. Pt then assisted back to stretcher. Will plan to progress transfers using RW next session as appropriate. Current Level of Function Impacting Discharge (mobility/balance): min A for sit<>stand Functional Outcome Measure: The patient scored Total: 35/100 on the Barthel Index which is indicative of 65% impaired ability to care for basic self needs/dependency on others. Other factors to consider for discharge: fall risk, COVID+, 14 ENRIQUE, lives with 79 yo mother Patient will benefit from skilled therapy intervention to address the above noted impairments. PLAN : 
Recommendations and Planned Interventions: bed mobility training, transfer training, gait training, therapeutic exercises, neuromuscular re-education, patient and family training/education and therapeutic activities Frequency/Duration: Patient will be followed by physical therapy:  5 times a week to address goals. Recommendation for discharge: (in order for the patient to meet his/her long term goals) Therapy 3 hours per day 5-7 days per week , if not then SNF If pt were to d/c home, would benefit from HHPT and require assistance/supervision for all transfers, ambulation, and stairs as pt is a fall risk This discharge recommendation: 
Has not yet been discussed the attending provider and/or case management IF patient discharges home will need the following DME: wheelchair SUBJECTIVE:  
Patient stated I think a walker would help.  OBJECTIVE DATA SUMMARY:  
HISTORY:   
Past Medical History:  
Diagnosis Date  Anemia associated with chronic renal failure  Anuria 2014  Arrhythmia Paroxysmal a fib, paroxsymal atrial tach, SVT  Chronic kidney disease ESRD- on PD  Chronic systolic heart failure ([facility] [address] 10/31/2012  Chronic tachycardia  GERD (gastroesophageal reflux disease)  Hx of non-ST elevation myocardial infarction (NSTEMI) 2011  Hypertension  Lipoma of right lower extremity 1980 Foot  Peritoneal dialysis catheter in place [facility]) 2014 Nightime exchanges  Polycystic kidney disease 2014 Past Surgical History:  
Procedure Laterality Date  HX HEART CATHETERIZATION  12/2015  
 no interventions  HX PACEMAKER  12/29/2015 ICD  IR INSERT NON TUNL CVC OVER 5 YRS  3/13/2019  [place] EPHYS EVAL W/ABLATION SUPRAVENT ARRHYTHMIA N/A 10/1/2019 ABLATION SVT performed by Orly Allen MD at [address], [facility]  CATH LAB  [place] INTRACARDIAC ELECTROPHYSIOLOGIC 3D MAPPING N/A 10/1/2019 Ep 3d Mapping performed by Orly Allen MD at [address], [facility]/s  CATH LAB  STIM/PACING HEART POST IV DRUG INFU N/A 10/1/2019 Drug Stimulation performed by Orly Allen MD at [address], [facility]  CATH LAB Personal factors and/or comorbidities impacting plan of care: [facility] Home Situation Home Environment: Apartment # Steps to Enter: 14 
Rails to Enter: Yes Hand Rails : Right One/Two Story Residence: One story Living Alone: No 
Support Systems: Family member(s) Current DME Used/Available at Home: Walker, rolling EXAMINATION/PRESENTATION/DECISION MAKING:  
Critical Behavior: 
Neurologic State: Drowsy Orientation Level: Oriented X4 Hearing: 
  
Skin:  intact Edema: none noted Range Of Motion: 
AROM: Generally decreased, functional 
  
  
  
PROM: Generally decreased, functional 
  
  
  
Strength:   
Strength: Generally decreased, functional 
  
  
  
  
  
  
Tone & Sensation:  
Tone: Normal 
  
  
  
  
Sensation: Intact Coordination: 
Coordination: Generally decreased, functional 
Vision:  
  
Functional Mobility: 
Bed Mobility: 
  
Supine to Sit: Minimum assistance Sit to Supine: Moderate assistance Scooting: Minimum assistance Transfers: 
Sit to Stand: Minimum assistance; Additional time Stand to Sit: Minimum assistance; Additional time Balance:  
Sitting: Impaired; Without support Sitting - Static: Fair (occasional) Sitting - Dynamic: Fair (occasional) Standing: Impaired; With support Standing - Static: Fair Standing - Dynamic : Fair;Constant support Functional Measure: 
Barthel Index: 
 
Bathin Bladder: 5 Bowels: 5 Groomin Dressin Feeding: 10 Mobility: 0 Stairs: 0 Toilet Use: 0 Transfer (Bed to Chair and Back): 5 Total: 35/100 The Barthel ADL Index: Guidelines 1. The index should be used as a record of what a patient does, not as a record of what a patient could do. 2. The main aim is to establish degree of independence from any help, physical or verbal, however minor and for whatever reason. 3. The need for supervision renders the patient not independent. 4. A patient's performance should be established using the best available evidence. Asking the patient, friends/relatives and nurses are the usual sources, but direct observation and common sense are also important. However direct testing is not needed. 5. Usually the patient's performance over the preceding 24-48 hours is important, but occasionally longer periods will be relevant. 6. Middle categories imply that the patient supplies over 50 per cent of the effort. 7. Use of aids to be independent is allowed. Tracy Garcia., Barthel, D.W. ([id number]). Functional evaluation: the Barthel Index. [address] [facility] (14)2. SELINA Coronado, Kirby Sauceda., Dk Cisneros., [place], [address] [place] [address] (). Measuring the change indisability after inpatient rehabilitation; comparison of the responsiveness of the Barthel Index and Functional [place] Measure. Journal of Neurology, Neurosurgery, and Psychiatry, 66(4), 476-097. NICO Singh, LARRY Viramontes, & Mireille aBrros M.A. (2004.) Assessment of post-stroke quality of life in cost-effectiveness studies: The usefulness of the Barthel Index and the EuroQoL-5D. [facility], 13, 177-00 Physical Therapy Evaluation Charge Determination History Examination Presentation Decision-Making MEDIUM  Complexity : 1-2 comorbidities / personal factors will impact the outcome/ POC  LOW Complexity : 1-2 Standardized tests and measures addressing body structure, function, activity limitation and / or participation in recreation  MEDIUM Complexity : Evolving with changing characteristics  Other outcome measures barthel  MEDIUM Based on the above components, the patient evaluation is determined to be of the following complexity level: LOW Activity Tolerance:  
Fair and requires rest breaks After treatment patient left in no apparent distress:  
Supine in bed, Call bell within reach and RN present COMMUNICATION/EDUCATION:  
The patients plan of care was discussed with: Occupational therapist and Registered nurse. Fall prevention education was provided and the patient/caregiver indicated understanding., Patient/family have participated as able in goal setting and plan of care. and Patient/family agree to work toward stated goals and plan of care. Thank you for this referral. 
Tg James, PT , DPT Time Calculation: 20 mins

## 2020-12-29 NOTE — PROGRESS NOTES
Clinical Pharmacy Note: Antibiotic Renal Dosing Day #1 of Cefepime Indication:  PNA Current regimen:   
 
Recent Labs  
  20 
0528 20 
2330 20 
2121 WBC 6.0  --  8.3 CREA 7.54* 7.13* 7.51* BUN 39* 35* 35* Temp (24hrs), Av.8 °F (36.6 °C), Min:97.6 °F (36.4 °C), Max:98 °F (36.7 °C) Est CrCl: dialysis patient Plan: Start cefepime 1 gram every 24 hours per [facility] P&T approved renal dosing protocol. Pharmacy will monitor daily and will make adjustments as necessary for changes in renal function.

## 2020-12-29 NOTE — PROGRESS NOTES
Pharmacist Note  Warfarin Dosing Consult provided for this 63 y. o.female to manage warfarin for Atrial Fibrillation INR Goal: 2 - 3 Home regimen/ tablet size: 3 mg SMWFS, 6 mg TTH Drugs that may increase INR: Quinolones Drugs that may decrease INR: None Other current anticoagulants/ drugs that may increase bleeding risk: None Risk factors: None Daily INR ordered: YES Recent Labs  
  12/28/20 
2121 HGB 8.2* INR 3.4* Date               INR                  Dose 
12/28            3.4 Assessment/ Plan: Will await am INR prior to ordering dose, of note pt is COVID + Pharmacy will continue to monitor daily and adjust therapy as indicated. Please contact the pharmacist at   for outpatient recommendations if needed.

## 2020-12-29 NOTE — ED PROVIDER NOTES
Pt from [facility] via EMS with c/o sob. Symptoms started about 5 hours ago. Gradually worsening.  + cough. Pt on 2LNC at baseline but found to have sats in 80's. Pt tested + for COVID today. Denies any vomiting or diarrhea. Past Medical History:  
Diagnosis Date  Anemia associated with chronic renal failure  Anuria 2014  Arrhythmia Paroxysmal a fib, paroxsymal atrial tach, SVT  Chronic kidney disease ESRD- on PD  Chronic systolic heart failure ([address] 10/31/2012  Chronic tachycardia  GERD (gastroesophageal reflux disease)  Hx of non-ST elevation myocardial infarction (NSTEMI) 2011  Hypertension  Lipoma of right lower extremity 1980 Foot  Peritoneal dialysis catheter in place [facility]) 2014 Nightime exchanges  Polycystic kidney disease 2014 Past Surgical History:  
Procedure Laterality Date  HX HEART CATHETERIZATION  12/2015  
 no interventions  HX PACEMAKER  12/29/2015 ICD  IR INSERT NON TUNL CVC OVER 5 YRS  3/13/2019  [place] EPHYS EVAL W/ABLATION SUPRAVENT ARRHYTHMIA N/A 10/1/2019 ABLATION SVT performed by Kathie Snyder MD at [address], [facility] Dr CATH LAB  [place] INTRACARDIAC ELECTROPHYSIOLOGIC 3D MAPPING N/A 10/1/2019 Ep 3d Mapping performed by Kathie Snyder MD at [address], [facility] CATH LAB  STIM/PACING HEART POST IV DRUG INFU N/A 10/1/2019 Drug Stimulation performed by Kathie Snyder MD at [address], [facility] CATH LAB Family History:  
Problem Relation Age of Onset  Diabetes Brother  Hypertension Brother  Hypertension Mother Jeri Nishant Arthritis-osteo Mother  Hypertension Sister  Diabetes Sister  Kidney Disease Father   
     polycystic  Parkinson's Disease Father  Heart Disease Father  Hypertension Father  Hypertension Brother Social History Socioeconomic History  Marital status: SINGLE Spouse name: Not on file  Number of children: Not on file  Years of education: Not on file  Highest education level: Not on file Occupational History  Not on file Social Needs  Financial resource strain: Not on file  Food insecurity Worry: Not on file Inability: Not on file  Transportation needs Medical: Not on file Non-medical: Not on file Tobacco Use  Smoking status: Never Smoker  Smokeless tobacco: Never Used Substance and Sexual Activity  Alcohol use: Not Currently Comment: rare  Drug use: No  
 Sexual activity: Not Currently Partners: Male Lifestyle  Physical activity Days per week: Not on file Minutes per session: Not on file  Stress: Not on file Relationships  Social connections Talks on phone: Not on file Gets together: Not on file Attends Yazdanism service: Not on file Active member of club or organization: Not on file Attends meetings of clubs or organizations: Not on file Relationship status: Not on file  Intimate partner violence Fear of current or ex partner: Not on file Emotionally abused: Not on file Physically abused: Not on file Forced sexual activity: Not on file Other Topics Concern  Not on file Social History Narrative  Not on file ALLERGIES: Iodinated contrast media Review of Systems Constitutional: Negative for fever. HENT: Negative for facial swelling. Eyes: Negative for visual disturbance. Respiratory: Positive for cough and shortness of breath. Cardiovascular: Negative for chest pain. Gastrointestinal: Negative for abdominal pain. Genitourinary: Negative for difficulty urinating and dysuria. Musculoskeletal: Negative for arthralgias. Skin: Negative for rash. Neurological: Negative for headaches. Hematological: Negative for adenopathy. Psychiatric/Behavioral: Negative for suicidal ideas. Vitals:  
 12/28/20 2000 12/28/20 2050 12/28/20 2100 12/28/20 2228 BP: 95/68 Pulse: 84  83 Resp: (!) 39  30   
 Temp:      
SpO2:  94% 90% (P) 98% Weight:      
      
 
Physical Exam 
Vitals signs and nursing note reviewed. Constitutional:   
   General: She is not in acute distress. Appearance: She is well-developed. HENT:  
   Head: Normocephalic and atraumatic. Eyes:  
   General: No scleral icterus. Conjunctiva/sclera: Conjunctivae normal.  
   Pupils: Pupils are equal, round, and reactive to light. Neck: Musculoskeletal: Normal range of motion and neck supple. Cardiovascular:  
   Rate and Rhythm: Normal rate. Heart sounds: No murmur. Comments: Life vest in place Pulmonary:  
   Effort: Tachypnea, accessory muscle usage and respiratory distress present. Breath sounds: Examination of the right-upper field reveals rhonchi. Examination of the left-upper field reveals rhonchi. Examination of the right-middle field reveals rhonchi. Examination of the left-middle field reveals rhonchi. Examination of the right-lower field reveals rhonchi. Examination of the left-lower field reveals rhonchi. Rhonchi present. Abdominal:  
   General: There is no distension. Musculoskeletal: Normal range of motion. Skin: 
   General: Skin is warm and dry. Findings: No rash. Neurological:  
   Mental Status: She is alert and oriented to person, place, and time. [facility] 
ED Course as of Dec 28 2230 Mon Dec 28, 2020  
2054 ED EKG interpretation: 
Rhythm: normal sinus rhythm. Rate (approx.): 83. Axis: normal.  ST segment:  No concerning ST elevations or depressions. This EKG was interpreted by Radha Hart MD,ED Provider. EKG, 12 LEAD, INITIAL [JM] ED Course User Index [JM] Rico Saavedra MD  
 
IMPRESSION: 
1. Severe sepsis ([address] 2. Pneumonia due to infectious organism, unspecified laterality, unspecified part of lung 3. COVID-19 virus infection 4. Acute on chronic respiratory failure with hypoxia (HCC) 5. ESRD on hemodialysis ([address] 6. Elevated troponin - Broad Spectrum Antibiotics ordered: Cefepime and  Levofloxacin - Repeat lactic acid ordered for time 2222PM 
- Re-assessment performed at time 2222 and clinical condition stable. - Hypotension or Lactic Acidosis present (SBP<90, MAP<65, Lactate >4): YES IVF:  Not given due to concerns for volume overload - Persistent Hypotension despite IVF resuscitation: NO  Vasopressors: Not indicated due to Septic Shock not present Plan: 
Admit to Step down Total critical care time spent exclusive of procedures:  39 minutes Karin Desouza MD 
 
Procedures 10:22 PM 
IV ACCESS WITH ULTRASOUND GUIDANCE Karin Desouza MD gained IV access using  18 gauge needle. Indication is vascular access could not be obtained. The site was cleansed with an alcohol prep, the L IJ vein was localized with ultrasound guidance in an anterior approach. Line confirmation was obtained by direct visualization and good blood return. No anaesthetic was used. The line was successfully flushed with normal saline and was secured with transparent tape. The patient tolerated the procedure well. There were no complications. Perfect Serve Consult for Admission 10:23 PM 
 
ED Room Number: LZ44/70 Patient Name and age:  Mario High 63 y.o.  female Working Diagnosis: 1. Severe sepsis ([facility] [address] 2. Pneumonia due to infectious organism, unspecified laterality, unspecified part of lung 3. COVID-19 virus infection 4. Acute on chronic respiratory failure with hypoxia (HCC) 5. ESRD on hemodialysis ([facility] [address] 6. Elevated troponin COVID-19 Suspicion:  yes Sepsis present:  yes  Reassessment needed: yes Code Status:  Full Code Readmission: no 
Isolation Requirements:  yes Recommended Level of Care:  step down Department:[facility] Adult ED - [phone] Other:  Known COVID from [address]. Missed HD today. Hypoxic to 80's on 2LNC. Improving now on BiPAP. Lactate 4.2.

## 2020-12-29 NOTE — CONSULTS
RENAL Called re: patient with dyspnea on presentation to [facility]. Diagnosed with CoVid19 today associated with pneumonia. She missed dialysis today, last HD was at [place] [id number] on 12/26. Also with hyperkalemia. CXR more associated with pneumonia as opposed to volume overload. She is relatively hypotensive. EXAMINATION:  AP CHEST, PORTABLE 12-28-20 
  
COMPARISON: October 29 
  
FINDINGS: Single AP portable view of the chest at 2153 hours demonstrates 
multiple overlying lines and other devices, limiting evaluation. There is a new 
tunneled right IJ central venous catheter. There is no definite pneumothorax. There is left lower lobe opacification and diffuse bilateral airspace disease. 
  
IMPRESSION IMPRESSION:  
  
1. Left lower lobe opacification, representing either atelectasis or pneumonia. 2. Additionally, multifocal bilateral airspace disease. This is compatible with COVID 19 pneumonia. 3. Study is technically limited due to numerous overlying lines and other 
electronic devices. D/W ED physician, will initiate a short dialysis for K and volume control, will avoid inducing worsening hypotension. D/w Alex Loera

## 2020-12-29 NOTE — CONSULTS
HD patient from [facility] unit. On MWF schedule Admitted with COVID pneumonia HD again in AM

## 2020-12-29 NOTE — PROGRESS NOTES
Admission Medication Reconciliation: 
 
Information obtained from: PTA medication list updated with information provided by [facility] by phone. Summary:  
 
Medications added: amiodarone, mexilitine, loperamide, nephro-julia, Miralax, senna, ascorbic acid, cholecalciferol, zinc 
Medications deleted: carvedilol, ferric citrate, fludricortisone, gentamicin cream, potassium chloride Inpatient orders have been reviewed and inpatient orders for ferric citrate and fludrocortisone were discontinued per Dr. Tonya Diggs. Dr. Tonya Diggs will review the updated PTA medication record and reorder medications as appropriate. Chief Complaint for this Admission:  COVID-19 Significant PMH/Disease States:  
Past Medical History:  
Diagnosis Date Anemia associated with chronic renal failure Anuria 2014 Arrhythmia Paroxysmal a fib, paroxsymal atrial tach, SVT Chronic kidney disease ESRD- on PD Chronic systolic heart failure ([facility] [address] 10/31/2012 Chronic tachycardia GERD (gastroesophageal reflux disease) Hx of non-ST elevation myocardial infarction (NSTEMI) 2011 Hypertension Lipoma of right lower extremity 1980 Foot Peritoneal dialysis catheter in place [facility]) 2014 Nightime exchanges Polycystic kidney disease 2014 Allergies:  Iodinated contrast media Prior to Admission Medications:  
Prior to Admission Medications Prescriptions Last Dose Informant Patient Reported? Taking?  
amiodarone (CORDARONE) 200 mg tablet   Yes Yes Sig: Take 200 mg by mouth daily. ascorbic acid, vitamin C, (VITAMIN C) 1,000 mg tablet   Yes Yes Sig: Take 1,000 mg by mouth two (2) times a day. aspirin delayed-release 81 mg tablet  Self Yes Yes Sig: Take 81 mg by mouth daily. cholecalciferol (VITAMIN D3) (5000 Units/125 mcg) tab tablet   Yes Yes Sig: Take 10,000 Units by mouth every seven (7) days. cinacalcet (SENSIPAR) 30 mg tablet  Self Yes Yes Sig: Take 30 mg by mouth daily. loperamide (IMODIUM) 2 mg capsule   Yes Yes Sig: Take 2 mg by mouth every three (3) hours as needed for Diarrhea. mexiletine (MEXITIL) 150 mg capsule   Yes Yes Sig: Take 150 mg by mouth three (3) times daily. omeprazole (PRILOSEC) 20 mg capsule   Yes Yes Sig: Take 20 mg by mouth Daily (before breakfast). polyethylene glycol (Miralax) 17 gram packet   Yes Yes Sig: Take 17 g by mouth daily as needed for Constipation. senna (Senna) 8.6 mg tablet   Yes Yes Sig: Take 1 Tab by mouth daily as needed for Constipation. vit B Cmplx 3-FA-Vit C-Biotin (Nephro-Cinthia Rx) 1- mg-mg-mcg tablet   Yes Yes Sig: Take 1 Tab by mouth daily. warfarin (COUMADIN) 1 mg tablet   Yes Yes Sig: Take 1 mg by mouth every evening. zinc acetate 25 mg (zinc) cap   Yes Yes Sig: Take 25 mg by mouth daily. Facility-Administered Medications: None Thank you for allowing me to participate in the care of this patient. Please contact the pharmacy at  or the medication reconciliation pharmacist at  with any questions. Elif Ellis, Pharm. D., BCPS, BCPPS

## 2020-12-29 NOTE — PROGRESS NOTES
Pharmacist Note  Warfarin Dosing 
Consult provided for this 60 y.o.female to manage warfarin for Atrial Fibrillation 
 
INR Goal: 2 - 3 
 
Home regimen/ tablet size: 3 mg SMWFS, 6 mg TTH 
 
Drugs that may increase INR: Quinolones 
Drugs that may decrease INR: None 
Other current anticoagulants/ drugs that may increase bleeding risk: None 
Risk factors: None 
Daily INR ordered: YES 
 
Recent Labs  
  12/29/20 
0736 12/29/20 
0528 12/28/20 
2121  
HGB  --  7.5* 8.2*  
INR 3.8*  --  3.4*  
 
Date               INR                  Dose 
12/28            3.4  
12/29            3.8                   HOLD  
                                                                            
Assessment/ Plan: 
Will HOLD dose today for supratherapeutic INR. 
 
Pharmacy will continue to monitor daily and adjust therapy as indicated.  Please contact the pharmacist at  [phone] for outpatient recommendations if needed.

## 2020-12-29 NOTE — H&P
History & Physical 
 
Primary Care Provider: Lisa Nava MD 
Source of Information: Patient and chart review History of Presenting Illness:  
Pilar Menchaca is a 63 y.o. -American female with past medical history of non-insulin-dependent type 2 diabetes, paroxysmal A. fib on AC with warfarin, VT, history of MI, ESRD on HD, HFrEF, hypertension who presents to hospital via EMS with chief complaint of shortness of breath. History is obtained from patient and chart review. Per EMS patient was found hypoxic with oxygen saturations in low 80s and required supplemental oxygen. Patient states she had been doing well until early morning of admission when she had a sudden onset of severe dyspnea with rest and ambulation. She states her only prior symptom had been a nonproductive cough that has been ongoing for the last week. She denies any known sick contacts or any recent travel. Of note she currently undergoes HD and states she missed her last hemodialysis session which was scheduled for 12/28. EMS also reports of a positive Covid test prior to ER presentation. The patient denies any fever, chills, chest pain, cough, congestion, recent illness, palpitations, or dysuria. Upon ER presentation patient was reported hypoxic to 80s and placed on 2 L nasal cannula. Vital signs on admission were notable for BPs of 90s over 60s and respiratory rate to 30s. Labs were remarkable for elevated troponin to 0.89, procalcitonin 3.68, BNP greater than 35,000. Chest x-ray showed left lower lobe opacification and multifocal bilateral airspace disease. She was treated with cefepime and Levaquin. Review of Systems: A comprehensive review of systems was negative except for that written in the History of Present Illness. Past Medical History:  
Diagnosis Date  Anemia associated with chronic renal failure  Anuria 2014  Arrhythmia Paroxysmal a fib, paroxsymal atrial tach, SVT  Chronic kidney disease ESRD- on PD  Chronic systolic heart failure ([facility] [address] 10/31/2012  Chronic tachycardia  GERD (gastroesophageal reflux disease)  Hx of non-ST elevation myocardial infarction (NSTEMI) 2011  Hypertension  Lipoma of right lower extremity 1980 Foot  Peritoneal dialysis catheter in place [facility]) 2014 Nightime exchanges  Polycystic kidney disease 2014 Past Surgical History:  
Procedure Laterality Date  HX HEART CATHETERIZATION  12/2015  
 no interventions  HX PACEMAKER  12/29/2015 ICD  IR INSERT NON TUNL CVC OVER 5 YRS  3/13/2019  [place] EPHYS EVAL W/ABLATION SUPRAVENT ARRHYTHMIA N/A 10/1/2019 ABLATION SVT performed by Babar Baker MD at [address], [facility] CATH LAB  [place] INTRACARDIAC ELECTROPHYSIOLOGIC 3D MAPPING N/A 10/1/2019 Ep 3d Mapping performed by [facility] MD [facility] CATH LAB  STIM/PACING HEART POST IV DRUG INFU N/A 10/1/2019 Drug Stimulation performed by Babar Baker MD at [address] [place] [id number], [address] CATH LAB Prior to Admission medications Medication Sig Start Date End Date Taking? Authorizing Provider  
fludrocortisone (FLORINEF) 0.1 mg tablet Take 1 Tab by mouth daily. 10/7/20   Luisito Tarango NP  
warfarin (COUMADIN) 3 mg tablet TAKE 1 TABLET BY MOUTH EVERY DAY EXCEPT ON TUESDAYS& THURSDAYS, TAKE 2 TABLETS BY MOUTH ON TUESDAYS& THURSDAYS 10/6/20   Luisito Tarango NP  
carvediloL (COREG) 6.25 mg tablet TAKE ONE TABLET BY MOUTH TWICE DAILY WITH MEALS 7/23/20   Nicho Wynn MD  
potassium chloride (K-DUR, KLOR-CON) 20 mEq tablet TAKE 2 TABLETS BY MOUTH DAILY 7/7/20   Luisito Tarango NP  
aspirin delayed-release 81 mg tablet Take 81 mg by mouth daily. Provider, Historical  
omeprazole (PRILOSEC) 20 mg capsule Take 20 mg by mouth daily as needed (indigestion).     Provider, Historical  
 SENNA-DOCUSATE SODIUM PO Take 1 Tab by mouth daily as needed (constipation). Provider, Historical  
cinacalcet (SENSIPAR) 30 mg tablet Take 30 mg by mouth daily (with dinner). Provider, Historical  
ferric citrate (AURYXIA) 210 mg iron tablet Take 210 mg by mouth three (3) times daily (with meals). Provider, Historical  
gentamicin (GARAMYCIN) 0.1 % topical cream Apply  to affected area nightly. 7/12/14   Provider, Historical  
 
Allergies Allergen Reactions  Iodinated Contrast Media Angioedema Cellulitis on side of face after test  
  
Family History Problem Relation Age of Onset  Diabetes Brother  Hypertension Brother  Hypertension Mother [facility] Arthritis-osteo Mother  Hypertension Sister  Diabetes Sister  Kidney Disease Father   
     polycystic  Parkinson's Disease Father  Heart Disease Father  Hypertension Father  Hypertension Brother SOCIAL HISTORY: 
Patient resides: 
Independently [facility] With family care Smoking history:  
None X Former Chronic Alcohol history:  
None X Social   
Chronic Ambulates:  
Independently X  
w/cane   
w/walker   
w/wc CODE STATUS: 
DNR Full X Other Objective:  
 
Physical Exam:  
 
Visit Vitals /78 Pulse 75 Temp 97.8 °F (36.6 °C) Resp 21 Wt 58.7 kg (129 lb 6.6 oz) SpO2 97% BMI 21.53 kg/m² O2 Flow Rate (L/min): 2 l/min O2 Device: BIPAP General:  Alert, cooperative, no distress, appears stated age. Head:  Normocephalic, without obvious abnormality, atraumatic. Eyes:  Conjunctivae/corneas clear. PERRL, EOMs intact. Nose: Nares normal. Septum midline. Mucosa normal. No drainage or sinus tenderness. Throat:  Examined this patient is on BiPAP Neck: Supple, symmetrical, trachea midline, no adenopathy no carotid bruit and no JVD. Back:   Symmetric, no curvature. ROM normal. No CVA tenderness. Lungs:   Clear to auscultation bilaterally. Chest wall:  No tenderness or deformity. LifeVest in place. Heart:  Regular rate and rhythm, S1, S2 normal, no murmur, click, rub or gallop. Abdomen:   Soft, non-tender. Bowel sounds normal. No masses,  No organomegaly. Extremities: Extremities normal, atraumatic, no cyanosis or edema. Pulses: 2+ and symmetric all extremities. Skin: Skin color, texture, turgor normal. No rashes or lesions Neurologic: CNII-XII intact. EKG: Normal sinus rhythm Data Review:  
 
Recent Days: 
Recent Labs  
  12/28/20 2121 WBC 8.3 HGB 8.2* HCT 27.2*  
 Recent Labs  
  12/28/20 
2330 12/28/20 2121  133*  
K 5.6* 6.3*  
 103 CO2 22 18* * 61*  
BUN 35* 35* CREA 7.13* 7.51* CA 6.6* 7.8*  
MG  --  2.5* ALB  --  2.3* ALT  --  114* INR  --  3.4* No results for input(s): PH, PCO2, PO2, HCO3, FIO2 in the last 72 hours. 24 Hour Results: 
Recent Results (from the past 24 hour(s)) EKG, 12 LEAD, INITIAL Collection Time: 12/28/20  8:22 PM  
Result Value Ref Range Ventricular Rate 83 BPM  
 Atrial Rate 83 BPM  
 P-R Interval 282 ms QRS Duration 124 ms Q-T Interval 418 ms QTC Calculation (Bezet) 491 ms Calculated P Axis 116 degrees Calculated R Axis 83 degrees Calculated T Axis 91 degrees Diagnosis Sinus rhythm with 1st degree AV block When compared with ECG of 29-OCT-2020 13:57, 
premature ventricular complexes are no longer present QRS duration has decreased ST now depressed in Inferior leads ST now depressed in Anterior leads T wave amplitude has increased in Anterior leads Nonspecific T wave abnormality, worse in Lateral leads LACTIC ACID Collection Time: 12/28/20  9:21 PM  
Result Value Ref Range Lactic acid 4.3 (HH) 0.4 - 2.0 MMOL/L  
CBC WITH AUTOMATED DIFF Collection Time: 12/28/20  9:21 PM  
Result Value Ref Range WBC 8.3 3.6 - 11.0 K/uL RBC 2.79 (L) 3.80 - 5.20 M/uL HGB 8.2 (L) 11.5 - 16.0 g/dL HCT 27.2 (L) 35.0 - 47.0 % MCV 97.5 80.0 - 99.0 FL  
 MCH 29.4 26.0 - 34.0 PG  
 MCHC 30.1 30.0 - 36.5 g/dL  
 RDW 19.7 (H) 11.5 - 14.5 % PLATELET 864 392 - 198 K/uL MPV 10.8 8.9 - 12.9 FL  
 NRBC 0.7 (H) 0  WBC ABSOLUTE NRBC 0.06 (H) 0.00 - 0.01 K/uL NEUTROPHILS 90 (H) 32 - 75 % LYMPHOCYTES 4 (L) 12 - 49 % MONOCYTES 3 (L) 5 - 13 % EOSINOPHILS 0 0 - 7 % BASOPHILS 0 0 - 1 % IMMATURE GRANULOCYTES 3 (H) 0.0 - 0.5 % ABS. NEUTROPHILS 7.4 1.8 - 8.0 K/UL  
 ABS. LYMPHOCYTES 0.3 (L) 0.8 - 3.5 K/UL  
 ABS. MONOCYTES 0.3 0.0 - 1.0 K/UL  
 ABS. EOSINOPHILS 0.0 0.0 - 0.4 K/UL  
 ABS. BASOPHILS 0.0 0.0 - 0.1 K/UL  
 ABS. IMM. GRANS. 0.3 (H) 0.00 - 0.04 K/UL  
 DF SMEAR SCANNED    
 RBC COMMENTS ANISOCYTOSIS 1+ 
    
 RBC COMMENTS MACROCYTOSIS 1+ 
    
 RBC COMMENTS OVALOCYTES PRESENT 
    
METABOLIC PANEL, COMPREHENSIVE Collection Time: 12/28/20  9:21 PM  
Result Value Ref Range Sodium 133 (L) 136 - 145 mmol/L Potassium 6.3 (H) 3.5 - 5.1 mmol/L Chloride 103 97 - 108 mmol/L  
 CO2 18 (L) 21 - 32 mmol/L Anion gap 12 5 - 15 mmol/L Glucose 61 (L) 65 - 100 mg/dL BUN 35 (H) 6 - 20 MG/DL Creatinine 7.51 (H) 0.55 - 1.02 MG/DL  
 BUN/Creatinine ratio 5 (L) 12 - 20 GFR est AA 7 (L) >60 ml/min/1.73m2 GFR est non-AA 6 (L) >60 ml/min/1.73m2 Calcium 7.8 (L) 8.5 - 10.1 MG/DL Bilirubin, total 1.0 0.2 - 1.0 MG/DL  
 ALT (SGPT) 114 (H) 12 - 78 U/L  
 AST (SGOT) 317 (H) 15 - 37 U/L Alk. phosphatase 268 (H) 45 - 117 U/L Protein, total 7.6 6.4 - 8.2 g/dL Albumin 2.3 (L) 3.5 - 5.0 g/dL Globulin 5.3 (H) 2.0 - 4.0 g/dL A-G Ratio 0.4 (L) 1.1 - 2.2 PROTHROMBIN TIME + INR Collection Time: 12/28/20  9:21 PM  
Result Value Ref Range INR 3.4 (H) 0.9 - 1.1 Prothrombin time 33.2 (H) 9.0 - 11.1 sec D DIMER Collection Time: 12/28/20  9:21 PM  
Result Value Ref Range D-dimer 9.73 (H) 0.00 - 0.65 mg/L FEU NT-PRO BNP Collection Time: 12/28/20  9:21 PM  
Result Value Ref Range NT pro-BNP >35,000 (H) <125 PG/ML  
TROPONIN I Collection Time: 12/28/20  9:21 PM  
Result Value Ref Range Troponin-I, Qt. 0.89 (H) <0.05 ng/mL MAGNESIUM Collection Time: 12/28/20  9:21 PM  
Result Value Ref Range Magnesium 2.5 (H) 1.6 - 2.4 mg/dL PROCALCITONIN Collection Time: 12/28/20  9:21 PM  
Result Value Ref Range Procalcitonin 3.68 ng/mL SAMPLES BEING HELD Collection Time: 12/28/20  9:29 PM  
Result Value Ref Range SAMPLES BEING HELD 4BC(2BLU 2PURP) COMMENT Add-on orders for these samples will be processed based on acceptable specimen integrity and analyte stability, which may vary by analyte. CULTURE, BLOOD, PAIRED Collection Time: 12/28/20  9:29 PM  
 Specimen: Blood Result Value Ref Range Special Requests: NO SPECIAL REQUESTS Culture result: NO GROWTH AFTER 5 HOURS    
POC EG7 Collection Time: 12/28/20 10:23 PM  
Result Value Ref Range Calcium, ionized (POC) 0.97 (L) 1.12 - 1.32 mmol/L  
 pH (POC) 7.45 7.35 - 7.45    
 pCO2 (POC) 25.6 (L) 35.0 - 45.0 MMHG  
 pO2 (POC) 61 (L) 80 - 100 MMHG  
 HCO3 (POC) 17.7 (L) 22 - 26 MMOL/L Base deficit (POC) 6 mmol/L  
 sO2 (POC) 93 92 - 97 % Site RIGHT RADIAL Device: NASAL CANNULA Flow rate (POC) 2 L/M Allens test (POC) YES Specimen type (POC) ARTERIAL    
LACTIC ACID Collection Time: 12/28/20 11:30 PM  
Result Value Ref Range Lactic acid 1.9 0.4 - 2.0 MMOL/L  
METABOLIC PANEL, BASIC Collection Time: 12/28/20 11:30 PM  
Result Value Ref Range Sodium 137 136 - 145 mmol/L Potassium 5.6 (H) 3.5 - 5.1 mmol/L Chloride 108 97 - 108 mmol/L  
 CO2 22 21 - 32 mmol/L Anion gap 7 5 - 15 mmol/L Glucose 106 (H) 65 - 100 mg/dL BUN 35 (H) 6 - 20 MG/DL  Creatinine 7.13 (H) 0.55 - 1.02 MG/DL  
 BUN/Creatinine ratio 5 (L) 12 - 20    
 GFR est AA 7 (L) >60 ml/min/1.73m2 GFR est non-AA 6 (L) >60 ml/min/1.73m2 Calcium 6.6 (L) 8.5 - 10.1 MG/DL Imaging:  
 
Assessment:  
 
Active Problems: 
  Respiratory failure with hypoxia ([facility] [address] (12/29/2020) Ninfa Constantino is a 63 y.o. -American female with past medical history of non-insulin-dependent type 2 diabetes, paroxysmal A. fib on AC with warfarin, VT, history of MI, ESRD on HD, HFrEF, hypertension who is admitted for acute hypoxic respiratory failure secondary to Covid pneumonia and volume overload. Plan:  
 
 
Acute hypoxic respiratory failure 2/2 over 19 pneumonia 
-Follow Covid lab (SARS-Cov 2), RVP, Flu , LDH, CRP, 
-Patient with elevated D-dimer likely from Covid+ ESRD. INR of 3. Defer further AC 
-Obtain ECG for baseline QTC -491 
-Albuterol q4 Hrs PRN via Spacer 
-Given Supplemental O2 use, Start Dexamethasone 15mg IV  
-Continue BiPAP with supplemental oxygen to keep sats greater than 88% -Defer AC given warfarin use and therapeutic INR. Pharmacy to dose warfarin 
-Continuous pulse Oximetry and Telemetry Monitoring 
-Cefepime plus Doxy 
-Vitamin C 500-1000 mg q 6 hourly  
-Zinc  mg/day  
-Famotidine 40 mg BID  
-Melatonin 10 mg at night (the optimal dose is unknown) [50] -Vitamin D3 20 000  60 000 iu single oral dose. -ID on consult for RemdesiviR 
-Droplet PLUS precautions. -ID on consult; appreciate recs ESRD on HD 
-Patient is volume overloaded confounding on hypoxia from Covid pneumonia 
-Continue home fludrocortisone 
-Nephrology on consult for HD. Appreciate recs Atrial fibrillation/HFrEF/VT / NSTE-ACS 
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 
-Continue aspirin plus warfarin pharmacy to dose 
-Serial troponins 
-Hold Coreg given labile blood pressures 
-Patient currently with LifeVest with plans for VT ablation 
-Consult placed to cardiology for further evaluation of LifeVest 
-Repeat echocardiogram 
 -Cardiology on consult. Appreciate recs GERD 
-On Pepcid Iron deficiency anemia 
-Continue home Rx FEN/GI -p.o. hydration Activity -as tolerated DVT prophylaxis -warfarin GI prophylaxis -Pepcid Disposition -TBD CODE STATUS: Full code Critical Care Attestation: This patient is unstable and critically ill. Due to a high probability of clinically significant, life threatening deterioration, the patient required my highest level of preparedness to intervene emergently and I personally spent this critical care time directly and personally managing the patient, and was immediately available to the patient. This critical care time included obtaining a history; examining the patient; pulse oximetry; ordering and review of labs/studies; arranging urgent treatment with development of a management plan; evaluation of patient's response to treatment; frequent reassessment; and, discussions with other providers and/or family. This critical care time was performed to assess and manage the high probability of imminent, life-threatening deterioration that could result in multi-organ failure and death. Time Spent:  
 
I personally spent 70 minutes in providing critical care time. It was exclusive of separately billable procedures, treating other patients, and teaching time. Signed By: Yara Ingram MD   
 December 29, 2020

## 2020-12-29 NOTE — PROGRESS NOTES
Orders received, chart reviewed and patient evaluated by physical therapy. Pending progression with skilled acute physical therapy, recommend: 
Therapy 3 hours per day 5-7 days per week Recommend with nursing patient to complete as able in order to maintain strength, endurance and independence: OOB to chair 3x/day with 1 person SOHAIL SANABRIA. Thank you for your assistance. Full evaluation to follow.

## 2020-12-29 NOTE — PROGRESS NOTES
ID. Chart reviewed. Poor candidate for remdesivir due to ESRD and elevated transaminases. Defer to primary team to determine if benefits of remdesivir outweigh potential risks

## 2020-12-29 NOTE — ED TRIAGE NOTES
TRIAGE NOTE:   Patient arrives by EMS from [facility] with progressive shortness of breath x 5 hours. Patient reports feels like there is fluid on her lungs. Patient tested positive for COVID 19 today. Per EMS patient is on 2L nasal cannula at baseline and oxygen saturations mid 80s. Patient does have life vest on d/t history of vtach, which patient reports she has not changed the battery. Patient is on dialysis MWF, but reports has not had dialysis today.

## 2020-12-29 NOTE — CONSULTS
[facility] 
CONSULTATION 
 
Name:  MANDA GILLETTE 
MR#:  [id number] 
:  1960 
ACCOUNT #:  [id number] 
DATE OF SERVICE:  2020 
 
REFERRING PHYSICIAN:  Dr. Martin from the emergency department. 
 
REASON FOR CONSULTATION:  Provision of hemodialysis. 
 
HISTORY OF PRESENT ILLNESS:  The patient is well-known to me, a 60-year-old black woman, who is a hemodialysis patient of mine.  She is supposed to be dialyzed on Monday, Wednesday, and Friday at [facility].  She missed one treatment after she was discharged from [facility].  She presented to the emergency room with hyperkalemia, shortness of breath, oxygen desaturation.  The patient tested positive for COVID.  She was treated for COVID pneumonia.  The patient had one emergency dialysis treatment yesterday to address hypervolemia and hyperkalemia.  Currently, she is resting comfortably.  No new complaints reported. 
 
PAST MEDICAL HISTORY: 
1.  End-stage renal disease, on hemodialysis due to polycystic kidney disease. 
2.  Chronic systolic heart failure. 
3.  Chronic tachycardia. 
4.  GERD. 
5.  Hypertension. 
6.  Paroxysmal atrial fibrillation. 
 
MEDICATION LIST:  Prior to this hospitalization, 
1.  Florinef 0.1 once a day. 
2.  Coumadin 3 mg once a day. 
3.  Coreg 6.25. 
4.  Aspirin. 
5.  Prilosec. 
6.  Senna. 
7.  Sensipar 30 once a day. 
8.  Orexia one pill once a day. 
 
ALLERGIES:  IODINATED CONTRAST MEDIA. 
 
PAST SURGICAL HISTORY: 
1.  Placement of ICD pacemaker, LifeVest. 
2.  Placement of PD catheter. 
 
SOCIAL HISTORY:  The patient used to be independent prior to becoming ill two months ago.  She denies alcohol, tobacco, or illicit drug abuse.  She is full code.  She is single. 
 
 FAMILY HISTORY:  Brother with hypertension and diabetes. Mother with hypertension and osteoarthritis. Sister with hypertension and diabetes. Father with polycystic kidney disease and Parkinson disease. Family history positive for chronic kidney disease and polycystic kidney disease. REVIEW OF SYSTEMS:  As outlined in history of present illness. The patient complains of cough, fatigue, shortness of breath, but denies any other symptoms. PHYSICAL EXAMINATION: 
GENERAL:  Frail, thin, middle-aged black woman, who does not appear to be in acute distress. VITAL SIGNS:  Blood pressure is 130/100, heart rate is 97, oxygenation is 96 on nasal cannula with oxygen. NECK:  Supple. LUNGS:  Diminished breath sounds and coarse rhonchi. HEART:  S1 and S2 with regular rate and rhythm with systolic murmur. ABDOMEN:  Soft. EXTREMITIES:  With no edema. NEUROLOGIC:  Nonfocal. 
 
LABORATORY DATA:  Sodium 137; potassium is 5.5, down from 6.3; CO2 is 21; BUN is 39; creatinine is 7.5.  C-reactive protein 13.5. Hemoglobin 7.5, white blood count is 6. Chest x-ray on admission:  Left lower lobe opacification, multifocal bilateral airspace disease compatible with COVID pneumonia. IMPRESSION: 
1. End-stage renal disease, on hemodialysis. The patient presented with hyperkalemia, which now has resolved. 2.  COVID pneumonia. 3.  Advanced congestive heart failure. RECOMMENDATIONS: 
1. The patient will be dialyzed again tomorrow with full treatment. 2.  Resume home medications. 3.  Restart anemia management. Thank you very much for the opportunity to be a part of this patient's care. MD MICAELA [email or web address] 
D:  12/29/2020 14:38 T:  12/29/2020 18:21 
JOB #:  [id number] CC:  Mike Guajardo MD

## 2020-12-29 NOTE — PROGRESS NOTES
Problem: Self Care Deficits Care Plan (Adult) Goal: *Acute Goals and Plan of Care (Insert Text) Description:  
FUNCTIONAL STATUS PRIOR TO ADMISSION: Per patient, prior to recent and frequent hospitalizations, she was independent and active without use of DME.  
 
HOME SUPPORT: The patient lived with her mother who is 80years old and reports they \"take care of each other\" and that she also ahs a sister in area to provide assistance. Occupational Therapy Goals Initiated 12/29/2020 1. Patient will perform grooming sitting unsupported with supervision/set-up within 7 day(s). 2.  Patient will perform anterior neck to thigh bathing sitting unsupported with minimal assistance/contact guard assist within 7 day(s). 3.  Patient will perform lower body dressing with moderate assistance within 7 day(s). 4.  Patient will perform toilet transfers with minimal assistance/contact guard assist within 7 day(s). 5.  Patient will perform all aspects of toileting with moderate assistance  within 7 day(s). 6.  Patient will participate in upper extremity therapeutic exercise/activities with supervision/set-up for 5 minutes within 7 day(s). 7.  Patient will utilize energy conservation techniques during functional activities with verbal cues within 7 day(s). Outcome: Not Met OCCUPATIONAL THERAPY EVALUATION Patient: Eliz Galo (70 y.o. female) Date: 12/29/2020 Primary Diagnosis: Respiratory failure with hypoxia ([facility] [address] [J96.91] Precautions:  Fall, Other (comment)(droplet plus) ASSESSMENT Based on the objective data described below, the patient presents with limited ADL performance s/p admission for acute respiratory failure with hypoxia. Patient noted to be COVID+. Patient ADLs limited by impaired balance, generalized weakness, decreased functional activity tolerance, limited lower body access and decreased cardiopulmonary endurance. Patient recently at [facility] for rehab, prior to that, patient was IND with ADLs and and living with 80year old mother. Today, patient agreeable to bed level evaluation. Noted patient had been hypertensive prior to session, within range (110s/70s). Patient able to roll in bed with min A using rails. Patient unable to simulate tailor position in bed for donning/doffing socks. Patient able to self feed. Patient BUE ROM/strength and coordination WFL. Patient left in bed with call bell in reach, RN aware and all needs met. Will continue to follow, anticipate need for return to SNF. Current Level of Function Impacting Discharge (ADLs/self-care): up to max A for ADLs Functional Outcome Measure: The patient scored 35/100 on the Barthel index outcome measure which is indicative of moderate/severe deficits in ADLs. Other factors to consider for discharge: was IND prior to recent hospitalizations Patient will benefit from skilled therapy intervention to address the above noted impairments. PLAN : 
Recommendations and Planned Interventions: self care training, functional mobility training, therapeutic exercise, balance training, therapeutic activities, endurance activities, patient education, home safety training, and family training/education Frequency/Duration: Patient will be followed by occupational therapy 5 times a week to address goals. Recommendation for discharge: (in order for the patient to meet his/her long term goals) Therapy up to 5 days/week in SNF setting This discharge recommendation: Has not yet been discussed the attending provider and/or case management IF patient discharges home will need the following DME: bedside commode, transfer bench, and wheelchair SUBJECTIVE:  
Patient stated [place] you for your help.  OBJECTIVE DATA SUMMARY:  
HISTORY:  
Past Medical History:  
Diagnosis Date Anemia associated with chronic renal failure Anuria 2014 Arrhythmia Paroxysmal a fib, paroxsymal atrial tach, SVT Chronic kidney disease ESRD- on PD Chronic systolic heart failure ([address] 10/31/2012 Chronic tachycardia GERD (gastroesophageal reflux disease) Hx of non-ST elevation myocardial infarction (NSTEMI) 2011 Hypertension Lipoma of right lower extremity 1980 Foot Peritoneal dialysis catheter in place [facility]) 2014 Nightime exchanges Polycystic kidney disease 2014 Past Surgical History:  
Procedure Laterality Date HX HEART CATHETERIZATION  12/2015  
 no interventions HX PACEMAKER  12/29/2015 ICD IR INSERT NON TUNL CVC OVER 5 YRS  3/13/2019 [facility] EPHYS EVAL W/ABLATION SUPRAVENT ARRHYTHMIA N/A 10/1/2019 ABLATION SVT performed by Shira Helton MD at [address] [place] [address], [facility] Dr CATH LAB  
 VA INTRACARDIAC ELECTROPHYSIOLOGIC 3D [address] [facility] N/A 10/1/2019 Ep 3d Mapping performed by Shira Helton MD at [address] [place] [address], [facility] [address] CATH LAB  
 STIM/PACING HEART POST IV DRUG INFU N/A 10/1/2019 Drug Stimulation performed by Shira Helton MD at [address] [place] [address], [facility] [address] CATH LAB Expanded or extensive additional review of patient history:  
 
Home Situation Home Environment: Apartment # Steps to Enter: 14 
Rails to Enter: Yes Hand Rails : Right One/Two Story Residence: One story Living Alone: No 
Support Systems: Family member(s), Friends \ neighbors(mom- taking care of each other) Current DME Used/Available at Home: Walker, rolling, Grab bars Tub or Shower Type: Tub/Shower combination Hand dominance: Right EXAMINATION OF PERFORMANCE DEFICITS: 
 Cognitive/Behavioral Status: 
Neurologic State: Alert Orientation Level: Oriented X4 Cognition: Appropriate for age attention/concentration; Follows commands Perception: Appears intact Perseveration: No perseveration noted Safety/Judgement: Awareness of environment; Fall prevention Skin: appears grossly intact Edema: none noted in BUEs Hearing: 
  
 
Vision/Perceptual:   
Tracking: Able to track stimulus in all quadrants w/o difficulty Diplopia: No   
Acuity: Impaired near vision; Impaired far vision Corrective Lenses: Glasses Range of Motion: In [address] [place] [address] AROM: Generally decreased, functional 
PROM: Generally decreased, functional 
 
Strength: In [address] [place] [address] Road Strength: Generally decreased, functional 
 
Coordination: 
Coordination: Generally decreased, functional 
Fine Motor Skills-Upper: Left Intact; Right Intact Gross Motor Skills-Upper: Left Intact; Right Intact Tone & Sensation: In [address] [place] [address] Tone: Normal 
Sensation: Intact Balance: 
Sitting: Impaired; Without support Sitting - Static: Fair (occasional) Sitting - Dynamic: Fair (occasional) Standing: Impaired; With support Standing - Static: Fair Standing - Dynamic : Fair;Constant support Functional Mobility and Transfers for ADLs: 
Bed Mobility: 
Rolling: Minimum assistance Supine to Sit: Minimum assistance Sit to Supine: Moderate assistance Scooting: Minimum assistance Transfers:  Per PT Sit to Stand: Minimum assistance; Additional time Stand to Sit: Minimum assistance; Additional time ADL Assessment: 
Feeding: Setup Oral Facial Hygiene/Grooming: Setup Bathing: Moderate assistance Upper Body Dressing: Minimum assistance Lower Body Dressing: Moderate assistance Toileting: Moderate assistance ADL Intervention and task modifications: 
Educated on role of acute OT in hospital setting Lower Body Dressing Assistance Socks: Total assistance (dependent) Leg Crossed Method Used: Yes Position Performed: Supine Cues: Don;Doff Cognitive Retraining Safety/Judgement: Awareness of environment; Fall prevention Functional Measure: 
Barthel Index: 
 
Bathin Bladder: 5 Bowels: 5 Groomin Dressin Feeding: 10 Mobility: 0 Stairs: 0 Toilet Use: 0 Transfer (Bed to Chair and Back): 5 Total: 35/100 The Barthel ADL Index: Guidelines 1. The index should be used as a record of what a patient does, not as a record of what a patient could do. 2. The main aim is to establish degree of independence from any help, physical or verbal, however minor and for whatever reason. 3. The need for supervision renders the patient not independent. 4. A patient's performance should be established using the best available evidence. Asking the patient, friends/relatives and nurses are the usual sources, but direct observation and common sense are also important. However direct testing is not needed. 5. Usually the patient's performance over the preceding 24-48 hours is important, but occasionally longer periods will be relevant. 6. Middle categories imply that the patient supplies over 50 per cent of the effort. 7. Use of aids to be independent is allowed. Frederica Del., Barthel, D.W. (0484). Functional evaluation: the Barthel Index. [address] [facility] (14)2. Amelie Avery benjamín SELINA Manzo, Myrla Holter., Jax Hernandez.[facility], [address] [place] [address] (). Measuring the change indisability after inpatient rehabilitation; comparison of the responsiveness of the Barthel Index and Functional [place] Measure. Journal of Neurology, Neurosurgery, and Psychiatry, 66(4), 735-329. Antonia Mancuso, N.J.A, LARRY Viramontes, & [place] Cummings MBasilioA. (2004.) Assessment of post-stroke quality of life in cost-effectiveness studies: The usefulness of the Barthel Index and the EuroQoL-5D. [facility], 13 194- Occupational Therapy Evaluation Charge Determination History Examination Decision-Making LOW Complexity : Brief history review  LOW Complexity : 1-3 performance deficits relating to physical, cognitive , or psychosocial skils that result in activity limitations and / or participation restrictions  MEDIUM Complexity : Patient may present with comorbidities that affect occupational performnce. Miniml to moderate modification of tasks or assistance (eg, physical or verbal ) with assesment(s) is necessary to enable patient to complete evaluation Based on the above components, the patient evaluation is determined to be of the following complexity level: MEDIUM Pain Rating: 
Reporting no pain Activity Tolerance:  
Fair After treatment patient left in no apparent distress:   
Supine in bed, Call bell within reach, Side rails x 3, and RN aware COMMUNICATION/EDUCATION:  
The patients plan of care was discussed with: Physical therapist and Registered nurse. Home safety education was provided and the patient/caregiver indicated understanding. and Patient/family have participated as able in goal setting and plan of care. This patients plan of care is appropriate for delegation to [facility]. Thank you for this referral. 
Nicho Fuller, OT Time Calculation: 21 mins

## 2020-12-29 NOTE — ED NOTES
Discharge and follow up discussed by the physician. VSS and GCS 15 at time of discharge. Patient ambulatory to the exit with steady gait.

## 2020-12-30 NOTE — WOUND CARE
WOCN Note:  
 
New consult for sacrum. Chart shows: 
Admitted for Covid-19+ History of HD Assessment:  
Appropriately conversational, continent, and ambulatory. Claims she fell at home about 2 weeks ago and injured buttocks. Bed: [facility]acare with foam mattress Bilateral heels intact with dry skin and dark callous. Cream applied. POA, right medial buttock wound = 0.6 x 0.6 x 0.2 cm  100% moist pink. No exudate or periwound redness. Hydrocolloid applied. Wound Recommendations:   
Right buttock:  Please maintain hydrocolloid up to one week or change as needed with soiling or rolled edges. PI Prevention: 
Turn/reposition approximately every 2 hours Offload heels with pillows at all times in bed. Discussed with RN. Transition of Care: Plan to follow weekly and as needed while admitted to hospital.   
 
KATT AlexanderN, RN, [facility] Certified Wound, Ostomy, Continence Nurse 
office [phone] 
pager [phone] or call  to page

## 2020-12-30 NOTE — ROUTINE PROCESS
Occupational Therapy [phone] -  
12.30.2020 Chart reviewed in prep for OT, discussed patient case with PT who reports patient only got to the floor from the ED early this morning and is significantly fatigued this afternoon. Will defer and f/u tomorrow for therapy as able and appropriate. Thank you. Recommend with nursing patient to complete as able in order to maintain strength, endurance and independence: ADLs with supervision/setup, once Egress Test completed OOB to chair 3x/day and mobilizing to the bathroom for toileting with 1 assist. Thank you for your assistance. Cinthya Timmons MS, OTR/L

## 2020-12-30 NOTE — PROGRESS NOTES
TRANSFER - OUT REPORT: 
 
Verbal report given to nurse to care for patient going to room 409 on IMCU on [address]  being transferred to [facility]) for routine progression of care Report consisted of patients Situation, Background, Assessment and  
Recommendations(SBAR). Information from the following report(s) SBAR, Kardex and Recent Results was reviewed with the receiving nurse. Lines:  
Peripheral IV 12/29/20 Anterior; Left External jugular (Active) Opportunity for questions and clarification was provided. Patient transported with: 
 Monitor. oxygen

## 2020-12-30 NOTE — PROGRESS NOTES
Name: Denia Siddiqi MRN: [id number] : 1960 Hospital: . [address] Date: 2020 IMPRESSION:  
· ESRD on HD. TTS schedule · Hyperkalemia- resolved · COVID , poor candidate for remdesivir. · CHF with advance cardiomyopathy · Anemia of ESRD PLAN:  
· HD as per established schedule · Anemia management · May use a PICC line for AB's · Prognosis is guarded at best.  
 
Subjective/Interval History:  
I have reviewed the flowsheet and previous days notes. ROS:Review of systems not obtained due to patient factors. Objective:  
Vital Signs:   
Visit Vitals /76 (BP 1 Location: Left arm, BP Patient Position: At rest) Pulse 70 Temp 97.6 °F (36.4 °C) Resp 19 Ht 5' 5\" (1.651 m) Wt 52.7 kg (116 lb 3.2 oz) SpO2 97% BMI 19.34 kg/m² O2 Device: Nasal cannula O2 Flow Rate (L/min): 2 l/min Temp (24hrs), Av °F (36.7 °C), Min:97.6 °F (36.4 °C), Max:98.8 °F (37.1 °C) Intake/Output:  
Last shift:      No intake/output data recorded. Last 3 shifts:  1901 -  0700 In: -  
Out: 2500 No intake or output data in the 24 hours ending 20 1511 Physical Exam: 
General:    Lethargis, wakes up. NAD, looks ill. Head:   Normocephalic, without obvious abnormality, atraumatic. Eyes:   Conjunctivae/corneas clear. Nose:  Nares normal. No drainage or sinus tenderness. Throat:    Lips, mucosa, and tongue normal.  No Thrush Neck:  Supple, symmetrical,  no adenopathy, thyroid: non tender 
  no carotid bruit and no JVD. Lungs:   Diminished to auscultation bilaterally. No Wheezing , + Rhonchi. No rales. Chest wall:  No tenderness or deformity. No Accessory muscle use. Heart:   Regular rate and rhythm. + SM Abdomen:   Soft, non-tender. Not distended. Bowel sounds normal. No masses Extremities: Extremities normal, atraumatic, No cyanosis. No edema. No clubbing Skin:     Texture, turgor normal. No rashes or lesions. Not Jaundiced Psych:  Fair insight. Not depressed. Not anxious or agitated. Neurologic: Weak, sleepy. DATA: 
Labs: 
Recent Labs 12/30/20 
4958 12/29/20 
0528 12/28/20 
2330 12/28/20 2121 * 137 137 133* K 4.2 5.5* 5.6* 6.3*  
 104 108 103 CO2 22 21 22 18*  
BUN 29* 39* 35* 35* CREA 4.79* 7.54* 7.13* 7.51* CA 7.9* 7.1* 6.6* 7.8* ALB  --  2.2*  --  2.3*  
PHOS  --  4.5  --   --   
MG  --  2.2  --  2.5* Recent Labs 12/30/20 
1218 12/30/20 
0323 12/29/20 
2324 12/29/20 
0528 12/29/20 
0528 12/28/20 2121 WBC  --  5.2  --   --  6.0 8.3 HGB 7.4* 8.4* 7.9*   < > 7.5* 8.2* HCT 24.5* 28.4* 25.4*   < > 25.2* 27.2*  
PLT  --  143*  --   --  139* 186  
 < > = values in this interval not displayed. No results for input(s): ROMAN, KU, CLU, CREAU in the last 72 hours. No lab exists for component: PROU Total time spent with patient:  35 minutes 
  [] Critical Care Provided Care Plan discussed with: 
 Staff, Medical Team 
 
Barb Jovel MD

## 2020-12-30 NOTE — PROGRESS NOTES
Comprehensive Nutrition Assessment Type and Reason for Visit: Initial, Wound Nutrition Recommendations/Plan: 1. Modify diet to regular, low K+, JEFF diet. Double portions. (Renal diet restricts protein) 2. Added ONS Magic Cup BID with meals 3. Added PM snack Malnutrition Assessment: 
Malnutrition Status: Moderate malnutrition Context:  Acute illness Findings of the 6 clinical characteristics of malnutrition:  
Energy Intake:  1 - 75% or less of est energy req for 7 or more days Weight Loss:  7.0 - Greater than 5% over 1 month Body Fat Loss:  Unable to assess, Muscle Mass Loss:  Unable to assess, Fluid Accumulation:  No significant fluid accumulation,   
 Strength:  Not performed Nutrition Assessment:    
63 y.o. female who has a past medical history of Anemia associated with chronic renal failure, Chronic systolic heart failure, Chronic tachycardia, GERD, non-ST elevation myocardial infarction, Hypertension, Lipoma of right lower extremity, and ESRD on HD. MD notes type 2 DM, but not part of listed PMHx and not on medication PTA. Admitted with Respiratory failure with hypoxia ([facility] [address] [J96.91] d/t COVID-19 PNA, volume overload confounding on hypoxia. BPA received for wounds. Spoke with pt via phone. Diet just advanced to renal.  Pt reports wound care in room now. She c/o poor appetite/ hasn't been hungry which has been going on for several weeks now. Tries to eat at all meals, but unable to finish (~50-75%).  lb, which she did weigh on admission. However, today is down 116 lb. Unsure of accuracy of admission wt, but noted to be in volume overload. Did receive HD yesterday and plans to receive again today  On 2 L NC and saturating well. Pt states she doesn't like Ensure and Nepro suppplements. She is willing to try Magic Cup (orange cream). She is also receptive to have a snack between meals (chicken salad sandwich). She also requested bigger portions in general.  She tells me her problem is she doesn't like anything \"too healthy\", but when I asked her to tell me foods she likes she listed: pastas, green beans, asparagus, salads in general, chicken, and beef. Dislikes tuna. Reinforced that these are all healthy foods to eat and encouraged. Will modify diet to regular, low K+ as does not need protein restriction that comes with renal diet. Monitor Phos levels. Given wt loss and reported intake 75% or less for several weeks now, pt meets criteria for acute, moderate protein-calorie malnutrition. Malnutrition Assessment: 
Malnutrition Status: Moderate malnutrition Context:  Acute illness Findings of the 6 clinical characteristics of malnutrition:  
Energy Intake:  1 - 75% or less of est energy req for 7 or more days Weight Loss:  7.0 - Greater than 5% over 1 month Body Fat Loss:  Unable to assess, Muscle Mass Loss:  Unable to assess, Fluid Accumulation:  No significant fluid accumulation,   
 Strength:  Not performed Nutritionally Significant Medications:  
Vit C, Nephrocap, cefepime, Vit D3, Decadron, Vibramycin, Mexitil, Warfarin, Zincate To start: Retacrit, Protonix PRN: Imodium, Miralax, Senokot Estimated Daily Nutrient Needs: 
Energy (kcal): 2256-4217(30-35 kcal/kg) Protein (g): 80+(1.5+ gm/kg) Fluid (ml/day): 500 mL + OP Nutrition Related Findings:  
Edema: none Last BM: 12/28 Wounds:   
Stage II, Pressure injury, Wound consult pending Current Nutrition Therapies: DIET SNACKS PM Snack DIET NUTRITIONAL SUPPLEMENTS Lunch, Dinner; Shyanne-Cortes DIET REGULAR 3-4 GM (JEFF); Low Potassium, Double Portions Meal intake: No data found. Anthropometric Measures: · Height:  5' 5\" (165.1 cm) · Current Body Wt:  52.7 kg (116 lb 2.9 oz) · Admission Body Wt:  129 lb 6.6 oz(58.7 kg) · Usual Body Wt:  58.1 kg (128 lb)(128 lb) · Ideal Body Wt:  125 lbs:  92.9 % · BMI Category:  Normal weight (BMI 18.5-24. 9) Wt Readings from Last 20 Encounters:  
12/30/20 52.7 kg (116 lb 3.2 oz)  
11/16/20 58.8 kg (129 lb 9.6 oz) 10/29/20 58.5 kg (129 lb)  
09/30/20 59 kg (130 lb)  
07/23/20 60.8 kg (134 lb)  
07/17/20 62.3 kg (137 lb 6.4 oz) 10/30/19 60.8 kg (134 lb) 10/02/19 59.5 kg (131 lb 2.8 oz) 09/06/19 60.1 kg (132 lb 9.6 oz)  
06/27/19 59.9 kg (132 lb) 04/12/19 55.8 kg (123 lb)  
03/28/19 56.2 kg (123 lb 12.8 oz) 03/19/19 62.3 kg (137 lb 5.6 oz) 02/18/19 58.2 kg (128 lb 3.2 oz) 02/08/19 56.6 kg (124 lb 12.8 oz) 12/17/18 56 kg (123 lb 6.4 oz) 12/03/18 56.5 kg (124 lb 9 oz)  
11/26/18 55.3 kg (121 lb 14.6 oz) 11/01/18 53.4 kg (117 lb 12.8 oz) 10/22/18 52.2 kg (115 lb) Nutrition Diagnosis:  
· Inadequate oral intake related to impaired respiratory function, early satiety as evidenced by poor intake prior to admission, weight loss · Increased nutrient needs related to increased demand for energy/nutrients as evidenced by wounds, dialysis Nutrition Interventions:  
Food and/or Nutrient Delivery: Start oral nutrition supplement, Modify current diet, Snacks (specify) Nutrition Education and Counseling: No recommendations at this time Coordination of Nutrition Care: Continue to monitor while inpatient, Interdisciplinary rounds Goals: 
PO >/=50% of most meals with at least 1 ONS daily within 5-7 days Nutrition Monitoring and Evaluation:  
Behavioral-Environmental Outcomes: None identified Food/Nutrient Intake Outcomes: Diet advancement/tolerance Physical Signs/Symptoms Outcomes: Biochemical data, GI status, Hemodynamic status, Weight, Skin, Chewing or swallowing Discharge Planning: Too soon to determine Recent Labs 12/30/20 
0027 12/29/20 
0528 12/28/20 
2330 12/28/20 
2121 GLU 68 101* 106* 61*  
BUN 29* 39* 35* 35* CREA 4.79* 7.54* 7.13* 7.51* * 137 137 133* K 4.2 5.5* 5.6* 6.3*  
 104 108 103 CO2 22 21 22 18* CA 7.9* 7.1* 6.6* 7.8* PHOS  --  4.5  --   --   
MG  --  2.2  --  2.5* Recent Labs  
  12/29/20 
0528 12/28/20 
2121 * 114* * 268* TBILI 0.7 1.0 TP 7.2 7.6 ALB 2.2* 2.3*  
GLOB 5.0* 5.3* Recent Labs  
  12/28/20 
2330 12/28/20 
2121 LAC 1.9 4.3* Recent Labs 12/30/20 
1218 12/30/20 
6609 12/29/20 
0528 12/29/20 
2924 WBC  --  5.2  --  6.0 HGB 7.4* 8.4*   < > 7.5* HCT 24.5* 28.4*   < > 25.2*  
PLT  --  143*  --  139*  
 < > = values in this interval not displayed. No results for input(s): PREALB in the last 72 hours. No results for input(s): TRIGL in the last 72 hours. Recent Labs 12/30/20 
9209 GLUCPOC 94 Lab Results Component Value Date/Time Hemoglobin A1c 5.4 03/16/2019 02:56 AM  
 
 
Iron Profile Iron Date Value Ref Range Status 12/29/2020 33 (L) 35 - 150 ug/dL Final  
03/18/2019 52 35 - 150 ug/dL Final  
08/27/2018 30 (L) 35 - 150 ug/dL Final  
10/19/2013 70 35 - 150 ug/dL Final  
 
TIBC Date Value Ref Range Status 12/29/2020 149 (L) 250 - 450 ug/dL Final  
03/18/2019 160 (L) 250 - 450 ug/dL Final  
08/27/2018 208 (L) 250 - 450 ug/dL Final  
10/19/2013 333 250 - 450 ug/dL Final  
 
Iron % saturation Date Value Ref Range Status 12/29/2020 22 20 - 50 % Final  
03/18/2019 33 20 - 50 % Final  
08/27/2018 14 (L) 20 - 50 % Final  
10/19/2013 21 20 - 50 % Final  
 
 
Ferritin Date Value Ref Range Status 10/19/2013 78 8 - 252 NG/ML Final  
 
 
B Vitamins Folate Date Value Ref Range Status 03/12/2019 19.3 5.0 - 21.0 ng/mL Final  
 
Vitamin B12 Date Value Ref Range Status 03/12/2019 1,173 (H) 193 - 986 pg/mL Final  
 
 
Zinc 
No results found for: [employer] Copper No results found for: COSLT Selenium No results found for: SELNLT Fat Soluble Vitamins No results found for: Odalis Sarmiento, VITE1T, VITEG, VIK1LT Suzanna Mckeon RD Available via [employer] Or Pager# [phone]

## 2020-12-30 NOTE — PROGRESS NOTES
HAKAN: 
 
RUR 26% Disposition:  [facility]. She was there for SNF rehab following an admission at [address] left message for admissions at [facility] to confirm that patient was there for rehab. CM sent referral via [facility] for patient to return. Dialysis M/W/F at [facility] Contact: sister Sierra ([phone]) Care Management Interventions PCP Verified by CM: Yes Mode of Transport at Discharge: (Sister will transport) MyChart Signup: Yes Discharge Durable Medical Equipment: No 
Physical Therapy Consult: Yes Occupational Therapy Consult: Yes Speech Therapy Consult: No 
Current Support Network: Other(Patient lives alone but her mother stays with her often.) Discharge Location Discharge Placement: Skilled nursing facility Reason for Admission:   SOB Covid positive RUR Score:    26% PCP: First and Last name:  Arleth Collins Name of Practice:  
 Are you a current patient: Yes/No:  
 Approximate date of last visit: 1 month ago Can you participate in a virtual visit if needed: Yes Do you (patient/family) have any concerns for transition/discharge? Patient will go back to [facility] SNF rehab Plan for utilizing home health:    
 
Current Advanced Directive/Advance Care Plan:   
        None Transition of Care Plan:      
 
CM spoke with patient's sister, Donna Nunez to introduce CM role, verify demographics and begin discharge planning. Patient lives alone in an apartment but her mother stays with her often. She is independent with ADLs and uses a walker. Patient gets prescriptions filled at [facility]. Patient was at [facility] for SNF rehab prior to admission. Insurance verified: [facility] Medicare A&B and [employer]. BLS transport at discharge. Jacy Maya, RN/CRM

## 2020-12-30 NOTE — PROGRESS NOTES
Spoke with primary nurse regarding concerns for pt's high INR, nurse states that he was able to get an IV and lab work for another INR and that PICC could wait for now, primary nurse will speak with physician

## 2020-12-30 NOTE — PROGRESS NOTES
Pharmacist Note  Warfarin Dosing Consult provided for this 63 y. o.female to manage warfarin for Atrial Fibrillation INR Goal: 2 - 3 Home regimen/ tablet size: 3 mg SMWFS, 6 mg TTH Drugs that may increase INR: Amiodarone Drugs that may decrease INR: None Other current anticoagulants/ drugs that may increase bleeding risk: None Risk factors: None Daily INR ordered: YES Recent Labs 12/30/20 
1218 12/30/20 
8726 12/29/20 
2324 12/29/20 
0736 12/29/20 
0736 12/28/20 
2121 12/28/20 2121 HGB 7.4* 8.4* 7.9*   < >  --    < > 8.2* INR 3.4*  --   --   --  3.8*  --  3.4*  
 < > = values in this interval not displayed. Date               INR                  Dose 
12/28            3.4  
12/29            3.8                   Held 
12/30              3.4                   Hold Assessment/ Plan: Will continue to hold warfarin for supratherapeutic INR. Pharmacy will continue to monitor daily and adjust therapy as indicated. Please contact the pharmacist at   for outpatient recommendations if needed.   
  
Sary Alejandro, KristiD, BCPS

## 2020-12-30 NOTE — PROGRESS NOTES
Chart reviewed and pt cleared by nursing, pt declined PT this pm secondary to fatigue - just needing to rest - pt reports up to [facility] x 2 earlier - pt requesting PT return 12/31. Will continue to follow - Mckinley Alejandre, PT

## 2020-12-30 NOTE — ACP (ADVANCE CARE PLANNING)
CM unable to reach patient in room. CM spoke to sister, Samantha David for initial assessment. She prefers for patient to answer ACP questions. Tsering Cummings, RN/CRM

## 2020-12-30 NOTE — PROGRESS NOTES
Hospitalist Progress Note NAME: Brittany Brown :  1960 MRN:  [id number] Assessment / Plan: 
Acute hypoxic respiratory failure 2/2 over 19 pneumonia: 
-Follow Covid lab (SARS-Cov 2): Positive for COVID-19, RVP: Negative, Flu , LDH, CRP. 
-Patient with elevated D-dimer likely from Covid+ ESRD. INR of 3. Defer further AC 
-Obtain ECG for baseline QTC -491 
-Albuterol q4 Hrs PRN via Spacer 
-Given Supplemental O2 use, continue dexamethasone 6 mg/day 
-On oxygen by nasal cannula 
-Defer AC given warfarin use and therapeutic INR. Pharmacy to dose warfarin: Hemoglobin remains a stable. Testing for FOBT. Results pending. 
-Continuous pulse Oximetry and Telemetry Monitoring 
-Cefepime plus Doxy 
-Vitamin C 500-1000 mg q 6 hourly  
-Zinc  mg/day  
-Famotidine 40 mg BID  
-Melatonin 10 mg at night (the optimal dose is unknown) [50] -Vitamin D3 20 000  60 000 iu single oral dose. -Droplet PLUS precautions. -ID on consult; appreciate recs. ID evaluated. 
  
ESRD on HD: Nephrology following 
-Patient is volume overloaded confounding on hypoxia from Covid pneumonia 
 
  
Atrial fibrillation/HFrEF/VT / NSTE-ACS 
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 
-Continue aspirin plus warfarin pharmacy to dose 
-Serial troponins 
-Hold Coreg given labile blood pressures 
-Patient currently with LifeVest with plans for VT ablation 
-Consult placed to cardiology for further evaluation of LifeVest 
-Repeat echocardiogram.  Echocardiogram 2019 shows severely decreased systolic function. 
-Cardiology consulted. 
  
GERD 
-On Pepcid 
  
Iron deficiency anemia 
-Continue home Rx 
  
  
  
FEN/GI -p.o. hydration Activity -as tolerated DVT prophylaxis -warfarin GI prophylaxis -Pepcid Disposition -TBD 
  
CODE STATUS: Full code 
  
  
   
 
Subjective: Chief Complaint / Reason for Physician Visit \"Patient reports feeling better today\". Discussed with RN events overnight. Review of Systems: 
Symptom Y/N Comments  Symptom Y/N Comments Fever/Chills    Chest Pain Poor Appetite    Edema Cough    Abdominal Pain Sputum    Joint Pain SOB/HENDERSON    Pruritis/Rash Nausea/vomit    Tolerating PT/OT Diarrhea    Tolerating Diet Constipation    Other Could NOT obtain due to:   
 
Objective: VITALS:  
Last 24hrs VS reviewed since prior progress note. Most recent are: 
Patient Vitals for the past 24 hrs: 
 Temp Pulse Resp BP SpO2  
12/30/20 1133     100 % 12/30/20 0805     100 % 12/30/20 0745 97.6 °F (36.4 °C) 69 19 120/83 100 % 12/30/20 0518 97.8 °F (36.6 °C) 76 16 124/87 98 % 12/30/20 0402     98 % 12/30/20 0043     98 % 12/29/20 2341     94 % 12/29/20 2056 98.8 °F (37.1 °C) 80 17 112/73 96 % 12/29/20 2012     98 % 12/29/20 1802  76 19  96 % 12/29/20 1700  76 17 110/78 97 % 12/29/20 1500  75 15 116/83 97 % 12/29/20 1346  76 21 (!) 130/108 96 % 12/29/20 1330  80 24 117/87 91 % No intake or output data in the 24 hours ending 12/30/20 1138 I had a face to face encounter and independently examined this patient on 12/30/2020, as outlined below: PHYSICAL EXAM: 
General: WD, WN. Alert, cooperative, no acute distress EENT:  EOMI. Anicteric sclerae. MMM Resp:  CTA bilaterally, no wheezing or rales. No accessory muscle use CV:  Regular  rhythm,  No edema GI:  Soft, Non distended, Non tender. +Bowel sounds Neurologic:  Alert and oriented X 3, normal speech, Psych:   Good insight. Not anxious nor agitated Skin:  No rashes. No jaundice Reviewed most current lab test results and cultures  YES Reviewed most current radiology test results   YES Review and summation of old records today    NO Reviewed patient's current orders and MAR    YES 
PMH/SH reviewed - no change compared to H&P 
________________________________________________________________________ Care Plan discussed with: 
 Comments Patient Family RN Care Manager Consultant Multidiciplinary team rounds were held today with , nursing, pharmacist and clinical coordinator. Patient's plan of care was discussed; medications were reviewed and discharge planning was addressed. ________________________________________________________________________ Total NON critical care TIME: 35  Minutes Total CRITICAL CARE TIME Spent:   Minutes non procedure based Comments >50% of visit spent in counseling and coordination of care    
________________________________________________________________________ Salvador Cano MD  
 
Procedures: see electronic medical records for all procedures/Xrays and details which were not copied into this note but were reviewed prior to creation of Plan. LABS: 
I reviewed today's most current labs and imaging studies. Pertinent labs include: 
Recent Labs 12/30/20 
0317 12/29/20 
2324 12/29/20 
1616 12/29/20 
0528 12/28/20 
2121 WBC 5.2  --   --  6.0 8.3 HGB 8.4* 7.9* 7.1* 7.5* 8.2* HCT 28.4* 25.4* 23.8* 25.2* 27.2*  
*  --   --  139* 186 Recent Labs 12/30/20 
1671 12/29/20 
9923 12/29/20 
0528 12/28/20 
2330 12/28/20 
2121 *  --  137 137 133* K 4.2  --  5.5* 5.6* 6.3*  
  --  104 108 103 CO2 22  --  21 22 18* GLU 68  --  101* 106* 61*  
BUN 29*  --  39* 35* 35* CREA 4.79*  --  7.54* 7.13* 7.51* CA 7.9*  --  7.1* 6.6* 7.8*  
MG  --   --  2.2  --  2.5* PHOS  --   --  4.5  --   --   
ALB  --   --  2.2*  --  2.3* TBILI  --   --  0.7  --  1.0 ALT  --   --  149*  --  114* INR  --  3.8*  --   --  3.4* Signed: Salvador Cano MD

## 2020-12-30 NOTE — PROGRESS NOTES
Bedside shift change report given to Yamileth Georges RN (oncoming nurse) by Loreta Vincent RN (offgoing nurse). Report included the following information SBAR, Kardex, ED Summary, Recent Results and Cardiac Rhythm NSR. TRANSFER - IN REPORT: 
  
Verbal report received from SLAVA MANCINI  on Wade Garcia  being received from ED (unit) for routine progression of care . Information from the following report(s) SBAR, Kardex, ED Summary and Cardiac Rhythm NSR was reviewed with the receiving nurse. 0518 hrs: Patient arrived to unit via stretcher. Patient pulled up in bed, vital signs obtained, call bell within reach. Pt oriented to room, call bell use, and plan of care. Opportunity for questions and clarification was provided. Primary Nurse Livia Ayers RN performed a dual skin assessment on this patient Impairment noted- see wound doc flow sheet. Maurisio score is 20. Assessment completed upon patients arrival to unit and care assumed.

## 2020-12-31 NOTE — PROGRESS NOTES
Pharmacist Note  Warfarin Dosing Consult provided for this 63 y. o.female to manage warfarin for Atrial Fibrillation INR Goal: 2 - 3 Home regimen/ tablet size: 3 mg S/M/W/F/S, 6 mg T/Th 
 
Drugs that may increase INR: Amiodarone Drugs that may decrease INR: None Other current anticoagulants/ drugs that may increase bleeding risk: None Risk factors: None Daily INR ordered: YES Recent Labs 12/31/20 
0510 12/30/20 
1218 12/30/20 
5049 12/29/20 
0736 12/29/20 
8504 HGB 7.6* 7.4* 8.4*   < >  --   
INR 4.6* 3.4*  --   --  3.8*  
 < > = values in this interval not displayed. Date               INR                  Dose 
12/28            3.4  
12/29            3.8                   Held  
12/30              3.4                   Held 
12/31  4.6  HOLD Assessment/ Plan: Will continue to hold warfarin for supratherapeutic INR. Pharmacy will continue to monitor daily and adjust therapy as indicated. Please contact the pharmacist at   for outpatient recommendations if needed. Kalina Dillard, PharmD Clinical Pharmacist, Orthopedics and Med/Surg () [facility] ()

## 2020-12-31 NOTE — PROGRESS NOTES
2005: Bedside shift change report given to Francisca Arauz RN (oncoming nurse) by Britt Mathew RN (offgoing nurse). Report included the following information SBAR, Kardex, Intake/Output, MAR, Recent Results, Med Rec Status and Cardiac Rhythm NSR, 1st degree AVB, BBB.

## 2020-12-31 NOTE — PROGRESS NOTES
ABG done on ROOM AIR 
 
PH       7.53 
CO2     31 
O2        42 HCO3   26 BE         3 SATS    84% Put back on NC 4 lpm (pt has Home O2 2lpm) Pt keeps pulling her oxygen off even after education.

## 2020-12-31 NOTE — PROGRESS NOTES
0015: Bedside shift change report given to Bre Tolbert (oncoming nurse) by Cecelia Barrett (offgoing nurse). Report included the following information SBAR, ED Summary, Intake/Output, Recent Results and Cardiac Rhythm Sinus with 1degree AVB.

## 2020-12-31 NOTE — PROGRESS NOTES
Spiritual Care Assessment/Progress Note [address] 
 
 
NAME: Brianne Alethea      MRN: [id number] AGE: 63 y.o. SEX: female Adventism Affiliation: [employer]ybeterian  
Language: English  
 
12/31/2020     Total Time (in minutes): 10 Spiritual Assessment begun in [facility] IMCU through conversation with: 
  
    []Patient        [] Family    [] Friend(s) Reason for Consult: Palliative Care, Initial/Spiritual Assessment Spiritual beliefs: (Please include comment if needed) 
   [] Identifies with a brianne tradition:     
   [] Supported by a brianne community:        
   [] Claims no spiritual orientation:       
   [] Seeking spiritual identity:            
   [] Adheres to an individual form of spirituality:       
   [x] Not able to assess:                   
 
    
Identified resources for coping:  
   [] Prayer                           
   [] Music                  [] Guided Imagery 
   [] Family/friends                 [] Pet visits [] Devotional reading                         [x] Unknown 
   [] Other:                                        
 
 
Interventions offered during this visit: (See comments for more details) Patient Interventions: Initial visit Plan of Care: 
 
 [] Support spiritual and/or cultural needs  
 [] Support AMD and/or advance care planning process    
 [] Support grieving process 
 [] Coordinate Rites and/or Rituals  
 [] Coordination with community clergy [] No spiritual needs identified at this time 
 [] Detailed Plan of Care below (See Comments)  [] Make referral to Music Therapy 
[] Make referral to Pet Therapy    
[] Make referral to Addiction services 
[] Make referral to [facility] 
[] Make referral to Spiritual Care Partner 
[] No future visits requested       
[x] Follow up visits as needed Attempted to visit pt for initial spiritual assessment. Unable to enter pt's room due to COVID-19 isolation precautions. Chaplain Alvarez MDiv, MS, [place] [phone] BRANDY ([id number])

## 2020-12-31 NOTE — DIALYSIS
[facility]  [phone] Vitals   Pre   Post   Assessment   Pre   Post    
Temp  Temp: 98.2 °F (36.8 °C) (12/31/20 0300)  98.4 LOC  A+Ox3, very lethargic, hard to arouse same HR   Pulse (Heart Rate): 77 (12/31/20 0300) 76 Lungs   Diminished/course through out bases  same B/P   BP: 112/73 (12/31/20 0300) 119/79 Cardiac   NSR  same Resp   Resp Rate: 25 (12/31/20 0300) 21 Skin   Warm/dry  same Pain level  Pain Intensity 1: 0 (12/30/20 2000) 0 Edema Generalized 
 same Orders: Duration:   Start:    0300 End:    0630 Total:   3.5hr  
Dialyzer:   Dialyzer/Set Up Inspection: Shasta Coon (12/31/20 0300) K Bath:   Dialysate K (mEq/L): 2 (12/31/20 0300) Ca Bath:   Dialysate CA (mEq/L): 2.0 (12/31/20 0300) Na/Bicarb:   Dialysate NA (mEq/L): 140 (12/31/20 0300) Target Fluid Removal:   Goal/Amount of Fluid to Remove (mL): 1000 mL (12/31/20 0300) Access Type & Location:   R-IJ-W/ (+) flush/aspiration. Dressing dry and intact, no S+S of infection. [email or web address] Labs Obtained/Reviewed Critical Results Called   Date when labs were drawn- 
Hgb-   
HGB Date Value Ref Range Status 12/30/2020 7.4 (L) 11.5 - 16.0 g/dL Final  
 
K-   
Potassium Date Value Ref Range Status 12/30/2020 4.2 3.5 - 5.1 mmol/L Final  
  Comment:  
  INVESTIGATED PER DELTA CHECK PROTOCOL Ca-  
Calcium Date Value Ref Range Status 12/30/2020 7.9 (L) 8.5 - 10.1 MG/DL Final  
 
Bun-  
BUN Date Value Ref Range Status 12/30/2020 29 (H) 6 - 20 MG/DL Final  
 
Creat-  
Creatinine Date Value Ref Range Status 12/30/2020 4.79 (H) 0.55 - 1.02 MG/DL Final  
  Comment:  
  INVESTIGATED PER DELTA CHECK PROTOCOL Medications/ Blood Products Given Name   Dose   Route and Time Blood Volume Processed (BVP):    86.3 Net Fluid Removed:  1,000ml Comments Time Out Done: 9162 Primary Nurse Rpt Pre:Primary Primary Nurse Rpt Post:Primary Pt Education:Fluid restrictions Care Plan:Continue Tx as ordered Tx Summary:Pt tolerated Tx well. All possible blood returned via NS rinseback. Lines flushed and locked w/ NS, then capped. Admiting Diagnosis: Respiratory failure Pt's previous clinic- 
Consent signed - Informed Consent Verified: Yes (12/31/20 0300) Marileeita Consent - Signed Hepatitis Status- Neg/immune Machine #- Machine Number: [id number] (12/31/20 0300) Telemetry status-bedside Pre-dialysis wt. - Pre-Dialysis Weight: 52.7 kg (116 lb 2.9 oz) (12/31/20 0300)

## 2020-12-31 NOTE — PROGRESS NOTES
Problem: Mobility Impaired (Adult and Pediatric) Goal: *Acute Goals and Plan of Care (Insert Text) Description: FUNCTIONAL STATUS PRIOR TO ADMISSION: Patient was independent and active without use of DME. Pt was ambulating short distance x RW with therapy in rehab. HOME SUPPORT PRIOR TO ADMISSION: The patient lived with 79 yo mother, pt assists mother with IADLs. Pt admitted from [facility]. Physical Therapy Goals Initiated 12/29/2020 1. Patient will move from supine to sit and sit to supine , scoot up and down, and roll side to side in bed with modified independence within 7 day(s). 2.  Patient will transfer from bed to chair and chair to bed with modified independence using the least restrictive device within 7 day(s). 3.  Patient will perform sit to stand with modified independence within 7 day(s). 4.  Patient will ambulate with modified independence for 300 feet with the least restrictive device within 7 day(s). 5.  Patient will ascend/descend 10 stairs with one handrail(s) with modified independence within 7 day(s). Outcome: Not Progressing Towards Goal 
PHYSICAL THERAPY TREATMENT Patient: Roxanne Catalan (33 y.o. female) Date: 12/31/2020 Diagnosis: Res[facility] with [facility] [J96.91] <principal problem not specified> Precautions: Fall, Other (comment)(droplet plus) Chart, physical therapy assessment, plan of care and goals were reviewed. ASSESSMENT Patient continues with skilled PT services and is not progressing towards goals. Nursing reports pt still drowsy, limited conversation or following commands. During PT session - pt responded to name, answered a few questions with few words, negative for following commands, when dessert placed in hand did feed self x 1 - refused other offering of food. Unable to elicit pt's full participation - re-positioned in bed, attempted to work on rolling, and placed bed in modified chair position. For the weekend, recommend nursing place bed in modified chair position - 1 hr /3 x day. PT to follow up after weekend. Current Level of Function Impacting Discharge (mobility/balance): Total assist today for bed mobility Other factors to consider for discharge: PLAN : 
Patient continues to benefit from skilled intervention to address the above impairments. Continue treatment per established plan of care. to address goals. Recommendation for discharge: (in order for the patient to meet his/her long term goals) Therapy 3 hours per day 5-7 days per week, if not possible SNF This discharge recommendation: 
Has not yet been discussed the attending provider and/or case management IF patient discharges home will need the following DME: to be determined (TBD) SUBJECTIVE:  
Patient stated I do have a sister named Agusto.   Attempted to call Agusto when in room - no answer. OBJECTIVE DATA SUMMARY:  
Critical Behavior: 
Neurologic State: Confused, Eyes open to stimulus, Drowsy Orientation Level: Oriented to person, Disoriented to situation, Disoriented to place, Disoriented to time Cognition: Decreased command following, Decreased attention/concentration Safety/Judgement: Awareness of environment, Fall prevention Functional Mobility Training: 
Bed Mobility: 
Rolling: Moderate assistance Scooting: Total assistance(up in bed) Balance: 
Sitting: (maintained midline in bed modified chair position) Therapeutic Exercises/Activity:  
Attempted AROM exercise - pt very drowsy, did not follow commands to lift arms or move legs or assist with rolling, scooting up in bed. Activity Tolerance:  
Poor - drowsy, unable to elicit pt's participation. After treatment patient left in no apparent distress:  
Call bell within reach and bed in modified chair position COMMUNICATION/COLLABORATION:  
The patients plan of care was discussed with: Registered nurse. Kael Choi, PT Time Calculation: 15 mins

## 2020-12-31 NOTE — PROGRESS NOTES
Problem: Falls - Risk of 
Goal: *Absence of Falls Description: Document Santiago Sol Fall Risk and appropriate interventions in the flowsheet. Outcome: Progressing Towards Goal 
Note: Fall Risk Interventions: 
Mobility Interventions: OT consult for ADLs, Patient to call before getting OOB, PT Consult for assist device competence, Communicate number of staff needed for ambulation/transfer Medication Interventions: Patient to call before getting OOB Elimination Interventions: Call light in reach

## 2020-12-31 NOTE — PROGRESS NOTES
Name: Kip Boston MRN: [id number] : 1960 Hospital: . [address] Date: 2020 IMPRESSION:  
· ESRD on HD. TTS schedule. Patient was dialyzed very early this morning. · Hyperkalemia- resolved · COVID , poor candidate for remdesivir. · CHF with advance cardiomyopathy · Anemia of ESRD PLAN:  
· HD as per established schedule · Anemia management · May use a PICC line for AB's · Prognosis is guarded at best. 
· O2 support Subjective/Interval History:  
I have reviewed the flowsheet and previous days notes. ROS:Review of systems not obtained due to patient factors. Objective:  
Vital Signs:   
Visit Vitals /76 Pulse 80 Temp 98.4 °F (36.9 °C) Resp 21 Ht 5' 5\" (1.651 m) Wt 52.6 kg (115 lb 15.4 oz) SpO2 95% BMI 19.30 kg/m² O2 Device: Nasal cannula O2 Flow Rate (L/min): 4 l/min Temp (24hrs), Av °F (36.7 °C), Min:97.4 °F (36.3 °C), Max:98.4 °F (36.9 °C) Intake/Output:  
Last shift:      No intake/output data recorded. Last 3 shifts:  1901 -  0700 In: 300 [I.V.:300] Out: 1000 Intake/Output Summary (Last 24 hours) at 2020 1008 Last data filed at 2020 9544 Gross per 24 hour Intake 300 ml Output 1000 ml Net -700 ml Physical Exam: 
General:    Lethargis, wakes up. NAD, looks ill. Head:   Normocephalic, without obvious abnormality, atraumatic. Eyes:   Conjunctivae/corneas clear. Nose:  Nares normal. No drainage or sinus tenderness. Throat:    Lips, mucosa, and tongue normal.  No Thrush Neck:  Supple, symmetrical,  no adenopathy, thyroid: non tender 
  no carotid bruit and no JVD. Lungs:   Diminished to auscultation bilaterally. No Wheezing , + Rhonchi. No rales. Chest wall:  No tenderness or deformity. No Accessory muscle use. Heart:   Regular rate and rhythm. + SM Abdomen:   Soft, non-tender. Not distended. Bowel sounds normal. No masses Extremities: Extremities normal, atraumatic, No cyanosis. No edema. No clubbing Skin:     Texture, turgor normal. No rashes or lesions. Not Jaundiced Psych:  Fair insight. Not depressed. Not anxious or agitated. Neurologic: Weak, sleepy. DATA: 
Labs: 
Recent Labs 12/31/20 
0510 12/30/20 
2807 12/29/20 
0528 12/28/20 
2121 12/28/20 
2121  135* 137   < > 133* K 3.0* 4.2 5.5*   < > 6.3*  
 101 104   < > 103 CO2 28 22 21   < > 18* BUN 14 29* 39*   < > 35* CREA 2.10* 4.79* 7.54*   < > 7.51* CA 8.7 7.9* 7.1*   < > 7.8* ALB  --   --  2.2*  --  2.3*  
PHOS  --   --  4.5  --   --   
MG  --   --  2.2  --  2.5*  
 < > = values in this interval not displayed. Recent Labs 12/31/20 
0510 12/30/20 
1218 12/30/20 
0377 12/29/20 
0738 12/29/20 
6522 WBC 7.0  --  5.2  --  6.0 HGB 7.6* 7.4* 8.4*   < > 7.5* HCT 25.0* 24.5* 28.4*   < > 25.2*  
  --  143*  --  139*  
 < > = values in this interval not displayed. No results for input(s): ROMAN, KU, CLU, CREAU in the last 72 hours. No lab exists for component: PROU Total time spent with patient:  35 minutes 
  [] Critical Care Provided Care Plan discussed with: 
 Staff, Medical Team 
 
Barb Jovel MD

## 2020-12-31 NOTE — PROGRESS NOTES
0830: PT lethargic, responds to voice, opens eyes to stimulus. Pt AO1 and responds with birthday to all other questions, per charting pt is typically AO4. Dr Jennie Stephens on floor and soon at bedside. ABG and ammonia ordered. Stat Head CT ordered. 1100: Pt returned to floor from Stat Head CT.  
 
1120: Updated pt's sister, Jocelynn Suggs 1400: Spoke with pt's chronic dialysis nurse, Carlos Lara, nurse stated that they typically pull off about 0.5L at at time. Also, please call [facility] on discharge to discuss plan of care and quarantine. Verbal shift change report given to Martha RN (oncoming nurse) by Johann Melchor RN (offgoing nurse). Report included the following information SBAR, Kardex, ED Summary, Procedure Summary, Intake/Output, MAR, Recent Results and Cardiac Rhythm AVB 1st degree.

## 2020-12-31 NOTE — CONSULTS
Consult Note Assessment: 
 
Patient Active Problem List  
Diagnosis Code  Polycystic kidney disease Q61.3  
 Essential hypertension, benign I10  
 NICM (nonischemic cardiomyopathy) ([facility] I42.8  Anemia D64.9  
 ESRD (end stage renal disease) ([facility]) N18.6  Paroxysmal atrial tachycardia (HCC) I47.1  ICD (implantable cardioverter-defibrillator) in place Z95.810  
 Paroxysmal atrial fibrillation (HCC) I48.0  Systolic CHF, chronic ([facility]) I50.22  
 SVT (supraventricular tachycardia) ([facility]) I47.1  GERD (gastroesophageal reflux disease) K21.9  Chronic anticoagulation Z79.01  
 Respiratory failure with hypoxia ([facility]) J96.91 Recommendations: Will follow, she has severe cardiac disease and ESRD now with presumed COVID, being seen by Palliative. Mike Rosas MD 
[phone]  Berhane Oseguera is a 63 y.o. female who presented 12/28/2020 with complaints of shortness of breath. The symptoms began approximately 2 days ago. She has a history of cardiac disease including CHF and VT, PAF. Examination by the attending physician suggested the possibility of CHF. At present the patient is unchanged since initial presentation. Therapy thus far has included O2. Cardiology has been consulted to assist in the management of this patient. She has a history of hypertension, non-ischemic CM, LVEF 25-30%, ESRD on peritoneal dialysis, NYHA class II-III, SVT s/p slow pathway ablation, VT s/p recent VT ablation ([place]). ICD interrogation today reveals recurrent VT for which ATP was delivered.  Her ICD coil exhibited an alert which tech services reported indicates VT.  
  
  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 She had VT subsequently and was hospitalized at [facility] with peritonitis where she had recurrent VT for which she underwent VT ablation. She was last seen in 9/2020 however, since then continues to have ICD shocks for VT despite undergoing VT ablation at [facility] recently and was referred to [address] [place] [address] for possible epicardial ablation attempt. Her ICD coil has an elevated coil impedance that once reviewed by tech services is felt to be unreliable for delivering ICD therapy. Current Facility-Administered Medications Medication Dose Route Frequency  potassium chloride SR (KLOR-CON 10) tablet 40 mEq  40 mEq Oral DAILY  Warfarin - HOLD dose for INR  4.6   Other ONCE  zinc sulfate (ZINCATE) 220 (50) mg capsule 1 Cap  1 Cap Oral DAILY  pantoprazole (PROTONIX) tablet 20 mg  20 mg Oral ACB  dexAMETHasone (DECADRON) tablet 6 mg  6 mg Oral Q24H  
 [START ON 1/1/2021] epoetin della-epbx (RETACRIT) injection 10,000 Units  10,000 Units SubCUTAneous DIALYSIS MON, WED & FRI  aspirin delayed-release tablet 81 mg  81 mg Oral DAILY  sodium chloride (NS) flush 5-40 mL  5-40 mL IntraVENous Q8H  
 sodium chloride (NS) flush 5-40 mL  5-40 mL IntraVENous PRN  
 acetaminophen (TYLENOL) tablet 650 mg  650 mg Oral Q6H PRN Or  
 acetaminophen (TYLENOL) suppository 650 mg  650 mg Rectal Q6H PRN  polyethylene glycol (MIRALAX) packet 17 g  17 g Oral DAILY PRN  
 albuterol (PROVENTIL HFA, VENTOLIN HFA, PROAIR HFA) inhaler 2 Puff  2 Puff Inhalation Q4H RT  
 ascorbic acid (vitamin C) (VITAMIN C) tablet 500 mg  500 mg Oral Q6H  
 melatonin tablet 9 mg  9 mg Oral QHS  Warfarin Pharmacy to Dose   Other Rx Dosing/Monitoring  cefepime (MAXIPIME) 1 g in 0.9% sodium chloride (MBP/ADV) 50 mL MBP  1 g IntraVENous Q24H  doxycycline (VIBRAMYCIN) 100 mg in 0.9% sodium chloride (MBP/ADV) 100 mL MBP  100 mg IntraVENous Q12H  
 amiodarone (CORDARONE) tablet 200 mg  200 mg Oral DAILY  cholecalciferol (VITAMIN D3) (1000 Units /25 mcg) tablet 10 Tab  10,000 Units Oral Q7D  
 cinacalcet (SENSIPAR) tablet 30 mg  30 mg Oral DAILY  loperamide (IMODIUM) capsule 2 mg  2 mg Oral Q3H PRN  
 mexiletine (MEXITIL) capsule 150 mg  150 mg Oral TID  B complex-vitaminC-folic acid (NEPHROCAP) cap  1 Cap Oral DAILY  senna (SENOKOT) tablet 8.6 mg  1 Tab Oral DAILY PRN Past Medical History:  
Diagnosis Date  Anemia associated with chronic renal failure  Anuria 2014  Arrhythmia Paroxysmal a fib, paroxsymal atrial tach, SVT  Chronic kidney disease ESRD- on PD  Chronic systolic heart failure ([facility] [address] 10/31/2012  Chronic tachycardia  GERD (gastroesophageal reflux disease)  Hx of non-ST elevation myocardial infarction (NSTEMI) 2011  Hypertension  Lipoma of right lower extremity 1980 Foot  Peritoneal dialysis catheter in place [facility]) 2014 Nightime exchanges  Polycystic kidney disease 2014 Patient Active Problem List  
Diagnosis Code  Polycystic kidney disease Q61.3  
 Essential hypertension, benign I10  
 NICM (nonischemic cardiomyopathy) ([facility] [address] I42.8  Anemia D64.9  
 ESRD (end stage renal disease) (HCC) N18.6  Paroxysmal atrial tachycardia (HCC) I47.1  ICD (implantable cardioverter-defibrillator) in place Z95.810  
 Paroxysmal atrial fibrillation (HCC) I48.0  Systolic CHF, chronic (HCC) I50.22  
 SVT (supraventricular tachycardia) ([facility]) I47.1  GERD (gastroesophageal reflux disease) K21.9  Chronic anticoagulation Z79.01  
 Respiratory failure with hypoxia ([facility]) J96.91 Allergies Allergen Reactions  Iodinated Contrast Media Angioedema Cellulitis on side of face after test  
 
Social History Tobacco Use  Smoking status: Never Smoker  Smokeless tobacco: Never Used Substance Use Topics  Alcohol use: Not Currently Comment: rare  Drug use: No  
 
Family History Problem Relation Age of Onset  Diabetes Brother  Hypertension Brother  Hypertension Mother [facility] Arthritis-osteo Mother  Hypertension Sister  Diabetes Sister  Kidney Disease Father   
     polycystic  Parkinson's Disease Father  Heart Disease Father  Hypertension Father  Hypertension Brother Review of Symptoms: 
Review of systems not obtained due to patient factors. COVID PUI Objective:  
  
Visit Vitals /81 (BP 1 Location: Right arm, BP Patient Position: At rest) Pulse 73 Temp 98 °F (36.7 °C) Resp 22 Ht 5' 5\" (1.651 m) Wt 115 lb 15.4 oz (52.6 kg) SpO2 100% BMI 19.30 kg/m² Physical Exam 
 
Not performed due to COVID status, chart reviewed. Cardiographics Telemetry: normal sinus rhythm ECG: normal sinus rhythm, nonspecific ST and T waves changes, 1st degree AV block Echocardiogram: Not done Labs:  
Recent Results (from the past 24 hour(s)) PROTHROMBIN TIME + INR Collection Time: 12/31/20  5:10 AM  
Result Value Ref Range INR 4.6 (HH) 0.9 - 1.1 Prothrombin time 45.0 (H) 9.0 - 11.1 sec METABOLIC PANEL, BASIC Collection Time: 12/31/20  5:10 AM  
Result Value Ref Range Sodium 137 136 - 145 mmol/L Potassium 3.0 (L) 3.5 - 5.1 mmol/L Chloride 100 97 - 108 mmol/L  
 CO2 28 21 - 32 mmol/L Anion gap 9 5 - 15 mmol/L Glucose 86 65 - 100 mg/dL BUN 14 6 - 20 MG/DL Creatinine 2.10 (H) 0.55 - 1.02 MG/DL  
 BUN/Creatinine ratio 7 (L) 12 - 20 GFR est AA 29 (L) >60 ml/min/1.73m2 GFR est non-AA 24 (L) >60 ml/min/1.73m2 Calcium 8.7 8.5 - 10.1 MG/DL  
CBC WITH AUTOMATED DIFF Collection Time: 12/31/20  5:10 AM  
Result Value Ref Range WBC 7.0 3.6 - 11.0 K/uL  
 RBC 2.63 (L) 3.80 - 5.20 M/uL HGB 7.6 (L) 11.5 - 16.0 g/dL HCT 25.0 (L) 35.0 - 47.0 % MCV 95.1 80.0 - 99.0 FL  
 MCH 28.9 26.0 - 34.0 PG  
 MCHC 30.4 30.0 - 36.5 g/dL  
 RDW 19.2 (H) 11.5 - 14.5 % PLATELET 085 272 - 561 K/uL MPV 11.2 8.9 - 12.9 FL  
 NRBC 1.3 (H) 0  WBC ABSOLUTE NRBC 0.09 (H) 0.00 - 0.01 K/uL NEUTROPHILS 82 (H) 32 - 75 % BAND NEUTROPHILS 3 0 - 6 % LYMPHOCYTES 9 (L) 12 - 49 % MONOCYTES 5 5 - 13 % EOSINOPHILS 0 0 - 7 % BASOPHILS 1 0 - 1 % IMMATURE GRANULOCYTES 0 %  
 ABS. NEUTROPHILS 5.9 1.8 - 8.0 K/UL  
 ABS. LYMPHOCYTES 0.6 (L) 0.8 - 3.5 K/UL  
 ABS. MONOCYTES 0.4 0.0 - 1.0 K/UL  
 ABS. EOSINOPHILS 0.0 0.0 - 0.4 K/UL  
 ABS. BASOPHILS 0.1 0.0 - 0.1 K/UL  
 ABS. IMM. GRANS. 0.0 K/UL  
 DF MANUAL PLATELET COMMENTS Large Platelets RBC COMMENTS ANISOCYTOSIS 1+ 
    
 RBC COMMENTS MACROCYTOSIS 1+ 
    
 RBC COMMENTS HYPOCHROMIA 1+ 
    
 RBC COMMENTS POLYCHROMASIA 1+ 
    
 RBC COMMENTS TEARDROP CELLS 
PRESENT 
    
GLUCOSE, POC Collection Time: 12/31/20  8:55 AM  
Result Value Ref Range Glucose (POC) 75 65 - 100 mg/dL Performed by Kathy DOUGLAS EG7 Collection Time: 12/31/20  9:27 AM  
Result Value Ref Range Calcium, ionized (POC) 1.02 (L) 1.12 - 1.32 mmol/L  
 pH (POC) 7.53 (H) 7.35 - 7.45    
 pCO2 (POC) 30.7 (L) 35.0 - 45.0 MMHG  
 pO2 (POC) 42 (LL) 80 - 100 MMHG  
 HCO3 (POC) 25.8 22 - 26 MMOL/L Base excess (POC) 3 mmol/L  
 sO2 (POC) 84 (L) 92 - 97 % Site LEFT RADIAL Device: ROOM AIR Allens test (POC) YES Specimen type (POC) ARTERIAL    
GLUCOSE, POC Collection Time: 12/31/20 11:31 AM  
Result Value Ref Range Glucose (POC) 75 65 - 100 mg/dL Performed by Kathy Rosas MD

## 2020-12-31 NOTE — CONSULTS
Palliative Medicine Consult [place]: [phone] ([id number]) Patient Name: Jono Tovra YOB: 1960 Date of Initial Consult: 12/31/20 Reason for Consult: Care decisions Requesting Provider: Shahrzad Ya Primary Care Physician: Siddharth Mchugh MD 
 
 SUMMARY:  
Jono Tovar is a 63 y.o. with a past history of ESRD on HD (Dr Newton Macias), CHF (EF 21-25% 3/2019), pacemaker/ICD (saw  [facility] 11/2020),  paroxysmal a fib on coumadin, VT s/p VT ablation w/ continued ICD shocks for VT afterwards  who was admitted on 12/28/2020 from [facility] (was there for SNF from previous hospital stay at [address] [place] [address]) with SOB due to COVID PNA. On abx and dexamethasone, not good candidate for remdesivir per ID. Lethargic today, CT head w/out acute findings. Current medical issues leading to Palliative Medicine involvement include: care decisions and guarded prognosis. Social: Prior to going to [facility] SNF lived alone in an apartment, her 79 yo mother would stay w/ her sometimes. PALLIATIVE DIAGNOSES:  
1. Lethargy, some confusion 2. Shortness of breath 3. Goals of care PLAN:  
1. Pt answering simple questions but remains lethargic and cannot tell me about situation. Attending physician spoke with sister Jeannie Aguiar today about pt's guarded situation - in past she may have signed a DDNR form, but we do not have it nor an AMD on file. Family going to discuss about care decisions and code status, although also want pt to participate- however at this time is unable. 2. Agree that w/ pt's mult medical issues, she may not do well with COVID PNA and care goals should be discussed even if pt does make some improvements- she has had recent hospital stay at [facility] as well and uncertain of what her new baseline will be. 3. Left message for Jeannie Aguiar- sister- at [phone], will addend note when she calls back. 4. Following w/ you, over holiday weekend please call our on call physician VENKAT for specific questions/concerns. 5. Initial consult note routed to primary continuity provider and/or primary health care team members 6. Communicated plan of care with: Palliative Ashli FALCON 192 Team 
 
 GOALS OF CARE / TREATMENT PREFERENCES:  
 
GOALS OF CARE: 
Patient/Health Care Proxy Stated Goals: Prolong life TREATMENT PREFERENCES:  
Code Status: Full Code Advance Care Planning: 
[x] The [facility] Interdisciplinary Team has updated the ACP Navigator with [address] and Patient Capacity Primary Decision MakerEudelia Ill - Sister - [phone] Secondary Decision Maker: Laila Trinh - Other Relative - [phone] Advance Care Planning 12/30/2020 Patient's Healthcare Decision Maker is: -  
Primary Decision Maker Name -  
Confirm Advance Directive None Patient Would Like to Complete Advance Directive - Medical Interventions: Full interventions Other: As far as possible, the palliative care team has discussed with patient / health care proxy about goals of care / treatment preferences for patient. HISTORY:  
 
History obtained from: Pt, chart, staff CHIEF COMPLAINT: \"I'm okay\" HPI/SUBJECTIVE: The patient is:  
[x] Verbal and participatory [] Non-participatory due to:  
 
Pt alert to self and talking clearly, but very slow to respond and cannot tell me about situation. Clinical Pain Assessment (nonverbal scale for severity on nonverbal patients):  
Clinical Pain Assessment Severity: 0 Duration: for how long has pt been experiencing pain (e.g., 2 days, 1 month, years) Frequency: how often pain is an issue (e.g., several times per day, once every few days, constant) FUNCTIONAL ASSESSMENT:  
 
Palliative Performance Scale (PPS): PPS: 40  PSYCHOSOCIAL/SPIRITUAL SCREENING:  
 
 Palliative IDT has assessed this patient for cultural preferences / practices and a referral made as appropriate to needs (Cultural Services, Patient Advocacy, Ethics, etc.) Any spiritual / Orthodoxy concerns: 
[] Yes /  [x] No 
 
Caregiver Burnout: 
[] Yes /  [x] No /  [] No Caregiver Present Anticipatory grief assessment:  
[x] Normal  / [] Maladaptive ESAS Anxiety: ESAS Depression:    
 
Cannot obtain due to patient factors REVIEW OF SYSTEMS:  
 
Positive and pertinent negative findings in ROS are noted above in HPI. The following systems were [x] reviewed / [] unable to be reviewed as noted in HPI Other findings are noted below. Systems: constitutional, ears/nose/mouth/throat, respiratory, gastrointestinal, genitourinary, musculoskeletal, integumentary, neurologic, psychiatric, endocrine. Positive findings noted below. Modified ESAS Completed by: provider Fatigue: 8 Drowsiness: 2 Pain: 0 Dyspnea: 2 PHYSICAL EXAM:  
 
From RN flowsheet: 
Wt Readings from Last 3 Encounters:  
12/31/20 115 lb 15.4 oz (52.6 kg)  
11/16/20 129 lb 9.6 oz (58.8 kg) 10/29/20 129 lb (58.5 kg) Blood pressure 124/81, pulse 73, temperature 98 °F (36.7 °C), resp. rate 22, height 5' 5\" (1.651 m), weight 115 lb 15.4 oz (52.6 kg), SpO2 100 %. Pain Scale 1: Numeric (0 - 10) Pain Intensity 1: 0 Constitutional: awake, alert to self, can answer in short sentences but slow to respond Eyes: pupils equal, anicteric Respiratory: breathing not labored Musculoskeletal: no deformity, no tenderness to palpation Skin: warm, dry Neurologic: following commands, moving all extremities Psychiatric: flat HISTORY:  
 
Active Problems: 
  Respiratory failure with hypoxia ([address] (12/29/2020) Past Medical History:  
Diagnosis Date  Anemia associated with chronic renal failure  Anuria 2014  Arrhythmia Paroxysmal a fib, paroxsymal atrial tach, SVT  Chronic kidney disease ESRD- on PD  Chronic systolic heart failure ([facility] [address] 10/31/2012  Chronic tachycardia  GERD (gastroesophageal reflux disease)  Hx of non-ST elevation myocardial infarction (NSTEMI) 2011  Hypertension  Lipoma of right lower extremity 1980 Foot  Peritoneal dialysis catheter in place [facility]) 2014 Nightime exchanges  Polycystic kidney disease 2014 Past Surgical History:  
Procedure Laterality Date  HX HEART CATHETERIZATION  12/2015  
 no interventions  HX PACEMAKER  12/29/2015 ICD  IR INSERT NON TUNL CVC OVER 5 YRS  3/13/2019  FL EPHYS EVAL W/ABLATION SUPRAVENT ARRHYTHMIA N/A 10/1/2019 ABLATION SVT performed by Gómez Polanco MD at [facility], [facility] CATH LAB  FL INTRACARDIAC ELECTROPHYSIOLOGIC 3D MAPPING N/A 10/1/2019 Ep 3d Mapping performed by Gómez Polanco MD at [facility] CATH LAB  STIM/PACING HEART POST IV DRUG INFU N/A 10/1/2019 Drug Stimulation performed by Gómez Polanco MD at [facility] [place] [address], [address] CATH LAB Family History Problem Relation Age of Onset  Diabetes Brother  Hypertension Brother  Hypertension Mother [facility] Arthritis-osteo Mother  Hypertension Sister  Diabetes Sister  Kidney Disease Father   
     polycystic  Parkinson's Disease Father  Heart Disease Father  Hypertension Father  Hypertension Brother History reviewed, no pertinent family history. Social History Tobacco Use  Smoking status: Never Smoker  Smokeless tobacco: Never Used Substance Use Topics  Alcohol use: Not Currently Comment: rare Allergies Allergen Reactions  Iodinated Contrast Media Angioedema Cellulitis on side of face after test  
  
Current Facility-Administered Medications Medication Dose Route Frequency  potassium chloride SR (KLOR-CON 10) tablet 40 mEq  40 mEq Oral DAILY  Warfarin - HOLD dose for INR  4.6   Other ONCE  
  zinc sulfate (ZINCATE) 220 (50) mg capsule 1 Cap  1 Cap Oral DAILY  pantoprazole (PROTONIX) tablet 20 mg  20 mg Oral ACB  dexAMETHasone (DECADRON) tablet 6 mg  6 mg Oral Q24H  
 [START ON 1/1/2021] epoetin della-epbx (RETACRIT) injection 10,000 Units  10,000 Units SubCUTAneous DIALYSIS MON, WED & FRI  aspirin delayed-release tablet 81 mg  81 mg Oral DAILY  sodium chloride (NS) flush 5-40 mL  5-40 mL IntraVENous Q8H  
 sodium chloride (NS) flush 5-40 mL  5-40 mL IntraVENous PRN  
 acetaminophen (TYLENOL) tablet 650 mg  650 mg Oral Q6H PRN Or  
 acetaminophen (TYLENOL) suppository 650 mg  650 mg Rectal Q6H PRN  polyethylene glycol (MIRALAX) packet 17 g  17 g Oral DAILY PRN  
 albuterol (PROVENTIL HFA, VENTOLIN HFA, PROAIR HFA) inhaler 2 Puff  2 Puff Inhalation Q4H RT  
 ascorbic acid (vitamin C) (VITAMIN C) tablet 500 mg  500 mg Oral Q6H  
 melatonin tablet 9 mg  9 mg Oral QHS  Warfarin Pharmacy to Dose   Other Rx Dosing/Monitoring  cefepime (MAXIPIME) 1 g in 0.9% sodium chloride (MBP/ADV) 50 mL MBP  1 g IntraVENous Q24H  
 doxycycline (VIBRAMYCIN) 100 mg in 0.9% sodium chloride (MBP/ADV) 100 mL MBP  100 mg IntraVENous Q12H  
 amiodarone (CORDARONE) tablet 200 mg  200 mg Oral DAILY  cholecalciferol (VITAMIN D3) (1000 Units /25 mcg) tablet 10 Tab  10,000 Units Oral Q7D  
 cinacalcet (SENSIPAR) tablet 30 mg  30 mg Oral DAILY  loperamide (IMODIUM) capsule 2 mg  2 mg Oral Q3H PRN  
 mexiletine (MEXITIL) capsule 150 mg  150 mg Oral TID  B complex-vitaminC-folic acid (NEPHROCAP) cap  1 Cap Oral DAILY  senna (SENOKOT) tablet 8.6 mg  1 Tab Oral DAILY PRN  
 
 
 
 LAB AND IMAGING FINDINGS:  
 
Lab Results Component Value Date/Time WBC 7.0 12/31/2020 05:10 AM  
 HGB 7.6 (L) 12/31/2020 05:10 AM  
 PLATELET 382 91/43/9667 05:10 AM  
 
Lab Results Component Value Date/Time  Sodium 137 12/31/2020 05:10 AM  
 Potassium 3.0 (L) 12/31/2020 05:10 AM  
 Chloride 100 12/31/2020 05:10 AM  
 CO2 28 12/31/2020 05:10 AM  
 BUN 14 12/31/2020 05:10 AM  
 Creatinine 2.10 (H) 12/31/2020 05:10 AM  
 Calcium 8.7 12/31/2020 05:10 AM  
 Magnesium 2.2 12/29/2020 05:28 AM  
 Phosphorus 4.5 12/29/2020 05:28 AM  
  
Lab Results Component Value Date/Time Alk. phosphatase 222 (H) 12/29/2020 05:28 AM  
 Protein, total 7.2 12/29/2020 05:28 AM  
 Albumin 2.2 (L) 12/29/2020 05:28 AM  
 Globulin 5.0 (H) 12/29/2020 05:28 AM  
 
Lab Results Component Value Date/Time INR 4.6 (HH) 12/31/2020 05:10 AM  
 Prothrombin time 45.0 (H) 12/31/2020 05:10 AM  
 aPTT 29.1 10/17/2012 04:40 AM  
  
Lab Results Component Value Date/Time Iron 33 (L) 12/29/2020 05:28 AM  
 TIBC 149 (L) 12/29/2020 05:28 AM  
 Iron % saturation 22 12/29/2020 05:28 AM  
 Ferritin 78 10/19/2013 04:30 AM  
  
No results found for: PH, PCO2, PO2 No components found for: [place] Lab Results Component Value Date/Time CK 58 12/29/2020 05:28 AM  
 CK - MB 11.5 (H) 10/15/2012 03:34 AM  
  
 
 
   
 
Total time:  
Counseling / coordination time, spent as noted above:  
> 50% counseling / coordination?:  
 
Prolonged service was provided for  []30 min   []75 min in face to face time in the presence of the patient, spent as noted above. Time Start:  
Time End:  
Note: this can only be billed with 43743 (initial) or 20559 (follow up). If multiple start / stop times, list each separately.

## 2020-12-31 NOTE — PROGRESS NOTES
Physician Progress Note 
 
 
PATIENT:               MANDA GILLETTE 
[facility] #:                  [id number] 
:                       1960 
ADMIT DATE:       2020 7:39 PM 
DISCH DATE: 
RESPONDING 
PROVIDER #:        BESSY FERNANDEZ MD 
 
 
 
 
QUERY TEXT: 
 
Patient admitted with COVID 19 PNA. If possible, please document in progress notes and discharge summary if you are evaluating and /or treating any of the following: 
 
The medical record reflects the following: 
Risk Factors: 59 y/o female admitted with COVID 19 PNA, hx of ESRD, HF, HTN 
Clinical Indicators: BMI 19, albumin 2.2, per RD note \"Given wt loss and reported intake 75% or less for several weeks now, pt meets criteria for acute, moderate protein-calorie malnutrition.\" 
Treatment: Albumin, IVFs, Nutrition consult, l nutrition supplement, double portions, pm snack 
Options provided: 
-- Protein calorie malnutrition mild 
-- Protein calorie malnutrition moderate 
-- Protein calorie malnutrition severe 
-- Underweight with BMI 19 
-- Other - I will add my own diagnosis 
-- Disagree - Not applicable / Not valid 
-- Disagree - Clinically unable to determine / Unknown 
-- Refer to Clinical Documentation Reviewer 
 
PROVIDER RESPONSE TEXT: 
 
This patient has moderate protein calorie malnutrition. 
 
Query created by: Ariella Sawyer on 2020 4:59 PM 
 
 
Electronically signed by:  BESSY FERNANDEZ MD 2020 6:54 PM

## 2020-12-31 NOTE — PROGRESS NOTES
[facility] Adult  Hospitalist Group Hospitalist Progress Note David Wharton MD 
Answering service: [phone] OR [phone] from in house phone Date of Service:  2020 NAME:  Eliz Galo :  1960 MRN:  [id number] This documentation was facilitated by a Voice Recognition software and may contain inadvertent typographical errors. Admission Summary:  
Eliz Galo is a 63 y.o. -American female with past medical history of non-insulin-dependent type 2 diabetes, paroxysmal A. fib on AC with warfarin, VT, history of MI, ESRD on HD, HFrEF, hypertension who presents to hospital via EMS with chief complaint of shortness of breath. Interval history / Subjective:  
    
I am assuming care of patient today. Patient said to be lethargic. Pr nursing,she has been described to be like this through the night. She finished dialysis this am. 
She is alert and awake,oriented to self and place,slow to respond to questions. She says she is sleepy Denied headache,chest pain. No extremity weakness. RN at bedside Assessment & Plan:  
Acute metabolic encephalopathy ,multifactorial: ahrf,covid infeciton ,advanced heart and kidney failure . -Exam non focal.SPo2 upper 90s with nasal canula 
-Check ABG,ammonia,stat head CT as she is on anticoagulation. Acute hypoxic respiratory failure 2/2 over 19 pneumonia: 
-Albuterol q4 Hrs PRN via Spacer 
-Given Supplemental O2 use, continue dexamethasone 6 mg/day 
-On oxygen by nasal cannula 
-anticoagulation:already on warfarin 
-Cefepime plus Doxy 
-Vitamin C 500-1000 mg q 6 hourly  
-Zinc  mg/day  
-Famotidine 40 mg BID  
-Vitamin D3 20 000  60 000 iu single oral dose. -Droplet PLUS precautions. -ID on consult; appreciate recs. ID evaluated. ESRD on HD: Nephrology following -Patient is volume overloaded confounding on hypoxia from Covid pneumonia Atrial fibrillation/HFrEF/VT / NSTE-ACS 
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 
-Continue aspirin plus warfarin. 
-Hold Coreg given labile blood pressures 
-Patient currently with LifeVest with plans for VT ablation 
-Repeat echocardiogram.  Echocardiogram 2019 shows severely decreased systolic function. 
-Cardiology consulted. GERD 
-On Pepcid Iron deficiency anemia 
-Continue home Rx Patient with significant co morbidities : ESRD,severe cardiomyopathy and now with COVID pneumonia and hypoxic resp failure,ams is at high risk for further rapid deterioration. Called and discussed my concern with her sister who is aware of her sisters medica situation. She thought pt signed DNR when she was in another hospital.I told her she is now full code and can get consent from pt as she is lethargic. I encouraged her sister to discuss among the family. Yoni Quintero said they will,that their is 80 ,lives with her and very alert that she will be in the conversation. FEN/GI -p.o. hydration Activity -as tolerated DVT prophylaxis -warfarin GI prophylaxis -Pepcid Disposition -TBD CODE STATUS: Full code Called and updated her sister/KIAN Wheeler [phone] Hospital Problems  Date Reviewed: 11/16/2020 Codes Class Noted POA Respiratory failure with hypoxia [facility]) ICD-10-CM: J96.91 
ICD-9-CM: 518.81  12/29/2020 Unknown Review of Systems:  
Review of systems not obtained due to patient factors. Vital Signs:  
 Last 24hrs VS reviewed since prior progress note. Most recent are: 
Visit Vitals /79 Pulse 76 Temp 98.4 °F (36.9 °C) Resp 21 Ht 5' 5\" (1.651 m) Wt 52.6 kg (115 lb 15.4 oz) SpO2 99% BMI 19.30 kg/m² Intake/Output Summary (Last 24 hours) at 12/31/2020 0901 Last data filed at 12/31/2020 8733 Gross per 24 hour Intake 300 ml Output 1000 ml Net -700 ml Physical Examination:  
 
I had a face to face encounter with this patient and independently examined them on12/31/2020 as outlined below: 
     
Constitutional:  Lethargic. She was able to tell me she is in the hospital.  
HEENT:  Atraumatic. Oral mucosa moist,. Non icteric sclera. No pallor. Resp:  CTA bilaterally. No wheezing/rhonchi/rales. No accessory muscle use Chest Wall: No deformity CV:  Regular rhythm, normal rate, no murmurs, gallops, rubs GI:  Soft, non distended, non tender. normoactive bowel sounds, no hepatosplenomegaly :  No CVA or suprapubic tenderness Musculoskeletal:  No edema, warm, 2+ pulses throughout Neurologic:  Mental status:lethargic. Cranial nerves II-XII : WNL Motor exam:Moves all extremities symmetrically Data Review:  
 Review and/or order of clinical lab test 
Review and/or order of tests in the radiology section of CPT Review and/or order of tests in the medicine section of CPT Labs:  
 
Recent Labs 12/31/20 
0510 12/30/20 
1218 12/30/20 
5722 WBC 7.0  --  5.2 HGB 7.6* 7.4* 8.4* HCT 25.0* 24.5* 28.4*  
  --  143* Recent Labs 12/31/20 
0510 12/30/20 
8763 12/29/20 
0528 12/28/20 2121 12/28/20 2121  135* 137   < > 133* K 3.0* 4.2 5.5*   < > 6.3*  
 101 104   < > 103 CO2 28 22 21   < > 18* BUN 14 29* 39*   < > 35* CREA 2.10* 4.79* 7.54*   < > 7.51* GLU 86 68 101*   < > 61* CA 8.7 7.9* 7.1*   < > 7.8*  
MG  --   --  2.2  --  2.5* PHOS  --   --  4.5  --   --   
 < > = values in this interval not displayed. Recent Labs  
  12/29/20 
0528 12/28/20 2121 * 114* * 268* TBILI 0.7 1.0 TP 7.2 7.6 ALB 2.2* 2.3*  
GLOB 5.0* 5.3* Recent Labs 12/31/20 
0510 12/30/20 
1218 12/29/20 
3222 INR 4.6* 3.4* 3.8* PTP 45.0* 33.7* 37.7* Recent Labs  
  12/29/20 
4227 TIBC 149* PSAT 22 Lab Results Component Value Date/Time Folate 19.3 03/12/2019 03:50 AM  
  
No results for input(s): PH, PCO2, PO2 in the last 72 hours. Recent Labs  
  12/29/20 0528 12/28/20 2121 CPK 58  --   
TROIQ 0.83* 0.89* Lab Results Component Value Date/Time Cholesterol, total 125 10/16/2012 03:40 AM  
 HDL Cholesterol 37 10/16/2012 03:40 AM  
 LDL, calculated 65 10/16/2012 03:40 AM  
 Triglyceride 115 10/16/2012 03:40 AM  
 CHOL/HDL Ratio 3.4 10/16/2012 03:40 AM  
 
Lab Results Component Value Date/Time Glucose (POC) 94 12/30/2020 09:18 AM  
 Glucose (POC) 98 03/19/2019 09:08 AM  
 Glucose (POC) 76 03/19/2019 08:44 AM  
 Glucose (POC) 105 (H) 03/18/2019 11:38 PM  
 Glucose (POC) 130 (H) 03/18/2019 04:17 PM  
 
Lab Results Component Value Date/Time Color RED 08/26/2018 07:52 AM  
 Appearance BLOODY (A) 08/26/2018 07:52 AM  
 Specific gravity 1.012 08/26/2018 07:52 AM  
 pH (UA) 7.0 08/26/2018 07:52 AM  
 Protein 300 (A) 08/26/2018 07:52 AM  
 Glucose NEGATIVE  08/26/2018 07:52 AM  
 Ketone TRACE (A) 08/26/2018 07:52 AM  
 Bilirubin NEGATIVE  10/18/2013 02:41 PM  
 Urobilinogen 0.2 08/26/2018 07:52 AM  
 Nitrites NEGATIVE  08/26/2018 07:52 AM  
 Leukocyte Esterase SMALL (A) 08/26/2018 07:52 AM  
 Epithelial cells FEW 08/26/2018 07:52 AM  
 Bacteria NEGATIVE  08/26/2018 07:52 AM  
 WBC 5-10 08/26/2018 07:52 AM  
 RBC >100 (H) 08/26/2018 07:52 AM  
 
 
 
Medications Reviewed:  
 
Current Facility-Administered Medications Medication Dose Route Frequency  potassium chloride SR (KLOR-CON 10) tablet 40 mEq  40 mEq Oral DAILY  zinc sulfate (ZINCATE) 220 (50) mg capsule 1 Cap  1 Cap Oral DAILY  pantoprazole (PROTONIX) tablet 20 mg  20 mg Oral ACB  dexAMETHasone (DECADRON) tablet 6 mg  6 mg Oral Q24H  
 [START ON 1/1/2021] epoetin della-epbx (RETACRIT) injection 10,000 Units  10,000 Units SubCUTAneous DIALYSIS MON, WED & FRI  aspirin delayed-release tablet 81 mg  81 mg Oral DAILY  sodium chloride (NS) flush 5-40 mL  5-40 mL IntraVENous Q8H  
 sodium chloride (NS) flush 5-40 mL  5-40 mL IntraVENous PRN  
 acetaminophen (TYLENOL) tablet 650 mg  650 mg Oral Q6H PRN Or  
 acetaminophen (TYLENOL) suppository 650 mg  650 mg Rectal Q6H PRN  polyethylene glycol (MIRALAX) packet 17 g  17 g Oral DAILY PRN  
 albuterol (PROVENTIL HFA, VENTOLIN HFA, PROAIR HFA) inhaler 2 Puff  2 Puff Inhalation Q4H RT  
 ascorbic acid (vitamin C) (VITAMIN C) tablet 500 mg  500 mg Oral Q6H  
 melatonin tablet 9 mg  9 mg Oral QHS  Warfarin Pharmacy to Dose   Other Rx Dosing/Monitoring  cefepime (MAXIPIME) 1 g in 0.9% sodium chloride (MBP/ADV) 50 mL MBP  1 g IntraVENous Q24H  
 doxycycline (VIBRAMYCIN) 100 mg in 0.9% sodium chloride (MBP/ADV) 100 mL MBP  100 mg IntraVENous Q12H  
 amiodarone (CORDARONE) tablet 200 mg  200 mg Oral DAILY  cholecalciferol (VITAMIN D3) (1000 Units /25 mcg) tablet 10 Tab  10,000 Units Oral Q7D  
 cinacalcet (SENSIPAR) tablet 30 mg  30 mg Oral DAILY  loperamide (IMODIUM) capsule 2 mg  2 mg Oral Q3H PRN  
 mexiletine (MEXITIL) capsule 150 mg  150 mg Oral TID  B complex-vitaminC-folic acid (NEPHROCAP) cap  1 Cap Oral DAILY  senna (SENOKOT) tablet 8.6 mg  1 Tab Oral DAILY PRN  
 
______________________________________________________________________ EXPECTED LENGTH OF STAY: 5d 12h ACTUAL LENGTH OF STAY:          2 Ivan Pineda MD

## 2021-01-01 ENCOUNTER — APPOINTMENT (OUTPATIENT)
Dept: GENERAL RADIOLOGY | Age: 61
DRG: 871 | End: 2021-01-01
Attending: HOSPITALIST
Payer: MEDICARE

## 2021-01-01 ENCOUNTER — APPOINTMENT (OUTPATIENT)
Dept: GENERAL RADIOLOGY | Age: 61
DRG: 871 | End: 2021-01-01
Attending: NURSE PRACTITIONER
Payer: MEDICARE

## 2021-01-01 VITALS
SYSTOLIC BLOOD PRESSURE: 131 MMHG | WEIGHT: 115.9 LBS | BODY MASS INDEX: 19.31 KG/M2 | HEIGHT: 65 IN | RESPIRATION RATE: 34 BRPM | OXYGEN SATURATION: 95 % | TEMPERATURE: 103.5 F | HEART RATE: 106 BPM | DIASTOLIC BLOOD PRESSURE: 78 MMHG

## 2021-01-01 LAB
ALBUMIN SERPL-MCNC: 2.4 G/DL (ref 3.5–5)
ALBUMIN SERPL-MCNC: 2.5 G/DL (ref 3.5–5)
ALBUMIN SERPL-MCNC: 2.6 G/DL (ref 3.5–5)
ALBUMIN/GLOB SERPL: 0.5 {RATIO} (ref 1.1–2.2)
ALBUMIN/GLOB SERPL: 0.5 {RATIO} (ref 1.1–2.2)
ALBUMIN/GLOB SERPL: 0.6 {RATIO} (ref 1.1–2.2)
ALP SERPL-CCNC: 237 U/L (ref 45–117)
ALP SERPL-CCNC: 245 U/L (ref 45–117)
ALP SERPL-CCNC: 247 U/L (ref 45–117)
ALT SERPL-CCNC: 190 U/L (ref 12–78)
ALT SERPL-CCNC: 244 U/L (ref 12–78)
ALT SERPL-CCNC: 90 U/L (ref 12–78)
ANION GAP SERPL CALC-SCNC: 10 MMOL/L (ref 5–15)
ANION GAP SERPL CALC-SCNC: 12 MMOL/L (ref 5–15)
ANION GAP SERPL CALC-SCNC: 9 MMOL/L (ref 5–15)
ANION GAP SERPL CALC-SCNC: 9 MMOL/L (ref 5–15)
AST SERPL-CCNC: 238 U/L (ref 15–37)
AST SERPL-CCNC: 333 U/L (ref 15–37)
AST SERPL-CCNC: 89 U/L (ref 15–37)
BACTERIA SPEC CULT: NORMAL
BASOPHILS # BLD: 0 K/UL (ref 0–0.1)
BASOPHILS NFR BLD: 0 % (ref 0–1)
BILIRUB SERPL-MCNC: 1.1 MG/DL (ref 0.2–1)
BILIRUB SERPL-MCNC: 1.6 MG/DL (ref 0.2–1)
BILIRUB SERPL-MCNC: 1.7 MG/DL (ref 0.2–1)
BUN SERPL-MCNC: 18 MG/DL (ref 6–20)
BUN SERPL-MCNC: 22 MG/DL (ref 6–20)
BUN SERPL-MCNC: 33 MG/DL (ref 6–20)
BUN SERPL-MCNC: 41 MG/DL (ref 6–20)
BUN/CREAT SERPL: 11 (ref 12–20)
BUN/CREAT SERPL: 6 (ref 12–20)
BUN/CREAT SERPL: 8 (ref 12–20)
BUN/CREAT SERPL: 9 (ref 12–20)
CALCIUM SERPL-MCNC: 7.9 MG/DL (ref 8.5–10.1)
CALCIUM SERPL-MCNC: 8.2 MG/DL (ref 8.5–10.1)
CALCIUM SERPL-MCNC: 8.3 MG/DL (ref 8.5–10.1)
CALCIUM SERPL-MCNC: 8.8 MG/DL (ref 8.5–10.1)
CHLORIDE SERPL-SCNC: 102 MMOL/L (ref 97–108)
CHLORIDE SERPL-SCNC: 102 MMOL/L (ref 97–108)
CHLORIDE SERPL-SCNC: 103 MMOL/L (ref 97–108)
CHLORIDE SERPL-SCNC: 104 MMOL/L (ref 97–108)
CO2 SERPL-SCNC: 23 MMOL/L (ref 21–32)
CO2 SERPL-SCNC: 26 MMOL/L (ref 21–32)
CO2 SERPL-SCNC: 26 MMOL/L (ref 21–32)
CO2 SERPL-SCNC: 28 MMOL/L (ref 21–32)
CREAT SERPL-MCNC: 2 MG/DL (ref 0.55–1.02)
CREAT SERPL-MCNC: 2.84 MG/DL (ref 0.55–1.02)
CREAT SERPL-MCNC: 4.31 MG/DL (ref 0.55–1.02)
CREAT SERPL-MCNC: 4.69 MG/DL (ref 0.55–1.02)
DIFFERENTIAL METHOD BLD: ABNORMAL
EOSINOPHIL # BLD: 0 K/UL (ref 0–0.4)
EOSINOPHIL # BLD: 0.3 K/UL (ref 0–0.4)
EOSINOPHIL NFR BLD: 0 % (ref 0–7)
EOSINOPHIL NFR BLD: 2 % (ref 0–7)
ERYTHROCYTE [DISTWIDTH] IN BLOOD BY AUTOMATED COUNT: 19.8 % (ref 11.5–14.5)
ERYTHROCYTE [DISTWIDTH] IN BLOOD BY AUTOMATED COUNT: 20.4 % (ref 11.5–14.5)
ERYTHROCYTE [DISTWIDTH] IN BLOOD BY AUTOMATED COUNT: 20.7 % (ref 11.5–14.5)
ERYTHROCYTE [DISTWIDTH] IN BLOOD BY AUTOMATED COUNT: 20.8 % (ref 11.5–14.5)
ERYTHROCYTE [DISTWIDTH] IN BLOOD BY AUTOMATED COUNT: 20.8 % (ref 11.5–14.5)
EST. AVERAGE GLUCOSE BLD GHB EST-MCNC: ABNORMAL MG/DL
GLOBULIN SER CALC-MCNC: 4.1 G/DL (ref 2–4)
GLOBULIN SER CALC-MCNC: 4.8 G/DL (ref 2–4)
GLOBULIN SER CALC-MCNC: 4.9 G/DL (ref 2–4)
GLUCOSE BLD STRIP.AUTO-MCNC: 108 MG/DL (ref 65–100)
GLUCOSE BLD STRIP.AUTO-MCNC: 109 MG/DL (ref 65–100)
GLUCOSE BLD STRIP.AUTO-MCNC: 113 MG/DL (ref 65–100)
GLUCOSE BLD STRIP.AUTO-MCNC: 119 MG/DL (ref 65–100)
GLUCOSE BLD STRIP.AUTO-MCNC: 120 MG/DL (ref 65–100)
GLUCOSE BLD STRIP.AUTO-MCNC: 121 MG/DL (ref 65–100)
GLUCOSE BLD STRIP.AUTO-MCNC: 127 MG/DL (ref 65–100)
GLUCOSE BLD STRIP.AUTO-MCNC: 128 MG/DL (ref 65–100)
GLUCOSE BLD STRIP.AUTO-MCNC: 128 MG/DL (ref 65–100)
GLUCOSE BLD STRIP.AUTO-MCNC: 130 MG/DL (ref 65–100)
GLUCOSE BLD STRIP.AUTO-MCNC: 132 MG/DL (ref 65–100)
GLUCOSE BLD STRIP.AUTO-MCNC: 137 MG/DL (ref 65–100)
GLUCOSE BLD STRIP.AUTO-MCNC: 138 MG/DL (ref 65–100)
GLUCOSE BLD STRIP.AUTO-MCNC: 149 MG/DL (ref 65–100)
GLUCOSE BLD STRIP.AUTO-MCNC: 35 MG/DL (ref 65–100)
GLUCOSE BLD STRIP.AUTO-MCNC: 36 MG/DL (ref 65–100)
GLUCOSE BLD STRIP.AUTO-MCNC: 42 MG/DL (ref 65–100)
GLUCOSE BLD STRIP.AUTO-MCNC: 44 MG/DL (ref 65–100)
GLUCOSE BLD STRIP.AUTO-MCNC: 62 MG/DL (ref 65–100)
GLUCOSE BLD STRIP.AUTO-MCNC: 65 MG/DL (ref 65–100)
GLUCOSE BLD STRIP.AUTO-MCNC: 75 MG/DL (ref 65–100)
GLUCOSE BLD STRIP.AUTO-MCNC: 75 MG/DL (ref 65–100)
GLUCOSE BLD STRIP.AUTO-MCNC: 78 MG/DL (ref 65–100)
GLUCOSE BLD STRIP.AUTO-MCNC: 80 MG/DL (ref 65–100)
GLUCOSE BLD STRIP.AUTO-MCNC: 87 MG/DL (ref 65–100)
GLUCOSE BLD STRIP.AUTO-MCNC: 95 MG/DL (ref 65–100)
GLUCOSE SERPL-MCNC: 106 MG/DL (ref 65–100)
GLUCOSE SERPL-MCNC: 61 MG/DL (ref 65–100)
GLUCOSE SERPL-MCNC: 74 MG/DL (ref 65–100)
GLUCOSE SERPL-MCNC: 88 MG/DL (ref 65–100)
HBA1C MFR BLD: <3.8 % (ref 4–5.6)
HCT VFR BLD AUTO: 22.9 % (ref 35–47)
HCT VFR BLD AUTO: 23.4 % (ref 35–47)
HCT VFR BLD AUTO: 25.2 % (ref 35–47)
HCT VFR BLD AUTO: 26.1 % (ref 35–47)
HCT VFR BLD AUTO: 26.7 % (ref 35–47)
HGB BLD-MCNC: 7.1 G/DL (ref 11.5–16)
HGB BLD-MCNC: 7.2 G/DL (ref 11.5–16)
HGB BLD-MCNC: 7.6 G/DL (ref 11.5–16)
HGB BLD-MCNC: 8 G/DL (ref 11.5–16)
HGB BLD-MCNC: 8.1 G/DL (ref 11.5–16)
IMM GRANULOCYTES # BLD AUTO: 0 K/UL
IMM GRANULOCYTES # BLD AUTO: 0.1 K/UL (ref 0–0.04)
IMM GRANULOCYTES NFR BLD AUTO: 0 %
IMM GRANULOCYTES NFR BLD AUTO: 1 % (ref 0–0.5)
INR PPP: 3.1 (ref 0.9–1.1)
INR PPP: 3.1 (ref 0.9–1.1)
INR PPP: 3.2 (ref 0.9–1.1)
INR PPP: 4.3 (ref 0.9–1.1)
INR PPP: 5.3 (ref 0.9–1.1)
LYMPHOCYTES # BLD: 0.3 K/UL (ref 0.8–3.5)
LYMPHOCYTES # BLD: 0.4 K/UL (ref 0.8–3.5)
LYMPHOCYTES # BLD: 0.5 K/UL (ref 0.8–3.5)
LYMPHOCYTES # BLD: 0.6 K/UL (ref 0.8–3.5)
LYMPHOCYTES # BLD: 0.7 K/UL (ref 0.8–3.5)
LYMPHOCYTES NFR BLD: 2 % (ref 12–49)
LYMPHOCYTES NFR BLD: 4 % (ref 12–49)
LYMPHOCYTES NFR BLD: 5 % (ref 12–49)
MCH RBC QN AUTO: 29.1 PG (ref 26–34)
MCH RBC QN AUTO: 29.3 PG (ref 26–34)
MCH RBC QN AUTO: 29.6 PG (ref 26–34)
MCH RBC QN AUTO: 29.7 PG (ref 26–34)
MCH RBC QN AUTO: 29.8 PG (ref 26–34)
MCHC RBC AUTO-ENTMCNC: 30 G/DL (ref 30–36.5)
MCHC RBC AUTO-ENTMCNC: 30.2 G/DL (ref 30–36.5)
MCHC RBC AUTO-ENTMCNC: 30.3 G/DL (ref 30–36.5)
MCHC RBC AUTO-ENTMCNC: 31 G/DL (ref 30–36.5)
MCHC RBC AUTO-ENTMCNC: 31.4 G/DL (ref 30–36.5)
MCV RBC AUTO: 94.2 FL (ref 80–99)
MCV RBC AUTO: 96 FL (ref 80–99)
MCV RBC AUTO: 96.6 FL (ref 80–99)
MCV RBC AUTO: 97.8 FL (ref 80–99)
MCV RBC AUTO: 97.9 FL (ref 80–99)
MONOCYTES # BLD: 0.1 K/UL (ref 0–1)
MONOCYTES # BLD: 0.2 K/UL (ref 0–1)
MONOCYTES # BLD: 0.3 K/UL (ref 0–1)
MONOCYTES # BLD: 0.5 K/UL (ref 0–1)
MONOCYTES # BLD: 0.5 K/UL (ref 0–1)
MONOCYTES NFR BLD: 1 % (ref 5–13)
MONOCYTES NFR BLD: 1 % (ref 5–13)
MONOCYTES NFR BLD: 3 % (ref 5–13)
MONOCYTES NFR BLD: 3 % (ref 5–13)
MONOCYTES NFR BLD: 4 % (ref 5–13)
NEUTS BAND NFR BLD MANUAL: 11 % (ref 0–6)
NEUTS BAND NFR BLD MANUAL: 7 % (ref 0–6)
NEUTS BAND NFR BLD MANUAL: 7 % (ref 0–6)
NEUTS SEG # BLD: 10.1 K/UL (ref 1.8–8)
NEUTS SEG # BLD: 10.1 K/UL (ref 1.8–8)
NEUTS SEG # BLD: 11.2 K/UL (ref 1.8–8)
NEUTS SEG # BLD: 14.7 K/UL (ref 1.8–8)
NEUTS SEG # BLD: 14.8 K/UL (ref 1.8–8)
NEUTS SEG NFR BLD: 80 % (ref 32–75)
NEUTS SEG NFR BLD: 85 % (ref 32–75)
NEUTS SEG NFR BLD: 85 % (ref 32–75)
NEUTS SEG NFR BLD: 94 % (ref 32–75)
NEUTS SEG NFR BLD: 97 % (ref 32–75)
NRBC # BLD: 0.1 K/UL (ref 0–0.01)
NRBC # BLD: 0.15 K/UL (ref 0–0.01)
NRBC # BLD: 0.16 K/UL (ref 0–0.01)
NRBC # BLD: 0.2 K/UL (ref 0–0.01)
NRBC # BLD: 0.54 K/UL (ref 0–0.01)
NRBC BLD-RTO: 0.9 PER 100 WBC
NRBC BLD-RTO: 1.1 PER 100 WBC
NRBC BLD-RTO: 1.4 PER 100 WBC
NRBC BLD-RTO: 1.6 PER 100 WBC
NRBC BLD-RTO: 3.3 PER 100 WBC
PLATELET # BLD AUTO: 101 K/UL (ref 150–400)
PLATELET # BLD AUTO: 106 K/UL (ref 150–400)
PLATELET # BLD AUTO: 110 K/UL (ref 150–400)
PLATELET # BLD AUTO: 162 K/UL (ref 150–400)
PLATELET # BLD AUTO: 173 K/UL (ref 150–400)
PLATELET COMMENTS,PCOM: ABNORMAL
PMV BLD AUTO: 11.2 FL (ref 8.9–12.9)
PMV BLD AUTO: 11.5 FL (ref 8.9–12.9)
PMV BLD AUTO: 11.6 FL (ref 8.9–12.9)
PMV BLD AUTO: 12.5 FL (ref 8.9–12.9)
PMV BLD AUTO: 12.9 FL (ref 8.9–12.9)
POTASSIUM SERPL-SCNC: 3.7 MMOL/L (ref 3.5–5.1)
POTASSIUM SERPL-SCNC: 3.7 MMOL/L (ref 3.5–5.1)
POTASSIUM SERPL-SCNC: 3.8 MMOL/L (ref 3.5–5.1)
POTASSIUM SERPL-SCNC: 4.3 MMOL/L (ref 3.5–5.1)
PROT SERPL-MCNC: 6.5 G/DL (ref 6.4–8.2)
PROT SERPL-MCNC: 7.4 G/DL (ref 6.4–8.2)
PROT SERPL-MCNC: 7.4 G/DL (ref 6.4–8.2)
PROTHROMBIN TIME: 30.5 SEC (ref 9–11.1)
PROTHROMBIN TIME: 30.6 SEC (ref 9–11.1)
PROTHROMBIN TIME: 32.1 SEC (ref 9–11.1)
PROTHROMBIN TIME: 41.9 SEC (ref 9–11.1)
PROTHROMBIN TIME: 51.7 SEC (ref 9–11.1)
RBC # BLD AUTO: 2.39 M/UL (ref 3.8–5.2)
RBC # BLD AUTO: 2.43 M/UL (ref 3.8–5.2)
RBC # BLD AUTO: 2.61 M/UL (ref 3.8–5.2)
RBC # BLD AUTO: 2.72 M/UL (ref 3.8–5.2)
RBC # BLD AUTO: 2.73 M/UL (ref 3.8–5.2)
RBC MORPH BLD: ABNORMAL
SERVICE CMNT-IMP: ABNORMAL
SERVICE CMNT-IMP: NORMAL
SODIUM SERPL-SCNC: 138 MMOL/L (ref 136–145)
SODIUM SERPL-SCNC: 138 MMOL/L (ref 136–145)
SODIUM SERPL-SCNC: 139 MMOL/L (ref 136–145)
SODIUM SERPL-SCNC: 139 MMOL/L (ref 136–145)
WBC # BLD AUTO: 10.7 K/UL (ref 3.6–11)
WBC # BLD AUTO: 11 K/UL (ref 3.6–11)
WBC # BLD AUTO: 12.2 K/UL (ref 3.6–11)
WBC # BLD AUTO: 15.2 K/UL (ref 3.6–11)
WBC # BLD AUTO: 16.3 K/UL (ref 3.6–11)

## 2021-01-01 PROCEDURE — 65660000001 HC RM ICU INTERMED STEPDOWN

## 2021-01-01 PROCEDURE — 74011250636 HC RX REV CODE- 250/636: Performed by: NURSE PRACTITIONER

## 2021-01-01 PROCEDURE — 74011250636 HC RX REV CODE- 250/636: Performed by: STUDENT IN AN ORGANIZED HEALTH CARE EDUCATION/TRAINING PROGRAM

## 2021-01-01 PROCEDURE — C9113 INJ PANTOPRAZOLE SODIUM, VIA: HCPCS | Performed by: HOSPITALIST

## 2021-01-01 PROCEDURE — 74011250637 HC RX REV CODE- 250/637: Performed by: STUDENT IN AN ORGANIZED HEALTH CARE EDUCATION/TRAINING PROGRAM

## 2021-01-01 PROCEDURE — 94640 AIRWAY INHALATION TREATMENT: CPT

## 2021-01-01 PROCEDURE — 74011000258 HC RX REV CODE- 258: Performed by: STUDENT IN AN ORGANIZED HEALTH CARE EDUCATION/TRAINING PROGRAM

## 2021-01-01 PROCEDURE — 74011000250 HC RX REV CODE- 250: Performed by: HOSPITALIST

## 2021-01-01 PROCEDURE — 74011250637 HC RX REV CODE- 250/637: Performed by: HOSPITALIST

## 2021-01-01 PROCEDURE — 74011250637 HC RX REV CODE- 250/637: Performed by: FAMILY MEDICINE

## 2021-01-01 PROCEDURE — 82962 GLUCOSE BLOOD TEST: CPT

## 2021-01-01 PROCEDURE — 94660 CPAP INITIATION&MGMT: CPT

## 2021-01-01 PROCEDURE — 85025 COMPLETE CBC W/AUTO DIFF WBC: CPT

## 2021-01-01 PROCEDURE — 80053 COMPREHEN METABOLIC PANEL: CPT

## 2021-01-01 PROCEDURE — 74011250636 HC RX REV CODE- 250/636: Performed by: INTERNAL MEDICINE

## 2021-01-01 PROCEDURE — 90935 HEMODIALYSIS ONE EVALUATION: CPT

## 2021-01-01 PROCEDURE — 85610 PROTHROMBIN TIME: CPT

## 2021-01-01 PROCEDURE — 71045 X-RAY EXAM CHEST 1 VIEW: CPT

## 2021-01-01 PROCEDURE — 36415 COLL VENOUS BLD VENIPUNCTURE: CPT

## 2021-01-01 PROCEDURE — 74018 RADEX ABDOMEN 1 VIEW: CPT

## 2021-01-01 PROCEDURE — 94760 N-INVAS EAR/PLS OXIMETRY 1: CPT

## 2021-01-01 PROCEDURE — 74011250636 HC RX REV CODE- 250/636: Performed by: HOSPITALIST

## 2021-01-01 PROCEDURE — 74011000250 HC RX REV CODE- 250: Performed by: NURSE PRACTITIONER

## 2021-01-01 PROCEDURE — 77010033711 HC HIGH FLOW OXYGEN

## 2021-01-01 PROCEDURE — 80048 BASIC METABOLIC PNL TOTAL CA: CPT

## 2021-01-01 PROCEDURE — 77010033678 HC OXYGEN DAILY

## 2021-01-01 PROCEDURE — C1751 CATH, INF, PER/CENT/MIDLINE: HCPCS

## 2021-01-01 PROCEDURE — 99233 SBSQ HOSP IP/OBS HIGH 50: CPT | Performed by: SPECIALIST

## 2021-01-01 PROCEDURE — 36556 INSERT NON-TUNNEL CV CATH: CPT

## 2021-01-01 PROCEDURE — P9047 ALBUMIN (HUMAN), 25%, 50ML: HCPCS | Performed by: INTERNAL MEDICINE

## 2021-01-01 PROCEDURE — 02HV33Z INSERTION OF INFUSION DEVICE INTO SUPERIOR VENA CAVA, PERCUTANEOUS APPROACH: ICD-10-PCS | Performed by: ANESTHESIOLOGY

## 2021-01-01 PROCEDURE — 83036 HEMOGLOBIN GLYCOSYLATED A1C: CPT

## 2021-01-01 RX ORDER — WATER FOR INJECTION,STERILE
VIAL (ML) INJECTION
Status: DISPENSED
Start: 2021-01-01 | End: 2021-01-01

## 2021-01-01 RX ORDER — DEXAMETHASONE SODIUM PHOSPHATE 4 MG/ML
6 INJECTION, SOLUTION INTRA-ARTICULAR; INTRALESIONAL; INTRAMUSCULAR; INTRAVENOUS; SOFT TISSUE EVERY 24 HOURS
Status: DISCONTINUED | OUTPATIENT
Start: 2021-01-01 | End: 2021-01-06 | Stop reason: HOSPADM

## 2021-01-01 RX ORDER — MORPHINE SULFATE 2 MG/ML
2 INJECTION, SOLUTION INTRAMUSCULAR; INTRAVENOUS ONCE
Status: COMPLETED | OUTPATIENT
Start: 2021-01-01 | End: 2021-01-01

## 2021-01-01 RX ORDER — WARFARIN 1 MG/1
0.5 TABLET ORAL ONCE
Status: DISCONTINUED | OUTPATIENT
Start: 2021-01-01 | End: 2021-01-01

## 2021-01-01 RX ORDER — MORPHINE SULFATE 2 MG/ML
1 INJECTION, SOLUTION INTRAMUSCULAR; INTRAVENOUS ONCE
Status: COMPLETED | OUTPATIENT
Start: 2021-01-01 | End: 2021-01-01

## 2021-01-01 RX ORDER — GUAIFENESIN 100 MG/5ML
81 LIQUID (ML) ORAL DAILY
Status: DISCONTINUED | OUTPATIENT
Start: 2021-01-01 | End: 2021-01-06 | Stop reason: HOSPADM

## 2021-01-01 RX ORDER — DEXTROSE MONOHYDRATE 50 MG/ML
25 INJECTION, SOLUTION INTRAVENOUS CONTINUOUS
Status: DISPENSED | OUTPATIENT
Start: 2021-01-01 | End: 2021-01-01

## 2021-01-01 RX ORDER — WARFARIN 1 MG/1
1 TABLET ORAL ONCE
Status: COMPLETED | OUTPATIENT
Start: 2021-01-01 | End: 2021-01-01

## 2021-01-01 RX ORDER — ALBUMIN HUMAN 250 G/1000ML
12.5 SOLUTION INTRAVENOUS
Status: DISCONTINUED | OUTPATIENT
Start: 2021-01-01 | End: 2021-01-06 | Stop reason: HOSPADM

## 2021-01-01 RX ADMIN — DEXAMETHASONE SODIUM PHOSPHATE 6 MG: 4 INJECTION, SOLUTION INTRAMUSCULAR; INTRAVENOUS at 10:39

## 2021-01-01 RX ADMIN — ALBUTEROL SULFATE 2 PUFF: 90 AEROSOL, METERED RESPIRATORY (INHALATION) at 23:30

## 2021-01-01 RX ADMIN — ACETAMINOPHEN 650 MG: 325 TABLET ORAL at 05:56

## 2021-01-01 RX ADMIN — ASPIRIN 81 MG: 81 TABLET, COATED ORAL at 08:55

## 2021-01-01 RX ADMIN — MEXILETINE HYDROCHLORIDE 150 MG: 150 CAPSULE ORAL at 21:01

## 2021-01-01 RX ADMIN — DOXYCYCLINE 100 MG: 100 INJECTION, POWDER, LYOPHILIZED, FOR SOLUTION INTRAVENOUS at 01:50

## 2021-01-01 RX ADMIN — SODIUM BICARBONATE 1 DOSE: 650 TABLET ORAL at 15:59

## 2021-01-01 RX ADMIN — MEXILETINE HYDROCHLORIDE 150 MG: 150 CAPSULE ORAL at 10:10

## 2021-01-01 RX ADMIN — MEXILETINE HYDROCHLORIDE 150 MG: 150 CAPSULE ORAL at 16:22

## 2021-01-01 RX ADMIN — Medication 10 ML: at 05:55

## 2021-01-01 RX ADMIN — ACETAMINOPHEN 650 MG: 325 TABLET ORAL at 16:45

## 2021-01-01 RX ADMIN — Medication 10 ML: at 08:25

## 2021-01-01 RX ADMIN — GLUCAGON HYDROCHLORIDE 1 MG: KIT at 17:39

## 2021-01-01 RX ADMIN — Medication 10 ML: at 21:19

## 2021-01-01 RX ADMIN — MEXILETINE HYDROCHLORIDE 150 MG: 150 CAPSULE ORAL at 14:35

## 2021-01-01 RX ADMIN — SODIUM CHLORIDE 40 MG: 9 INJECTION INTRAMUSCULAR; INTRAVENOUS; SUBCUTANEOUS at 10:39

## 2021-01-01 RX ADMIN — DOXYCYCLINE 100 MG: 100 INJECTION, POWDER, LYOPHILIZED, FOR SOLUTION INTRAVENOUS at 20:44

## 2021-01-01 RX ADMIN — OXYCODONE HYDROCHLORIDE AND ACETAMINOPHEN 500 MG: 500 TABLET ORAL at 08:55

## 2021-01-01 RX ADMIN — EPOETIN ALFA-EPBX 10000 UNITS: 10000 INJECTION, SOLUTION INTRAVENOUS; SUBCUTANEOUS at 21:25

## 2021-01-01 RX ADMIN — CINACALCET HYDROCHLORIDE 30 MG: 30 TABLET, FILM COATED ORAL at 14:36

## 2021-01-01 RX ADMIN — DEXAMETHASONE SODIUM PHOSPHATE 6 MG: 4 INJECTION, SOLUTION INTRAMUSCULAR; INTRAVENOUS at 13:59

## 2021-01-01 RX ADMIN — Medication 10 ML: at 13:26

## 2021-01-01 RX ADMIN — OXYCODONE HYDROCHLORIDE AND ACETAMINOPHEN 500 MG: 500 TABLET ORAL at 14:00

## 2021-01-01 RX ADMIN — Medication 10 ML: at 14:00

## 2021-01-01 RX ADMIN — CEFEPIME HYDROCHLORIDE 1 G: 1 INJECTION, POWDER, FOR SOLUTION INTRAMUSCULAR; INTRAVENOUS at 23:22

## 2021-01-01 RX ADMIN — CEFEPIME HYDROCHLORIDE 1 G: 1 INJECTION, POWDER, FOR SOLUTION INTRAMUSCULAR; INTRAVENOUS at 22:45

## 2021-01-01 RX ADMIN — DOXYCYCLINE 100 MG: 100 INJECTION, POWDER, LYOPHILIZED, FOR SOLUTION INTRAVENOUS at 22:41

## 2021-01-01 RX ADMIN — DEXTROSE MONOHYDRATE 25 ML/HR: 50 INJECTION, SOLUTION INTRAVENOUS at 06:54

## 2021-01-01 RX ADMIN — OXYCODONE HYDROCHLORIDE AND ACETAMINOPHEN 500 MG: 500 TABLET ORAL at 21:20

## 2021-01-01 RX ADMIN — DEXTROSE MONOHYDRATE 15.5 G: 25 INJECTION, SOLUTION INTRAVENOUS at 00:40

## 2021-01-01 RX ADMIN — DOXYCYCLINE 100 MG: 100 INJECTION, POWDER, LYOPHILIZED, FOR SOLUTION INTRAVENOUS at 10:40

## 2021-01-01 RX ADMIN — MEXILETINE HYDROCHLORIDE 150 MG: 150 CAPSULE ORAL at 21:21

## 2021-01-01 RX ADMIN — DEXTROSE MONOHYDRATE 25 G: 25 INJECTION, SOLUTION INTRAVENOUS at 18:13

## 2021-01-01 RX ADMIN — CINACALCET HYDROCHLORIDE 30 MG: 30 TABLET, FILM COATED ORAL at 09:05

## 2021-01-01 RX ADMIN — ASPIRIN 81 MG: 81 TABLET, CHEWABLE ORAL at 10:10

## 2021-01-01 RX ADMIN — ZINC SULFATE 220 MG (50 MG) CAPSULE 1 CAPSULE: CAPSULE at 08:55

## 2021-01-01 RX ADMIN — ALBUTEROL SULFATE 2 PUFF: 90 AEROSOL, METERED RESPIRATORY (INHALATION) at 11:52

## 2021-01-01 RX ADMIN — MORPHINE SULFATE 1 MG: 2 INJECTION, SOLUTION INTRAMUSCULAR; INTRAVENOUS at 23:22

## 2021-01-01 RX ADMIN — Medication 30 ML: at 14:00

## 2021-01-01 RX ADMIN — ALBUTEROL SULFATE 2 PUFF: 90 AEROSOL, METERED RESPIRATORY (INHALATION) at 20:00

## 2021-01-01 RX ADMIN — OXYCODONE HYDROCHLORIDE AND ACETAMINOPHEN 500 MG: 500 TABLET ORAL at 10:42

## 2021-01-01 RX ADMIN — ALBUTEROL SULFATE 2 PUFF: 90 AEROSOL, METERED RESPIRATORY (INHALATION) at 15:03

## 2021-01-01 RX ADMIN — ZINC SULFATE 220 MG (50 MG) CAPSULE 1 CAPSULE: CAPSULE at 10:10

## 2021-01-01 RX ADMIN — DOXYCYCLINE 100 MG: 100 INJECTION, POWDER, LYOPHILIZED, FOR SOLUTION INTRAVENOUS at 09:49

## 2021-01-01 RX ADMIN — SODIUM CHLORIDE 40 MG: 9 INJECTION INTRAMUSCULAR; INTRAVENOUS; SUBCUTANEOUS at 08:55

## 2021-01-01 RX ADMIN — ZINC SULFATE 220 MG (50 MG) CAPSULE 1 CAPSULE: CAPSULE at 14:00

## 2021-01-01 RX ADMIN — Medication 10 ML: at 06:11

## 2021-01-01 RX ADMIN — CINACALCET HYDROCHLORIDE 30 MG: 30 TABLET, FILM COATED ORAL at 10:10

## 2021-01-01 RX ADMIN — ZINC SULFATE 220 MG (50 MG) CAPSULE 1 CAPSULE: CAPSULE at 10:40

## 2021-01-01 RX ADMIN — Medication 10 ML: at 05:57

## 2021-01-01 RX ADMIN — CEFEPIME HYDROCHLORIDE 1 G: 1 INJECTION, POWDER, FOR SOLUTION INTRAMUSCULAR; INTRAVENOUS at 00:01

## 2021-01-01 RX ADMIN — OXYCODONE HYDROCHLORIDE AND ACETAMINOPHEN 500 MG: 500 TABLET ORAL at 01:42

## 2021-01-01 RX ADMIN — AMIODARONE HYDROCHLORIDE 200 MG: 200 TABLET ORAL at 14:01

## 2021-01-01 RX ADMIN — DEXAMETHASONE SODIUM PHOSPHATE 6 MG: 4 INJECTION, SOLUTION INTRAMUSCULAR; INTRAVENOUS at 11:46

## 2021-01-01 RX ADMIN — EPOETIN ALFA-EPBX 10000 UNITS: 10000 INJECTION, SOLUTION INTRAVENOUS; SUBCUTANEOUS at 20:42

## 2021-01-01 RX ADMIN — Medication 10 ML: at 14:32

## 2021-01-01 RX ADMIN — ALBUTEROL SULFATE 2 PUFF: 90 AEROSOL, METERED RESPIRATORY (INHALATION) at 12:22

## 2021-01-01 RX ADMIN — MEXILETINE HYDROCHLORIDE 150 MG: 150 CAPSULE ORAL at 16:45

## 2021-01-01 RX ADMIN — CINACALCET HYDROCHLORIDE 30 MG: 30 TABLET, FILM COATED ORAL at 10:40

## 2021-01-01 RX ADMIN — ALBUMIN (HUMAN) 12.5 G: 0.25 INJECTION, SOLUTION INTRAVENOUS at 21:19

## 2021-01-01 RX ADMIN — OXYCODONE HYDROCHLORIDE AND ACETAMINOPHEN 500 MG: 500 TABLET ORAL at 14:24

## 2021-01-01 RX ADMIN — MEXILETINE HYDROCHLORIDE 150 MG: 150 CAPSULE ORAL at 10:40

## 2021-01-01 RX ADMIN — OXYCODONE HYDROCHLORIDE AND ACETAMINOPHEN 500 MG: 500 TABLET ORAL at 01:01

## 2021-01-01 RX ADMIN — ASPIRIN 81 MG: 81 TABLET, COATED ORAL at 14:00

## 2021-01-01 RX ADMIN — Medication 1 CAPSULE: at 10:40

## 2021-01-01 RX ADMIN — ALBUTEROL SULFATE 2 PUFF: 90 AEROSOL, METERED RESPIRATORY (INHALATION) at 12:48

## 2021-01-01 RX ADMIN — AMIODARONE HYDROCHLORIDE 200 MG: 200 TABLET ORAL at 10:40

## 2021-01-01 RX ADMIN — GLUCAGON HYDROCHLORIDE 1 MG: KIT at 08:50

## 2021-01-01 RX ADMIN — ASPIRIN 81 MG: 81 TABLET, COATED ORAL at 10:40

## 2021-01-01 RX ADMIN — Medication 10 ML: at 09:06

## 2021-01-01 RX ADMIN — Medication 1 CAPSULE: at 10:10

## 2021-01-01 RX ADMIN — DOXYCYCLINE 100 MG: 100 INJECTION, POWDER, LYOPHILIZED, FOR SOLUTION INTRAVENOUS at 20:42

## 2021-01-01 RX ADMIN — AMIODARONE HYDROCHLORIDE 200 MG: 200 TABLET ORAL at 10:10

## 2021-01-01 RX ADMIN — Medication 10 ML: at 00:06

## 2021-01-01 RX ADMIN — OXYCODONE HYDROCHLORIDE AND ACETAMINOPHEN 500 MG: 500 TABLET ORAL at 10:12

## 2021-01-01 RX ADMIN — OXYCODONE HYDROCHLORIDE AND ACETAMINOPHEN 500 MG: 500 TABLET ORAL at 21:18

## 2021-01-01 RX ADMIN — ALBUTEROL SULFATE 2 PUFF: 90 AEROSOL, METERED RESPIRATORY (INHALATION) at 15:49

## 2021-01-01 RX ADMIN — MEXILETINE HYDROCHLORIDE 150 MG: 150 CAPSULE ORAL at 09:05

## 2021-01-01 RX ADMIN — Medication 10 ML: at 21:01

## 2021-01-01 RX ADMIN — Medication 1 CAPSULE: at 08:55

## 2021-01-01 RX ADMIN — ALBUTEROL SULFATE 2 PUFF: 90 AEROSOL, METERED RESPIRATORY (INHALATION) at 00:10

## 2021-01-01 RX ADMIN — DOXYCYCLINE 100 MG: 100 INJECTION, POWDER, LYOPHILIZED, FOR SOLUTION INTRAVENOUS at 14:24

## 2021-01-01 RX ADMIN — ALBUTEROL SULFATE 2 PUFF: 90 AEROSOL, METERED RESPIRATORY (INHALATION) at 08:55

## 2021-01-01 RX ADMIN — ACETAMINOPHEN 650 MG: 325 TABLET ORAL at 23:37

## 2021-01-01 RX ADMIN — OXYCODONE HYDROCHLORIDE AND ACETAMINOPHEN 500 MG: 500 TABLET ORAL at 20:43

## 2021-01-01 RX ADMIN — DOXYCYCLINE 100 MG: 100 INJECTION, POWDER, LYOPHILIZED, FOR SOLUTION INTRAVENOUS at 09:06

## 2021-01-01 RX ADMIN — ALBUTEROL SULFATE 2 PUFF: 90 AEROSOL, METERED RESPIRATORY (INHALATION) at 03:35

## 2021-01-01 RX ADMIN — SODIUM CHLORIDE 40 MG: 9 INJECTION INTRAMUSCULAR; INTRAVENOUS; SUBCUTANEOUS at 10:09

## 2021-01-01 RX ADMIN — GLUCAGON HYDROCHLORIDE 1 MG: KIT at 12:16

## 2021-01-01 RX ADMIN — Medication 1 CAPSULE: at 14:00

## 2021-01-01 RX ADMIN — ALBUTEROL SULFATE 2 PUFF: 90 AEROSOL, METERED RESPIRATORY (INHALATION) at 04:34

## 2021-01-01 RX ADMIN — DEXAMETHASONE SODIUM PHOSPHATE 6 MG: 4 INJECTION, SOLUTION INTRAMUSCULAR; INTRAVENOUS at 10:10

## 2021-01-01 RX ADMIN — GLUCAGON HYDROCHLORIDE 1 MG: KIT at 16:47

## 2021-01-01 RX ADMIN — AMIODARONE HYDROCHLORIDE 200 MG: 200 TABLET ORAL at 08:55

## 2021-01-01 RX ADMIN — MORPHINE SULFATE 2 MG: 2 INJECTION, SOLUTION INTRAMUSCULAR; INTRAVENOUS at 02:37

## 2021-01-01 RX ADMIN — SODIUM CHLORIDE 40 MG: 9 INJECTION INTRAMUSCULAR; INTRAVENOUS; SUBCUTANEOUS at 14:00

## 2021-01-01 RX ADMIN — WARFARIN SODIUM 1 MG: 1 TABLET ORAL at 17:48

## 2021-01-01 RX ADMIN — CEFEPIME HYDROCHLORIDE 1 G: 1 INJECTION, POWDER, FOR SOLUTION INTRAMUSCULAR; INTRAVENOUS at 01:42

## 2021-01-01 RX ADMIN — MEXILETINE HYDROCHLORIDE 150 MG: 150 CAPSULE ORAL at 21:19

## 2021-01-01 NOTE — PROGRESS NOTES
Name: Fe Kern MRN: [id number] : 1960 Hospital: . [address] Date: 2021 IMPRESSION:  
· ESRD on HD. MWF schedule at her home [facility]. · Hyperkalemia- resolved · COVID , poor candidate for remdesivir. · Confusion- likely from hypoxemia. · CHF with advanced cardiomyopathy. Patient was offered to be placed on the heart transplant list 3 years ago. She had declined at that time. · Anemia of ESRD PLAN:  
· HD as per established schedule. Next Tx today to place her on her routine schedule. Increase the UF goal to 2000 ml if able. · Anemia management · May use a PICC line for AB's · Prognosis is guarded at best. 
· O2 support Subjective/Interval History:  
I have reviewed the flowsheet and previous days notes. ROS:Review of systems not obtained due to patient factors. Objective:  
Vital Signs:   
Visit Vitals /85 (BP 1 Location: Right arm, BP Patient Position: At rest) Pulse 91 Temp 98.5 °F (36.9 °C) Resp 25 Ht 5' 5\" (1.651 m) Wt 52.1 kg (114 lb 12.8 oz) SpO2 92% BMI 19.10 kg/m² O2 Device: Nasal cannula O2 Flow Rate (L/min): 6 l/min Temp (24hrs), Av.4 °F (36.9 °C), Min:98 °F (36.7 °C), Max:98.9 °F (37.2 °C) Intake/Output:  
Last shift:      No intake/output data recorded. Last 3 shifts: 1901 -  0700 In: 150 [I.V.:150] Out: 1000 No intake or output data in the 24 hours ending 21 1307 Physical Exam: 
General:    Lethargis, wakes up. NAD, looks ill. Tachypnic at rest 
Head:   Normocephalic, without obvious abnormality, atraumatic. Eyes:   Conjunctivae/corneas clear. Nose:  Nares normal. No drainage or sinus tenderness. Throat:    Lips, mucosa, and tongue normal.  No Thrush Neck:  Supple, symmetrical,  no adenopathy, thyroid: non tender 
  no carotid bruit and no JVD. Lungs:   Diminished to auscultation bilaterally. No Wheezing , + Rhonchi. No rales. Chest wall:  No tenderness or deformity. No Accessory muscle use. Heart:   Regular rate and rhythm. + SM Abdomen:   Soft, non-tender. Not distended. Bowel sounds normal. No masses Extremities: Extremities normal, atraumatic, No cyanosis. No edema. No clubbing Skin:     Texture, turgor normal. No rashes or lesions. Not Jaundiced Psych:  Fair insight. Not depressed. Not anxious or agitated. Neurologic: Weak, sleepy. DATA: 
Labs: 
Recent Labs 01/01/21 
3250 12/31/20 
0510 12/30/20 
8371  137 135* K 3.7 3.0* 4.2  100 101 CO2 23 28 22 BUN 33* 14 29* CREA 4.31* 2.10* 4.79* CA 8.2* 8.7 7.9* ALB 2.6*  --   --   
 
Recent Labs 01/01/21 
3058 12/31/20 
2127 12/31/20 
1526 12/31/20 
0510 12/30/20 
8300 12/30/20 
6790 WBC 10.7  --   --  7.0  --  5.2 HGB 8.0* 7.8* 7.6* 7.6*   < > 8.4* HCT 26.7* 25.6* 24.8* 25.0*   < > 28.4*  
  --   --  151  --  143*  
 < > = values in this interval not displayed. No results for input(s): ROMAN, KU, CLU, CREAU in the last 72 hours. No lab exists for component: PROU Total time spent with patient:  35 minutes 
  [] Critical Care Provided Care Plan discussed with: 
 Staff, Medical Team 
 
Josh Larsen MD

## 2021-01-01 NOTE — PROGRESS NOTES
Pharmacist Note  Warfarin Dosing Consult provided for this 63 y. o.female to manage warfarin for Atrial Fibrillation INR Goal: 2 - 3 Home regimen/ tablet size: 3 mg S/M/W/F/S, 6 mg T/Th 
 
Drugs that may increase INR: Amiodarone Drugs that may decrease INR: None Other current anticoagulants/ drugs that may increase bleeding risk: None Risk factors: None Daily INR ordered: YES Recent Labs 01/01/21 
1012 01/01/21 
0509 12/31/20 
2127 12/31/20 
1526 12/31/20 
0510 12/30/20 
1218 HGB  --  8.0* 7.8* 7.6* 7.6* 7.4* INR 3.2*  --   --   --  4.6* 3.4* Date               INR                  Dose 
12/28            3.4  
12/29            3.8                   Held  
12/30              3.4                   Held 
12/31  4.6  Held 1/1  3.2  Held Assessment/ Plan: Will continue to hold warfarin for supratherapeutic INR. Pharmacy will continue to monitor daily and adjust therapy as indicated. Please contact the pharmacist at   for outpatient recommendations if needed.

## 2021-01-01 NOTE — ACP (ADVANCE CARE PLANNING)
Discussion with family including sister Gareth Blanc. Note mother is alive, age 80 and engaged with Agusto in decision making. Family conversation; see full note on 1/1/2021 for details 
-Family would like to proceed with reasonable medical interventions including central line and NG tube for nutrition as well as hemodialysis -Family DOES NOT want escalation of care with intubation/ventilatory support if her respiratory status declines or CPR. Abe Hawkins DNR order Primary Decision MakerAdy Vazquez - Sister - [phone] Secondary Decision Maker: Erica [place] - Other Relative - [phone] Advance Care Planning 12/30/2020 Patient's Healthcare Decision Maker is: -  
Primary Decision Maker Name -  
Confirm Advance Directive None Patient Would Like to Complete Advance Directive -  
 
ACP: >16 min in addition to conversation as stated in note 1/1/2021; see full conversation

## 2021-01-01 NOTE — PROGRESS NOTES
1325: Talked with Bennett Hooks, about placing a central line and an NG tube. Discussed risks and educated on benefits. Dixie Rodriguez was unsure about what she wanted to do - encouraged Dixie Rodriguez to talk to palliative care team to get a full picture of plan of care before moving forward. Paged on call Palliative team.  
 
1800: Pt has completed dialysis. Anesthesia paged for central line placement. 1910: Called sister, Dixie Rodriguez, for an update HYPOGLYCEMIC EPISODE DOCUMENTATION Patient with hypoglycemic episode(s) at 0830 (time) on 1/1/20 (date). BG value(s) pre-treatment 65 Was patient symptomatic? [] yes, [x] no Patient was treated with the following rescue medications/treatments: [] D50 [] Glucose tablets [x] Glucagon 
              [] 4oz juice 
              [] 6oz reg soda 
              [] 8oz low fat milk BG value post-treatment: 78 Once BG treated and value greater than 80mg/dl, pt was provided with the following: pt refused 
[] snack 
[] meal 
Name of MD notified:Dr aKthleen Mcdaniel The following orders were received: start D5 at 25ml/hr HYPOGLYCEMIC EPISODE DOCUMENTATION Patient with hypoglycemic episode(s) at 1148 (time) on 1/1/20 (date). BG value(s) pre-treatment 62 Was patient symptomatic? [] yes, [x] no Patient was treated with the following rescue medications/treatments: [] D50 [] Glucose tablets [x] Glucagon 
              [] 4oz juice 
              [] 6oz reg soda 
              [] 8oz low fat milk BG value post-treatment: 87 Once BG treated and value greater than 80mg/dl, pt was provided with the following: pt declined [] snack 
[] meal 
Name of MD notified:Dr Kathleen Mcdaniel The following orders were received: N/A 
 
0484 31 29 02: Cardiologist, Dr Fredy Mo, on floor.  Pt's AICD is deactivated - will continue to wear lifevest in hospital. 
 
[facility] 
 
 Patient with hypoglycemic episode(s) at 426 1660 (time) on 1/1/20 (date). BG value(s) pre-treatment 42 Was patient symptomatic? [x] yes, [] no 
Patient was treated with the following rescue medications/treatments: [] D50 [] Glucose tablets [x] Glucagon 
              [] 4oz juice 
              [] 6oz reg soda 
              [] 8oz low fat milk BG value post-treatment: 35 Once BG treated and value greater than 80mg/dl, pt was provided with the following: NPO 
[] snack 
[] meal 
Name of MD notified: Dr Deepa Garay The following orders were received: will continue to give glucagon until pt achieves IV access - Anesthesia has been paged for central line, NG tube placed to potentially give oral replacement, awaiting KUB confirmation. HYPOGLYCEMIC EPISODE DOCUMENTATION Patient with hypoglycemic episode(s) at 1801 (time) on 1/1/20 (date). BG value(s) pre-treatment 119 Was patient symptomatic? [x] yes, [] no 
Patient was treated with the following rescue medications/treatments: [x] D50 [] Glucose tablets [x] Glucagon 
              [] 4oz juice 
              [] 6oz reg soda 
              [] 8oz low fat milk BG value post-treatment: 119 Once BG treated and value greater than 80mg/dl, pt was provided with the following: NPO 
[] snack 
[] meal 
Name of MD notified:Dr Landeros at bedside The following orders were received: Will start D5 continuous at 25ml/hr if BG results less than 80. If BG >80 will hold fluids and start TF. Bedside shift change report given to SLAVA Thomas (oncoming nurse) by Cassie Soriano RN (offgoing nurse).  Report included the following information SBAR, Kardex, ED Summary, Procedure Summary, Intake/Output, MAR, Recent Results and Cardiac Rhythm AVB 1st.

## 2021-01-01 NOTE — PROGRESS NOTES
Palliative Medicine Consult [place]: [phone] ([phone]) Patient Name: Brittany Brown YOB: 1960 Date of Initial Consult: 12/31/20 Reason for Consult: Care Decisions Requesting Provider: Dr. Andrew Jensen Primary Care Physician: Brad Song MD 
 
 SUMMARY:  
Brittany Brown is a 63 y.o. with a past history of ESRD on HD (Dr Maida Valdez), CHF (EF 21-25% 3/2019), pacemaker/ICD (saw Dr. Jordon Gaviria 11/2020), paroxysmal a fib on coumadin, VT s/p VT ablation w/ continued ICD shocks for VT afterwards  who was admitted on 12/28/2020 from [facility] (was there for SNF from previous hospital stay at [address] [place] [address]) with SOB due to COVID PNA. On anti-biotics and dexamethasone, not good candidate for Remdesivir per ID. Of note, patient had previous admissions at outside hospitals for various medical issues over the last few months and has not been at home since November 17th. She was previously living independently. See full conversation below. Discussed care with sister Anat Nix who also involved their other siblings Socorro Capps and Brad Robison as well as niece Fabiano Granda and good friend Sal Briceno. Primary Decision MakerStacey Zelayamelodyr - Sister - [phone] Secondary Decision Maker: Jose A Hurst - Other Relative - [phone] The family supports attempting the central line for medications and the NG tube for nutrition but agrees to not escalating care to intubation/ventilation or with CPR in the event of a cardio-respiratory arrest.  
 
 PALLIATIVE DIAGNOSES:  
1. Lethargy, some confusion 2. Shortness of breath / Dyspnea 3. Complex chronic co-morbidities of multiple organ systems 4. Progressive health issues over several months 5. Goals of care conversation with family; Entered DNR order  PLAN:  
 Sister Edith Cox described her as very independent and private person, she was never /had no children. She has always worked; actually had deferred disability despite her health conditions because she wanted to continue working despite her illnesses. She loved to read and watch TV as well. Her friend Marianela Singh described her as a loyal kind person, always helping others before helping herself. She always had issues with her kidney and then later with her heart which has caused her significant problems more recently. She had been doing dialysis at home as she did not want to go to a dialysis center but then had the peritoneal infection in August and again in November so this was no longer possible. Edith Cox said her health issues were escalating over the last 2 years affecting her quality of life significantly. Discussed her current status; that leading up to this hospitalization, she had already shown signs of physical decline. The patient had expressed over the last few months that she was getting tired and felt that she continued to have set backs that were not allowing her to fully recover. This hospitalization has really been a convergence of issues that include COVID-19; Pulmonary issues from this; Cardiac issues from her heart, Renal issues which are continuing to require hemodialysis and Nutritional issues as well. All of this seems to be affecting the oxygen delivered to her organs; so her brain is not functioning well (lethargic/confused). Discussed that she has a small chance of recovery from this hospitalization given the previous health challenges over the last few months as well as the acute issues she is facing. If she is able to make it through these health set backs, her function will not return to what it was even several months ago; she is likely to require a much higher level of support and not return to living independently. The family supports attempting the central line for medications and the NG tube for nutrition but agrees to not escalating care to intubation/ventilation or with CPR in the event of a cardio-respiratory arrest.  
 
We agreed that we would provide the family with daily updates through the clinical team in regards to her medical status and her mental status. All information shared with Dr. Annalise Rose. Communicated plan of care with: Palliative IDT, [facility] Team 
 
 GOALS OF CARE / TREATMENT PREFERENCES:  
 
GOALS OF CARE: 
Patient/Health Care Proxy Stated Goals: Prolong life TREATMENT PREFERENCES:  
Code Status: DNR Advance Care Planning: 
[x] The [facility] Interdisciplinary Team has updated the ACP Navigator with [address] and Patient Capacity Primary Decision MakerAndreas Parlor - Sister - [phone] Secondary Decision Maker: Charlene Saucedo - Other Relative - [phone] Advance Care Planning 12/30/2020 Patient's Healthcare Decision Maker is: -  
Primary Decision Maker Name -  
Confirm Advance Directive None Patient Would Like to Complete Advance Directive - Medical Interventions: Limited additional interventions Other: As far as possible, the palliative care team has discussed with patient / health care proxy about goals of care / treatment preferences for patient. HISTORY:  
 
History obtained from: Patient, chart, staff CHIEF COMPLAINT: Patient lethargic, unable to answer HPI/SUBJECTIVE: The patient is:  
[] Verbal and participatory [x] Non-participatory due to:  
 
Currently receiving hemodilaysis Clinical Pain Assessment (nonverbal scale for severity on nonverbal patients):  
Clinical Pain Assessment Severity: 0 Duration: for how long has pt been experiencing pain (e.g., 2 days, 1 month, years) Frequency: how often pain is an issue (e.g., several times per day, once every few days, constant) FUNCTIONAL ASSESSMENT:  
 
Palliative Performance Scale (PPS): PPS: 40 PSYCHOSOCIAL/SPIRITUAL SCREENING:  
 
Palliative IDT has assessed this patient for cultural preferences / practices and a referral made as appropriate to needs (Cultural Services, Patient Advocacy, Ethics, etc.) Any spiritual / Adventism concerns: 
[] Yes /  [x] No 
 
Caregiver Burnout: 
[] Yes /  [x] No /  [] No Caregiver Present Anticipatory grief assessment:  
[x] Normal  / [] Maladaptive ESAS Anxiety: ESAS Depression:    
 
Cannot obtain due to patient factors REVIEW OF SYSTEMS:  
 
Positive and pertinent negative findings in ROS are noted above in HPI. The following systems were [] reviewed / [x] unable to be reviewed as noted in HPI Other findings are noted below. Systems: constitutional, ears/nose/mouth/throat, respiratory, gastrointestinal, genitourinary, musculoskeletal, integumentary, neurologic, psychiatric, endocrine. Positive findings noted below. Modified ESAS Completed by: provider Fatigue: 8 Drowsiness: 2 Pain: 0 Dyspnea: 2 PHYSICAL EXAM:  
 
From RN flowsheet: 
Wt Readings from Last 3 Encounters:  
01/01/21 114 lb 12.8 oz (52.1 kg)  
11/16/20 129 lb 9.6 oz (58.8 kg) 10/29/20 129 lb (58.5 kg) Blood pressure 127/83, pulse 77, temperature 97.9 °F (36.6 °C), temperature source Axillary, resp. rate 27, height 5' 5\" (1.651 m), weight 114 lb 12.8 oz (52.1 kg), SpO2 100 %. Pain Scale 1: Visual 
Pain Intensity 1: 0 
  
  
  
 HISTORY:  
 
Active Problems: 
  Respiratory failure with hypoxia ([facility] [address] (12/29/2020) Allergies Allergen Reactions  Iodinated Contrast Media Angioedema Cellulitis on side of face after test  
  
Current Facility-Administered Medications Medication Dose Route Frequency  sterile water (preservative free) injection  dextrose 5% infusion  25 mL/hr IntraVENous CONTINUOUS  
  potassium chloride SR (KLOR-CON 10) tablet 40 mEq  40 mEq Oral DAILY  glucose chewable tablet 16 g  4 Tab Oral PRN  
 dextrose (D50W) injection syrg 12.5-25 g  12.5-25 g IntraVENous PRN  
 glucagon (GLUCAGEN) injection 1 mg  1 mg IntraMUSCular PRN  zinc sulfate (ZINCATE) 220 (50) mg capsule 1 Cap  1 Cap Oral DAILY  pantoprazole (PROTONIX) tablet 20 mg  20 mg Oral ACB  dexAMETHasone (DECADRON) tablet 6 mg  6 mg Oral Q24H  
 epoetin della-epbx (RETACRIT) injection 10,000 Units  10,000 Units SubCUTAneous DIALYSIS MON, WED & FRI  aspirin delayed-release tablet 81 mg  81 mg Oral DAILY  sodium chloride (NS) flush 5-40 mL  5-40 mL IntraVENous Q8H  
 sodium chloride (NS) flush 5-40 mL  5-40 mL IntraVENous PRN  
 acetaminophen (TYLENOL) tablet 650 mg  650 mg Oral Q6H PRN Or  
 acetaminophen (TYLENOL) suppository 650 mg  650 mg Rectal Q6H PRN  polyethylene glycol (MIRALAX) packet 17 g  17 g Oral DAILY PRN  
 albuterol (PROVENTIL HFA, VENTOLIN HFA, PROAIR HFA) inhaler 2 Puff  2 Puff Inhalation Q4H RT  
 ascorbic acid (vitamin C) (VITAMIN C) tablet 500 mg  500 mg Oral Q6H  Warfarin Pharmacy to Dose   Other Rx Dosing/Monitoring  cefepime (MAXIPIME) 1 g in 0.9% sodium chloride (MBP/ADV) 50 mL MBP  1 g IntraVENous Q24H  
 doxycycline (VIBRAMYCIN) 100 mg in 0.9% sodium chloride (MBP/ADV) 100 mL MBP  100 mg IntraVENous Q12H  
 amiodarone (CORDARONE) tablet 200 mg  200 mg Oral DAILY  cholecalciferol (VITAMIN D3) (1000 Units /25 mcg) tablet 10 Tab  10,000 Units Oral Q7D  
 cinacalcet (SENSIPAR) tablet 30 mg  30 mg Oral DAILY  loperamide (IMODIUM) capsule 2 mg  2 mg Oral Q3H PRN  
 mexiletine (MEXITIL) capsule 150 mg  150 mg Oral TID  B complex-vitaminC-folic acid (NEPHROCAP) cap  1 Cap Oral DAILY  senna (SENOKOT) tablet 8.6 mg  1 Tab Oral DAILY PRN  
 
 LAB AND IMAGING FINDINGS:  
 
Lab Results Component Value Date/Time  WBC 10.7 01/01/2021 05:09 AM  
 HGB 8.0 (L) 01/01/2021 05:09 AM  
 PLATELET 359 22/96/0734 05:09 AM  
 
Lab Results Component Value Date/Time Sodium 138 01/01/2021 05:09 AM  
 Potassium 3.7 01/01/2021 05:09 AM  
 Chloride 103 01/01/2021 05:09 AM  
 CO2 23 01/01/2021 05:09 AM  
 BUN 33 (H) 01/01/2021 05:09 AM  
 Creatinine 4.31 (H) 01/01/2021 05:09 AM  
 Calcium 8.2 (L) 01/01/2021 05:09 AM  
 Magnesium 2.2 12/29/2020 05:28 AM  
 Phosphorus 4.5 12/29/2020 05:28 AM  
  
Lab Results Component Value Date/Time Alk. phosphatase 247 (H) 01/01/2021 05:09 AM  
 Protein, total 7.4 01/01/2021 05:09 AM  
 Albumin 2.6 (L) 01/01/2021 05:09 AM  
 Globulin 4.8 (H) 01/01/2021 05:09 AM  
 
Lab Results Component Value Date/Time INR 3.2 (H) 01/01/2021 10:12 AM  
 Prothrombin time 32.1 (H) 01/01/2021 10:12 AM  
 aPTT 29.1 10/17/2012 04:40 AM  
  
Lab Results Component Value Date/Time Iron 33 (L) 12/29/2020 05:28 AM  
 TIBC 149 (L) 12/29/2020 05:28 AM  
 Iron % saturation 22 12/29/2020 05:28 AM  
 Ferritin 78 10/19/2013 04:30 AM  
  
No results found for: PH, PCO2, PO2 No components found for: [place] Lab Results Component Value Date/Time CK 58 12/29/2020 05:28 AM  
 CK - MB 11.5 (H) 10/15/2012 03:34 AM  
  
   
 
Total time: 60min Counseling / coordination time, spent as noted above: 60min 
> 50% counseling / coordination?: yes re goals of care Prolonged service was provided for  []30 min   []75 min in face to face time in the presence of the patient, spent as noted above. Time Start:  
Time End:  
Note: this can only be billed with 66006 (initial) or 77237 (follow up). If multiple start / stop times, list each separately.

## 2021-01-01 NOTE — PROGRESS NOTES
[facility] [facility] Adult  Hospitalist Group Hospitalist Progress Note Rachid Velez MD 
Answering service: [phone] OR [phone] from in house phone Date of Service:  2021 NAME:  Slim Domínguez :  1960 MRN:  [id number] This documentation was facilitated by a Voice Recognition software and may contain inadvertent typographical errors. Admission Summary:  
Slim Domínguez is a 63 y.o. -American female with past medical history of non-insulin-dependent type 2 diabetes, paroxysmal A. fib on AC with warfarin, VT, history of MI, ESRD on HD, HFrEF, hypertension who presents to hospital via EMS with chief complaint of shortness of breath. Interval history / Subjective:  
    
Lethargic. Opens eye,non communicative. She does not appear to be in distress Discussed with nephrology who knows patient from before. Patient was on PD until she was recently admitted to [facility] for sepsis due to peritonitis and was switched to HD. Patient apparently was very sick and there was discussion about code status,goal of care with family but family reportedly decided to pursue aggressive care. Assessment & Plan:  
Acute metabolic encephalopathy ,multifactorial: ahrf,covid infeciton ,advanced heart and kidney failure . -Exam non focal.Head CT negative. Ammonia low. Acute hypoxic respiratory failure 2/2 over 19 pneumonia: 
-Albuterol q4 Hrs PRN via Spacer 
-Given Supplemental O2 use, continue dexamethasone 6 mg/day 
-On oxygen by nasal cannula 
-anticoagulation:already on warfarin 
-Cefepime plus Doxy 
-Vitamin C 500-1000 mg q 6 hourly  
-Zinc  mg/day  
-Famotidine 40 mg BID  
-Vitamin D3 20 000  60 000 iu single oral dose. -Droplet PLUS precautions. -ID on consult; appreciate recs. ESRD on HD: Nephrology following -Patient is volume overloaded confounding on hypoxia from Covid pneumonia Atrial fibrillation/HFrEF/VT / NSTE-ACS. Severe cardiomyopathy. Per nephrology,cardiac transplant was considered at one point. 
-Trop leak likely secondary to Covid cardiomyopathy vs ESRD 
-Continue aspirin plus warfarin. 
-Hold Coreg given labile blood pressures 
-Patient currently with LifeVest with plans for VT ablation. AICD in place not functional per cardiology. -Repeat echocardiogram.  Echocardiogram 2019 shows severely decreased systolic function. Elevated LFT,possibly due to hypoperfusion in the setting of severe cardiomyopathy,intermittent hypotension 
-monitor and manage accordingly. GERD 
-On Pepcid Iron deficiency anemia 
-Continue home Rx Diet:NPO due to AMS 
-will insert NGT and start tube feeding unless family decides otherwise after talking with Palliative medicine. Hypoglycemia due to no po intake 
-hypoglycemic protocol. 
-will insert NGT and start tube feeding unless family decides otherwise after talking with Palliative medicine. Patient with significant co morbidities : ESRD,severe cardiomyopathy and now with COVID pneumonia and hypoxic resp failure,ams is at high risk for further rapid deterioration. Called and discussed my concern with her sister who is aware of her sisters medica situation. She thought pt signed DNR when she was in another hospital.I told her she is now full code and can get consent from pt as she is lethargic. I encouraged her sister to discuss among the family. Alyssa Luis said they will,that their is 80 ,lives with her and very alert that she will be in the conversation. FEN/GI -NPO Activity -as tolerated DVT prophylaxis -warfarin GI prophylaxis -Pepcid Disposition -TBD CODE STATUS: Full code Called and updated her sister/KIAN Romano Stamp [phone] Hospital Problems  Date Reviewed: 11/16/2020 Codes Class Noted POA Respiratory failure with hypoxia [facility]) ICD-10-CM: J96.91 
ICD-9-CM: 518.81  12/29/2020 Unknown Review of Systems:  
Review of systems not obtained due to patient factors. Vital Signs:  
 Last 24hrs VS reviewed since prior progress note. Most recent are: 
Visit Vitals /85 (BP 1 Location: Right arm, BP Patient Position: At rest) Pulse 91 Temp 98.5 °F (36.9 °C) Resp 25 Ht 5' 5\" (1.651 m) Wt 52.1 kg (114 lb 12.8 oz) SpO2 92% BMI 19.10 kg/m² No intake or output data in the 24 hours ending 01/01/21 1347 Physical Examination:  
 
I had a face to face encounter with this patient and independently examined them on1/1/2021 as outlined below: 
     
Constitutional:  Lethargic. HEENT:  Atraumatic. Oral mucosa moist,. Non icteric sclera. No pallor. Resp:  CTA bilaterally. No wheezing/rhonchi/rales. No accessory muscle use Chest Wall: No deformity CV:  Regular rhythm, normal rate, no murmurs, gallops, rubs GI:  Soft, non distended, non tender. normoactive bowel sounds, no hepatosplenomegaly :  No CVA or suprapubic tenderness Musculoskeletal:  No edema, warm, 2+ pulses throughout Neurologic:  Mental status:lethargic. Cranial nerves II-XII : WNL Motor exam:Moves all extremities symmetrically Data Review:  
 Review and/or order of clinical lab test 
Review and/or order of tests in the radiology section of CPT Review and/or order of tests in the medicine section of CPT Labs:  
 
Recent Labs 01/01/21 
4696 12/31/20 
2127 12/31/20 
0510 12/31/20 
0510 WBC 10.7  --   --  7.0 HGB 8.0* 7.8*   < > 7.6* HCT 26.7* 25.6*   < > 25.0*  
  --   --  151  
 < > = values in this interval not displayed. Recent Labs 01/01/21 
9428 12/31/20 
0510 12/30/20 
6660  137 135* K 3.7 3.0* 4.2  100 101 CO2 23 28 22 BUN 33* 14 29* CREA 4.31* 2.10* 4.79* GLU 61* 86 68  
CA 8.2* 8.7 7.9* Recent Labs 01/01/21 7403 * * TBILI 1.1* TP 7.4 ALB 2.6*  
GLOB 4.8* Recent Labs 01/01/21 
1012 12/31/20 
0510 12/30/20 
1218 INR 3.2* 4.6* 3.4* PTP 32.1* 45.0* 33.7* No results for input(s): FE, TIBC, PSAT, FERR in the last 72 hours. Lab Results Component Value Date/Time Folate 19.3 03/12/2019 03:50 AM  
  
No results for input(s): PH, PCO2, PO2 in the last 72 hours. No results for input(s): CPK, CKNDX, TROIQ in the last 72 hours. No lab exists for component: CPKMB Lab Results Component Value Date/Time Cholesterol, total 125 10/16/2012 03:40 AM  
 HDL Cholesterol 37 10/16/2012 03:40 AM  
 LDL, calculated 65 10/16/2012 03:40 AM  
 Triglyceride 115 10/16/2012 03:40 AM  
 CHOL/HDL Ratio 3.4 10/16/2012 03:40 AM  
 
Lab Results Component Value Date/Time Glucose (POC) 87 01/01/2021 12:54 PM  
 Glucose (POC) 62 (L) 01/01/2021 11:48 AM  
 Glucose (POC) 78 01/01/2021 09:20 AM  
 Glucose (POC) 65 01/01/2021 08:30 AM  
 Glucose (POC) 127 (H) 01/01/2021 01:03 AM  
 
Lab Results Component Value Date/Time Color RED 08/26/2018 07:52 AM  
 Appearance BLOODY (A) 08/26/2018 07:52 AM  
 Specific gravity 1.012 08/26/2018 07:52 AM  
 pH (UA) 7.0 08/26/2018 07:52 AM  
 Protein 300 (A) 08/26/2018 07:52 AM  
 Glucose NEGATIVE  08/26/2018 07:52 AM  
 Ketone TRACE (A) 08/26/2018 07:52 AM  
 Bilirubin NEGATIVE  10/18/2013 02:41 PM  
 Urobilinogen 0.2 08/26/2018 07:52 AM  
 Nitrites NEGATIVE  08/26/2018 07:52 AM  
 Leukocyte Esterase SMALL (A) 08/26/2018 07:52 AM  
 Epithelial cells FEW 08/26/2018 07:52 AM  
 Bacteria NEGATIVE  08/26/2018 07:52 AM  
 WBC 5-10 08/26/2018 07:52 AM  
 RBC >100 (H) 08/26/2018 07:52 AM  
 
 
 
Medications Reviewed:  
 
Current Facility-Administered Medications Medication Dose Route Frequency  sterile water (preservative free) injection  dextrose 5% infusion  25 mL/hr IntraVENous CONTINUOUS  
 Warfarin - Hold dose today   Other ONCE  
  potassium chloride SR (KLOR-CON 10) tablet 40 mEq  40 mEq Oral DAILY  glucose chewable tablet 16 g  4 Tab Oral PRN  
 dextrose (D50W) injection syrg 12.5-25 g  12.5-25 g IntraVENous PRN  
 glucagon (GLUCAGEN) injection 1 mg  1 mg IntraMUSCular PRN  zinc sulfate (ZINCATE) 220 (50) mg capsule 1 Cap  1 Cap Oral DAILY  pantoprazole (PROTONIX) tablet 20 mg  20 mg Oral ACB  dexAMETHasone (DECADRON) tablet 6 mg  6 mg Oral Q24H  
 epoetin della-epbx (RETACRIT) injection 10,000 Units  10,000 Units SubCUTAneous DIALYSIS MON, WED & FRI  aspirin delayed-release tablet 81 mg  81 mg Oral DAILY  sodium chloride (NS) flush 5-40 mL  5-40 mL IntraVENous Q8H  
 sodium chloride (NS) flush 5-40 mL  5-40 mL IntraVENous PRN  
 acetaminophen (TYLENOL) tablet 650 mg  650 mg Oral Q6H PRN Or  
 acetaminophen (TYLENOL) suppository 650 mg  650 mg Rectal Q6H PRN  polyethylene glycol (MIRALAX) packet 17 g  17 g Oral DAILY PRN  
 albuterol (PROVENTIL HFA, VENTOLIN HFA, PROAIR HFA) inhaler 2 Puff  2 Puff Inhalation Q4H RT  
 ascorbic acid (vitamin C) (VITAMIN C) tablet 500 mg  500 mg Oral Q6H  
 melatonin tablet 9 mg  9 mg Oral QHS  Warfarin Pharmacy to Dose   Other Rx Dosing/Monitoring  cefepime (MAXIPIME) 1 g in 0.9% sodium chloride (MBP/ADV) 50 mL MBP  1 g IntraVENous Q24H  
 doxycycline (VIBRAMYCIN) 100 mg in 0.9% sodium chloride (MBP/ADV) 100 mL MBP  100 mg IntraVENous Q12H  
 amiodarone (CORDARONE) tablet 200 mg  200 mg Oral DAILY  cholecalciferol (VITAMIN D3) (1000 Units /25 mcg) tablet 10 Tab  10,000 Units Oral Q7D  
 cinacalcet (SENSIPAR) tablet 30 mg  30 mg Oral DAILY  loperamide (IMODIUM) capsule 2 mg  2 mg Oral Q3H PRN  
 mexiletine (MEXITIL) capsule 150 mg  150 mg Oral TID  B complex-vitaminC-folic acid (NEPHROCAP) cap  1 Cap Oral DAILY  senna (SENOKOT) tablet 8.6 mg  1 Tab Oral DAILY PRN  
 
______________________________________________________________________ EXPECTED LENGTH OF STAY: 5d 12h ACTUAL LENGTH OF STAY:          3 Guilherme Griffin MD

## 2021-01-01 NOTE — PROGRESS NOTES
Cardiology Progress Note 1/1/2021 12:56 PM 
 
Admit Date: 12/28/2020 Admit Diagnosis: Respiratory failure with hypoxia ([facility] [place] [address] [J96.91] Assessment:  
 
Active Problems: 
  Respiratory failure with hypoxia ([address] (12/29/2020) Plan:  
 
Ventricular Tachycardia  improved   she has AICD, also LifeVest, apparently AICD lead has high impedence. Palliative involved, family considering DNR, in which case Life Vest could be removed. Will sign off for now, see again as neede. Tammie Haddad MD 
[phone]  Cell Subjective: She has dementia and is essentially non communicative. ROS: negative except as noted above. Objective:  
 
 
Visit Vitals /85 (BP 1 Location: Right arm, BP Patient Position: At rest) Pulse 91 Temp 98.5 °F (36.9 °C) Resp 25 Ht 5' 5\" (1.651 m) Wt 114 lb 12.8 oz (52.1 kg) SpO2 92% BMI 19.10 kg/m² Current Facility-Administered Medications Medication Dose Route Frequency  sterile water (preservative free) injection  dextrose 5% infusion  25 mL/hr IntraVENous CONTINUOUS  
 Warfarin - Hold dose today   Other ONCE  potassium chloride SR (KLOR-CON 10) tablet 40 mEq  40 mEq Oral DAILY  glucose chewable tablet 16 g  4 Tab Oral PRN  
 dextrose (D50W) injection syrg 12.5-25 g  12.5-25 g IntraVENous PRN  
 glucagon (GLUCAGEN) injection 1 mg  1 mg IntraMUSCular PRN  zinc sulfate (ZINCATE) 220 (50) mg capsule 1 Cap  1 Cap Oral DAILY  pantoprazole (PROTONIX) tablet 20 mg  20 mg Oral ACB  dexAMETHasone (DECADRON) tablet 6 mg  6 mg Oral Q24H  
 epoetin della-epbx (RETACRIT) injection 10,000 Units  10,000 Units SubCUTAneous DIALYSIS MON, WED & FRI  aspirin delayed-release tablet 81 mg  81 mg Oral DAILY  sodium chloride (NS) flush 5-40 mL  5-40 mL IntraVENous Q8H  
 sodium chloride (NS) flush 5-40 mL  5-40 mL IntraVENous PRN  
 acetaminophen (TYLENOL) tablet 650 mg  650 mg Oral Q6H PRN  Or  
  acetaminophen (TYLENOL) suppository 650 mg  650 mg Rectal Q6H PRN  polyethylene glycol (MIRALAX) packet 17 g  17 g Oral DAILY PRN  
 albuterol (PROVENTIL HFA, VENTOLIN HFA, PROAIR HFA) inhaler 2 Puff  2 Puff Inhalation Q4H RT  
 ascorbic acid (vitamin C) (VITAMIN C) tablet 500 mg  500 mg Oral Q6H  
 melatonin tablet 9 mg  9 mg Oral QHS  Warfarin Pharmacy to Dose   Other Rx Dosing/Monitoring  cefepime (MAXIPIME) 1 g in 0.9% sodium chloride (MBP/ADV) 50 mL MBP  1 g IntraVENous Q24H  
 doxycycline (VIBRAMYCIN) 100 mg in 0.9% sodium chloride (MBP/ADV) 100 mL MBP  100 mg IntraVENous Q12H  
 amiodarone (CORDARONE) tablet 200 mg  200 mg Oral DAILY  cholecalciferol (VITAMIN D3) (1000 Units /25 mcg) tablet 10 Tab  10,000 Units Oral Q7D  
 cinacalcet (SENSIPAR) tablet 30 mg  30 mg Oral DAILY  loperamide (IMODIUM) capsule 2 mg  2 mg Oral Q3H PRN  
 mexiletine (MEXITIL) capsule 150 mg  150 mg Oral TID  B complex-vitaminC-folic acid (NEPHROCAP) cap  1 Cap Oral DAILY  senna (SENOKOT) tablet 8.6 mg  1 Tab Oral DAILY PRN Physical Exam: Not examined due to Covid positivity. Discussed with nurse. Telemetry: normal sinus rhythm Data Review:  
 
Labs:   
Recent Results (from the past 24 hour(s)) AMMONIA Collection Time: 12/31/20  3:26 PM  
Result Value Ref Range Ammonia 29 <32 UMOL/L  
HGB & HCT Collection Time: 12/31/20  3:26 PM  
Result Value Ref Range HGB 7.6 (L) 11.5 - 16.0 g/dL HCT 24.8 (L) 35.0 - 47.0 % GLUCOSE, POC Collection Time: 12/31/20  9:20 PM  
Result Value Ref Range Glucose (POC) 75 65 - 100 mg/dL Performed by Loretta De La O   
HGB & HCT Collection Time: 12/31/20  9:27 PM  
Result Value Ref Range HGB 7.8 (L) 11.5 - 16.0 g/dL HCT 25.6 (L) 35.0 - 47.0 % GLUCOSE, POC Collection Time: 12/31/20 11:44 PM  
Result Value Ref Range Glucose (POC) 64 (L) 65 - 100 mg/dL Performed by Estefany Mcgowan RN traveler GLUCOSE, POC  
 Collection Time: 01/01/21  1:03 AM  
Result Value Ref Range Glucose (POC) 127 (H) 65 - 100 mg/dL Performed by Kyleigh Wilson CBC WITH AUTOMATED DIFF Collection Time: 01/01/21  5:09 AM  
Result Value Ref Range WBC 10.7 3.6 - 11.0 K/uL  
 RBC 2.73 (L) 3.80 - 5.20 M/uL HGB 8.0 (L) 11.5 - 16.0 g/dL HCT 26.7 (L) 35.0 - 47.0 % MCV 97.8 80.0 - 99.0 FL  
 MCH 29.3 26.0 - 34.0 PG  
 MCHC 30.0 30.0 - 36.5 g/dL  
 RDW 19.8 (H) 11.5 - 14.5 % PLATELET 436 524 - 950 K/uL MPV 11.6 8.9 - 12.9 FL  
 NRBC 1.4 (H) 0  WBC ABSOLUTE NRBC 0.15 (H) 0.00 - 0.01 K/uL NEUTROPHILS 94 (H) 32 - 75 % LYMPHOCYTES 4 (L) 12 - 49 % MONOCYTES 1 (L) 5 - 13 % EOSINOPHILS 0 0 - 7 % BASOPHILS 0 0 - 1 % IMMATURE GRANULOCYTES 1 (H) 0.0 - 0.5 % ABS. NEUTROPHILS 10.1 (H) 1.8 - 8.0 K/UL  
 ABS. LYMPHOCYTES 0.4 (L) 0.8 - 3.5 K/UL  
 ABS. MONOCYTES 0.1 0.0 - 1.0 K/UL  
 ABS. EOSINOPHILS 0.0 0.0 - 0.4 K/UL  
 ABS. BASOPHILS 0.0 0.0 - 0.1 K/UL  
 ABS. IMM. GRANS. 0.1 (H) 0.00 - 0.04 K/UL  
 DF SMEAR SCANNED    
 RBC COMMENTS TARGET CELLS 
PRESENT 
    
 RBC COMMENTS POLYCHROMASIA 1+ 
    
 RBC COMMENTS ANISOCYTOSIS 1+ 
    
 RBC COMMENTS MACROCYTOSIS 
1+ METABOLIC PANEL, COMPREHENSIVE Collection Time: 01/01/21  5:09 AM  
Result Value Ref Range Sodium 138 136 - 145 mmol/L Potassium 3.7 3.5 - 5.1 mmol/L Chloride 103 97 - 108 mmol/L  
 CO2 23 21 - 32 mmol/L Anion gap 12 5 - 15 mmol/L Glucose 61 (L) 65 - 100 mg/dL BUN 33 (H) 6 - 20 MG/DL Creatinine 4.31 (H) 0.55 - 1.02 MG/DL  
 BUN/Creatinine ratio 8 (L) 12 - 20 GFR est AA 13 (L) >60 ml/min/1.73m2 GFR est non-AA 10 (L) >60 ml/min/1.73m2 Calcium 8.2 (L) 8.5 - 10.1 MG/DL Bilirubin, total 1.1 (H) 0.2 - 1.0 MG/DL  
 ALT (SGPT) 244 (H) 12 - 78 U/L  
 AST (SGOT) 333 (H) 15 - 37 U/L Alk. phosphatase 247 (H) 45 - 117 U/L Protein, total 7.4 6.4 - 8.2 g/dL Albumin 2.6 (L) 3.5 - 5.0 g/dL Globulin 4.8 (H) 2.0 - 4.0 g/dL A-G Ratio 0.5 (L) 1.1 - 2.2 GLUCOSE, POC Collection Time: 01/01/21  8:30 AM  
Result Value Ref Range Glucose (POC) 65 65 - 100 mg/dL Performed by [employer]   
GLUCOSE, POC Collection Time: 01/01/21  9:20 AM  
Result Value Ref Range Glucose (POC) 78 65 - 100 mg/dL Performed by [employer]   
PROTHROMBIN TIME + INR Collection Time: 01/01/21 10:12 AM  
Result Value Ref Range INR 3.2 (H) 0.9 - 1.1 Prothrombin time 32.1 (H) 9.0 - 11.1 sec GLUCOSE, POC Collection Time: 01/01/21 11:48 AM  
Result Value Ref Range Glucose (POC) 62 (L) 65 - 100 mg/dL Performed by [employer] Elliepharadha P Kevin Grimm MD

## 2021-01-01 NOTE — PROCEDURES
[facility] Dialysis Team [place]  [phone] Vitals   Pre   Post   Assessment   Pre   Post    
Temp  Temp: 98.8 °F (37.1 °C) (01/01/21 1350)  97.9 LOC  Lethargic, non-verbal Remains lethargic HR   Pulse (Heart Rate): 92 (01/01/21 1350) 77 Lungs   Coarse-diminished, 5L O2. +cough Remains on 5L O2   
B/P  BP: 120/82 (01/01/21 1350) 127/83 Cardiac   S1S2, Irregular AICD and LifeVest Irregular Resp   Resp Rate: 30 (01/01/21 1350) 27 Skin   Warm, dry No change Pain level  Pain Intensity 1: 0 (01/01/21 0400)  Edema  No peripheral edema Orders: Duration:   Start:    1350 End:    1720 Total:   3.5hrs Dialyzer:   Dialyzer/Set Up Inspection: Michel Robert (01/01/21 1350) K Bath:   Dialysate K (mEq/L): 3 (01/01/21 1350) Ca Bath:   Dialysate CA (mEq/L): 2.5 (01/01/21 1350) Na/Bicarb:   Dialysate NA (mEq/L): 140 (01/01/21 1350) Target Fluid Removal:   Goal/Amount of Fluid to Remove (mL): 1000 mL (01/01/21 1350) Access Type & Location:   RIJ tunneled CVC, transparent dsg dated 12/29/20 CDI. +aspiration/flush.  with acceptable AP/ Labs Obtained/Reviewed Critical Results Called   Date when labs were drawn- 
Hgb-   
HGB Date Value Ref Range Status 01/01/2021 8.0 (L) 11.5 - 16.0 g/dL Final  
 
K-   
Potassium Date Value Ref Range Status 01/01/2021 3.7 3.5 - 5.1 mmol/L Final  
 
Ca-  
Calcium Date Value Ref Range Status 01/01/2021 8.2 (L) 8.5 - 10.1 MG/DL Final  
 
Bun-  
BUN Date Value Ref Range Status 01/01/2021 33 (H) 6 - 20 MG/DL Final  
 
Creat-  
Creatinine Date Value Ref Range Status 01/01/2021 4.31 (H) 0.55 - 1.02 MG/DL Final  
  Comment:  
  INVESTIGATED PER DELTA CHECK PROTOCOL Medications/ Blood Products Given Name   Dose   Route and Time None given Blood Volume Processed (BVP): 78.8L Net Fluid Removed:  0 mL Comments Time Out Done: 7474 Primary Nurse Rpt Pre: Sancho Azevedo RN 
 Primary Nurse Rpt Post: Trina Fischer RN 
Pt Education: CVC precautions Care Plan: HD today, cont MWF Tx Summary: 
4066: Safety checks complete, time out performed. 1350: CVC assessed, no redness, warmth or drainage noted. Transparent dressing and biopatch CDI. Each catheter limb disinfected for 60 seconds per limb with alcohol swabs. Caps removed, dialysis CVC hub scrubbed with Prevantics for 15 seconds, followed by a 5 second dry time per Hospital P&P. Aspirated and discarded 5ml from each lumen. +aspiration/flush. HD initiated. Access visible, lines secure. Medications reviewed. Fifi Somers MD at bedside to discuss plan of care. 1640: PCT at bedside for routine BG check- BS 42. 
1650: Primary RN at bedside to administer glucagon IM.  
1720: HD complete. All possible blood rinsed back. Each catheter limb disinfected for 60 seconds per limb with alcohol swabs. Dialysis CVC hubs scrubbed with Prevantics for 15 seconds, followed by a 5 second dry time per Hospital P&P. Saline flushed, locked and capped. SBAR called to primary RN. Admitting Diagnosis: respiratory failure, COVID, ESRD Pt's previous clinic: [id number] [place] [address] Informed Consent Verified: Yes (01/01/21 1350) Hepatitis Status: HBsAg: Neg (12/11/20) HBsAb: 545/immune (12/11/20) Machine Number: [id number] (01/01/21 1350) Telemetry status: bedside cardiac monitor Pre-Dialysis Weight: 52.1 kg (114 lb 13.8 oz) (01/01/21 1350)

## 2021-01-01 NOTE — PROGRESS NOTES
I walked into patient's room, found the nasal cannula improperly placed on her nose. The O2 saturations were in the low 80s. I placed the nasal cannula properly in patient's nose. I then proceeded to give the Albuterol MDI. Patient began to shake head in the manner of replying no. Patient refused to open mouth to perform the maneuver for the MDI.

## 2021-01-01 NOTE — PROGRESS NOTES
Patient was seen again during HD Tx. Patient is being dialyzed by [facility] Will proceed with full HD Tx Change bath to K- 3.0 Patient's condition is deteriorating rapidly She is at risk of complications/death D/W Dr. Radhames Vega and RN.

## 2021-01-01 NOTE — PROGRESS NOTES
Bedside and Verbal shift change report given to Saad Abreu RN  (oncoming nurse) by Marlin Plunkett RN (offgoing nurse). Report included the following information SBAR, Kardex, ED Summary, Intake/Output, MAR, Recent Results, Cardiac Rhythm NSR and Alarm Parameters Last 3 Recorded Weights in this Encounter 12/30/20 0518 12/31/20 7516 01/01/21 9010 Weight: 52.7 kg (116 lb 3.2 oz) 52.6 kg (115 lb 15.4 oz) 52.1 kg (114 lb 12.8 oz) [facility] HYPOGLYCEMIC EPISODE DOCUMENTATION Patient with hypoglycemic episode(s) at 2344(time) on 12/31/2020(date). BG value(s) pre-treatment 64 Was patient symptomatic? [x] yes, [] no (drowsy) Patient was treated with the following rescue medications/treatments: [x] D50 [] Glucose tablets 
              [] Glucagon 
              [] 4oz juice 
              [] 6oz reg soda 
              [] 8oz low fat milk BG value post-treatment: 127 Once BG treated and value greater than 80mg/dl, pt was provided with the following: pt refused 
[] snack 
[] meal 
Name of MD notified Zully Ventura NP The following orders were received: n/a

## 2021-01-02 NOTE — PROGRESS NOTES
Bedside and Verbal shift change report given to David Cox RN (oncoming nurse) by Alireza Case RN (offgoing nurse). Report included the following information SBAR, Kardex, ED Summary, Intake/Output, MAR, Recent Results, Cardiac Rhythm Paced and Alarm Parameters Patient Vitals for the past 12 hrs: 
 Temp Pulse Resp BP SpO2  
01/02/21 0700 99.1 °F (37.3 °C) (!) 103 21 111/66 95 % 01/02/21 0325     100 % 01/02/21 0303 98.5 °F (36.9 °C) 97 23 94/68 99 % 01/01/21 2314 98.9 °F (37.2 °C) (!) 104  (!) 154/86 94 % 01/01/21 2255     93 % Last 3 Recorded Weights in this Encounter 12/31/20 8485 01/01/21 0583 01/02/21 0303 Weight: 52.6 kg (115 lb 15.4 oz) 52.1 kg (114 lb 12.8 oz) 51.7 kg (114 lb) Brendon Heath

## 2021-01-02 NOTE — PROGRESS NOTES
Pharmacist Note  Warfarin Dosing Consult provided for this 63 y. o.female to manage warfarin for Atrial Fibrillation INR Goal: 2 - 3 Home regimen/ tablet size: 3 mg S/M/W/F/S, 6 mg T/Th 
 
Drugs that may increase INR: Amiodarone Drugs that may decrease INR: None Other current anticoagulants/ drugs that may increase bleeding risk: None Risk factors: None Daily INR ordered: YES Recent Labs 01/02/21 
7621 01/01/21 
1012 01/01/21 
0509 12/31/20 
2127 12/31/20 
0510 12/31/20 
0510 HGB 8.1*  --  8.0* 7.8*   < > 7.6* INR 3.1* 3.2*  --   --   --  4.6*  
 < > = values in this interval not displayed. Date               INR                  Dose 
12/28            3.4  
12/29            3.8                   Held  
12/30              3.4                   Held 
12/31  4.6  Held 1/1  3.2  Held 1/2  3.1  1 mg Assessment/ Plan: 
Warfarin 1 mg x1 today Pharmacy will continue to monitor daily and adjust therapy as indicated. Please contact the pharmacist at   for outpatient recommendations if needed.

## 2021-01-02 NOTE — PROGRESS NOTES
Occupational Therapy Orders received, chart reviewed and patient is positive for COVID-19. Pt is being followed by OT services and currently on caseload. Recommend with nursing patient to complete as able in order to maintain strength, endurance and independence: ADLs with supervision/setup, once Egress Test completed OOB to chair 3x/day and mobilizing to the bathroom for toileting with assist.  
 
Thank you for your assistance.

## 2021-01-02 NOTE — PROGRESS NOTES
[address] [facility] Adult  Hospitalist Group Hospitalist Progress Note Valeri Mijares MD 
Answering service: [phone] OR [phone] from in house phone Date of Service:  2021 NAME:  Raciel Nelson :  1960 MRN:  [id number] This documentation was facilitated by a Voice Recognition software and may contain inadvertent typographical errors. Admission Summary:  
Raciel Nelson is a 63 y.o. -American female with past medical history of non-insulin-dependent type 2 diabetes, paroxysmal A. fib on AC with warfarin, VT, history of MI, ESRD on HD, HFrEF, hypertension who presents to hospital via EMS with chief complaint of shortness of breath. Interval history / Subjective:  
    
Patient more alert today,screams but confused and does not respond to questions. Over night events noted,discussed with nurse. Patient pulled out NGT. She has now central line in place Her resp status worsened and she is now on HFNC Assessment & Plan:  
Acute metabolic encephalopathy ,multifactorial: ahrf,covid infeciton ,advanced heart and kidney failure . -Exam non focal.Head CT negative 
-Alertness improved,still confused Acute hypoxic respiratory failure 2/2 over 19 pneumonia,worsening: 
-Albuterol q4 Hrs PRN via Spacer 
-Now on HFNC continue dexamethasone 6 mg/day 
-anticoagulation:already on warfarin 
-Cefepime plus Doxy 
-Vitamin C 500-1000 mg q 6 hourly  
-Zinc  mg/day  
-Vitamin D3 20 000  60 000 iu single oral dose. -Droplet PLUS precautions. -ID on consult; appreciate recs. ESRD on HD: Nephrology following 
-Patient is volume overloaded confounding on hypoxia from Covid pneumonia Atrial fibrillation/HFrEF/VT / NSTE-ACS. Severe cardiomyopathy. Per nephrology,cardiac transplant was considered at one point. 
-Trop leak likely secondary to Covid cardiomyopathy vs ESRD 
 -Continue aspirin plus warfarin. 
-Hold Coreg given labile blood pressures 
-Patient currently with LifeVest with plans for VT ablation. AICD in place not functional per cardiology. -Repeat echocardiogram.  Echocardiogram 2019 shows severely decreased systolic function. Elevated LFT,possibly due to hypoperfusion in the setting of severe cardiomyopathy,intermittent hypotension 
-monitor and manage accordingly. LFTs showed slight improvement. GERD 
-PPI Iron deficiency anemia 
-Continue home Rx Diet:NPO due to AMS 
-Pt pulled out NGT overnight. Plan to reinsert and resume TF Hypoglycemia due to no po intake 
-hypoglycemic protocol. -TF 
-Accucheks q6 and prn Access: left IJ in place. Patient with significant co morbidities : ESRD,severe cardiomyopathy and now with COVID pneumonia and hypoxic resp failure,ams is at high risk for further rapid deterioration. Called and discussed my concern with her sister who is aware of her sisters medica situation. She thought pt signed DNR when she was in another hospital.I told her she is now full code and can get consent from pt as she is lethargic. I encouraged her sister to discuss among the family. Edith Cox said they will,that their is 80 ,lives with her and very alert that she will be in the conversation. FEN/GI -NPO Activity -as tolerated DVT prophylaxis -warfarin GI prophylaxis -Pepcid Disposition -TBD CODE STATUS: DNR 
1/2:Called and updated her sister/KIAN Saavedra [phone]. Made her aware pts respiratory status is worsening and she is now requiring high flow oxygen. Hospital Problems  Date Reviewed: 11/16/2020 Codes Class Noted POA Lethargy ICD-10-CM: R53.83 ICD-9-CM: 780.79  Unknown Unknown Moderate protein-calorie malnutrition ([facility] [address] ICD-10-CM: E44.0 ICD-9-CM: 263.0  Unknown Unknown Advance care planning ICD-10-CM: Z71.89 ICD-9-CM: V65.49  Unknown Unknown Respiratory failure with hypoxia [facility]) ICD-10-CM: J96.91 
ICD-9-CM: 518.81  12/29/2020 Unknown Review of Systems:  
Review of systems not obtained due to patient factors. Vital Signs:  
 Last 24hrs VS reviewed since prior progress note. Most recent are: 
Visit Vitals /66 (BP 1 Location: Right arm, BP Patient Position: At rest) Pulse (!) 103 Temp 99.1 °F (37.3 °C) Resp 21 Ht 5' 5\" (1.651 m) Wt 51.7 kg (114 lb) SpO2 95% BMI 18.97 kg/m² Intake/Output Summary (Last 24 hours) at 1/2/2021 1033 Last data filed at 1/1/2021 4376 Gross per 24 hour Intake  Output 0 ml Net 0 ml Physical Examination:  
 
I had a face to face encounter with this patient and independently examined them on1/2/2021 as outlined below: 
     
Constitutional:  More alert,confused. HEENT:  Atraumatic. Oral mucosa moist,. Non icteric sclera. No pallor. Resp:  CTA bilaterally. No wheezing/rhonchi/rales. No accessory muscle use Chest Wall: No deformity CV:  Regular rhythm, normal rate, no murmurs, gallops, rubs GI:  Soft, non distended, non tender. normoactive bowel sounds, no hepatosplenomegaly :  No CVA or suprapubic tenderness Musculoskeletal:  No edema, warm, 2+ pulses throughout Neurologic:  Mental status:More alert,confused. Cranial nerves II-XII : WNL Motor exam:Moves all extremities symmetrically Data Review:  
 Review and/or order of clinical lab test 
Review and/or order of tests in the radiology section of CPT Review and/or order of tests in the medicine section of CPT Labs:  
 
Recent Labs 01/02/21 
9078 01/01/21 
1768 WBC 12.2* 10.7 HGB 8.1* 8.0*  
HCT 26.1* 26.7*  
 173 Recent Labs 01/02/21 
9248 01/01/21 
5422 12/31/20 
0510  138 137  
K 3.7 3.7 3.0*  
 103 100 CO2 28 23 28 BUN 18 33* 14  
CREA 2.84* 4.31* 2.10* GLU 74 61* 86  
CA 8.8 8.2* 8.7 Recent Labs 01/02/21 
1082 01/01/21 
7441 * 244* * 247* TBILI 1.7* 1.1* TP 7.4 7.4 ALB 2.5* 2.6*  
GLOB 4.9* 4.8* Recent Labs 01/02/21 
0318 01/01/21 
1012 12/31/20 
0510 INR 3.1* 3.2* 4.6* PTP 30.6* 32.1* 45.0* No results for input(s): FE, TIBC, PSAT, FERR in the last 72 hours. Lab Results Component Value Date/Time Folate 19.3 03/12/2019 03:50 AM  
  
No results for input(s): PH, PCO2, PO2 in the last 72 hours. No results for input(s): CPK, CKNDX, TROIQ in the last 72 hours. No lab exists for component: CPKMB Lab Results Component Value Date/Time Cholesterol, total 125 10/16/2012 03:40 AM  
 HDL Cholesterol 37 10/16/2012 03:40 AM  
 LDL, calculated 65 10/16/2012 03:40 AM  
 Triglyceride 115 10/16/2012 03:40 AM  
 CHOL/HDL Ratio 3.4 10/16/2012 03:40 AM  
 
Lab Results Component Value Date/Time Glucose (POC) 75 01/02/2021 06:52 AM  
 Glucose (POC) 113 (H) 01/01/2021 10:59 PM  
 Glucose (POC) 119 (H) 01/01/2021 06:39 PM  
 Glucose (POC) 36 (LL) 01/01/2021 06:01 PM  
 Glucose (POC) 44 (LL) 01/01/2021 05:38 PM  
 
Lab Results Component Value Date/Time Color RED 08/26/2018 07:52 AM  
 Appearance BLOODY (A) 08/26/2018 07:52 AM  
 Specific gravity 1.012 08/26/2018 07:52 AM  
 pH (UA) 7.0 08/26/2018 07:52 AM  
 Protein 300 (A) 08/26/2018 07:52 AM  
 Glucose NEGATIVE  08/26/2018 07:52 AM  
 Ketone TRACE (A) 08/26/2018 07:52 AM  
 Bilirubin NEGATIVE  10/18/2013 02:41 PM  
 Urobilinogen 0.2 08/26/2018 07:52 AM  
 Nitrites NEGATIVE  08/26/2018 07:52 AM  
 Leukocyte Esterase SMALL (A) 08/26/2018 07:52 AM  
 Epithelial cells FEW 08/26/2018 07:52 AM  
 Bacteria NEGATIVE  08/26/2018 07:52 AM  
 WBC 5-10 08/26/2018 07:52 AM  
 RBC >100 (H) 08/26/2018 07:52 AM  
 
 
 
Medications Reviewed:  
 
Current Facility-Administered Medications Medication Dose Route Frequency  warfarin (COUMADIN) tablet 1 mg  1 mg Oral ONCE  
 dextrose 5% infusion  25 mL/hr IntraVENous CONTINUOUS  
  potassium chloride SR (KLOR-CON 10) tablet 40 mEq  40 mEq Oral DAILY  glucose chewable tablet 16 g  4 Tab Oral PRN  
 dextrose (D50W) injection syrg 12.5-25 g  12.5-25 g IntraVENous PRN  
 glucagon (GLUCAGEN) injection 1 mg  1 mg IntraMUSCular PRN  zinc sulfate (ZINCATE) 220 (50) mg capsule 1 Cap  1 Cap Oral DAILY  pantoprazole (PROTONIX) tablet 20 mg  20 mg Oral ACB  dexAMETHasone (DECADRON) tablet 6 mg  6 mg Oral Q24H  
 epoetin della-epbx (RETACRIT) injection 10,000 Units  10,000 Units SubCUTAneous DIALYSIS MON, WED & FRI  aspirin delayed-release tablet 81 mg  81 mg Oral DAILY  sodium chloride (NS) flush 5-40 mL  5-40 mL IntraVENous Q8H  
 sodium chloride (NS) flush 5-40 mL  5-40 mL IntraVENous PRN  
 acetaminophen (TYLENOL) tablet 650 mg  650 mg Oral Q6H PRN Or  
 acetaminophen (TYLENOL) suppository 650 mg  650 mg Rectal Q6H PRN  polyethylene glycol (MIRALAX) packet 17 g  17 g Oral DAILY PRN  
 albuterol (PROVENTIL HFA, VENTOLIN HFA, PROAIR HFA) inhaler 2 Puff  2 Puff Inhalation Q4H RT  
 ascorbic acid (vitamin C) (VITAMIN C) tablet 500 mg  500 mg Oral Q6H  Warfarin Pharmacy to Dose   Other Rx Dosing/Monitoring  cefepime (MAXIPIME) 1 g in 0.9% sodium chloride (MBP/ADV) 50 mL MBP  1 g IntraVENous Q24H  
 doxycycline (VIBRAMYCIN) 100 mg in 0.9% sodium chloride (MBP/ADV) 100 mL MBP  100 mg IntraVENous Q12H  
 amiodarone (CORDARONE) tablet 200 mg  200 mg Oral DAILY  cholecalciferol (VITAMIN D3) (1000 Units /25 mcg) tablet 10 Tab  10,000 Units Oral Q7D  
 cinacalcet (SENSIPAR) tablet 30 mg  30 mg Oral DAILY  loperamide (IMODIUM) capsule 2 mg  2 mg Oral Q3H PRN  
 mexiletine (MEXITIL) capsule 150 mg  150 mg Oral TID  B complex-vitaminC-folic acid (NEPHROCAP) cap  1 Cap Oral DAILY  senna (SENOKOT) tablet 8.6 mg  1 Tab Oral DAILY PRN  
 
______________________________________________________________________ EXPECTED LENGTH OF STAY: 5d 12h ACTUAL LENGTH OF STAY:          4 Jackelyn Sethi MD

## 2021-01-02 NOTE — PROGRESS NOTES
Name: Roxanne Catalan MRN: [id number] : 1960 Hospital: [address] Date: 2021 IMPRESSION:  
· ESRD on HD -> MWF schedule at her home [facility]. · Hyperkalemia- resolved · COVID , poor candidate for remdesivir. · Confusion- likely from hypoxemia. · CHF with advanced cardiomyopathy. Patient was offered to be placed on the heart transplant list 3 
· years ago. She had declined at that time. · Anemia of ESRD PLAN:  
· Completed HD yesterday(). HD again on Mon (). · Anemia management · May use a PICC line for AB's · Prognosis is guarded at best. 
· O2 support · Dose meds for her GFR Subjective/Interval History: F/U ESRD ---> 2021 Ms. Carlitos Edmondson was confused. She could not provide the ROS. Objective:  
Vital Signs:   
Visit Vitals /66 Pulse 98 Temp 98.7 °F (37.1 °C) Resp 15 Ht 5' 5\" (1.651 m) Wt 51.7 kg (114 lb) SpO2 97% BMI 18.97 kg/m² O2 Device: Hi flow nasal cannula O2 Flow Rate (L/min): 60 l/min Temp (24hrs), Av.7 °F (37.1 °C), Min:97.9 °F (36.6 °C), Max:99.1 °F (37.3 °C) Intake/Output:  
Last shift:      No intake/output data recorded. Last 3 shifts: 1901 -  0700 In: 150 [I.V.:150] Out: 0 Intake/Output Summary (Last 24 hours) at 2021 1156 Last data filed at 2021 0120 Gross per 24 hour Intake  Output 0 ml Net 0 ml Physical Exam: 
 
Seen in Room 409. General:    Chronically ill-appearing. Head:   Normocephalic Neck :             R. IJ HD catheter L. IJ Triple Lumen. Lungs: No wheezes, No rales Heart:   No S3 gallop , No pericardial rub Abdomen:   Not distended. Extremities: Wrist restraints. Psych:  Agitated at times Crystal Faith Neurologic: Moaning intermittently. Carlitos Burnham was not conversational. 
   
DATA: 
Labs: 
Recent Labs 21 
7570 21 
3180 20 
0510  138 137  
K 3.7 3.7 3.0*  
 103 100 CO2 28 23 28 BUN 18 33* 14  
CREA 2.84* 4.31* 2.10* CA 8.8 8.2* 8.7 ALB 2.5* 2.6*  --   
 
Recent Labs 01/02/21 
0825 01/01/21 
4298 12/31/20 
2127 12/31/20 
0510 12/31/20 
0510 WBC 12.2* 10.7  --   --  7.0 HGB 8.1* 8.0* 7.8*   < > 7.6* HCT 26.1* 26.7* 25.6*   < > 25.0*  
 173  --   --  151  
 < > = values in this interval not displayed. No results for input(s): ROMAN, KU, CLU, CREAU in the last 72 hours. No lab exists for component: PROU Total time spent with patient:   
 
 
 
Care Plan discussed with: 
 
 
  
 
Daniela Thomson MD

## 2021-01-02 NOTE — PROCEDURES
Central Line Placement Performed by: Ruthie Whitehead DO Authorized by: Ruthie Whitehead DO Indications: vascular access Preanesthetic Checklist: patient identified, risks and benefits discussed, anesthesia consent, site marked, patient being monitored and timeout performed Pre-procedure: All elements of maximal sterile barrier technique followed? Yes   
2% Chlorhexidine for cutaneous antisepsis, Hand hygiene performed prior to catheter insertion and Ultrasound guidance Sterile Ultrasound Technique followed?: Yes Procedure:  
Prep:  Chlorhexidine Orientation:  Left Patient position:  Trendelenburg Catheter type:  Quad lumen Catheter size:  8.5 Fr Catheter length:  16 cm Number of attempts:  2 Successful placement: Yes Assessment:  
Post-procedure:  Catheter secured, sterile dressing applied and sterile dressing with CHG applied Assessment:  Blood return through all ports, free fluid flow and guidewire removal verified Insertion:  Uncomplicated Patient tolerance:  Patient tolerated the procedure well with no immediate complications Attempted right side, unable to pass wire. Left side successful.

## 2021-01-03 NOTE — PROGRESS NOTES
[facility] Hospitalist Progress Note Rufina Malloy MD 
Answering service: [phone] OR [phone] from in house phone Date of Service:  1/3/2021 NAME:  Brittany Brown :  1960 MRN:  [id number] This documentation was facilitated by a Voice Recognition software and may contain inadvertent typographical errors. Admission Summary:  
Brittany Brown is a 63 y.o. -American female with past medical history of non-insulin-dependent type 2 diabetes, paroxysmal A. fib on AC with warfarin, VT, history of MI, ESRD on HD, HFrEF, hypertension who presents to hospital via EMS with chief complaint of shortness of breath. Interval history / Subjective:  
    
Ms. Chasity Buckley is lethargic, opens eyes to voice and followed simple commands. She is off restraint. She is on BiPAP. She is not in any overt cardiorespiratory distress. Assessment & Plan:  
Acute metabolic encephalopathy ,multifactorial: ahrf,covid infeciton ,advanced heart and kidney failure . -Exam non focal.Head CT negative 
-Mental status fluctuating. Acute hypoxic respiratory failure 2/2 over 19 pneumonia,worsening: 
-Albuterol q4 Hrs PRN via Spacer 
-Now on HFNC continue dexamethasone 6 mg/day 
-anticoagulation:already on warfarin 
-Cefepime plus Doxy 
-Vitamin C 500-1000 mg q 6 hourly  
-Zinc  mg/day  
-Vitamin D3 20 000  60 000 iu single oral dose. -Droplet PLUS precautions. -ID on consult; appreciate recs. ESRD on HD: Nephrology following 
-Patient is volume overloaded confounding on hypoxia from Covid pneumonia Atrial fibrillation/HFrEF/VT / NSTE-ACS. Severe cardiomyopathy. Per nephrology,cardiac transplant was considered at one point. 
-Trop leak likely secondary to Covid cardiomyopathy vs ESRD 
-Continue aspirin plus warfarin. 
-Hold Coreg given labile blood pressures -Patient currently with LifeVest with plans for VT ablation. AICD in place not functional per cardiology. -Repeat echocardiogram.  Echocardiogram 2019 shows severely decreased systolic function. Elevated LFT,possibly due to hypoperfusion in the setting of severe cardiomyopathy,intermittent hypotension and severe COVID-19 disease 
-monitor and manage accordingly. LFTs showed slight improvement. GERD 
-PPI Iron deficiency anemia 
-Continue home Rx Diet:NPO due to AMS 
-Continue tube feeding via Dobbhoff Hypoglycemia due to no po intake 
-hypoglycemic protocol. -TF 
-Accucheks q6 and prn Access: left IJ in place. Patient with significant co morbidities : ESRD,severe cardiomyopathy and now with COVID pneumonia and hypoxic resp failure,ams is at high risk for further rapid deterioration. Palliative medicine following. FEN/GI -NPO Activity -as tolerated DVT prophylaxis -warfarin GI prophylaxis -Pepcid Disposition -TBD CODE STATUS: DNR 
1/2:Called and updated her sister/KIAN Robledo [phone]. Hospital Problems  Date Reviewed: 11/16/2020 Codes Class Noted POA Lethargy ICD-10-CM: R53.83 ICD-9-CM: 780.79  Unknown Unknown Moderate protein-calorie malnutrition ([facility] [address] ICD-10-CM: E44.0 ICD-9-CM: 263.0  Unknown Unknown Advance care planning ICD-10-CM: Z71.89 ICD-9-CM: V65.49  Unknown Unknown Respiratory failure with hypoxia [facility]) ICD-10-CM: J96.91 
ICD-9-CM: 518.81  12/29/2020 Unknown Review of Systems:  
Review of systems not obtained due to patient factors. Vital Signs:  
 Last 24hrs VS reviewed since prior progress note. Most recent are: 
Visit Vitals /75 Pulse 76 Temp 98 °F (36.7 °C) Resp 18 Ht 5' 5\" (1.651 m) Wt 51.4 kg (113 lb 6.4 oz) SpO2 100% BMI 18.87 kg/m² Intake/Output Summary (Last 24 hours) at 1/3/2021 3558 Last data filed at 1/2/2021 2000 Gross per 24 hour Intake 200 ml Output  Net 200 ml Physical Examination:  
 
I had a face to face encounter with this patient and independently examined them on1/3/2021 as outlined below: 
     
Constitutional:  Lethargic, opens eyes to voice. HEENT:  Atraumatic. Oral mucosa moist,. Non icteric sclera. No pallor. Resp:  Remained on BiPAP. No wheezing/rhonchi/rales. No accessory muscle use Chest Wall: No deformity CV:  Regular rhythm, normal rate, no murmurs, gallops, rubs GI:  Soft, non distended, non tender. normoactive bowel sounds, no hepatosplenomegaly :  No CVA or suprapubic tenderness Musculoskeletal:  No edema, warm, 2+ pulses throughout Neurologic:  Mental status: Lethargic. Opens eyes to voice. Follows simple commands. Cranial nerves II-XII : WNL Motor exam:Moves all extremities symmetrically Data Review:  
 Review and/or order of clinical lab test 
Review and/or order of tests in the radiology section of CPT Review and/or order of tests in the medicine section of CPT Labs:  
 
Recent Labs 01/03/21 
8855 01/02/21 
3832 WBC 11.0 12.2* HGB 7.1* 8.1* HCT 23.4* 26.1*  
* 162 Recent Labs 01/03/21 
6965 01/02/21 
4416 01/01/21 
5416  139 138  
K 4.3 3.7 3.7  102 103 CO2 26 28 23 BUN 41* 18 33* CREA 4.69* 2.84* 4.31* * 74 61* CA 7.9* 8.8 8.2* Recent Labs 01/02/21 
1012 01/01/21 
5393 * 244* * 247* TBILI 1.7* 1.1* TP 7.4 7.4 ALB 2.5* 2.6*  
GLOB 4.9* 4.8* Recent Labs 01/03/21 
0322 01/02/21 
0318 01/01/21 
1012 INR 3.1* 3.1* 3.2* PTP 30.5* 30.6* 32.1* No results for input(s): FE, TIBC, PSAT, FERR in the last 72 hours. Lab Results Component Value Date/Time Folate 19.3 03/12/2019 03:50 AM  
  
No results for input(s): PH, PCO2, PO2 in the last 72 hours. No results for input(s): CPK, CKNDX, TROIQ in the last 72 hours. No lab exists for component: CPKMB Lab Results Component Value Date/Time Cholesterol, total 125 10/16/2012 03:40 AM  
 HDL Cholesterol 37 10/16/2012 03:40 AM  
 LDL, calculated 65 10/16/2012 03:40 AM  
 Triglyceride 115 10/16/2012 03:40 AM  
 CHOL/HDL Ratio 3.4 10/16/2012 03:40 AM  
 
Lab Results Component Value Date/Time Glucose (POC) 130 (H) 01/03/2021 02:02 PM  
 Glucose (POC) 138 (H) 01/03/2021 06:42 AM  
 Glucose (POC) 132 (H) 01/02/2021 11:08 PM  
 Glucose (POC) 108 (H) 01/02/2021 05:40 PM  
 Glucose (POC) 80 01/02/2021 01:20 PM  
 
Lab Results Component Value Date/Time Color RED 08/26/2018 07:52 AM  
 Appearance BLOODY (A) 08/26/2018 07:52 AM  
 Specific gravity 1.012 08/26/2018 07:52 AM  
 pH (UA) 7.0 08/26/2018 07:52 AM  
 Protein 300 (A) 08/26/2018 07:52 AM  
 Glucose NEGATIVE  08/26/2018 07:52 AM  
 Ketone TRACE (A) 08/26/2018 07:52 AM  
 Bilirubin NEGATIVE  10/18/2013 02:41 PM  
 Urobilinogen 0.2 08/26/2018 07:52 AM  
 Nitrites NEGATIVE  08/26/2018 07:52 AM  
 Leukocyte Esterase SMALL (A) 08/26/2018 07:52 AM  
 Epithelial cells FEW 08/26/2018 07:52 AM  
 Bacteria NEGATIVE  08/26/2018 07:52 AM  
 WBC 5-10 08/26/2018 07:52 AM  
 RBC >100 (H) 08/26/2018 07:52 AM  
 
 
 
Medications Reviewed:  
 
Current Facility-Administered Medications Medication Dose Route Frequency  Warfarin - hold dose today   Other ONCE  Viokace tube flush  1 Dose Per J Tube ONCE  pantoprazole (PROTONIX) 40 mg in 0.9% sodium chloride 10 mL injection  40 mg IntraVENous DAILY  dexamethasone (DECADRON) 4 mg/mL injection 6 mg  6 mg IntraVENous Q24H  patiromer calcium sorbitex (VELTASSA) powder 8.4 g  8.4 g Oral NOW  glucose chewable tablet 16 g  4 Tab Oral PRN  
 dextrose (D50W) injection syrg 12.5-25 g  12.5-25 g IntraVENous PRN  
 glucagon (GLUCAGEN) injection 1 mg  1 mg IntraMUSCular PRN  zinc sulfate (ZINCATE) 220 (50) mg capsule 1 Cap  1 Cap Oral DAILY  epoetin della-epbx (RETACRIT) injection 10,000 Units  10,000 Units SubCUTAneous DIALYSIS MON, WED & FRI  aspirin delayed-release tablet 81 mg  81 mg Oral DAILY  sodium chloride (NS) flush 5-40 mL  5-40 mL IntraVENous Q8H  
 sodium chloride (NS) flush 5-40 mL  5-40 mL IntraVENous PRN  
 acetaminophen (TYLENOL) tablet 650 mg  650 mg Oral Q6H PRN Or  
 acetaminophen (TYLENOL) suppository 650 mg  650 mg Rectal Q6H PRN  polyethylene glycol (MIRALAX) packet 17 g  17 g Oral DAILY PRN  
 albuterol (PROVENTIL HFA, VENTOLIN HFA, PROAIR HFA) inhaler 2 Puff  2 Puff Inhalation Q4H RT  
 ascorbic acid (vitamin C) (VITAMIN C) tablet 500 mg  500 mg Oral Q6H  Warfarin Pharmacy to Dose   Other Rx Dosing/Monitoring  cefepime (MAXIPIME) 1 g in 0.9% sodium chloride (MBP/ADV) 50 mL MBP  1 g IntraVENous Q24H  
 doxycycline (VIBRAMYCIN) 100 mg in 0.9% sodium chloride (MBP/ADV) 100 mL MBP  100 mg IntraVENous Q12H  
 amiodarone (CORDARONE) tablet 200 mg  200 mg Oral DAILY  cholecalciferol (VITAMIN D3) (1000 Units /25 mcg) tablet 10 Tab  10,000 Units Oral Q7D  
 cinacalcet (SENSIPAR) tablet 30 mg  30 mg Oral DAILY  loperamide (IMODIUM) capsule 2 mg  2 mg Oral Q3H PRN  
 mexiletine (MEXITIL) capsule 150 mg  150 mg Oral TID  B complex-vitaminC-folic acid (NEPHROCAP) cap  1 Cap Oral DAILY  senna (SENOKOT) tablet 8.6 mg  1 Tab Oral DAILY PRN  
 
______________________________________________________________________ EXPECTED LENGTH OF STAY: 5d 12h ACTUAL LENGTH OF STAY:          5 Tanika Alvarado MD

## 2021-01-03 NOTE — ROUTINE PROCESS
2330- Patient placed on BiPAP, so restraint order discontinued. Tube feeds put on hold per NP because of BiPAP in place. 0730- Bedside and Verbal shift change report given to SLAVA Winter (oncoming nurse) by Gladys Rashid (offgoing nurse). Report included the following information SBAR, Kardex, Intake/Output, MAR, Accordion, Recent Results, Cardiac Rhythm NSR and Quality Measures.

## 2021-01-03 NOTE — PROGRESS NOTES
Problem: Falls - Risk of 
Goal: *Absence of Falls Description: Document Green Salvia Fall Risk and appropriate interventions in the flowsheet. 1/3/2021 1409 by Jeancarlos Roberts RN Outcome: Progressing Towards Goal 
Note: Fall Risk Interventions: 
Mobility Interventions: Communicate number of staff needed for ambulation/transfer Mentation Interventions: Evaluate medications/consider consulting pharmacy, Adequate sleep, hydration, pain control, Reorient patient, Room close to nurse's station, Toileting rounds, Update white board Medication Interventions: Evaluate medications/consider consulting pharmacy, Patient to call before getting OOB, Teach patient to arise slowly Elimination Interventions: Call light in reach 1/3/2021 1408 by Jeancarlos Roberts RN Outcome: Progressing Towards Goal 
Note: Fall Risk Interventions: 
Mobility Interventions: Communicate number of staff needed for ambulation/transfer Mentation Interventions: Evaluate medications/consider consulting pharmacy, Adequate sleep, hydration, pain control, Reorient patient, Room close to nurse's station, Toileting rounds, Update white board Medication Interventions: Evaluate medications/consider consulting pharmacy, Patient to call before getting OOB, Teach patient to arise slowly Elimination Interventions: Call light in reach Problem: Pain Goal: *Control of Pain Outcome: Progressing Towards Goal 
  
Problem: Pressure Injury - Risk of 
Goal: *Prevention of pressure injury Description: Document Maurisio Scale and appropriate interventions in the flowsheet. 1/3/2021 1409 by Jeancarlos Roberts RN Outcome: Progressing Towards Goal 
Note: Pressure Injury Interventions: Sensory Interventions: Assess changes in LOC, Check visual cues for pain, Minimize linen layers, Keep linens dry and wrinkle-free, Float heels, Pressure redistribution bed/mattress (bed type), Turn and reposition approx. every two hours (pillows and wedges if needed) Moisture Interventions: Absorbent underpads, Apply protective barrier, creams and emollients, Limit adult briefs, Maintain skin hydration (lotion/cream), Minimize layers, Moisture barrier, Check for incontinence Q2 hours and as needed Activity Interventions: Pressure redistribution bed/mattress(bed type) Mobility Interventions: Pressure redistribution bed/mattress (bed type), HOB 30 degrees or less, Turn and reposition approx. every two hours(pillow and wedges), Float heels Nutrition Interventions: Document food/fluid/supplement intake Friction and Shear Interventions: Lift sheet, Minimize layers, HOB 30 degrees or less, Feet elevated on foot rest 
 
  
 
 
 
1/3/2021 1408 by Gerald Márquez RN Outcome: Progressing Towards Goal 
Note: Pressure Injury Interventions: 
Sensory Interventions: Assess changes in LOC, Check visual cues for pain, Minimize linen layers, Keep linens dry and wrinkle-free, Float heels, Pressure redistribution bed/mattress (bed type), Turn and reposition approx. every two hours (pillows and wedges if needed) Moisture Interventions: Absorbent underpads, Apply protective barrier, creams and emollients, Limit adult briefs, Maintain skin hydration (lotion/cream), Minimize layers, Moisture barrier, Check for incontinence Q2 hours and as needed Activity Interventions: Pressure redistribution bed/mattress(bed type) Mobility Interventions: Pressure redistribution bed/mattress (bed type), HOB 30 degrees or less, Turn and reposition approx. every two hours(pillow and wedges), Float heels Nutrition Interventions: Document food/fluid/supplement intake Friction and Shear Interventions: Lift sheet, Minimize layers, HOB 30 degrees or less, Feet elevated on foot rest

## 2021-01-03 NOTE — PROGRESS NOTES
The room was not entered (in an effort to preserve PPE). Lab Review : 
 
K - > 4.3 Dialysis again tomorrow (1/4). Our team is available over the weekend. Please call with questions.

## 2021-01-03 NOTE — PROGRESS NOTES
Pharmacist Note  Warfarin Dosing Consult provided for this 63 y. o.female to manage warfarin for Atrial Fibrillation INR Goal: 2 - 3 Home regimen/ tablet size: 3 mg S/M/W/F/S, 6 mg T/Th 
 
Drugs that may increase INR: Amiodarone Drugs that may decrease INR: None Other current anticoagulants/ drugs that may increase bleeding risk: None Risk factors: None Daily INR ordered: YES Recent Labs 01/03/21 
8856 01/02/21 
1083 01/01/21 
1012 01/01/21 
5181 HGB 7.1* 8.1*  --  8.0* INR 3.1* 3.1* 3.2*  --   
 
Date               INR                  Dose 
12/28            3.4  
12/29            3.8                   Held  
12/30              3.4                   Held 
12/31  4.6  Held 1/1  3.2  Held 1/2  3.1  1 mg 
1/3  3.1  hold Assessment/ Plan: 
Hold warfarin today for INR 3.1 and HGB 7.1 Pharmacy will continue to monitor daily and adjust therapy as indicated. Please contact the pharmacist at   for outpatient recommendations if needed.

## 2021-01-04 NOTE — PROGRESS NOTES
Bedside and Verbal shift change report given to [facility] (oncoming nurse) by Kahlil Powers (offgoing nurse). Report included the following information SBAR, Kardex, MAR, Recent Results and Cardiac Rhythm NSR. Patient Vitals for the past 12 hrs: 
 Temp Pulse Resp BP SpO2  
01/04/21 1908 98.1 °F (36.7 °C) 79 22 (!) 80/61 96 % 01/04/21 1549     96 % 01/04/21 1500 98.2 °F (36.8 °C) 78 22 (!) 86/55 95 % 01/04/21 1224     96 % 01/04/21 1222     94 % 01/04/21 1129 97.7 °F (36.5 °C) 83 20 (!) 84/58 94 % 01/04/21 0859     94 % 01/04/21 0855  85  116/87  Problem: Falls - Risk of 
Goal: *Absence of Falls Description: Document Clydene Bone Fall Risk and appropriate interventions in the flowsheet. Outcome: Progressing Towards Goal 
Note: Fall Risk Interventions: 
Mobility Interventions: Communicate number of staff needed for ambulation/transfer, OT consult for ADLs, Patient to call before getting OOB, PT Consult for mobility concerns, PT Consult for assist device competence Mentation Interventions: Evaluate medications/consider consulting pharmacy, More frequent rounding Medication Interventions: Evaluate medications/consider consulting pharmacy, Patient to call before getting OOB Elimination Interventions: Call light in reach Problem: Pressure Injury - Risk of 
Goal: *Prevention of pressure injury Description: Document Maurisio Scale and appropriate interventions in the flowsheet. Outcome: Progressing Towards Goal 
Note: Pressure Injury Interventions: 
Sensory Interventions: Assess changes in LOC, Minimize linen layers, Monitor skin under medical devices, Pad between skin to skin Moisture Interventions: Absorbent underpads, Apply protective barrier, creams and emollients, Minimize layers Activity Interventions: Pressure redistribution bed/mattress(bed type), PT/OT evaluation Mobility Interventions: Pressure redistribution bed/mattress (bed type), PT/OT evaluation Nutrition Interventions: Document food/fluid/supplement intake Friction and Shear Interventions: Lift sheet, Apply protective barrier, creams and emollients, Minimize layers

## 2021-01-04 NOTE — PROGRESS NOTES
Name: Marion Menchaca MRN: [id number] : 1960 Hospital: [address] Date: 2021 IMPRESSION:  
· ESRD on HD. MWF schedule at her home [facility]. · Hyperkalemia- resolved · COVID , poor candidate for remdesivir. · Confusion- likely from hypoxemia. · CHF with advanced cardiomyopathy. Patient was offered to be placed on the heart transplant list 3 years ago. She had declined at that time. · Anemia of ESRD PLAN:  
· HD as per established schedule. Next Tx today around 3 pm as per Jordan's schedule. Anemia management · May use a PICC line for AB's · Prognosis is guarded at best. 
· O2 support · DNR status noted. Subjective/Interval History:  
I have reviewed the flowsheet and previous days notes. ROS:Review of systems not obtained due to patient factors. Objective:  
Vital Signs:   
Visit Vitals /87 Pulse 85 Temp 97.9 °F (36.6 °C) Resp 18 Ht 5' 5\" (1.651 m) Wt 50.8 kg (112 lb) SpO2 99% BMI 18.64 kg/m² O2 Device: BIPAP  
O2 Flow Rate (L/min): 60 l/min Temp (24hrs), Av.8 °F (36.6 °C), Min:97.6 °F (36.4 °C), Max:97.9 °F (36.6 °C) Intake/Output:  
Last shift:       07 -  1900 In: 200 Out: - Last 3 shifts: 1901 -  0700 In: 889 Out: - Intake/Output Summary (Last 24 hours) at 2021 1123 Last data filed at 2021 0800 Gross per 24 hour Intake 750 ml Output  Net 750 ml Physical Exam: 
General:    Lethargis, wakes up. NAD, looks ill. Tachypnic at rest 
Head:   Normocephalic, without obvious abnormality, atraumatic. Eyes:   Conjunctivae/corneas clear. Nose:  Nares normal. No drainage or sinus tenderness. Throat:    Lips, mucosa, and tongue normal.  No Thrush Neck:  Supple, symmetrical,  no adenopathy, thyroid: non tender 
  no carotid bruit and no JVD. Lungs:   Diminished to auscultation bilaterally. No Wheezing , + Rhonchi. No rales. Chest wall:  No tenderness or deformity. No Accessory muscle use. Heart:   Regular rate and rhythm. + SM Abdomen:   Soft, non-tender. Not distended. Bowel sounds normal. No masses Extremities: Extremities normal, atraumatic, No cyanosis. No edema. No clubbing Skin:     Texture, turgor normal. No rashes or lesions. Not Jaundiced Psych:  Fair insight. Not depressed. Not anxious or agitated. Neurologic: Weak, sleepy. DATA: 
Labs: 
Recent Labs 01/03/21 
3149 01/02/21 
0915  139  
K 4.3 3.7  102 CO2 26 28 BUN 41* 18  
CREA 4.69* 2.84* CA 7.9* 8.8 ALB  --  2.5* Recent Labs 01/04/21 
0109 01/03/21 
8060 01/02/21 
0976 WBC 15.2* 11.0 12.2* HGB 7.6* 7.1* 8.1* HCT 25.2* 23.4* 26.1*  
* 110* 162 No results for input(s): ROMAN, KU, CLU, CREAU in the last 72 hours. No lab exists for component: PROU Total time spent with patient:  35 minutes 
  [] Critical Care Provided Care Plan discussed with: 
 Staff, Medical Team 
 
Jeri Garza MD

## 2021-01-04 NOTE — PROGRESS NOTES
[address] [facility] Adult  Hospitalist Group Hospitalist Progress Note Francisca Majano MD 
Answering service: [phone] OR [phone] from in house phone Date of Service:  2021 NAME:  Annabelle Kelly :  1960 MRN:  [id number] This documentation was facilitated by a Voice Recognition software and may contain inadvertent typographical errors. Admission Summary:  
Annabelle Kelly is a 63 y.o. -American female with past medical history of non-insulin-dependent type 2 diabetes, paroxysmal A. fib on AC with warfarin, VT, history of MI, ESRD on HD, HFrEF, hypertension who presents to hospital via EMS with chief complaint of shortness of breath. Interval history / Subjective:  
    
Ms. Mikayla Dawkins is lethargic, opens eyes to voice. Remained on BiPAP. Assessment & Plan:  
Acute metabolic encephalopathy ,multifactorial: ahrf,covid infeciton ,advanced heart and kidney failure . -Exam non focal.Head CT negative 
-Mental status fluctuating. Acute hypoxic respiratory failure 2/2 over 19 pneumonia,worsening: 
-Albuterol q4 Hrs PRN via Spacer 
-Remain dependent on BiPAP. Continue dexamethasone 6 mg/day 
-anticoagulation:already on warfarin 
-Cefepime plus Doxy 
-Vitamin C 500-1000 mg q 6 hourly  
-Zinc  mg/day  
-Vitamin D3 20 000  60 000 iu single oral dose. -Droplet PLUS precautions. -ID on consult; appreciate recs. ESRD on HD: Nephrology following 
-Patient was volume overloaded confounding on hypoxia from Covid pneumonia Atrial fibrillation/HFrEF/VT / NSTE-ACS. Severe cardiomyopathy. Per nephrology,cardiac transplant was considered at one point. 
-Trop leak likely secondary to Covid cardiomyopathy vs ESRD 
-Continue aspirin plus warfarin. 
-Hold Coreg given labile blood pressures -Patient currently with LifeVest with plans for VT ablation. AICD in place not functional per cardiology. -Repeat echocardiogram.  Echocardiogram 2019 shows severely decreased systolic function. Elevated LFT,possibly due to hypoperfusion in the setting of severe cardiomyopathy,intermittent hypotension and severe COVID-19 disease 
-monitor and manage accordingly. LFTs showed slight improvement. GERD 
-PPI Iron deficiency anemia 
-Continue home Rx Diet:NPO due to AMS 
-Continue tube feeding via [place] Hypoglycemia due to no po intake 
-hypoglycemic protocol. -TF 
-Accucheks q6 and prn Access: left IJ in place. Patient with significant co morbidities : ESRD,severe cardiomyopathy and now with COVID pneumonia and hypoxic resp failure,ams is at high risk for further rapid deterioration. Palliative medicine following. FEN/GI -NPO Activity -as tolerated DVT prophylaxis -warfarin GI prophylaxis -Pepcid Disposition -TBD CODE STATUS: DNR 
1/4:Called and updated her sister/KIAN Saavedra [phone]. Hospital Problems  Date Reviewed: 11/16/2020 Codes Class Noted POA Lethargy ICD-10-CM: R53.83 ICD-9-CM: 780.79  Unknown Unknown Moderate protein-calorie malnutrition ([facility] [address] ICD-10-CM: E44.0 ICD-9-CM: 263.0  Unknown Unknown Advance care planning ICD-10-CM: Z71.89 ICD-9-CM: V65.49  Unknown Unknown Respiratory failure with hypoxia [facility]) ICD-10-CM: J96.91 
ICD-9-CM: 518.81  12/29/2020 Unknown Review of Systems:  
Review of systems not obtained due to patient factors. Vital Signs:  
 Last 24hrs VS reviewed since prior progress note. Most recent are: 
Visit Vitals BP (!) 86/55 (BP 1 Location: Right arm, BP Patient Position: At rest) Pulse 78 Temp 98.2 °F (36.8 °C) Resp 22 Ht 5' 5\" (1.651 m) Wt 50.8 kg (112 lb) SpO2 96% BMI 18.64 kg/m² Intake/Output Summary (Last 24 hours) at 1/4/2021 6208 Last data filed at 1/4/2021 1600 Gross per 24 hour Intake 950 ml Output  Net 950 ml Physical Examination:  
 
I had a face to face encounter with this patient and independently examined them on1/4/2021 as outlined below: 
     
Constitutional:  Lethargic, opens eyes to voice. HEENT:  NGT IN PLACE. A BiPAP in place. Resp:  Remained on BiPAP. No wheezing/rhonchi/rales. No accessory muscle use Chest Wall: No deformity CV:  Regular rhythm, normal rate, no murmurs, gallops, rubs GI:  Soft, non distended, non tender. normoactive bowel sounds, no hepatosplenomegaly :  No CVA or suprapubic tenderness Musculoskeletal:  No edema, warm, 2+ pulses throughout Neurologic:  Mental status: Lethargic. Opens eyes to voice. Follows simple commands. Cranial nerves II-XII : WNL Motor exam:Moves all extremities symmetrically Data Review:  
 Review and/or order of clinical lab test 
Review and/or order of tests in the radiology section of CPT Review and/or order of tests in the medicine section of CPT Labs:  
 
Recent Labs 01/04/21 
0109 01/03/21 
2743 WBC 15.2* 11.0 HGB 7.6* 7.1*  
HCT 25.2* 23.4*  
* 110* Recent Labs 01/03/21 
7780 01/02/21 
6577  139  
K 4.3 3.7  102 CO2 26 28 BUN 41* 18  
CREA 4.69* 2.84* * 74  
CA 7.9* 8.8 Recent Labs 01/02/21 
6345 * * TBILI 1.7* TP 7.4 ALB 2.5*  
GLOB 4.9* Recent Labs 01/04/21 
0109 01/03/21 
9662 01/02/21 
8400 INR 4.3* 3.1* 3.1* PTP 41.9* 30.5* 30.6* No results for input(s): FE, TIBC, PSAT, FERR in the last 72 hours. Lab Results Component Value Date/Time Folate 19.3 03/12/2019 03:50 AM  
  
No results for input(s): PH, PCO2, PO2 in the last 72 hours. No results for input(s): CPK, CKNDX, TROIQ in the last 72 hours. No lab exists for component: CPKMB Lab Results Component Value Date/Time Cholesterol, total 125 10/16/2012 03:40 AM  
 HDL Cholesterol 37 10/16/2012 03:40 AM  
 LDL, calculated 65 10/16/2012 03:40 AM  
 Triglyceride 115 10/16/2012 03:40 AM  
 CHOL/HDL Ratio 3.4 10/16/2012 03:40 AM  
 
Lab Results Component Value Date/Time Glucose (POC) 149 (H) 01/04/2021 05:12 PM  
 Glucose (POC) 137 (H) 01/04/2021 01:05 PM  
 Glucose (POC) 128 (H) 01/04/2021 06:45 AM  
 Glucose (POC) 128 (H) 01/03/2021 11:21 PM  
 Glucose (POC) 121 (H) 01/03/2021 06:25 PM  
 
Lab Results Component Value Date/Time Color RED 08/26/2018 07:52 AM  
 Appearance BLOODY (A) 08/26/2018 07:52 AM  
 Specific gravity 1.012 08/26/2018 07:52 AM  
 pH (UA) 7.0 08/26/2018 07:52 AM  
 Protein 300 (A) 08/26/2018 07:52 AM  
 Glucose NEGATIVE  08/26/2018 07:52 AM  
 Ketone TRACE (A) 08/26/2018 07:52 AM  
 Bilirubin NEGATIVE  10/18/2013 02:41 PM  
 Urobilinogen 0.2 08/26/2018 07:52 AM  
 Nitrites NEGATIVE  08/26/2018 07:52 AM  
 Leukocyte Esterase SMALL (A) 08/26/2018 07:52 AM  
 Epithelial cells FEW 08/26/2018 07:52 AM  
 Bacteria NEGATIVE  08/26/2018 07:52 AM  
 WBC 5-10 08/26/2018 07:52 AM  
 RBC >100 (H) 08/26/2018 07:52 AM  
 
 
 
Medications Reviewed:  
 
Current Facility-Administered Medications Medication Dose Route Frequency  [START ON 1/5/2021] aspirin chewable tablet 81 mg  81 mg Oral DAILY  Warfarin- HOLD on 1/4   Other PRN  pantoprazole (PROTONIX) 40 mg in 0.9% sodium chloride 10 mL injection  40 mg IntraVENous DAILY  dexamethasone (DECADRON) 4 mg/mL injection 6 mg  6 mg IntraVENous Q24H  
 glucose chewable tablet 16 g  4 Tab Oral PRN  
 dextrose (D50W) injection syrg 12.5-25 g  12.5-25 g IntraVENous PRN  
 glucagon (GLUCAGEN) injection 1 mg  1 mg IntraMUSCular PRN  zinc sulfate (ZINCATE) 220 (50) mg capsule 1 Cap  1 Cap Oral DAILY  epoetin della-epbx (RETACRIT) injection 10,000 Units  10,000 Units SubCUTAneous DIALYSIS MON, WED & FRI  
  sodium chloride (NS) flush 5-40 mL  5-40 mL IntraVENous Q8H  
 sodium chloride (NS) flush 5-40 mL  5-40 mL IntraVENous PRN  
 acetaminophen (TYLENOL) tablet 650 mg  650 mg Oral Q6H PRN Or  
 acetaminophen (TYLENOL) suppository 650 mg  650 mg Rectal Q6H PRN  polyethylene glycol (MIRALAX) packet 17 g  17 g Oral DAILY PRN  
 albuterol (PROVENTIL HFA, VENTOLIN HFA, PROAIR HFA) inhaler 2 Puff  2 Puff Inhalation Q4H RT  
 ascorbic acid (vitamin C) (VITAMIN C) tablet 500 mg  500 mg Oral Q6H  Warfarin Pharmacy to Dose   Other Rx Dosing/Monitoring  cefepime (MAXIPIME) 1 g in 0.9% sodium chloride (MBP/ADV) 50 mL MBP  1 g IntraVENous Q24H  
 doxycycline (VIBRAMYCIN) 100 mg in 0.9% sodium chloride (MBP/ADV) 100 mL MBP  100 mg IntraVENous Q12H  
 amiodarone (CORDARONE) tablet 200 mg  200 mg Oral DAILY  cholecalciferol (VITAMIN D3) (1000 Units /25 mcg) tablet 10 Tab  10,000 Units Oral Q7D  
 cinacalcet (SENSIPAR) tablet 30 mg  30 mg Oral DAILY  loperamide (IMODIUM) capsule 2 mg  2 mg Oral Q3H PRN  
 mexiletine (MEXITIL) capsule 150 mg  150 mg Oral TID  B complex-vitaminC-folic acid (NEPHROCAP) cap  1 Cap Oral DAILY  senna (SENOKOT) tablet 8.6 mg  1 Tab Oral DAILY PRN  
 
______________________________________________________________________ EXPECTED LENGTH OF STAY: 5d 12h ACTUAL LENGTH OF STAY:          6 Gil Mohan MD

## 2021-01-04 NOTE — PROGRESS NOTES
Physical Therapy Chart reviewed in prep for therapy session. Noted pt on BiPAP, INR 4.3, hypotensive. Discussed with RN who recommended holding off therapy session today. Therapy will defer and check back tomorrow.

## 2021-01-04 NOTE — PROGRESS NOTES
Pharmacist Note  Warfarin Dosing Consult provided for this 63 y. o.female to manage warfarin for Atrial Fibrillation INR Goal: 2 - 3 Home regimen/ tablet size: 3 mg S/M/W/F/S, 6 mg T/Th 
 
Drugs that may increase INR: Amiodarone Drugs that may decrease INR: None Other current anticoagulants/ drugs that may increase bleeding risk: Aspirin Risk factors: None Daily INR ordered: YES Recent Labs 01/04/21 
0109 01/03/21 
4486 01/02/21 
9673 HGB 7.6* 7.1* 8.1* INR 4.3* 3.1* 3.1* Date               INR                  Dose 
12/28            3.4  
12/29            3.8                   Held  
12/30              3.4                   Held 
12/31  4.6  Held 1/1  3.2  Held 1/2  3.1  1 mg 
1/3  3.1  Hold 1/4                  4.3                   Hold Assessment/ Plan: Will continue to hold warfarin for supra-therapeutic INR. Pharmacy will continue to monitor daily and adjust therapy as indicated. Please contact the pharmacist at   for outpatient recommendations if needed.   
  
 
Trixie Guerra, KristiD, BCPS

## 2021-01-04 NOTE — CONSULTS
Comprehensive Nutrition Assessment Type and Reason for Visit: Anne-Marie Luis Nutrition Recommendations/Plan: 1. Recommend scheduled bowel regimen. No BM x 1 week. Has PRN orders, but not given. 2. Increase TF of Nepro to goal of 45 mL/hr with 30 mL H2O flushes q 4 hours 3. Diet adjusted to NPO so kitchen doesn't send tray Malnutrition Assessment: 
Malnutrition Status: Moderate malnutrition Context:  Acute illness Findings of the 6 clinical characteristics of malnutrition:  
Energy Intake:  1 - 75% or less of est energy req for 7 or more days Weight Loss:  7.0 - Greater than 5% over 1 month Body Fat Loss:  Unable to assess, Muscle Mass Loss:  Unable to assess, Fluid Accumulation:  No significant fluid accumulation,   
 Strength:  Not performed Nutrition Assessment:    
63 y.o. female who has a past medical history of Anemia associated with chronic renal failure, Chronic systolic heart failure, Chronic tachycardia, GERD, non-ST elevation myocardial infarction, Hypertension, Lipoma of right lower extremity, and ESRD on HD. MD notes type 2 DM, but not part of listed PMHx and not on medication PTA. Admitted with Respiratory failure with hypoxia ([facility] [address] [J96.91] d/t COVID-19 PNA, volume overload confounding on hypoxia. Per initial RD assessment 12/30: C/o poor appetite/ intake on admission for several weeks with +wt loss ( lb, down to 116 lb on admission), dislikes supplements, familiar with renal diet, +wounds, meets criteria for acute, moderate protein-calorie malnutrition. Consult received for tube feeds. Pt with change in mental status, more lethargic, requiring continuous BiPAP. Started on TF via [address] over weekend, KUB 1/2 confirms in duodenal bulb. Currently receiving TF of Nepro @ 20 mL/hr + 100 mL H2O q 6 hours which provides 440 mL, 792 kcal, 36 gm pro, 720 mL free H2O (based on 22° d/t HD). Pt still with diet order, but technically NPO. Will make NPO so kitchen doesn't send tray. Recommend increasing TF of Nepro to 45 mL/hr with 30 mL H2O q 4 hours to provide 990 mL, 1782 kcal, 80 gm pro, 900 mL free H2O. Discussed above with Dr. Taryn Lowe. RD will continue to follow along closely. Nutritionally Significant Medications:  
Vit C, Nephrocap, cefepime, Vit D3, Decadron, Vibramycin, Mexitil, Protonix, Warfarin, Zincate PRN: Imodium, Miralax, Senokot Estimated Daily Nutrient Needs: 
Energy (kcal): 8012-6268(30-35 kcal/kg) Protein (g): 80+(1.5+ gm/kg) Fluid (ml/day): 500 mL + OP Nutrition Related Findings:  
Edema: none Last BM: 12/28 Wounds:   
Stage II, Pressure injury Current Nutrition Therapies: 
Diet: Regular, JEFF, low K+ with Magic Cup BID 
EN Order: TF via NG of Nepro @ 20 mL/hr + 100 mL H2O q 6 hours Anthropometric Measures: 
· Height:  5' 5\" (165.1 cm) · Current Body Wt:  50.8 kg (111 lb 15.9 oz) · Admission Body Wt:  129 lb 6.6 oz(58.7 kg) · Usual Body Wt:  58.1 kg (128 lb)(128 lb) · Ideal Body Wt:  125 lbs:  89.6 % · BMI Category:  Normal weight (BMI 18.5-24. 9) Wt Readings from Last 20 Encounters:  
12/30/20 52.7 kg (116 lb 3.2 oz)  
11/16/20 58.8 kg (129 lb 9.6 oz) 10/29/20 58.5 kg (129 lb)  
09/30/20 59 kg (130 lb)  
07/23/20 60.8 kg (134 lb)  
07/17/20 62.3 kg (137 lb 6.4 oz) 10/30/19 60.8 kg (134 lb) 10/02/19 59.5 kg (131 lb 2.8 oz) 09/06/19 60.1 kg (132 lb 9.6 oz)  
06/27/19 59.9 kg (132 lb) 04/12/19 55.8 kg (123 lb)  
03/28/19 56.2 kg (123 lb 12.8 oz) 03/19/19 62.3 kg (137 lb 5.6 oz) 02/18/19 58.2 kg (128 lb 3.2 oz) 02/08/19 56.6 kg (124 lb 12.8 oz) 12/17/18 56 kg (123 lb 6.4 oz) 12/03/18 56.5 kg (124 lb 9 oz)  
11/26/18 55.3 kg (121 lb 14.6 oz) 11/01/18 53.4 kg (117 lb 12.8 oz) 10/22/18 52.2 kg (115 lb) Nutrition Diagnosis:  
· Inadequate oral intake related to impaired respiratory function, cognitive or neurological impairment as evidenced by NPO or clear liquid status due to medical condition, nutrition support-enteral nutrition · Increased nutrient needs related to increased demand for energy/nutrients as evidenced by wounds, dialysis Nutrition Interventions:  
Food and/or Nutrient Delivery: Continue tube feeding, Modify tube feeding Nutrition Education and Counseling: No recommendations at this time Coordination of Nutrition Care: Continue to monitor while inpatient, Interdisciplinary rounds Goals: 
TF to meet >90% of est needs within 2 days and then over next week Nutrition Monitoring and Evaluation:  
Behavioral-Environmental Outcomes: None identified Food/Nutrient Intake Outcomes: Enteral nutrition intake/tolerance Physical Signs/Symptoms Outcomes: Biochemical data, GI status, Weight, Hemodynamic status, Fluid status or edema Discharge Planning: Too soon to determine Recent Labs 01/03/21 
6244 01/02/21 
3229 * 74 BUN 41* 18  
CREA 4.69* 2.84*  139  
K 4.3 3.7  102 CO2 26 28  
CA 7.9* 8.8 Recent Labs 01/02/21 
2770 * * TBILI 1.7* TP 7.4 ALB 2.5*  
GLOB 4.9* No results for input(s): LAC in the last 72 hours. Recent Labs 01/04/21 
0109 01/03/21 
3258 WBC 15.2* 11.0 HGB 7.6* 7.1*  
HCT 25.2* 23.4*  
* 110* No results for input(s): PREALB in the last 72 hours. No results for input(s): TRIGL in the last 72 hours. Recent Labs 01/04/21 
1305 01/04/21 
0645 01/03/21 
2321 01/03/21 
1825 01/03/21 
1402 01/03/21 
6980 01/02/21 
2308 01/02/21 470 78 605 01/02/21 
1320 01/02/21 
3544 01/01/21 
2259 01/01/21 
1839 01/01/21 
1801 01/01/21 
1738 01/01/21 
1715 01/01/21 
1641 GLUCPOC 137* 128* 128* 121* 130* 138* 132* 108* 80 75 113* 119* 36* 44* 35* 42* Lab Results Component Value Date/Time Hemoglobin A1c <3.8 (L) 01/02/2021 03:18 AM  
 Hemoglobin A1c 5.4 03/16/2019 02:56 AM  
 
 
Iron Profile Iron Date Value Ref Range Status 12/29/2020 33 (L) 35 - 150 ug/dL Final  
03/18/2019 52 35 - 150 ug/dL Final  
08/27/2018 30 (L) 35 - 150 ug/dL Final  
10/19/2013 70 35 - 150 ug/dL Final  
 
TIBC Date Value Ref Range Status 12/29/2020 149 (L) 250 - 450 ug/dL Final  
03/18/2019 160 (L) 250 - 450 ug/dL Final  
08/27/2018 208 (L) 250 - 450 ug/dL Final  
10/19/2013 333 250 - 450 ug/dL Final  
 
Iron % saturation Date Value Ref Range Status 12/29/2020 22 20 - 50 % Final  
03/18/2019 33 20 - 50 % Final  
08/27/2018 14 (L) 20 - 50 % Final  
10/19/2013 21 20 - 50 % Final  
 
 
 
 
Devi Malloy RD Available via [employer] Pager# [phone]

## 2021-01-04 NOTE — PROGRESS NOTES
0800: Bedside and Verbal shift change report given to IbelemALPHONSE [employer] (oncoming nurse) by Jesu Miguel (offgoing nurse). Report included the following information SBAR, Kardex, ED Summary, Intake/Output and Cardiac Rhythm NSR.

## 2021-01-05 NOTE — PROGRESS NOTES
Spiritual Care Assessment/Progress Note [address] 
 
 
NAME: Jeni Diaz      MRN: [id number] AGE: 63 y.o. SEX: female Taoist Affiliation: [facility]  
Language: Curly Kendrick 1/5/2021     Total Time (in minutes): 11 Spiritual Assessment begun in [facility] IMCU through conversation with: 
  
    []Patient        [] Family    [] Friend(s) Reason for Consult: Death, Inpatient Spiritual beliefs: (Please include comment if needed) 
   [] Identifies with a sherri tradition:     
   [] Supported by a sherri community:        
   [] Claims no spiritual orientation:       
   [] Seeking spiritual identity:            
   [] Adheres to an individual form of spirituality:       
   [] Not able to assess:                   
 
    
Identified resources for coping:  
   [] Prayer                           
   [] Music                  [] Guided Imagery 
   [] Family/friends                 [] Pet visits [] Devotional reading                         [] Unknown 
   [] Other Interventions offered during this visit: (See comments for more details) Patient Interventions: Other (comment) Plan of Care: 
 
 [] Support spiritual and/or cultural needs  
 [] Support AMD and/or advance care planning process    
 [] Support grieving process 
 [] Coordinate Rites and/or Rituals  
 [] Coordination with community clergy [] No spiritual needs identified at this time 
 [] Detailed Plan of Care below (See Comments)  [] Make referral to Music Therapy 
[] Make referral to Pet Therapy    
[] Make referral to Addiction services 
[] Make referral to [facility] 
[] Make referral to Spiritual Care Partner 
[] No future visits requested       
[] Follow up upon further referrals Comments:  responded to page and was informed of pt's death. Arrived to unit and consulted with staff. Offered silent prayer outside pt's room due to isolation precautions. Ana Giraldo, [address] [place] [address] Road

## 2021-01-05 NOTE — PROGRESS NOTES
Occupational Therapy 1/5/2021 Chart reviewed. Events of AM noted and pt currently on high flow NC. Pt also with INR 5.3. spoke with RN who reported pt is not appropriate for OT intervention at this time. Will follow.  Prosper Palma MS, OTR/L

## 2021-01-05 NOTE — PROGRESS NOTES
Nabeel NP Progress note Name: Kip Boston YOB: 1960 MRN: [id number] Admission Date: 12/28/2020  7:39 PM 
 
Date of service: 1/4/2021 12:13 AM 
 
Situation / Background:  
 
Vomited while wearing BiPAP. Concern for aspiration Subjective:  
 
Unable to elicit. Objective:  
  
 
19-year-old female admitted 12/28/2020 with acute hypoxic respiratory failure secondary to COVID-19 pneumonia. Acute metabolic encephalopathy due to advanced heart failure, renal failure. · Alert, unable to evaluate orientation. In no acute distress · Lungs clear throughout. 95% on 60 L nasal cannula · Abdomen soft, nontender not distended. Evidence small amount of vomitus on gown · Extremities without edema. Pulses 1+ palpable · Just finished hemodialysis Assessment/ Plan:  
 
Concern for aspiration · Vomiting while on BiPAP. · Clinically appears comfortable now, no hypoxia on HFNC · Already on cepepime and doxycycline · Will monitor fever, leukocytosis, hypoxia 
 
d/w primary RN This patient was stable at the time of my exam.  Please do not hesitate to call me again if her status does not improve or worsens. ADDENDUM 0345: 
 
· initially doing well on NC, now best sat 90% on HFNC. · Note temp is mildly elevated as well. WBC up · Concern for aspiration after vomiting while on bipap earlier in the shift. · Currently on cefipime and doxy. · CXR to evaluate. INR 5.3 noted. Rufina Waddell, MSN, RN, NP-C 
[phone] or via [employer]

## 2021-01-05 NOTE — PROGRESS NOTES
Physical Therapy Chart reviewed. Events of AM noted and pt currently on high flow NC. Pt also with INR 5.3. spoke with RN who reported pt is not appropriate for PT intervention at this time. Will follow.

## 2021-01-05 NOTE — PROGRESS NOTES
2100: This RN called to pt room by dialysis RN due to emesis in bipap mask. RT called and changed pt to HFNC 60L 83%. o2 sats remain >90. Hailey Vergara NP notified, no orders received.  
Ariadna Roles: Hailey Vergara NP updated on pt status, fever 100.6, increased WOB and RR 20-30, HR 100s, and increased lethargy- eyes opened to pain only. Orders placed for cxray and to trial pt back on BIPAP. [id number] Pacheco, Charge RN and this RN agreed that due to pt current condition and protocol, may be best to keep on HFNC.   
0815: Dr Yvonne Elias on floor and updated by this RN.  
0830: Verbal shift change report given to [facility] (oncoming nurse) by Baldomero Fishman (offgoing nurse).  Report included the following information SBAR, Kardex, Intake/Output, Accordion, Recent Results and Cardiac Rhythm SR.

## 2021-01-05 NOTE — PROGRESS NOTES
Pharmacist Note  Warfarin Dosing Consult provided for this 63 y. o.female to manage warfarin for Atrial Fibrillation INR Goal: 2 - 3 Home regimen/ tablet size: 3 mg S/M/W/F/S, 6 mg T/Th 
 
Drugs that may increase INR: Amiodarone Drugs that may decrease INR: None Other current anticoagulants/ drugs that may increase bleeding risk: Aspirin Risk factors: None Daily INR ordered: YES Recent Labs 01/05/21 
3317 01/04/21 
0109 01/03/21 
3001 HGB 7.2* 7.6* 7.1* INR 5.3* 4.3* 3.1* Date               INR                  Dose 
12/28            3.4  
12/29            3.8                   Held  
12/30              3.4                   Held 
12/31  4.6  Held 1/1  3.2  Held 1/2  3.1  1 mg 
1/3  3.1  Hold 1/4                  4.3                   Hold 1/5                  5.3                   Hold Assessment/ Plan: Will continue to hold warfarin for supra-therapeutic INR. Pharmacy will continue to monitor daily and adjust therapy as indicated. Please contact the pharmacist at   for outpatient recommendations if needed.   
  
Evelyn Bowling, KristiD, BCPS

## 2021-01-05 NOTE — PROGRESS NOTES
RRT was just being called for her spo2 keeps dropping on HF max. I Called pts sister Debora Jacinto mother with her listening our conversation. I was telling them that [facility] is not doing well,she has declined significantly since yesterday,unable to maintain her oxygen on high flow,unable to use BiPAP as she is vomiting. lovely said her mother has already made up her mind and they have all decided to let her go peacefully and agreed for comfort care. As I hang up,RN informed me she just passed. I came to assess her. Called to examine patient who has . No response to verbal and tactile stimuli. No respiratory effort. Absent heart sounds and pulses. Pupils fixed and dilated. Patient pronounced dead at 5:40 PM hours. I called again and informed Maiden Frisk that [facility] just passed and expressed my condolence.

## 2021-01-05 NOTE — DIALYSIS
[facility] Dialysis Team [place]  [phone] Vitals   Pre   Post   Assessment   Pre   Post    
Temp  Temp: 98.1 °F (36.7 °C) (01/04/21 1908)  98.8 LOC  lethergic lethargic HR   Pulse (Heart Rate): 84 (01/04/21 2102) 93 Lungs   Diminished on bipap  diminished on hi-flow NC  
B/P   BP: (!) 94/56 (01/04/21 2102) 85/56 Cardiac   B/p low, A-fib  b/p low but map greater than 65 Resp   Resp Rate: 30 (01/04/21 2102) 30 Skin   intact  intact Pain level  Not showing signs of pain 0 Edema  none 
 
 none Orders: Duration:   Start:    2103 End:    0034 Total:   3.5 hr  
Dialyzer:   Dialyzer/Set Up Inspection: Petr Kennedy (01/04/21 2102) K Bath:   Dialysate K (mEq/L): 3 (01/04/21 2102) Ca Bath:   Dialysate CA (mEq/L): 2.5 (01/04/21 2102) Na/Bicarb:   Dialysate NA (mEq/L): 140 (01/04/21 2102) Target Fluid Removal:   Goal/Amount of Fluid to Remove (mL): 1000 mL (01/04/21 2102) Access Type & Location:   Rt subclavian permcath. Each catheter limb disinfected per p&p, caps removed, hubs disinfected per p&p. Blood aspirated and lines flushed wiuthout any issues. Good blood flow noted. Dressing changed due to vomit on it. No s/s of infection noted Labs Obtained/Reviewed Critical Results Called   Date when labs were drawn- 
Hgb-   
HGB Date Value Ref Range Status 01/04/2021 7.6 (L) 11.5 - 16.0 g/dL Final  
 
K-   
Potassium Date Value Ref Range Status 01/03/2021 4.3 3.5 - 5.1 mmol/L Final  
 
Ca-  
Calcium Date Value Ref Range Status 01/03/2021 7.9 (L) 8.5 - 10.1 MG/DL Final  
 
Bun-  
BUN Date Value Ref Range Status 01/03/2021 41 (H) 6 - 20 MG/DL Final  
 
Creat-  
Creatinine Date Value Ref Range Status 01/03/2021 4.69 (H) 0.55 - 1.02 MG/DL Final  
  Comment:  
  INVESTIGATED PER DELTA CHECK PROTOCOL Medications/ Blood Products Given Name   Dose   Route and Time Albumin 12.5g/50mg Started at 2120 Blood Volume Processed (BVP):    78.6 Net Fluid Removed:  0 Comments Time Out Done: 2045 Primary Nurse Rpt Pre:Nneka Smith RN 
Primary Nurse Rpt Post:Nneka Smith RN 
Pt Education:ESRD Care Plan:ESRD Tx Summary: 
2045 upon assessment, pt noted to have vomit in her bipap mask and vomit on her cvc dressing. Primary nurse notified about vomit and dressing changed over CVC. Priamry also notified about low b/p, Nurse states Md are aware and are ok as long as MNAP is greater than 65 
2103 Dialysis started 2120 Albumin given to help with fluid removal 
2130 100ml ns gioven for b/p 
2145 Another 100ml ns given for b/p 
0034 Dialysis completed. Each dialysis catheter limb disinfected per p&p, blood returned per p&p, each dialysis hub disinfected per p&p, post dialysis catheter dwell instilled with saline per order, and caps applied. Admiting Diagnosis:Respiratory failure [facility] Consent signed - Informed Consent Verified: Yes (01/04/21 2102) Hepatitis Status- Immune 12/11/20 Machine #- Machine Number: [id number] (01/04/21 2102) Telemetry status-Afib Pre-dialysis wt. - Pre-Dialysis Weight: 52.1 kg (114 lb 13.8 oz) (01/01/21 1350)

## 2021-01-05 NOTE — PROGRESS NOTES
Name: Annabelle Kelly MRN: [id number] : 1960 Hospital: [address] Date: 2021 IMPRESSION:  
· ESRD on HD. MWF schedule at her home [facility]. · Hyperkalemia- resolved · COVID , poor candidate for remdesivir. · Confusion- likely from hypoxemia. · CHF with advanced cardiomyopathy. Patient was offered to be placed on the heart transplant list 3 years ago. She had declined at that time. · Anemia of ESRD PLAN:  
· HD again in AM 
· May use a PICC line for AB's · Prognosis is guarded at best. 
· O2 support · DNR status noted. Subjective/Interval History:  
I have reviewed the flowsheet and previous days notes. ROS:Review of systems not obtained due to patient factors. Objective:  
Vital Signs:   
Visit Vitals /75 (BP 1 Location: Right arm, BP Patient Position: At rest) Pulse (!) 102 Temp (!) 101.7 °F (38.7 °C) Resp 28 Ht 5' 5\" (1.651 m) Wt 52.6 kg (115 lb 14.4 oz) SpO2 97% BMI 19.29 kg/m² O2 Device: Heated, Hi flow nasal cannula O2 Flow Rate (L/min): 60 l/min Temp (24hrs), Av.4 °F (37.4 °C), Min:97.7 °F (36.5 °C), Max:101.7 °F (38.7 °C) Intake/Output:  
Last shift:      No intake/output data recorded. Last 3 shifts:  1901 -  0700 In: 950 Out: 0 Intake/Output Summary (Last 24 hours) at 2021 1008 Last data filed at 2021 0030 Gross per 24 hour Intake 200 ml Output 0 ml Net 200 ml Physical Exam: 
General:    Lethargis, wakes up. NAD, looks ill. Head:   Normocephalic, without obvious abnormality, atraumatic. Eyes:   Conjunctivae/corneas clear. Nose:  Nares normal. No drainage or sinus tenderness. Throat:    Lips, mucosa, and tongue normal.  No Thrush Neck:  Supple, symmetrical,  no adenopathy, thyroid: non tender 
  no carotid bruit and no JVD. Lungs:   Diminished to auscultation bilaterally. No Wheezing , + Rhonchi. No rales. Chest wall:  No tenderness or deformity. No Accessory muscle use. Heart:   Regular rate and rhythm. + SM Abdomen:   Soft, non-tender. Not distended. Bowel sounds normal. No masses Extremities: Extremities normal, atraumatic, No cyanosis. No edema. No clubbing Skin:     Texture, turgor normal. No rashes or lesions. Not Jaundiced Psych:  Fair insight. Not depressed. Not anxious or agitated. Neurologic: Weak, sleepy. DATA: 
Labs: 
Recent Labs 01/05/21 
1083 01/03/21 
6746  139  
K 3.8 4.3  104 CO2 26 26 BUN 22* 41* CREA 2.00* 4.69* CA 8.3* 7.9* ALB 2.4*  --   
 
Recent Labs 01/05/21 
7984 01/04/21 
0109 01/03/21 
0507 WBC 16.3* 15.2* 11.0 HGB 7.2* 7.6* 7.1*  
HCT 22.9* 25.2* 23.4*  
* 101* 110* No results for input(s): ROMAN, KU, CLU, CREAU in the last 72 hours. No lab exists for component: PROU Total time spent with patient:  35 minutes 
  [] Critical Care Provided Care Plan discussed with: 
 Staff, Medical Team 
 
Genoveva Almendarez MD

## 2021-01-06 NOTE — PROGRESS NOTES
1720: Patient's SPO2 began to hover around 83-84%. Entered patient's room to reposition her and inspect patency of hi-flow canula. No change in oxygenation. Paged hospitalist regarding use of BIPAP and episode of emesis on BIPAP last night. Called rapid response. Decided to place BIPAP on patient with supervision to see if oxygenation would improve until MD responded. SPO2 increased to 89% briefly, then fell to 70s and below. HR went to 40s, called another RN to assist and check pulses. Notified MD of patient passing. Coreen Solorzano

## 2021-01-07 NOTE — PROGRESS NOTES
Physician Progress Note Luz Lyle 
CSN #:                  [id number] :                       1960 ADMIT DATE:       2020 7:39 PM 
[address] [place] DATE:        2021 7:04 PM 
RESPONDING 
PROVIDER #:        Amee Braun MD 
 
 
 
 
QUERY TEXT: 
 
Patient admitted with COVID 19 PNA. Noted documentation of severe sepsis in ED note . Please indicate one of the following and document in the medical record: The medical record reflects the following: 
Risk Factors: 62 y/o female admitted with COVID19 PNA, hx of ESRD, DM, HTN, A-Fib Clinical Indicators: +Cough, Lactic acid 4.3, B/P 71/51, T 101.3, RR 31, O2 Sats 86%,  per CXR  Left lower lobe opacification, representing either atelectasis or pneumonia, Severe sepsis per admission note. Treatment: Supplemental O2 BiPAP, cefepime and Levaquin. ID Consult, Continuous pulse Oximetry and Telemetry Monitoring Options provided: 
-- Sepsis present as evidenced by, Please document evidence. -- Sepsis was ruled out after study -- Other - I will add my own diagnosis -- Disagree - Not applicable / Not valid -- Disagree - Clinically unable to determine / Unknown 
-- Refer to Clinical Documentation Reviewer PROVIDER RESPONSE TEXT: 
 
Sepsis is present as evidenced by fever,tachy,hypotension,encephalopathy,hypoxia +COVID 19 disease Query created by:  Kassandra Scherer on 2021 1:50 PM 
 
 
Electronically signed by:  Amee Braun MD 2021 10:29 PM

## 2021-01-08 NOTE — DISCHARGE SUMMARY
Death Summary PATIENT ID: Shea Chavis MRN: [id number] YOB: 1960 DATE OF ADMISSION: 12/28/2020  7:39 PM   
DATE OF DISCHARGE: 01/05/2021 PRIMARY CARE PROVIDER: Panda Ribera MD  
 
ATTENDING PHYSICIAN: Masha Chatterjee MD 
 
DISCHARGING PROVIDER: Masha Chatterjee MD   
To contact this individual call [phone] and ask the  to page. If unavailable ask to be transferred the Adult Hospitalist Department. CONSULTATIONS: IP CONSULT TO CARDIOLOGY PROCEDURES/SURGERIES: * No surgery found * ADMITTING DIAGNOSES & HOSPITAL COURSE:  
This 69-year-old male was admitted on 12/28/2020 when she presented with progressive worsening of shortness of breath after being diagnosed with COVID-19. EMS found her hypoxic with an SPO2 of mid 80s, she is on 2 L of oxygen via nasal cannula at baseline. Patient came from [facility]. Patient has significant comorbidities which includes advanced heart failure for which she was considered for heart transplant fevers back, ESRD on hemodialysis, hypertension, atrial fibrillation anticoagulated with warfarin, non-insulin-dependent diabetes mellitus. There was consideration for ICU admission on presentation. ED consulted ICU services with evaluated patient and said she is not appropriate and was admitted to the intermediate care, she was on BiPAP. Patient was intolerant of dialysis with intermittent hypotension. She was treated with broad-spectrum antibiotics, steroids but was not a candidate for Remdesevir due to renal dysfunction. She has a LifeVest on as AICD in situ was not functional.  She developed worsening encephalopathy, imaging with CT of the head was negative. She continued to deteriorate overall including worsening respiratory status and becoming increasingly intolerant of dialysis. We have been having discussion with her sister and mother about the patient's decline and discuss goals of care including whether to continue aggressive care, CODE STATUS etc.  Family noted patient was in a another hospital a couple months ago when she was very sick and they had similar discussion about goals of care and end-of-life. Her sister thought she might have had DNR that time but it was she was full code this admission. Palliative medicine was consulted and assisted with the goals of care conversation. Family ultimately decided for DNR and no escalation of care. Patient continued to decline respiratory wise and hemodynamically overall. On the day of her death, I had extensive discussion with her sister and her mother as patient was declining, this point they decided  not to let her suffer anymore and make her comfortable. It was not too long before the patient passed. Probable cause of death: Respiratory failure due to Covid pneumonia DISCHARGE DIAGNOSES / PLAN:   
Acute hypoxic respiratory failure due to Covid pneumonia Advantage congestive heart failure ESRD on hemodialysis. DM II 
HTN [facility] Problem List as of 1/5/2021 Date Reviewed: 11/16/2020 Codes Class Noted - Resolved Lethargy ICD-10-CM: R53.83 ICD-9-CM: 780.79  Unknown - Present Moderate protein-calorie malnutrition ([facility] [address] ICD-10-CM: E44.0 ICD-9-CM: 263.0  Unknown - Present  Advance care planning ICD-10-CM: Z71.89 
 ICD-9-CM: V65.49  Unknown - Present Respiratory failure with hypoxia [facility]) ICD-10-CM: J96.91 
ICD-9-CM: 518.81  12/29/2020 - Present Chronic anticoagulation ICD-10-CM: Z79.01 
ICD-9-CM: V58.61  3/28/2019 - Present SVT (supraventricular tachycardia) (HCC) ICD-10-CM: I47.1 ICD-9-CM: 427.89  3/11/2019 - Present GERD (gastroesophageal reflux disease) ICD-10-CM: K21.9 ICD-9-CM: 530.81  3/11/2019 - Present Systolic CHF, chronic (HCC) (Chronic) ICD-10-CM: B37.12 ICD-9-CM: 428.22, 428.0  8/25/2018 - Present Paroxysmal atrial fibrillation (HCC) (Chronic) ICD-10-CM: I48.0 ICD-9-CM: 427.31  7/31/2018 - Present  
   
 ICD (implantable cardioverter-defibrillator) in place (Chronic) ICD-10-CM: Q98.651 ICD-9-CM: V45.02  1/26/2018 - Present Paroxysmal atrial tachycardia (HCC) ICD-10-CM: I47.1 ICD-9-CM: 427.0  4/17/2016 - Present ESRD (end stage renal disease) (HCC) (Chronic) ICD-10-CM: N18.6 ICD-9-CM: 585.6  12/11/2015 - Present Anemia (Chronic) ICD-10-CM: D64.9 ICD-9-CM: 285.9  1/20/2015 - Present NICM (nonischemic cardiomyopathy) ([facility] ICD-10-CM: I42.8 ICD-9-CM: 425.4  10/19/2013 - Present Overview Signed 10/19/2013  1:36 PM by Armond Ralph MD  
  LVEF 25-30% by echo 10/19/2013 (previously 45%) Polycystic kidney disease (Chronic) ICD-10-CM: Q61.3 ICD-9-CM: 753.12  10/18/2012 - Present Essential hypertension, benign (Chronic) ICD-10-CM: I10 
ICD-9-CM: 401.1  10/18/2012 - Present RESOLVED: Leukocytosis ICD-10-CM: V02.728 ICD-9-CM: 288.60  8/25/2018 - 8/27/2018 RESOLVED: Coagulopathy ([address] ICD-10-CM: C27.0 ICD-9-CM: 286.9  8/24/2018 - 8/27/2018 RESOLVED: Abdominal pain ICD-10-CM: R10.9 ICD-9-CM: 789.00  8/24/2018 - 8/27/2018 RESOLVED: Abnormal nuclear stress test ICD-10-CM: R94.39 
ICD-9-CM: 794.39  12/11/2015 - 1/5/2016 RESOLVED: CAD (coronary artery disease) ICD-10-CM: I25.10 ICD-9-CM: 414.00  1/20/2015 - 12/11/2015 RESOLVED: Sepsis ([facility] ICD-10-CM: A41.9 ICD-9-CM: 038.9, 995.91  1/20/2015 - 10/13/2015 RESOLVED: Fever and chills ICD-10-CM: R50.9 ICD-9-CM: 780.60  1/20/2015 - 10/13/2015 RESOLVED: Abdominal pain ICD-10-CM: R10.9 ICD-9-CM: 789.00  1/20/2015 - 10/13/2015 RESOLVED: Leukocytosis ICD-10-CM: K86.158 ICD-9-CM: 288.60  1/20/2015 - 10/13/2015 RESOLVED: Peritonitis ([facility] ICD-10-CM: K65.9 ICD-9-CM: 567.9  1/19/2015 - 10/13/2015 RESOLVED: Acute systolic HF (heart failure) (HCC) ICD-10-CM: I50.21 ICD-9-CM: 428.21  10/19/2013 - 10/31/2013 RESOLVED: Excessive sleepiness ICD-10-CM: G47.10 ICD-9-CM: 780.54  10/19/2013 - 10/20/2013 Overview Signed 10/19/2013  1:37 PM by Bandar Roa MD  
  Suspect severe KAI RESOLVED: CHF, acute ([facility] ICD-10-CM: I50.9 ICD-9-CM: 428.0  10/18/2013 - 10/31/2013 RESOLVED: Fluid overload ICD-10-CM: E87.70 ICD-9-CM: 276.69  10/18/2013 - 10/31/2013 RESOLVED: Chronic diastolic heart failure ([facility] ICD-10-CM: I50.32 
ICD-9-CM: 428.32  10/31/2012 - 12/13/2013 RESOLVED: CKD (chronic kidney disease) stage 5, GFR less than 15 ml/min (HCC) (Chronic) ICD-10-CM: N18.5 ICD-9-CM: 585.5  10/18/2012 - 12/11/2015 RESOLVED: Heart failure (HCC) ICD-10-CM: I50.9 ICD-9-CM: 428.9  10/15/2012 - 10/18/2012 RESOLVED: Elevated troponin ICD-10-CM: R77.8 ICD-9-CM: 790.6  10/14/2012 - 10/18/2012 RESOLVED: Other dyspnea and respiratory abnormality ICD-10-CM: R06.09, R09.89 ICD-9-CM: 786.09  10/14/2012 - 10/18/2012 RESOLVED: Acute renal failure (HCC) ICD-10-CM: N17.9 ICD-9-CM: 584.9  10/14/2012 - 10/18/2012 Greater than 30 minutes were spent with the patient on counseling and coordination of care Signed:   
Masha Chatterjee MD 
1/8/2021 
8:50 AM

## 2022-02-23 NOTE — PROGRESS NOTES
[id number] Freddie Hdez [facility], [address] [facility]    Office [phone]  Fax [phone]    History of Present Illness  Marilu Gutierrez is a 61 y.o. female. Last seen by Dr. Analilia Salmon 6 months ago. Problem List  Date Reviewed: 7/27/2018          Codes Class Noted    ICD (implantable cardioverter-defibrillator) in place ICD-10-CM: Z95.810  ICD-9-CM: V45.02  1/26/2018        Paroxysmal atrial tachycardia ([facility] ICD-10-CM: I47.1  ICD-9-CM: 427.0  4/17/2016        ESRD on peritoneal dialysis [facility]) ICD-10-CM: N18.6, Z99.2  ICD-9-CM: 585.6, V45.11  57/90/3706        Systolic HF (heart failure) ([facility] ICD-10-CM: I50.20  ICD-9-CM: 428.20  10/31/2013        Cardiomyopathy, nonischemic ([facility] ICD-10-CM: I42.8  ICD-9-CM: 425.4  10/19/2013    Overview Signed 10/19/2013  1:36 PM by Jimy Toledo MD     LVEF 25-30% by echo 10/19/2013 (previously 45%)             Polycystic kidney disease (Chronic) ICD-10-CM: Q61.3  ICD-9-CM: 753.12  10/18/2012        Essential hypertension, benign (Chronic) ICD-10-CM: I10  ICD-9-CM: 401.1  10/18/2012            Cardiac Testing  ECHO: 10/14/12: EF 45% w/ posterior HK Grade 1 DD, LAE, mild MR, mild-mod TR RVSP 60 mmHG   Cath: 10/16/12: Normal cors. No AVG. Mild pulm HTN (43/16/26). Low-normal filling pressures. Echo 10/19/13 - EF 25- 30%. Severe diffuse hypokinesis. Mildly increased wall thickness. Mild concentric hypertrophy. Doppler parameters were consistent with a reversible restrictive pattern, indicative of decreased left ventricular  diastolic compliance and/or increased left atrial pressure (grade 3 diastolic dysfunction). LA mildly dilated, mild MR, mild TR, mod PA HTN, small pericardial effusion circumferential to the heart, no evidence of hemodynamic compromise.     Echo 11/2/15 - EF 25-30%, global HK, mild MR  Lexiscan cardiolite 11/24/15 - focal anteroapical ischemia    Cath 12/15/2015 - EDP 10, LV dilated, EF 20% global HK, normal cors with right dominance. Unable to cannulate femoral vein. 12/29/15-implantation of a single-chamber [address] per Dr. Mirian Shannon    Echo 1/30/17 - EF 20 %. Severe diffuse hypokinesis. Mild LAE. Mild MR. Mild Pulm HTN. Small pericardial effusion. No significant change when compared to study 02-Nov-2015. Echo 1/26/18- LVEF 28%, severe LAE, PA systolic 50 mmHg    HPI  Ms. Sally Davies reports a recent hospital admission to [facility] for complications following cardiac cath. This was part of an ongoing renal tx evaluation. She reports being admitted for several days. Records are unavailable at this time. Since discharge, she reports \"feeling better than I have in a long time\". Prior to admission she reported intermittent nausea and moderate HENDERSON. She is now active with walking. She notes an improvement in baseline HENDERSON, now mild. She denies any exertional sxs with her ADLs. Eating and sleeping well. Denies any syncope, orthopnea, edema or paroxysmal nocturnal dyspnea. Home monitoring of BP has been normal, but she notes that her HR has been 100-130's. Resting HR prior to admission was 80-90's    She gets regular ICD checks. Denies any ICD discharges. She states PD has been going well. Followed by Dr. Martinez Carranza. Reports lab work since hospital discharge. Current Outpatient Prescriptions on File Prior to Visit   Medication Sig Dispense Refill    dilTIAZem CD (CARDIZEM CD) 120 mg ER capsule Take 1 Cap by mouth daily. 30 Cap 6    carvedilol (COREG) 25 mg tablet TAKE ONE TABLET BY MOUTH TWICE DAILY WITH MEALS 180 Tab 3    valsartan (DIOVAN) 160 mg tablet TAKE ONE TABLET BY MOUTH TWICE DAILY FOR HYPERTENSION 180 Tab 3    omeprazole (PRILOSEC) 20 mg capsule Take 20 mg by mouth daily.  SENNA-DOCUSATE SODIUM PO Take  by mouth as needed.  cinacalcet (SENSIPAR) 30 mg tablet Take 30 mg by mouth daily.  ferric citrate (AURYXIA) 210 mg iron tablet Take  by mouth three (3) times daily (with meals).       potassium chloride SR (KLOR-CON 10) 10 mEq tablet Take 40 mEq by mouth daily.  spironolactone (ALDACTONE) 50 mg tablet Take 50 mg by mouth daily.  bumetanide (BUMEX) 2 mg tablet Take 4 mg by mouth two (2) times a day.  aspirin delayed-release 81 mg tablet Take 81 mg by mouth daily.  gentamicin (GARAMYCIN) 0.1 % topical cream Apply  to affected area two (2) times a day. No current facility-administered medications on file prior to visit. Social History     Social History    Marital status: SINGLE     Spouse name: N/A    Number of children: N/A    Years of education: N/A     Occupational History    Not on file. Social History Main Topics    Smoking status: Never Smoker    Smokeless tobacco: Never Used    Alcohol use No    Drug use: No    Sexual activity: Not Currently     Partners: Male     Other Topics Concern    Not on file     Social History Narrative     Review of Systems  Constitutional:  Negative for fever, chills and diaphoresis. Respiratory: Negative for cough, hemoptysis, sputum production. Positive for dyspnea; improved  Cardiovascular:  Negative for claudication, leg swelling and PND. Positive for tachycardia  Gastrointestinal:  Negative for nausea, vomiting, blood in stool and melena. Genitourinary:  Negative for dysuria, urgency and flank pain. Musculoskeletal:  Negative for back pain and joint pain. Skin:  Negative for rash. Neurological:  Negative for dizziness, weakness, seizures, loss of consciousness and headaches. Endo/Heme/Allergies:  Does not bruise/bleed easily. Psychiatric/Behavioral:  Negative for memory loss. The patient does not have insomnia.     Visit Vitals    /76 (BP 1 Location: Right arm, BP Patient Position: Sitting)    Pulse (!) 103    Resp 14    Ht 5' 5\" (1.651 m)    Wt 123 lb (55.8 kg)    SpO2 99%    BMI 20.47 kg/m2     Wt Readings from Last 3 Encounters:   07/27/18 123 lb (55.8 kg)   01/26/18 141 lb (64 kg)   12/08/17 141 lb 3.2 oz (64 kg)     Physical Exam  Vitals reviewed. Constitutional:  She is oriented to person, place and time. She appears well-nourished. Head:  Normocephalic. Neck:  Neck supple. No JVD present. Cardiovascular:  Irregular rhythm, tachycardiac. No murmur, rub or gallop. Pulmonary/Chest:  Effort normal and breath sounds normal.  Abdominal:  Soft, nontender. Bowel sounds normal.  Musculoskeletal:  No edema. Neurological:  Alert and oriented to person, place and time. Skin:  Skin is warm and dry. Psychiatric:  She has a normal mood and affect. Cardiographics  EKG 4/15/2016 - atrial tachycardia 120   Device interrogation 9/15/17 - No device therapies. No VT (since 2/2017). EKG 9/15/17 - SB, first degree AV block   EKG 7/27/18 -  AF, 110    ASSESSMENT and PLAN  Encounter Diagnoses   Name Primary?  Chronic systolic heart failure (HCC) Yes    Essential hypertension, benign     Cardiomyopathy, nonischemic (HCC)     Polycystic kidney disease     ESRD on peritoneal dialysis ([facility] [address]     Paroxysmal atrial tachycardia ([facility] [address] [facility]     ICD (implantable cardioverter-defibrillator) in place     Paroxysmal atrial fibrillation [facility])      Ms. Sally Davies has a NICM with severe LV dysfunction s/p ICD in the setting of HTN and ESRD on PD. 1. NICM - Echo 1/2018 demonstrated stable but severe LF dysfunction (28%). She has stable class 2 HF and is euvolemic on exam.   - Continue BB, ARB and Aldactone  - She continues on Bumex with mild urinary output and PD for volume management. 2. Atrial fibrillation - new diagnosis of unknown duration. Severe LAE on last Echo. Rate control in the office today is stable on Diltiazem and Coreg. No s/sxs of decompensated HF.   - We had a long discussion about diagnosis and treatment including the addition of Coumadin for stroke prevention.    - Request recent lab work from Nephrologist in addition to recent hospital admission documentation from [address] [place] [address].  Will phone follow up to discuss plan. 3. HTN - normotensive.        Tess Hayward NP Anesthesia Volume In Cc (Will Not Render If 0): 0.6

## 2022-04-29 NOTE — TELEPHONE ENCOUNTER
Coumadin 5mg po daily ordered per HOWARD Gonzalez NP to start pt on.     Angela will call pt to make an appt to check INR Dwayne Salas(Attending)

## 2022-12-30 NOTE — H&P
-- DO NOT REPLY / DO NOT REPLY ALL --  -- Message is from [facility] [facility] [employer] ([facility]) --    General Patient Message:  Patient called stating she is in the parking lot wanting to come in to speak to nurse. The office closed at 4pm, which is what writer informed her.  Patient called to speak to nurse about a medication issue that she is having. She stated nobody ever calls her back.    Please call patient back to discuss.       Caller Information       Type Contact Phone/Fax    12/30/2022 04:13 PM CST Phone (Incoming) Nuvia Mcghee (Self) [phone] (M)        Alternative phone number: no    Can a detailed message be left? Yes    Message Turnaround: [place]:    Refer to site's KB page for routing instructions    Please give this turnaround time to the caller:   \"You can expect to receive a response 2-3 business days after your provider's clinical team reviews the message\"               Hospitalist service paged to admit patient with acute hypoxic respiratory failure secondary to Covid pneumonia. Chart reviewed patient seen and examined at bedside. Discussed patient with ER attending Dr. Fe Polanco and requested ICU consult as patient may be more appropriate for ICU placement. Contacted by ICU NP who stated he had advised ER to have patient undergo HD and ICU to reevaluate the patient after HD and admit to ICU for respiratory function has not improved. Hospitalist service will await ICU eval after hemodialysis and admit to  floor if deemed appropriate by ICU.

## 2023-07-13 NOTE — CONSULTS
[facility]  Pulmonary, Critical Care, and Sleep Medicine    Name: Nayan Lucas MRN: [id number]   : 1960 Hospital: [address]   Date: 3/14/2019        Critical Care Initial Patient Consult    IMPRESSION:   · Acute hypoxemic respiratory failure  · Shock  · ? pneumonia  · SVT, h/o afib  · Elevated troponin  · ams / metabolic encephalopathy  · H/o cardiomyopathy (EF21-25%) s/p ICD, mild-moderate AR  · ESRD on PD, h/o PCKD  · GERD  · hyperglycemia      RECOMMENDATIONS:   · Supplemental O2 as needed to keep sats > 90%  · Titrate pressors to keep MAP > 65  · Send blood and sputum cultures, RVP, mycoplasma  · PA/lat CXR  · Check cell count from PD fluid  · Add SSI  · Continue ASA  · Continue amio  · Continue sensipar and sevelamer    DVT ppx: sq heparin  GI ppx: protonix    Pt. Is critically ill and at high risk of decompensation  CCT: 39 minutes     Subjective/History:     Seen as intensivist.    Ms. Ksenia Borden is a 64yo female w/ history of cardiomyopathy (EF 21-25%) s/p ICD, afib/SVT, ESRD on PD, HTN, PCKD and GERD who presented to the ER on 3/11 w/ complaints of sore throat, shortness of breath, dry cough, n/v/d, dysarthria and memory loss (doesn't remember any events of Saturday and missed PD). On amio for SVT. Seen by neuro. Head CT negative. EEG c/w toxi-metabolic encephalopathy. Transferred to the ICU overnight w/ hypotension requiring pressors. She continues to c/o dry cough and pleuritic chest pain.      Past Medical History:   Diagnosis Date    Anemia associated with chronic renal failure     Anuria     Arrhythmia     Paroxysmal a fib, paroxsymal atrial tach, SVT    Chronic kidney disease     ESRD- on PD    Chronic systolic heart failure ([facility] [address] 10/31/2012    Chronic tachycardia     GERD (gastroesophageal reflux disease)     Hx of non-ST elevation myocardial infarction (NSTEMI)     Hypertension     Lipoma of right lower extremity  Problem: Discharge Planning  Goal: Discharge to home or other facility with appropriate resources  Outcome: Progressing     Problem: Chronic Conditions and Co-morbidities  Goal: Patient's chronic conditions and co-morbidity symptoms are monitored and maintained or improved  Outcome: Progressing     Problem: Skin/Tissue Integrity  Goal: Absence of new skin breakdown  Description: 1. Monitor for areas of redness and/or skin breakdown  2. Assess vascular access sites hourly  3. Every 4-6 hours minimum:  Change oxygen saturation probe site  4. Every 4-6 hours:  If on nasal continuous positive airway pressure, respiratory therapy assess nares and determine need for appliance change or resting period.   Outcome: Progressing     Problem: Respiratory - Adult  Goal: Achieves optimal ventilation and oxygenation  Outcome: Progressing Foot    Peritoneal dialysis catheter in place [facility]) 2014    Nightime exchanges    Polycystic kidney disease 2014      Past Surgical History:   Procedure Laterality Date    HX HEART CATHETERIZATION  12/2015    no interventions    HX PACEMAKER  12/29/2015    ICD    IR INSERT NON TUNL CVC OVER 5 YRS  3/13/2019      Prior to Admission medications    Medication Sig Start Date End Date Taking? Authorizing Provider   dilTIAZem CD (CARDIZEM CD) 120 mg ER capsule Take 1 Cap by mouth daily. 2/8/19  Yes Aung Breaux NP   aspirin delayed-release 81 mg tablet Take 81 mg by mouth daily. Yes Provider, Historical   spironolactone (ALDACTONE) 50 mg tablet Take 50 mg by mouth every morning. Yes Provider, Historical   potassium chloride (KLOR-CON M20) 20 mEq tablet Take 40 mEq by mouth every morning. Yes Provider, Historical   carvedilol (COREG) 25 mg tablet Take 1 Tab by mouth two (2) times daily (with meals). 9/18/18  Yes Chris Amaya NP   omeprazole (PRILOSEC) 20 mg capsule Take 20 mg by mouth daily as needed (indigestion). Yes Provider, Historical   SENNA-DOCUSATE SODIUM PO Take 1 Tab by mouth daily as needed (constipation). Yes Provider, Historical   cinacalcet (SENSIPAR) 30 mg tablet Take 30 mg by mouth daily (with dinner). Yes Provider, Historical   ferric citrate (AURYXIA) 210 mg iron tablet Take 210 mg by mouth three (3) times daily (with meals). Yes Provider, Historical   gentamicin (GARAMYCIN) 0.1 % topical cream Apply  to affected area nightly.  7/12/14  Yes Provider, Historical     Current Facility-Administered Medications   Medication Dose Route Frequency    [START ON 3/15/2019] pantoprazole (PROTONIX) tablet 40 mg  40 mg Oral ACB    sevelamer carbonate (RENVELA) tab 1,600 mg  1,600 mg Oral TID WITH MEALS    amiodarone (CORDARONE) tablet 200 mg  200 mg Oral BID WITH MEALS    NOREPINephrine (LEVOPHED) 32 mg in dextrose 5% 250 mL infusion  2-16 mcg/min IntraVENous TITRATE    gentamicin (GARAMYCIN) 0.1 % cream   Topical QHS    heparin (porcine) injection 5,000 Units  5,000 Units SubCUTAneous Q8H    aspirin delayed-release tablet 81 mg  81 mg Oral DAILY    cinacalcet (SENSIPAR) tablet 30 mg  30 mg Oral DAILY WITH DINNER    ferric citrate (AURYXIA) tablet 210 mg  210 mg Oral TID WITH MEALS    sodium chloride (NS) flush 5-40 mL  5-40 mL IntraVENous Q8H    peritoneal dialysis DEXTROSE 1.5% (2.5 mEq/L low calcium) solution 2,000 mL  2,000 mL IntraPERitoneal QID    hydrocortisone (ANUSOL-HC) 2.5 % rectal cream   PeriANAL QID     Allergies   Allergen Reactions    Iodinated Contrast- Oral And Iv Dye Angioedema     Cellulitis on side of face after test      Social History     Tobacco Use    Smoking status: Never Smoker    Smokeless tobacco: Never Used   Substance Use Topics    Alcohol use: No      Family History   Problem Relation Age of Onset    Diabetes Brother     Hypertension Brother     Hypertension Mother     Arthritis-osteo Mother     Hypertension Sister     Diabetes Sister     Kidney Disease Father         polycystic    Parkinson's Disease Father     Heart Disease Father     Hypertension Father     Hypertension Brother             Objective:   Vital Signs:    Visit Vitals  BP 92/47   Pulse 63   Temp 98.7 °F (37.1 °C)   Resp 22   Ht 5' 5\" (1.651 m)   Wt 55.3 kg (122 lb)   SpO2 90%   BMI 20.30 kg/m²       O2 Device: Nasal cannula, Humidifier   O2 Flow Rate (L/min): 4 l/min   Temp (24hrs), Av.9 °F (36.6 °C), Min:97.5 °F (36.4 °C), Max:98.7 °F (37.1 °C)       Intake/Output:   Last shift:       0701 -  190  In: 69926.1 [I.V.:55095.1]  Out: 1500   Last 3 shifts:  1901 -  0700  In: 8415.6 [P.O.:120;  I.V.:45.6]  Out: 6100     Intake/Output Summary (Last 24 hours) at 3/14/2019 1224  Last data filed at 3/14/2019 0922  Gross per 24 hour   Intake 44283.62 ml   Output 3600 ml   Net 89410.62 ml     Hemodynamics:   PAP:   CO:     Wedge:   CI:     CVP:    SVR: PVR:       Ventilator Settings:  Mode Rate Tidal Volume Pressure FiO2 PEEP                    Peak airway pressure:      Minute ventilation:        Physical Exam:    General:  Alert, cooperative, no distress, appears stated age. Head:     Eyes:     Nose:    Throat:    Neck: Supple, symmetrical, trachea midline,   Back:      Lungs:   Diminished but clear   Chest wall:  . Heart:  Regular rate and rhythm, S1, S2 normal, no murmur, click, rub or gallop. Abdomen:   Soft, non-tender, distended. Bowel sounds normal.   Extremities: Extremities normal, atraumatic, no cyanosis or edema.    Pulses:    Skin: Skin color, texture, turgor normal. No rashes or lesions   Lymph nodes:    Neurologic: Grossly nonfocal       Data:     Recent Results (from the past 24 hour(s))   RENAL FUNCTION PANEL    Collection Time: 03/14/19  4:44 AM   Result Value Ref Range    Sodium 133 (L) 136 - 145 mmol/L    Potassium 4.1 3.5 - 5.1 mmol/L    Chloride 95 (L) 97 - 108 mmol/L    CO2 24 21 - 32 mmol/L    Anion gap 14 5 - 15 mmol/L    Glucose 142 (H) 65 - 100 mg/dL     (H) 6 - 20 MG/DL    Creatinine 19.70 (H) 0.55 - 1.02 MG/DL    BUN/Creatinine ratio 6 (L) 12 - 20      GFR est AA 2 (L) >60 ml/min/1.73m2    GFR est non-AA 2 (L) >60 ml/min/1.73m2    Calcium 7.7 (L) 8.5 - 10.1 MG/DL    Phosphorus 8.4 (H) 2.6 - 4.7 MG/DL    Albumin 2.2 (L) 3.5 - 5.0 g/dL                 Imaging:  I have personally reviewed the patients radiographs and have reviewed the reports:            Maci Rosas MD

## 2024-07-31 NOTE — TELEPHONE ENCOUNTER
Pt is taking valsartan 160 mg BID, is it okay to switch to losartan 50 mg BID? normal , pleasant, well nourished, in no acute distress

## 2024-09-16 NOTE — TELEPHONE ENCOUNTER
Health Maintenance       Hepatitis C Screening (Once)  Never done    COVID-19 Vaccine (4 - 2023-24 season)  Overdue since 9/1/2024    Shingles Vaccine (2 of 2)  Order placed this encounter    Influenza Vaccine (1)  Order placed this encounter    Depression Screening (Yearly)  Due soon on 10/2/2024           Following review of the above:  Patient is not proceeding with: Hepatitis C Screening and COVID-19    Note: Refer to final orders and clinician documentation.       Spoke with nurse,Alisa,and pt to continue same coumadin dose,recheck on 03/26/19 as ordered. INR 1.9,PT 23. 1. New pt to coumadin clinic

## (undated) DEVICE — SOLUTION IRRIG 3000ML 0.9% SOD CHL FLX CONT 0797208] ICU MEDICAL INC]

## (undated) DEVICE — CATH 4MM NAVIGATIONAL BI-DIREC --

## (undated) DEVICE — DEVON™ KNEE AND BODY STRAP 60" X 3" (1.5 M X 7.6 CM): Brand: DEVON

## (undated) DEVICE — JELLY,LUBE,STERILE,FLIP TOP,TUBE,4-OZ: Brand: MEDLINE

## (undated) DEVICE — CABLE RMFG EP C3 10/BLK 12YEL -- F/CARTO 3 SYS - OEM 323592

## (undated) DEVICE — CABLE RMFG EP MAP NAVISTAR RED -- F/CARTO 3 SYS - OEM 323590

## (undated) DEVICE — DRESSING HEMOSTATIC SFT INTVENT W/O SLT DBL WRP QUIKCLOT LF

## (undated) DEVICE — PINNACLE INTRODUCER SHEATH: Brand: PINNACLE

## (undated) DEVICE — CATH BI DIR 7FR DEFL CS NON --

## (undated) DEVICE — Z DISCONTINUEDSOLUTION PREP 2OZ 10% POVIDONE IOD SCR CAP BTL

## (undated) DEVICE — SKIN MARKER FINE TIP WITH RULER: Brand: DEVON

## (undated) DEVICE — OPEN-END URETERAL CATHETER: Brand: COOK

## (undated) DEVICE — PACK,CYSTOSCOPY,PK III,SIRUS: Brand: MEDLINE

## (undated) DEVICE — CATHETER ELECTROPHYSIOLOGY D 2-5-2 MM 1 MM 6 FRX115 CM 4 FIX

## (undated) DEVICE — PACK PROCEDURE SURG HRT CATH

## (undated) DEVICE — REM POLYHESIVE ADULT PATIENT RETURN ELECTRODE: Brand: VALLEYLAB

## (undated) DEVICE — 4-PORT MANIFOLD: Brand: NEPTUNE 2

## (undated) DEVICE — PATCH CARTO 3 EXT REF --

## (undated) DEVICE — Y-TYPE TUR IRRIGATION SET

## (undated) DEVICE — STERILE POLYISOPRENE POWDER-FREE SURGICAL GLOVES: Brand: PROTEXIS

## (undated) DEVICE — HEMO INTRO 8.5F 60CM SR0 --

## (undated) DEVICE — BAG DRNGE 4000ML CONT IRRIG ROUNDED TEARDROP SHP DISP

## (undated) DEVICE — SOL IRR GLYC 1.5 % 3000ML --

## (undated) DEVICE — Device: Brand: PADPRO

## (undated) DEVICE — GOWN,PLEAT,SPECIALTY,XL,STRL: Brand: MEDLINE

## (undated) DEVICE — INFECTION CONTROL KIT SYS

## (undated) DEVICE — CABLE RMFG DECA 34/LBLU 10/BLK -- F/CARTO 3 SYS - OEM 332259

## (undated) DEVICE — CATHETER EP 6FR L92CM 2-5-2MM SPC TIP 1MM 4 ELECTRD D CRV

## (undated) DEVICE — LIGHT HANDLE: Brand: DEVON

## (undated) DEVICE — BAG COLLECTION FLD OR-TBL NS --

## (undated) DEVICE — SYR 10ML LUER LOK 1/5ML GRAD --